# Patient Record
Sex: FEMALE | Race: WHITE | NOT HISPANIC OR LATINO
[De-identification: names, ages, dates, MRNs, and addresses within clinical notes are randomized per-mention and may not be internally consistent; named-entity substitution may affect disease eponyms.]

---

## 2017-02-10 ENCOUNTER — RESULT REVIEW (OUTPATIENT)
Age: 67
End: 2017-02-10

## 2017-02-15 ENCOUNTER — APPOINTMENT (OUTPATIENT)
Dept: MAMMOGRAPHY | Facility: IMAGING CENTER | Age: 67
End: 2017-02-15

## 2017-02-15 ENCOUNTER — APPOINTMENT (OUTPATIENT)
Dept: ULTRASOUND IMAGING | Facility: IMAGING CENTER | Age: 67
End: 2017-02-15

## 2017-02-16 ENCOUNTER — OUTPATIENT (OUTPATIENT)
Dept: OUTPATIENT SERVICES | Facility: HOSPITAL | Age: 67
LOS: 1 days | End: 2017-02-16
Payer: MEDICARE

## 2017-02-16 ENCOUNTER — APPOINTMENT (OUTPATIENT)
Dept: MRI IMAGING | Facility: IMAGING CENTER | Age: 67
End: 2017-02-16

## 2017-02-16 DIAGNOSIS — Z98.89 OTHER SPECIFIED POSTPROCEDURAL STATES: Chronic | ICD-10-CM

## 2017-02-16 DIAGNOSIS — Z00.8 ENCOUNTER FOR OTHER GENERAL EXAMINATION: ICD-10-CM

## 2017-02-16 PROCEDURE — C8908: CPT

## 2017-02-16 PROCEDURE — C8937: CPT

## 2017-02-16 PROCEDURE — A9585: CPT

## 2017-02-16 PROCEDURE — 82565 ASSAY OF CREATININE: CPT

## 2017-02-22 ENCOUNTER — OUTPATIENT (OUTPATIENT)
Dept: OUTPATIENT SERVICES | Facility: HOSPITAL | Age: 67
LOS: 1 days | Discharge: HOME | End: 2017-02-22

## 2017-02-22 DIAGNOSIS — Z98.89 OTHER SPECIFIED POSTPROCEDURAL STATES: Chronic | ICD-10-CM

## 2017-02-22 DIAGNOSIS — D05.10 INTRADUCTAL CARCINOMA IN SITU OF UNSPECIFIED BREAST: ICD-10-CM

## 2017-03-09 ENCOUNTER — APPOINTMENT (OUTPATIENT)
Dept: HEMATOLOGY ONCOLOGY | Facility: CLINIC | Age: 67
End: 2017-03-09

## 2017-03-31 ENCOUNTER — OUTPATIENT (OUTPATIENT)
Dept: OUTPATIENT SERVICES | Facility: HOSPITAL | Age: 67
LOS: 1 days | Discharge: HOME | End: 2017-03-31

## 2017-03-31 DIAGNOSIS — Z98.89 OTHER SPECIFIED POSTPROCEDURAL STATES: Chronic | ICD-10-CM

## 2017-03-31 DIAGNOSIS — D05.10 INTRADUCTAL CARCINOMA IN SITU OF UNSPECIFIED BREAST: ICD-10-CM

## 2017-04-02 ENCOUNTER — RESULT REVIEW (OUTPATIENT)
Age: 67
End: 2017-04-02

## 2017-04-03 ENCOUNTER — OTHER (OUTPATIENT)
Age: 67
End: 2017-04-03

## 2017-04-03 ENCOUNTER — APPOINTMENT (OUTPATIENT)
Dept: HEMATOLOGY ONCOLOGY | Facility: CLINIC | Age: 67
End: 2017-04-03

## 2017-04-03 VITALS
HEART RATE: 97 BPM | DIASTOLIC BLOOD PRESSURE: 73 MMHG | WEIGHT: 201.99 LBS | BODY MASS INDEX: 32.85 KG/M2 | OXYGEN SATURATION: 94 % | SYSTOLIC BLOOD PRESSURE: 109 MMHG | RESPIRATION RATE: 16 BRPM | TEMPERATURE: 98.2 F

## 2017-04-03 LAB
BASOPHILS # BLD AUTO: 0 K/UL — SIGNIFICANT CHANGE UP (ref 0–0.2)
BASOPHILS NFR BLD AUTO: 0.2 % — SIGNIFICANT CHANGE UP (ref 0–2)
EOSINOPHIL # BLD AUTO: 0.1 K/UL — SIGNIFICANT CHANGE UP (ref 0–0.5)
EOSINOPHIL NFR BLD AUTO: 0.5 % — SIGNIFICANT CHANGE UP (ref 0–6)
HCT VFR BLD CALC: 40.1 % — SIGNIFICANT CHANGE UP (ref 34.5–45)
HGB BLD-MCNC: 13.6 G/DL — SIGNIFICANT CHANGE UP (ref 11.5–15.5)
LYMPHOCYTES # BLD AUTO: 1.7 K/UL — SIGNIFICANT CHANGE UP (ref 1–3.3)
LYMPHOCYTES # BLD AUTO: 11.2 % — LOW (ref 13–44)
MCHC RBC-ENTMCNC: 29.4 PG — SIGNIFICANT CHANGE UP (ref 27–34)
MCHC RBC-ENTMCNC: 34.1 G/DL — SIGNIFICANT CHANGE UP (ref 32–36)
MCV RBC AUTO: 86.4 FL — SIGNIFICANT CHANGE UP (ref 80–100)
MONOCYTES # BLD AUTO: 0.6 K/UL — SIGNIFICANT CHANGE UP (ref 0–0.9)
MONOCYTES NFR BLD AUTO: 4.3 % — SIGNIFICANT CHANGE UP (ref 2–14)
NEUTROPHILS # BLD AUTO: 12.4 K/UL — HIGH (ref 1.8–7.4)
NEUTROPHILS NFR BLD AUTO: 83.9 % — HIGH (ref 43–77)
PLATELET # BLD AUTO: 197 K/UL — SIGNIFICANT CHANGE UP (ref 150–400)
RBC # BLD: 4.64 M/UL — SIGNIFICANT CHANGE UP (ref 3.8–5.2)
RBC # FLD: 11.9 % — SIGNIFICANT CHANGE UP (ref 10.3–14.5)
WBC # BLD: 14.8 K/UL — HIGH (ref 3.8–10.5)
WBC # FLD AUTO: 14.8 K/UL — HIGH (ref 3.8–10.5)

## 2017-04-17 ENCOUNTER — APPOINTMENT (OUTPATIENT)
Dept: ULTRASOUND IMAGING | Facility: HOSPITAL | Age: 67
End: 2017-04-17

## 2017-04-17 ENCOUNTER — APPOINTMENT (OUTPATIENT)
Dept: ULTRASOUND IMAGING | Facility: IMAGING CENTER | Age: 67
End: 2017-04-17

## 2017-06-02 ENCOUNTER — RESULT REVIEW (OUTPATIENT)
Age: 67
End: 2017-06-02

## 2017-06-02 LAB
ALBUMIN SERPL ELPH-MCNC: 3.9 G/DL
ALP BLD-CCNC: 119 U/L
ALT SERPL-CCNC: 29 U/L
ANION GAP SERPL CALC-SCNC: 15 MMOL/L
AST SERPL-CCNC: 24 U/L
BILIRUB SERPL-MCNC: 0.7 MG/DL
BUN SERPL-MCNC: 19 MG/DL
CALCIUM SERPL-MCNC: 9.5 MG/DL
CANCER AG15-3 SERPL-ACNC: 15.5 U/ML
CANCER AG27-29 SERPL-ACNC: 18.7 U/ML
CHLORIDE SERPL-SCNC: 101 MMOL/L
CO2 SERPL-SCNC: 21 MMOL/L
CREAT SERPL-MCNC: 0.85 MG/DL
GLUCOSE SERPL-MCNC: 106 MG/DL
LDH SERPL-CCNC: 225 U/L
POTASSIUM SERPL-SCNC: 4.5 MMOL/L
PROT SERPL-MCNC: 7 G/DL
SODIUM SERPL-SCNC: 137 MMOL/L

## 2017-07-07 ENCOUNTER — CHART COPY (OUTPATIENT)
Age: 67
End: 2017-07-07

## 2017-07-07 ENCOUNTER — APPOINTMENT (OUTPATIENT)
Dept: SURGERY | Facility: CLINIC | Age: 67
End: 2017-07-07

## 2017-07-07 VITALS
DIASTOLIC BLOOD PRESSURE: 88 MMHG | OXYGEN SATURATION: 97 % | HEART RATE: 78 BPM | RESPIRATION RATE: 16 BRPM | TEMPERATURE: 98.1 F | SYSTOLIC BLOOD PRESSURE: 154 MMHG

## 2017-07-07 DIAGNOSIS — Z82.49 FAMILY HISTORY OF ISCHEMIC HEART DISEASE AND OTHER DISEASES OF THE CIRCULATORY SYSTEM: ICD-10-CM

## 2017-07-07 RX ORDER — ANASTROZOLE TABLETS 1 MG/1
1 TABLET ORAL DAILY
Qty: 90 | Refills: 3 | Status: DISCONTINUED | COMMUNITY
Start: 2017-04-17 | End: 2017-07-07

## 2017-07-07 RX ORDER — CLONAZEPAM 1 MG/1
1 TABLET ORAL
Refills: 0 | Status: ACTIVE | COMMUNITY

## 2017-07-07 RX ORDER — AMLODIPINE BESYLATE 5 MG/1
TABLET ORAL
Refills: 0 | Status: ACTIVE | COMMUNITY

## 2017-07-07 RX ORDER — ZOLPIDEM TARTRATE 10 MG/1
10 TABLET, FILM COATED ORAL
Refills: 0 | Status: ACTIVE | COMMUNITY

## 2017-07-10 ENCOUNTER — OUTPATIENT (OUTPATIENT)
Dept: OUTPATIENT SERVICES | Facility: HOSPITAL | Age: 67
LOS: 1 days | Discharge: HOME | End: 2017-07-10

## 2017-07-10 ENCOUNTER — APPOINTMENT (OUTPATIENT)
Dept: HEMATOLOGY ONCOLOGY | Facility: CLINIC | Age: 67
End: 2017-07-10

## 2017-07-10 DIAGNOSIS — Z98.89 OTHER SPECIFIED POSTPROCEDURAL STATES: Chronic | ICD-10-CM

## 2017-07-10 DIAGNOSIS — D05.10 INTRADUCTAL CARCINOMA IN SITU OF UNSPECIFIED BREAST: ICD-10-CM

## 2017-07-11 ENCOUNTER — OTHER (OUTPATIENT)
Age: 67
End: 2017-07-11

## 2017-07-19 ENCOUNTER — RESULT REVIEW (OUTPATIENT)
Age: 67
End: 2017-07-19

## 2017-07-19 ENCOUNTER — APPOINTMENT (OUTPATIENT)
Dept: HEMATOLOGY ONCOLOGY | Facility: CLINIC | Age: 67
End: 2017-07-19

## 2017-07-19 VITALS
DIASTOLIC BLOOD PRESSURE: 79 MMHG | WEIGHT: 204.81 LBS | HEART RATE: 78 BPM | TEMPERATURE: 98 F | OXYGEN SATURATION: 97 % | SYSTOLIC BLOOD PRESSURE: 117 MMHG | RESPIRATION RATE: 16 BRPM | BODY MASS INDEX: 33.31 KG/M2

## 2017-07-19 LAB
BASOPHILS # BLD AUTO: 0 K/UL — SIGNIFICANT CHANGE UP (ref 0–0.2)
BASOPHILS NFR BLD AUTO: 0.1 % — SIGNIFICANT CHANGE UP (ref 0–2)
EOSINOPHIL # BLD AUTO: 0.1 K/UL — SIGNIFICANT CHANGE UP (ref 0–0.5)
EOSINOPHIL NFR BLD AUTO: 1.1 % — SIGNIFICANT CHANGE UP (ref 0–6)
HCT VFR BLD CALC: 43.5 % — SIGNIFICANT CHANGE UP (ref 34.5–45)
HGB BLD-MCNC: 15 G/DL — SIGNIFICANT CHANGE UP (ref 11.5–15.5)
LYMPHOCYTES # BLD AUTO: 2.4 K/UL — SIGNIFICANT CHANGE UP (ref 1–3.3)
LYMPHOCYTES # BLD AUTO: 29.8 % — SIGNIFICANT CHANGE UP (ref 13–44)
MCHC RBC-ENTMCNC: 29.1 PG — SIGNIFICANT CHANGE UP (ref 27–34)
MCHC RBC-ENTMCNC: 34.4 G/DL — SIGNIFICANT CHANGE UP (ref 32–36)
MCV RBC AUTO: 84.5 FL — SIGNIFICANT CHANGE UP (ref 80–100)
MONOCYTES # BLD AUTO: 0.6 K/UL — SIGNIFICANT CHANGE UP (ref 0–0.9)
MONOCYTES NFR BLD AUTO: 7.2 % — SIGNIFICANT CHANGE UP (ref 2–14)
NEUTROPHILS # BLD AUTO: 4.9 K/UL — SIGNIFICANT CHANGE UP (ref 1.8–7.4)
NEUTROPHILS NFR BLD AUTO: 61.8 % — SIGNIFICANT CHANGE UP (ref 43–77)
PLATELET # BLD AUTO: 218 K/UL — SIGNIFICANT CHANGE UP (ref 150–400)
RBC # BLD: 5.14 M/UL — SIGNIFICANT CHANGE UP (ref 3.8–5.2)
RBC # FLD: 13.9 % — SIGNIFICANT CHANGE UP (ref 10.3–14.5)
WBC # BLD: 8 K/UL — SIGNIFICANT CHANGE UP (ref 3.8–10.5)
WBC # FLD AUTO: 8 K/UL — SIGNIFICANT CHANGE UP (ref 3.8–10.5)

## 2017-07-24 ENCOUNTER — RESULT REVIEW (OUTPATIENT)
Age: 67
End: 2017-07-24

## 2017-07-24 ENCOUNTER — APPOINTMENT (OUTPATIENT)
Dept: HEMATOLOGY ONCOLOGY | Facility: CLINIC | Age: 67
End: 2017-07-24

## 2017-07-24 LAB
BASOPHILS # BLD AUTO: 0 K/UL — SIGNIFICANT CHANGE UP (ref 0–0.2)
BASOPHILS NFR BLD AUTO: 0.4 % — SIGNIFICANT CHANGE UP (ref 0–2)
EOSINOPHIL # BLD AUTO: 0.1 K/UL — SIGNIFICANT CHANGE UP (ref 0–0.5)
EOSINOPHIL NFR BLD AUTO: 1.2 % — SIGNIFICANT CHANGE UP (ref 0–6)
HCT VFR BLD CALC: 42.2 % — SIGNIFICANT CHANGE UP (ref 34.5–45)
HGB BLD-MCNC: 14.6 G/DL — SIGNIFICANT CHANGE UP (ref 11.5–15.5)
LYMPHOCYTES # BLD AUTO: 2.1 K/UL — SIGNIFICANT CHANGE UP (ref 1–3.3)
LYMPHOCYTES # BLD AUTO: 34.9 % — SIGNIFICANT CHANGE UP (ref 13–44)
MCHC RBC-ENTMCNC: 29.5 PG — SIGNIFICANT CHANGE UP (ref 27–34)
MCHC RBC-ENTMCNC: 34.7 G/DL — SIGNIFICANT CHANGE UP (ref 32–36)
MCV RBC AUTO: 85.1 FL — SIGNIFICANT CHANGE UP (ref 80–100)
MONOCYTES # BLD AUTO: 0.4 K/UL — SIGNIFICANT CHANGE UP (ref 0–0.9)
MONOCYTES NFR BLD AUTO: 6.8 % — SIGNIFICANT CHANGE UP (ref 2–14)
NEUTROPHILS # BLD AUTO: 3.5 K/UL — SIGNIFICANT CHANGE UP (ref 1.8–7.4)
NEUTROPHILS NFR BLD AUTO: 56.7 % — SIGNIFICANT CHANGE UP (ref 43–77)
PLATELET # BLD AUTO: 210 K/UL — SIGNIFICANT CHANGE UP (ref 150–400)
RBC # BLD: 4.95 M/UL — SIGNIFICANT CHANGE UP (ref 3.8–5.2)
RBC # FLD: 13.9 % — SIGNIFICANT CHANGE UP (ref 10.3–14.5)
WBC # BLD: 6.1 K/UL — SIGNIFICANT CHANGE UP (ref 3.8–10.5)
WBC # FLD AUTO: 6.1 K/UL — SIGNIFICANT CHANGE UP (ref 3.8–10.5)

## 2017-07-25 ENCOUNTER — RESULT REVIEW (OUTPATIENT)
Age: 67
End: 2017-07-25

## 2017-07-25 LAB
ALBUMIN SERPL ELPH-MCNC: 4.4 G/DL
ALP BLD-CCNC: 172 U/L
ALT SERPL-CCNC: 19 U/L
ANION GAP SERPL CALC-SCNC: 18 MMOL/L
AST SERPL-CCNC: 20 U/L
BILIRUB SERPL-MCNC: 0.3 MG/DL
BUN SERPL-MCNC: 14 MG/DL
CALCIUM SERPL-MCNC: 10 MG/DL
CANCER AG15-3 SERPL-ACNC: 16.7 U/ML
CANCER AG27-29 SERPL-ACNC: 16.8 U/ML
CHLORIDE SERPL-SCNC: 104 MMOL/L
CO2 SERPL-SCNC: 21 MMOL/L
CREAT SERPL-MCNC: 0.83 MG/DL
GLUCOSE SERPL-MCNC: 118 MG/DL
LDH SERPL-CCNC: 176 U/L
POTASSIUM SERPL-SCNC: 4.6 MMOL/L
PROT SERPL-MCNC: 7.3 G/DL
SODIUM SERPL-SCNC: 143 MMOL/L

## 2017-07-26 ENCOUNTER — APPOINTMENT (OUTPATIENT)
Dept: SURGERY | Facility: HOSPITAL | Age: 67
End: 2017-07-26

## 2017-07-28 LAB
ALP BLD-CCNC: 168 U/L
ALP BONE CFR SERPL: 61 %
ALP INTEST CFR SERPL: 0 %
ALP LIVER CFR SERPL: 39 %
ALP MACRO CFR SERPL: 0 %
ALP PLAC CFR SERPL: 0 %

## 2017-08-01 ENCOUNTER — FORM ENCOUNTER (OUTPATIENT)
Age: 67
End: 2017-08-01

## 2017-08-02 ENCOUNTER — APPOINTMENT (OUTPATIENT)
Dept: NUCLEAR MEDICINE | Facility: IMAGING CENTER | Age: 67
End: 2017-08-02
Payer: MEDICARE

## 2017-08-02 ENCOUNTER — OUTPATIENT (OUTPATIENT)
Dept: OUTPATIENT SERVICES | Facility: HOSPITAL | Age: 67
LOS: 1 days | End: 2017-08-02
Payer: MEDICARE

## 2017-08-02 ENCOUNTER — APPOINTMENT (OUTPATIENT)
Dept: MRI IMAGING | Facility: IMAGING CENTER | Age: 67
End: 2017-08-02
Payer: MEDICARE

## 2017-08-02 DIAGNOSIS — Z85.3 PERSONAL HISTORY OF MALIGNANT NEOPLASM OF BREAST: ICD-10-CM

## 2017-08-02 DIAGNOSIS — Z98.89 OTHER SPECIFIED POSTPROCEDURAL STATES: Chronic | ICD-10-CM

## 2017-08-02 PROCEDURE — 78816 PET IMAGE W/CT FULL BODY: CPT | Mod: 26,PS

## 2017-08-02 PROCEDURE — A9552: CPT

## 2017-08-02 PROCEDURE — 78816 PET IMAGE W/CT FULL BODY: CPT

## 2017-08-03 ENCOUNTER — APPOINTMENT (OUTPATIENT)
Dept: ULTRASOUND IMAGING | Facility: IMAGING CENTER | Age: 67
End: 2017-08-03
Payer: MEDICARE

## 2017-08-03 ENCOUNTER — APPOINTMENT (OUTPATIENT)
Dept: MAMMOGRAPHY | Facility: IMAGING CENTER | Age: 67
End: 2017-08-03
Payer: MEDICARE

## 2017-08-03 ENCOUNTER — RESULT REVIEW (OUTPATIENT)
Age: 67
End: 2017-08-03

## 2017-08-03 ENCOUNTER — OUTPATIENT (OUTPATIENT)
Dept: OUTPATIENT SERVICES | Facility: HOSPITAL | Age: 67
LOS: 1 days | End: 2017-08-03
Payer: MEDICARE

## 2017-08-03 DIAGNOSIS — Z00.8 ENCOUNTER FOR OTHER GENERAL EXAMINATION: ICD-10-CM

## 2017-08-03 DIAGNOSIS — Z98.89 OTHER SPECIFIED POSTPROCEDURAL STATES: Chronic | ICD-10-CM

## 2017-08-03 LAB
ALBUMIN SERPL ELPH-MCNC: 4.2 G/DL
ALP BLD-CCNC: 168 U/L
ALT SERPL-CCNC: 20 U/L
ANION GAP SERPL CALC-SCNC: 16 MMOL/L
AST SERPL-CCNC: 16 U/L
BILIRUB SERPL-MCNC: 0.3 MG/DL
BUN SERPL-MCNC: 21 MG/DL
CALCIUM SERPL-MCNC: 10.4 MG/DL
CANCER AG15-3 SERPL-ACNC: 18.3 U/ML
CANCER AG27-29 SERPL-ACNC: 20.5 U/ML
CHLORIDE SERPL-SCNC: 104 MMOL/L
CO2 SERPL-SCNC: 21 MMOL/L
CREAT SERPL-MCNC: 0.94 MG/DL
GLUCOSE SERPL-MCNC: 107 MG/DL
LDH SERPL-CCNC: 164 U/L
POTASSIUM SERPL-SCNC: 4.4 MMOL/L
PROT SERPL-MCNC: 7.3 G/DL
SODIUM SERPL-SCNC: 141 MMOL/L

## 2017-08-03 PROCEDURE — G0204: CPT | Mod: 26

## 2017-08-03 PROCEDURE — G0279: CPT | Mod: 26

## 2017-08-03 PROCEDURE — 76641 ULTRASOUND BREAST COMPLETE: CPT | Mod: 26,50

## 2017-08-03 PROCEDURE — 77066 DX MAMMO INCL CAD BI: CPT

## 2017-08-03 PROCEDURE — 76641 ULTRASOUND BREAST COMPLETE: CPT

## 2017-08-03 PROCEDURE — G0279: CPT

## 2017-08-11 ENCOUNTER — APPOINTMENT (OUTPATIENT)
Dept: SURGERY | Facility: HOSPITAL | Age: 67
End: 2017-08-11

## 2017-10-12 ENCOUNTER — OUTPATIENT (OUTPATIENT)
Dept: OUTPATIENT SERVICES | Facility: HOSPITAL | Age: 67
LOS: 1 days | Discharge: HOME | End: 2017-10-12

## 2017-10-12 DIAGNOSIS — D05.10 INTRADUCTAL CARCINOMA IN SITU OF UNSPECIFIED BREAST: ICD-10-CM

## 2017-10-12 DIAGNOSIS — Z98.89 OTHER SPECIFIED POSTPROCEDURAL STATES: Chronic | ICD-10-CM

## 2017-10-17 ENCOUNTER — APPOINTMENT (OUTPATIENT)
Dept: HEMATOLOGY ONCOLOGY | Facility: CLINIC | Age: 67
End: 2017-10-17
Payer: MEDICARE

## 2017-11-07 ENCOUNTER — APPOINTMENT (OUTPATIENT)
Dept: HEMATOLOGY ONCOLOGY | Facility: CLINIC | Age: 67
End: 2017-11-07
Payer: MEDICARE

## 2017-11-07 ENCOUNTER — RESULT REVIEW (OUTPATIENT)
Age: 67
End: 2017-11-07

## 2017-11-07 VITALS
WEIGHT: 210.54 LBS | SYSTOLIC BLOOD PRESSURE: 127 MMHG | RESPIRATION RATE: 16 BRPM | HEART RATE: 78 BPM | BODY MASS INDEX: 35.51 KG/M2 | OXYGEN SATURATION: 99 % | HEIGHT: 64.69 IN | TEMPERATURE: 97.4 F | DIASTOLIC BLOOD PRESSURE: 83 MMHG

## 2017-11-07 LAB
BASOPHILS # BLD AUTO: 0.1 K/UL — SIGNIFICANT CHANGE UP (ref 0–0.2)
BASOPHILS NFR BLD AUTO: 0.8 % — SIGNIFICANT CHANGE UP (ref 0–2)
EOSINOPHIL # BLD AUTO: 0.1 K/UL — SIGNIFICANT CHANGE UP (ref 0–0.5)
EOSINOPHIL NFR BLD AUTO: 1.2 % — SIGNIFICANT CHANGE UP (ref 0–6)
HCT VFR BLD CALC: 45.7 % — HIGH (ref 34.5–45)
HGB BLD-MCNC: 15.7 G/DL — HIGH (ref 11.5–15.5)
LYMPHOCYTES # BLD AUTO: 2.6 K/UL — SIGNIFICANT CHANGE UP (ref 1–3.3)
LYMPHOCYTES # BLD AUTO: 34.8 % — SIGNIFICANT CHANGE UP (ref 13–44)
MCHC RBC-ENTMCNC: 29.2 PG — SIGNIFICANT CHANGE UP (ref 27–34)
MCHC RBC-ENTMCNC: 34.4 G/DL — SIGNIFICANT CHANGE UP (ref 32–36)
MCV RBC AUTO: 85 FL — SIGNIFICANT CHANGE UP (ref 80–100)
MONOCYTES # BLD AUTO: 0.5 K/UL — SIGNIFICANT CHANGE UP (ref 0–0.9)
MONOCYTES NFR BLD AUTO: 6.3 % — SIGNIFICANT CHANGE UP (ref 2–14)
NEUTROPHILS # BLD AUTO: 4.2 K/UL — SIGNIFICANT CHANGE UP (ref 1.8–7.4)
NEUTROPHILS NFR BLD AUTO: 56.9 % — SIGNIFICANT CHANGE UP (ref 43–77)
PLATELET # BLD AUTO: 232 K/UL — SIGNIFICANT CHANGE UP (ref 150–400)
RBC # BLD: 5.38 M/UL — HIGH (ref 3.8–5.2)
RBC # FLD: 12.2 % — SIGNIFICANT CHANGE UP (ref 10.3–14.5)
WBC # BLD: 7.4 K/UL — SIGNIFICANT CHANGE UP (ref 3.8–10.5)
WBC # FLD AUTO: 7.4 K/UL — SIGNIFICANT CHANGE UP (ref 3.8–10.5)

## 2017-11-07 PROCEDURE — 99214 OFFICE O/P EST MOD 30 MIN: CPT

## 2017-11-08 LAB
ALBUMIN SERPL ELPH-MCNC: 4.1 G/DL
ALP BLD-CCNC: 157 U/L
ALT SERPL-CCNC: 20 U/L
ANION GAP SERPL CALC-SCNC: 13 MMOL/L
AST SERPL-CCNC: 22 U/L
BILIRUB SERPL-MCNC: 0.3 MG/DL
BUN SERPL-MCNC: 16 MG/DL
CALCIUM SERPL-MCNC: 9.8 MG/DL
CANCER AG15-3 SERPL-ACNC: 18.7 U/ML
CANCER AG27-29 SERPL-ACNC: 20 U/ML
CHLORIDE SERPL-SCNC: 101 MMOL/L
CO2 SERPL-SCNC: 24 MMOL/L
CREAT SERPL-MCNC: 0.74 MG/DL
GLUCOSE SERPL-MCNC: 114 MG/DL
LDH SERPL-CCNC: 203 U/L
POTASSIUM SERPL-SCNC: 4.4 MMOL/L
PROT SERPL-MCNC: 7.6 G/DL
SODIUM SERPL-SCNC: 138 MMOL/L

## 2017-11-13 ENCOUNTER — OUTPATIENT (OUTPATIENT)
Dept: OUTPATIENT SERVICES | Facility: HOSPITAL | Age: 67
LOS: 1 days | Discharge: HOME | End: 2017-11-13

## 2017-11-13 ENCOUNTER — APPOINTMENT (OUTPATIENT)
Dept: SURGERY | Facility: CLINIC | Age: 67
End: 2017-11-13
Payer: MEDICARE

## 2017-11-13 ENCOUNTER — RESULT REVIEW (OUTPATIENT)
Age: 67
End: 2017-11-13

## 2017-11-13 ENCOUNTER — APPOINTMENT (OUTPATIENT)
Dept: HEMATOLOGY ONCOLOGY | Facility: CLINIC | Age: 67
End: 2017-11-13

## 2017-11-13 DIAGNOSIS — R74.8 ABNORMAL LEVELS OF OTHER SERUM ENZYMES: ICD-10-CM

## 2017-11-13 DIAGNOSIS — Z98.89 OTHER SPECIFIED POSTPROCEDURAL STATES: Chronic | ICD-10-CM

## 2017-11-13 DIAGNOSIS — D05.10 INTRADUCTAL CARCINOMA IN SITU OF UNSPECIFIED BREAST: ICD-10-CM

## 2017-11-13 LAB
BASOPHILS # BLD AUTO: 0.1 K/UL — SIGNIFICANT CHANGE UP (ref 0–0.2)
BASOPHILS NFR BLD AUTO: 1.2 % — SIGNIFICANT CHANGE UP (ref 0–2)
EOSINOPHIL # BLD AUTO: 0.1 K/UL — SIGNIFICANT CHANGE UP (ref 0–0.5)
EOSINOPHIL NFR BLD AUTO: 0.9 % — SIGNIFICANT CHANGE UP (ref 0–6)
HCT VFR BLD CALC: 47.4 % — HIGH (ref 34.5–45)
HGB BLD-MCNC: 16.5 G/DL — HIGH (ref 11.5–15.5)
LYMPHOCYTES # BLD AUTO: 2.5 K/UL — SIGNIFICANT CHANGE UP (ref 1–3.3)
LYMPHOCYTES # BLD AUTO: 26.6 % — SIGNIFICANT CHANGE UP (ref 13–44)
MCHC RBC-ENTMCNC: 29.7 PG — SIGNIFICANT CHANGE UP (ref 27–34)
MCHC RBC-ENTMCNC: 34.8 G/DL — SIGNIFICANT CHANGE UP (ref 32–36)
MCV RBC AUTO: 85.5 FL — SIGNIFICANT CHANGE UP (ref 80–100)
MONOCYTES # BLD AUTO: 0.6 K/UL — SIGNIFICANT CHANGE UP (ref 0–0.9)
MONOCYTES NFR BLD AUTO: 6.8 % — SIGNIFICANT CHANGE UP (ref 2–14)
NEUTROPHILS # BLD AUTO: 6 K/UL — SIGNIFICANT CHANGE UP (ref 1.8–7.4)
NEUTROPHILS NFR BLD AUTO: 64.5 % — SIGNIFICANT CHANGE UP (ref 43–77)
PLATELET # BLD AUTO: 230 K/UL — SIGNIFICANT CHANGE UP (ref 150–400)
RBC # BLD: 5.54 M/UL — HIGH (ref 3.8–5.2)
RBC # FLD: 12.3 % — SIGNIFICANT CHANGE UP (ref 10.3–14.5)
WBC # BLD: 9.3 K/UL — SIGNIFICANT CHANGE UP (ref 3.8–10.5)
WBC # FLD AUTO: 9.3 K/UL — SIGNIFICANT CHANGE UP (ref 3.8–10.5)

## 2017-11-13 PROCEDURE — 99213K: CUSTOM

## 2017-11-16 LAB
ALP BLD-CCNC: 178 U/L
ALP BONE CFR SERPL: 57 %
ALP INTEST CFR SERPL: 0 %
ALP LIVER CFR SERPL: 43 %
ALP MACRO CFR SERPL: 0 %
ALP PLAC CFR SERPL: 0 %

## 2018-01-02 ENCOUNTER — EMERGENCY (EMERGENCY)
Facility: HOSPITAL | Age: 68
LOS: 1 days | Discharge: HOME | End: 2018-01-02
Attending: EMERGENCY MEDICINE | Admitting: EMERGENCY MEDICINE
Payer: MEDICARE

## 2018-01-02 VITALS
SYSTOLIC BLOOD PRESSURE: 133 MMHG | RESPIRATION RATE: 20 BRPM | OXYGEN SATURATION: 99 % | HEART RATE: 98 BPM | DIASTOLIC BLOOD PRESSURE: 87 MMHG | TEMPERATURE: 99 F

## 2018-01-02 DIAGNOSIS — Z98.89 OTHER SPECIFIED POSTPROCEDURAL STATES: Chronic | ICD-10-CM

## 2018-01-02 LAB
ALBUMIN SERPL ELPH-MCNC: 3.7 G/DL — SIGNIFICANT CHANGE UP (ref 3.3–5)
ALBUMIN SERPL ELPH-MCNC: 4.2 G/DL — SIGNIFICANT CHANGE UP (ref 3.3–5)
ALP SERPL-CCNC: 113 U/L — SIGNIFICANT CHANGE UP (ref 40–120)
ALP SERPL-CCNC: 122 U/L — HIGH (ref 40–120)
ALT FLD-CCNC: 44 U/L RC — SIGNIFICANT CHANGE UP (ref 10–45)
ALT FLD-CCNC: 55 U/L RC — HIGH (ref 10–45)
ANION GAP SERPL CALC-SCNC: 11 MMOL/L — SIGNIFICANT CHANGE UP (ref 5–17)
ANION GAP SERPL CALC-SCNC: 11 MMOL/L — SIGNIFICANT CHANGE UP (ref 5–17)
APPEARANCE UR: ABNORMAL
AST SERPL-CCNC: 29 U/L — SIGNIFICANT CHANGE UP (ref 10–40)
AST SERPL-CCNC: 70 U/L — HIGH (ref 10–40)
BASOPHILS # BLD AUTO: 0.1 K/UL — SIGNIFICANT CHANGE UP (ref 0–0.2)
BASOPHILS NFR BLD AUTO: 1 % — SIGNIFICANT CHANGE UP (ref 0–2)
BILIRUB SERPL-MCNC: 0.4 MG/DL — SIGNIFICANT CHANGE UP (ref 0.2–1.2)
BILIRUB SERPL-MCNC: 0.5 MG/DL — SIGNIFICANT CHANGE UP (ref 0.2–1.2)
BILIRUB UR-MCNC: NEGATIVE — SIGNIFICANT CHANGE UP
BUN SERPL-MCNC: 17 MG/DL — SIGNIFICANT CHANGE UP (ref 7–23)
BUN SERPL-MCNC: 18 MG/DL — SIGNIFICANT CHANGE UP (ref 7–23)
CALCIUM SERPL-MCNC: 10 MG/DL — SIGNIFICANT CHANGE UP (ref 8.4–10.5)
CALCIUM SERPL-MCNC: 9.4 MG/DL — SIGNIFICANT CHANGE UP (ref 8.4–10.5)
CHLORIDE SERPL-SCNC: 104 MMOL/L — SIGNIFICANT CHANGE UP (ref 96–108)
CHLORIDE SERPL-SCNC: 106 MMOL/L — SIGNIFICANT CHANGE UP (ref 96–108)
CO2 SERPL-SCNC: 24 MMOL/L — SIGNIFICANT CHANGE UP (ref 22–31)
CO2 SERPL-SCNC: 25 MMOL/L — SIGNIFICANT CHANGE UP (ref 22–31)
COLOR SPEC: YELLOW — SIGNIFICANT CHANGE UP
COMMENT - URINE: SIGNIFICANT CHANGE UP
CREAT SERPL-MCNC: 0.81 MG/DL — SIGNIFICANT CHANGE UP (ref 0.5–1.3)
CREAT SERPL-MCNC: 0.88 MG/DL — SIGNIFICANT CHANGE UP (ref 0.5–1.3)
DIFF PNL FLD: ABNORMAL
EOSINOPHIL # BLD AUTO: 0.1 K/UL — SIGNIFICANT CHANGE UP (ref 0–0.5)
EOSINOPHIL NFR BLD AUTO: 1.4 % — SIGNIFICANT CHANGE UP (ref 0–6)
EPI CELLS # UR: SIGNIFICANT CHANGE UP /HPF
GLUCOSE SERPL-MCNC: 100 MG/DL — HIGH (ref 70–99)
GLUCOSE SERPL-MCNC: 118 MG/DL — HIGH (ref 70–99)
GLUCOSE UR QL: NEGATIVE — SIGNIFICANT CHANGE UP
HCT VFR BLD CALC: 44.3 % — SIGNIFICANT CHANGE UP (ref 34.5–45)
HGB BLD-MCNC: 16.1 G/DL — HIGH (ref 11.5–15.5)
KETONES UR-MCNC: NEGATIVE — SIGNIFICANT CHANGE UP
LEUKOCYTE ESTERASE UR-ACNC: ABNORMAL
LIDOCAIN IGE QN: 63 U/L — HIGH (ref 7–60)
LYMPHOCYTES # BLD AUTO: 4.1 K/UL — HIGH (ref 1–3.3)
LYMPHOCYTES # BLD AUTO: 43.6 % — SIGNIFICANT CHANGE UP (ref 13–44)
MCHC RBC-ENTMCNC: 32 PG — SIGNIFICANT CHANGE UP (ref 27–34)
MCHC RBC-ENTMCNC: 36.3 GM/DL — HIGH (ref 32–36)
MCV RBC AUTO: 88 FL — SIGNIFICANT CHANGE UP (ref 80–100)
MONOCYTES # BLD AUTO: 0.6 K/UL — SIGNIFICANT CHANGE UP (ref 0–0.9)
MONOCYTES NFR BLD AUTO: 6.7 % — SIGNIFICANT CHANGE UP (ref 2–14)
NEUTROPHILS # BLD AUTO: 4.5 K/UL — SIGNIFICANT CHANGE UP (ref 1.8–7.4)
NEUTROPHILS NFR BLD AUTO: 47.3 % — SIGNIFICANT CHANGE UP (ref 43–77)
NITRITE UR-MCNC: NEGATIVE — SIGNIFICANT CHANGE UP
PH UR: 5.5 — SIGNIFICANT CHANGE UP (ref 5–8)
PLATELET # BLD AUTO: 261 K/UL — SIGNIFICANT CHANGE UP (ref 150–400)
POTASSIUM SERPL-MCNC: 3.7 MMOL/L — SIGNIFICANT CHANGE UP (ref 3.5–5.3)
POTASSIUM SERPL-MCNC: 6.8 MMOL/L — CRITICAL HIGH (ref 3.5–5.3)
POTASSIUM SERPL-SCNC: 3.7 MMOL/L — SIGNIFICANT CHANGE UP (ref 3.5–5.3)
POTASSIUM SERPL-SCNC: 6.8 MMOL/L — CRITICAL HIGH (ref 3.5–5.3)
PROT SERPL-MCNC: 6.3 G/DL — SIGNIFICANT CHANGE UP (ref 6–8.3)
PROT SERPL-MCNC: 8.2 G/DL — SIGNIFICANT CHANGE UP (ref 6–8.3)
PROT UR-MCNC: 30 MG/DL
RBC # BLD: 5.03 M/UL — SIGNIFICANT CHANGE UP (ref 3.8–5.2)
RBC # FLD: 12.3 % — SIGNIFICANT CHANGE UP (ref 10.3–14.5)
RBC CASTS # UR COMP ASSIST: ABNORMAL /HPF (ref 0–2)
SODIUM SERPL-SCNC: 139 MMOL/L — SIGNIFICANT CHANGE UP (ref 135–145)
SODIUM SERPL-SCNC: 142 MMOL/L — SIGNIFICANT CHANGE UP (ref 135–145)
SP GR SPEC: 1.02 — SIGNIFICANT CHANGE UP (ref 1.01–1.02)
UROBILINOGEN FLD QL: NEGATIVE — SIGNIFICANT CHANGE UP
WBC # BLD: 9.4 K/UL — SIGNIFICANT CHANGE UP (ref 3.8–10.5)
WBC # FLD AUTO: 9.4 K/UL — SIGNIFICANT CHANGE UP (ref 3.8–10.5)
WBC UR QL: >50 /HPF (ref 0–5)

## 2018-01-02 PROCEDURE — 93010 ELECTROCARDIOGRAM REPORT: CPT

## 2018-01-02 PROCEDURE — 99284 EMERGENCY DEPT VISIT MOD MDM: CPT | Mod: 25,GC

## 2018-01-02 RX ORDER — SODIUM CHLORIDE 9 MG/ML
1000 INJECTION INTRAMUSCULAR; INTRAVENOUS; SUBCUTANEOUS ONCE
Qty: 0 | Refills: 0 | Status: COMPLETED | OUTPATIENT
Start: 2018-01-02 | End: 2018-01-02

## 2018-01-02 RX ORDER — ONDANSETRON 8 MG/1
4 TABLET, FILM COATED ORAL ONCE
Qty: 0 | Refills: 0 | Status: COMPLETED | OUTPATIENT
Start: 2018-01-02 | End: 2018-01-02

## 2018-01-02 RX ORDER — CEFTRIAXONE 500 MG/1
1 INJECTION, POWDER, FOR SOLUTION INTRAMUSCULAR; INTRAVENOUS ONCE
Qty: 0 | Refills: 0 | Status: COMPLETED | OUTPATIENT
Start: 2018-01-02 | End: 2018-01-02

## 2018-01-02 RX ORDER — MORPHINE SULFATE 50 MG/1
4 CAPSULE, EXTENDED RELEASE ORAL ONCE
Qty: 0 | Refills: 0 | Status: DISCONTINUED | OUTPATIENT
Start: 2018-01-02 | End: 2018-01-02

## 2018-01-02 RX ADMIN — MORPHINE SULFATE 4 MILLIGRAM(S): 50 CAPSULE, EXTENDED RELEASE ORAL at 23:41

## 2018-01-02 RX ADMIN — SODIUM CHLORIDE 3000 MILLILITER(S): 9 INJECTION INTRAMUSCULAR; INTRAVENOUS; SUBCUTANEOUS at 21:53

## 2018-01-02 RX ADMIN — MORPHINE SULFATE 4 MILLIGRAM(S): 50 CAPSULE, EXTENDED RELEASE ORAL at 21:53

## 2018-01-02 RX ADMIN — CEFTRIAXONE 100 GRAM(S): 500 INJECTION, POWDER, FOR SOLUTION INTRAMUSCULAR; INTRAVENOUS at 23:41

## 2018-01-02 RX ADMIN — ONDANSETRON 4 MILLIGRAM(S): 8 TABLET, FILM COATED ORAL at 21:54

## 2018-01-02 NOTE — ED ADULT NURSE NOTE - PSH
History of lumpectomy  left 2002  & right 2008 both time treated with chemotherapy & radiation therapy  S/P D&C (status post dilation and curettage)

## 2018-01-02 NOTE — ED PROVIDER NOTE - OBJECTIVE STATEMENT
67 year old F w/ hx of breast CA s/p lumpectomy, HTN, anxiety, insomnia, depression, hypothyroidism, bipolar disorder presenting w/ 4 day history of intermittent R sided abdominal pain. Patient describes vague R sided pain, RLQ> RUQ and involving R flank. No relationship to food. No fever, chills, nausea, vomiting. Patient visited [facility] for similar symptoms in April, CT scan and US showed gallstones without cholecystitis, pain resolved with morphine. No diarrhea, constipation. No urinary symptoms, cough, shortness of breath.

## 2018-01-02 NOTE — ED PROVIDER NOTE - ATTENDING CONTRIBUTION TO CARE
Dr. Walls : I have personally seen and examined this patient at the bedside. I have fully participated in the care of this patient. I have reviewed all pertinent clinical information, including history, physical exam, plan and the Resident's note and agree except as noted.     68yo F w/ hx of breast CA s/p lumpectomy, HTN, anxiety, insomnia, depression, hypothyroidism, bipolar disorder presenting intermittent ruq and rlq pain. notes that feels similar to her "gallbladder attack - time when she had gallstone pain". pain not worse after food. notes that pain is getting more constant today.  Denies f/c/n/v/cp/sob/palpitations/cough//d/c/dysuria/hematuria. no sick contacts/recent travel.    PE:  head; atraumatic normocephalic  eyes: perrla  Heart: rrr s1s2  lungs: ctab  abd: soft, rlq ttp; mild epigastric discomfort; neg murphys, nd + bs no rebound/guarding no cva ttp  le: no swelling no calf ttp  back: no midline cervical/thoracic/lumbar ttp    -->rlq ttp will fu ct abd to eval for appy labs analgesia; ruq bedside sono; ekg--reassess

## 2018-01-02 NOTE — ED ADULT NURSE NOTE - OBJECTIVE STATEMENT
67 y,o female w. PMH of breast CA s/p lumpectomy, HTN, anxiety, insomnia, depression, hypothyroidism, bipolar disorder presenting w/ 4 day history of intermittent R sided abdominal pain. Pt denies any CP, SOB fevers, chills, nausea, vomiting. Patient visited [facility] for similar symptoms in April, CT scan and US showed gallstones. No acute distress noted at this time, safety and comfort maintained. No acute distress noted at this time.

## 2018-01-02 NOTE — ED PROVIDER NOTE - PROGRESS NOTE DETAILS
Dr. Chavez: Pt signed out to me pending CTAP for abd pain. CT neg. UA c/w infection. Plan to DC with rx for cefpodoxime per sign out.

## 2018-01-02 NOTE — ED ADULT NURSE NOTE - PMH
Abnormal finding  breast  Acid reflux    Affective Bipolar Disorder    Benzodiazepine Dependence    Carcinoma in situ  breast,bilateal lumpectomies s/p chemo  Depression    Hypertension    Hypothyroidism    Insomnia

## 2018-01-03 VITALS
HEART RATE: 80 BPM | OXYGEN SATURATION: 99 % | TEMPERATURE: 98 F | RESPIRATION RATE: 19 BRPM | SYSTOLIC BLOOD PRESSURE: 125 MMHG | DIASTOLIC BLOOD PRESSURE: 78 MMHG

## 2018-01-03 PROCEDURE — 85027 COMPLETE CBC AUTOMATED: CPT

## 2018-01-03 PROCEDURE — 96375 TX/PRO/DX INJ NEW DRUG ADDON: CPT

## 2018-01-03 PROCEDURE — 83690 ASSAY OF LIPASE: CPT

## 2018-01-03 PROCEDURE — 93005 ELECTROCARDIOGRAM TRACING: CPT

## 2018-01-03 PROCEDURE — 76705 ECHO EXAM OF ABDOMEN: CPT | Mod: 26,RT

## 2018-01-03 PROCEDURE — 80053 COMPREHEN METABOLIC PANEL: CPT

## 2018-01-03 PROCEDURE — 74177 CT ABD & PELVIS W/CONTRAST: CPT

## 2018-01-03 PROCEDURE — 87186 SC STD MICRODIL/AGAR DIL: CPT

## 2018-01-03 PROCEDURE — 76705 ECHO EXAM OF ABDOMEN: CPT

## 2018-01-03 PROCEDURE — 74177 CT ABD & PELVIS W/CONTRAST: CPT | Mod: 26

## 2018-01-03 PROCEDURE — 81001 URINALYSIS AUTO W/SCOPE: CPT

## 2018-01-03 PROCEDURE — 87086 URINE CULTURE/COLONY COUNT: CPT

## 2018-01-03 PROCEDURE — 96374 THER/PROPH/DIAG INJ IV PUSH: CPT | Mod: XU

## 2018-01-03 PROCEDURE — 99284 EMERGENCY DEPT VISIT MOD MDM: CPT | Mod: 25

## 2018-01-03 RX ORDER — CEFPODOXIME PROXETIL 100 MG
1 TABLET ORAL
Qty: 14 | Refills: 0 | OUTPATIENT
Start: 2018-01-03 | End: 2018-01-09

## 2018-02-12 ENCOUNTER — OUTPATIENT (OUTPATIENT)
Dept: OUTPATIENT SERVICES | Facility: HOSPITAL | Age: 68
LOS: 1 days | Discharge: HOME | End: 2018-02-12

## 2018-02-12 ENCOUNTER — LABORATORY RESULT (OUTPATIENT)
Age: 68
End: 2018-02-12

## 2018-02-12 ENCOUNTER — APPOINTMENT (OUTPATIENT)
Dept: HEMATOLOGY ONCOLOGY | Facility: CLINIC | Age: 68
End: 2018-02-12

## 2018-02-12 ENCOUNTER — RESULT REVIEW (OUTPATIENT)
Age: 68
End: 2018-02-12

## 2018-02-12 DIAGNOSIS — Z98.89 OTHER SPECIFIED POSTPROCEDURAL STATES: Chronic | ICD-10-CM

## 2018-02-12 DIAGNOSIS — D05.10 INTRADUCTAL CARCINOMA IN SITU OF UNSPECIFIED BREAST: ICD-10-CM

## 2018-02-12 LAB
BASOPHILS # BLD AUTO: 0.1 K/UL — SIGNIFICANT CHANGE UP (ref 0–0.2)
BASOPHILS NFR BLD AUTO: 0.9 % — SIGNIFICANT CHANGE UP (ref 0–2)
EOSINOPHIL # BLD AUTO: 0.1 K/UL — SIGNIFICANT CHANGE UP (ref 0–0.5)
EOSINOPHIL NFR BLD AUTO: 1.2 % — SIGNIFICANT CHANGE UP (ref 0–6)
HCT VFR BLD CALC: 43.5 % — SIGNIFICANT CHANGE UP (ref 34.5–45)
HGB BLD-MCNC: 15.4 G/DL — SIGNIFICANT CHANGE UP (ref 11.5–15.5)
LYMPHOCYTES # BLD AUTO: 2.7 K/UL — SIGNIFICANT CHANGE UP (ref 1–3.3)
LYMPHOCYTES # BLD AUTO: 35.5 % — SIGNIFICANT CHANGE UP (ref 13–44)
MCHC RBC-ENTMCNC: 29.7 PG — SIGNIFICANT CHANGE UP (ref 27–34)
MCHC RBC-ENTMCNC: 35.4 G/DL — SIGNIFICANT CHANGE UP (ref 32–36)
MCV RBC AUTO: 84 FL — SIGNIFICANT CHANGE UP (ref 80–100)
MONOCYTES # BLD AUTO: 0.6 K/UL — SIGNIFICANT CHANGE UP (ref 0–0.9)
MONOCYTES NFR BLD AUTO: 7.4 % — SIGNIFICANT CHANGE UP (ref 2–14)
NEUTROPHILS # BLD AUTO: 4.2 K/UL — SIGNIFICANT CHANGE UP (ref 1.8–7.4)
NEUTROPHILS NFR BLD AUTO: 54.9 % — SIGNIFICANT CHANGE UP (ref 43–77)
PLATELET # BLD AUTO: 185 K/UL — SIGNIFICANT CHANGE UP (ref 150–400)
RBC # BLD: 5.18 M/UL — SIGNIFICANT CHANGE UP (ref 3.8–5.2)
RBC # FLD: 12.1 % — SIGNIFICANT CHANGE UP (ref 10.3–14.5)
WBC # BLD: 7.6 K/UL — SIGNIFICANT CHANGE UP (ref 3.8–10.5)
WBC # FLD AUTO: 7.6 K/UL — SIGNIFICANT CHANGE UP (ref 3.8–10.5)

## 2018-03-01 ENCOUNTER — APPOINTMENT (OUTPATIENT)
Dept: HEMATOLOGY ONCOLOGY | Facility: CLINIC | Age: 68
End: 2018-03-01

## 2018-03-09 ENCOUNTER — APPOINTMENT (OUTPATIENT)
Dept: MRI IMAGING | Facility: IMAGING CENTER | Age: 68
End: 2018-03-09
Payer: MEDICARE

## 2018-03-09 ENCOUNTER — OUTPATIENT (OUTPATIENT)
Dept: OUTPATIENT SERVICES | Facility: HOSPITAL | Age: 68
LOS: 1 days | End: 2018-03-09
Payer: MEDICARE

## 2018-03-09 DIAGNOSIS — Z98.89 OTHER SPECIFIED POSTPROCEDURAL STATES: Chronic | ICD-10-CM

## 2018-03-09 DIAGNOSIS — Z00.8 ENCOUNTER FOR OTHER GENERAL EXAMINATION: ICD-10-CM

## 2018-03-09 PROCEDURE — 0159T: CPT | Mod: 26

## 2018-03-09 PROCEDURE — 77059 MRI BREAST BILATERAL: CPT | Mod: 26

## 2018-03-09 PROCEDURE — C8908: CPT

## 2018-03-09 PROCEDURE — A9585: CPT

## 2018-03-09 PROCEDURE — C8937: CPT

## 2018-03-20 ENCOUNTER — OUTPATIENT (OUTPATIENT)
Dept: OUTPATIENT SERVICES | Facility: HOSPITAL | Age: 68
LOS: 1 days | Discharge: HOME | End: 2018-03-20

## 2018-03-20 DIAGNOSIS — D05.10 INTRADUCTAL CARCINOMA IN SITU OF UNSPECIFIED BREAST: ICD-10-CM

## 2018-03-20 DIAGNOSIS — Z98.89 OTHER SPECIFIED POSTPROCEDURAL STATES: Chronic | ICD-10-CM

## 2018-03-22 ENCOUNTER — APPOINTMENT (OUTPATIENT)
Dept: HEMATOLOGY ONCOLOGY | Facility: CLINIC | Age: 68
End: 2018-03-22
Payer: MEDICARE

## 2018-03-22 VITALS
TEMPERATURE: 97.9 F | OXYGEN SATURATION: 95 % | WEIGHT: 201.99 LBS | HEART RATE: 99 BPM | BODY MASS INDEX: 33.94 KG/M2 | SYSTOLIC BLOOD PRESSURE: 107 MMHG | RESPIRATION RATE: 16 BRPM | DIASTOLIC BLOOD PRESSURE: 76 MMHG

## 2018-03-22 PROCEDURE — 99215 OFFICE O/P EST HI 40 MIN: CPT

## 2018-06-21 ENCOUNTER — OUTPATIENT (OUTPATIENT)
Dept: OUTPATIENT SERVICES | Facility: HOSPITAL | Age: 68
LOS: 1 days | Discharge: HOME | End: 2018-06-21

## 2018-06-21 DIAGNOSIS — Z98.89 OTHER SPECIFIED POSTPROCEDURAL STATES: Chronic | ICD-10-CM

## 2018-06-21 DIAGNOSIS — D05.10 INTRADUCTAL CARCINOMA IN SITU OF UNSPECIFIED BREAST: ICD-10-CM

## 2018-06-25 ENCOUNTER — APPOINTMENT (OUTPATIENT)
Dept: HEMATOLOGY ONCOLOGY | Facility: CLINIC | Age: 68
End: 2018-06-25

## 2018-07-11 ENCOUNTER — RESULT REVIEW (OUTPATIENT)
Age: 68
End: 2018-07-11

## 2018-07-11 ENCOUNTER — APPOINTMENT (OUTPATIENT)
Dept: HEMATOLOGY ONCOLOGY | Facility: CLINIC | Age: 68
End: 2018-07-11
Payer: MEDICARE

## 2018-07-11 VITALS
DIASTOLIC BLOOD PRESSURE: 73 MMHG | OXYGEN SATURATION: 95 % | WEIGHT: 200 LBS | RESPIRATION RATE: 16 BRPM | HEART RATE: 73 BPM | BODY MASS INDEX: 33.6 KG/M2 | TEMPERATURE: 97.9 F | SYSTOLIC BLOOD PRESSURE: 105 MMHG

## 2018-07-11 LAB
BASOPHILS # BLD AUTO: 0.1 K/UL — SIGNIFICANT CHANGE UP (ref 0–0.2)
BASOPHILS NFR BLD AUTO: 1.1 % — SIGNIFICANT CHANGE UP (ref 0–2)
EOSINOPHIL # BLD AUTO: 0.1 K/UL — SIGNIFICANT CHANGE UP (ref 0–0.5)
EOSINOPHIL NFR BLD AUTO: 1.4 % — SIGNIFICANT CHANGE UP (ref 0–6)
HCT VFR BLD CALC: 45 % — SIGNIFICANT CHANGE UP (ref 34.5–45)
HGB BLD-MCNC: 15.3 G/DL — SIGNIFICANT CHANGE UP (ref 11.5–15.5)
LYMPHOCYTES # BLD AUTO: 2.7 K/UL — SIGNIFICANT CHANGE UP (ref 1–3.3)
LYMPHOCYTES # BLD AUTO: 39.2 % — SIGNIFICANT CHANGE UP (ref 13–44)
MCHC RBC-ENTMCNC: 28.8 PG — SIGNIFICANT CHANGE UP (ref 27–34)
MCHC RBC-ENTMCNC: 34 G/DL — SIGNIFICANT CHANGE UP (ref 32–36)
MCV RBC AUTO: 84.8 FL — SIGNIFICANT CHANGE UP (ref 80–100)
MONOCYTES # BLD AUTO: 0.5 K/UL — SIGNIFICANT CHANGE UP (ref 0–0.9)
MONOCYTES NFR BLD AUTO: 8.1 % — SIGNIFICANT CHANGE UP (ref 2–14)
NEUTROPHILS # BLD AUTO: 3.4 K/UL — SIGNIFICANT CHANGE UP (ref 1.8–7.4)
NEUTROPHILS NFR BLD AUTO: 50.2 % — SIGNIFICANT CHANGE UP (ref 43–77)
PLATELET # BLD AUTO: 217 K/UL — SIGNIFICANT CHANGE UP (ref 150–400)
RBC # BLD: 5.31 M/UL — HIGH (ref 3.8–5.2)
RBC # FLD: 12.6 % — SIGNIFICANT CHANGE UP (ref 10.3–14.5)
WBC # BLD: 6.8 K/UL — SIGNIFICANT CHANGE UP (ref 3.8–10.5)
WBC # FLD AUTO: 6.8 K/UL — SIGNIFICANT CHANGE UP (ref 3.8–10.5)

## 2018-07-11 PROCEDURE — 99214 OFFICE O/P EST MOD 30 MIN: CPT

## 2018-07-16 LAB
ALBUMIN SERPL ELPH-MCNC: 4.2 G/DL
ALP BLD-CCNC: 168 U/L
ALT SERPL-CCNC: 19 U/L
ANION GAP SERPL CALC-SCNC: 15 MMOL/L
AST SERPL-CCNC: 16 U/L
BILIRUB SERPL-MCNC: 0.3 MG/DL
BUN SERPL-MCNC: 12 MG/DL
CALCIUM SERPL-MCNC: 10 MG/DL
CANCER AG15-3 SERPL-ACNC: 17 U/ML
CHLORIDE SERPL-SCNC: 102 MMOL/L
CO2 SERPL-SCNC: 23 MMOL/L
CREAT SERPL-MCNC: 0.76 MG/DL
GLUCOSE SERPL-MCNC: 104 MG/DL
LDH SERPL-CCNC: 172 U/L
POTASSIUM SERPL-SCNC: 4.3 MMOL/L
PROT SERPL-MCNC: 7.1 G/DL
SODIUM SERPL-SCNC: 140 MMOL/L

## 2018-08-15 ENCOUNTER — OUTPATIENT (OUTPATIENT)
Dept: OUTPATIENT SERVICES | Facility: HOSPITAL | Age: 68
LOS: 1 days | End: 2018-08-15
Payer: MEDICARE

## 2018-08-15 ENCOUNTER — APPOINTMENT (OUTPATIENT)
Dept: MAMMOGRAPHY | Facility: IMAGING CENTER | Age: 68
End: 2018-08-15
Payer: MEDICARE

## 2018-08-15 ENCOUNTER — APPOINTMENT (OUTPATIENT)
Dept: ULTRASOUND IMAGING | Facility: IMAGING CENTER | Age: 68
End: 2018-08-15
Payer: MEDICARE

## 2018-08-15 DIAGNOSIS — Z98.89 OTHER SPECIFIED POSTPROCEDURAL STATES: Chronic | ICD-10-CM

## 2018-08-15 DIAGNOSIS — Z00.00 ENCOUNTER FOR GENERAL ADULT MEDICAL EXAMINATION WITHOUT ABNORMAL FINDINGS: ICD-10-CM

## 2018-08-15 PROCEDURE — 77066 DX MAMMO INCL CAD BI: CPT | Mod: 26

## 2018-08-15 PROCEDURE — G0279: CPT | Mod: 26

## 2018-08-15 PROCEDURE — G0279: CPT

## 2018-08-15 PROCEDURE — 77066 DX MAMMO INCL CAD BI: CPT

## 2018-08-23 ENCOUNTER — APPOINTMENT (OUTPATIENT)
Dept: ULTRASOUND IMAGING | Facility: IMAGING CENTER | Age: 68
End: 2018-08-23
Payer: MEDICARE

## 2018-08-23 ENCOUNTER — APPOINTMENT (OUTPATIENT)
Dept: MAMMOGRAPHY | Facility: IMAGING CENTER | Age: 68
End: 2018-08-23
Payer: MEDICARE

## 2018-08-23 ENCOUNTER — OUTPATIENT (OUTPATIENT)
Dept: OUTPATIENT SERVICES | Facility: HOSPITAL | Age: 68
LOS: 1 days | End: 2018-08-23
Payer: MEDICARE

## 2018-08-23 DIAGNOSIS — Z98.89 OTHER SPECIFIED POSTPROCEDURAL STATES: Chronic | ICD-10-CM

## 2018-08-23 DIAGNOSIS — Z00.8 ENCOUNTER FOR OTHER GENERAL EXAMINATION: ICD-10-CM

## 2018-08-23 PROCEDURE — 77065 DX MAMMO INCL CAD UNI: CPT

## 2018-08-23 PROCEDURE — G0279: CPT | Mod: 26

## 2018-08-23 PROCEDURE — 77065 DX MAMMO INCL CAD UNI: CPT | Mod: 26,RT

## 2018-08-23 PROCEDURE — 76641 ULTRASOUND BREAST COMPLETE: CPT

## 2018-08-23 PROCEDURE — 76641 ULTRASOUND BREAST COMPLETE: CPT | Mod: 26,50

## 2018-08-23 PROCEDURE — G0279: CPT

## 2018-10-31 ENCOUNTER — OUTPATIENT (OUTPATIENT)
Dept: OUTPATIENT SERVICES | Facility: HOSPITAL | Age: 68
LOS: 1 days | Discharge: HOME | End: 2018-10-31

## 2018-10-31 DIAGNOSIS — Z98.89 OTHER SPECIFIED POSTPROCEDURAL STATES: Chronic | ICD-10-CM

## 2018-10-31 DIAGNOSIS — D05.10 INTRADUCTAL CARCINOMA IN SITU OF UNSPECIFIED BREAST: ICD-10-CM

## 2018-11-19 ENCOUNTER — EMERGENCY (EMERGENCY)
Facility: HOSPITAL | Age: 68
LOS: 1 days | Discharge: HOME | End: 2018-11-19
Attending: EMERGENCY MEDICINE | Admitting: EMERGENCY MEDICINE
Payer: MEDICARE

## 2018-11-19 VITALS
OXYGEN SATURATION: 100 % | DIASTOLIC BLOOD PRESSURE: 94 MMHG | SYSTOLIC BLOOD PRESSURE: 127 MMHG | HEART RATE: 91 BPM | RESPIRATION RATE: 15 BRPM

## 2018-11-19 VITALS
OXYGEN SATURATION: 100 % | DIASTOLIC BLOOD PRESSURE: 111 MMHG | RESPIRATION RATE: 16 BRPM | SYSTOLIC BLOOD PRESSURE: 142 MMHG | HEART RATE: 103 BPM | TEMPERATURE: 99 F

## 2018-11-19 DIAGNOSIS — Z98.89 OTHER SPECIFIED POSTPROCEDURAL STATES: Chronic | ICD-10-CM

## 2018-11-19 DIAGNOSIS — F43.21 ADJUSTMENT DISORDER WITH DEPRESSED MOOD: ICD-10-CM

## 2018-11-19 DIAGNOSIS — F13.10 SEDATIVE, HYPNOTIC OR ANXIOLYTIC ABUSE, UNCOMPLICATED: ICD-10-CM

## 2018-11-19 LAB
ALBUMIN SERPL ELPH-MCNC: 4.1 G/DL — SIGNIFICANT CHANGE UP (ref 3.3–5)
ALBUMIN SERPL ELPH-MCNC: 4.1 G/DL — SIGNIFICANT CHANGE UP (ref 3.3–5)
ALP SERPL-CCNC: 161 U/L — HIGH (ref 40–120)
ALP SERPL-CCNC: 163 U/L — HIGH (ref 40–120)
ALT FLD-CCNC: 27 U/L — SIGNIFICANT CHANGE UP (ref 4–33)
ALT FLD-CCNC: 28 U/L — SIGNIFICANT CHANGE UP (ref 4–33)
APAP SERPL-MCNC: < 15 UG/ML — LOW (ref 15–25)
AST SERPL-CCNC: 28 U/L — SIGNIFICANT CHANGE UP (ref 4–32)
AST SERPL-CCNC: 39 U/L — HIGH (ref 4–32)
BASE EXCESS BLDV CALC-SCNC: 3.3 MMOL/L — SIGNIFICANT CHANGE UP
BASOPHILS # BLD AUTO: 0.04 K/UL — SIGNIFICANT CHANGE UP (ref 0–0.2)
BASOPHILS NFR BLD AUTO: 0.6 % — SIGNIFICANT CHANGE UP (ref 0–2)
BILIRUB SERPL-MCNC: 0.4 MG/DL — SIGNIFICANT CHANGE UP (ref 0.2–1.2)
BILIRUB SERPL-MCNC: 0.4 MG/DL — SIGNIFICANT CHANGE UP (ref 0.2–1.2)
BLOOD GAS VENOUS - CREATININE: 0.67 MG/DL — SIGNIFICANT CHANGE UP (ref 0.5–1.3)
BUN SERPL-MCNC: 15 MG/DL — SIGNIFICANT CHANGE UP (ref 7–23)
BUN SERPL-MCNC: 15 MG/DL — SIGNIFICANT CHANGE UP (ref 7–23)
CALCIUM SERPL-MCNC: 10.4 MG/DL — SIGNIFICANT CHANGE UP (ref 8.4–10.5)
CALCIUM SERPL-MCNC: 10.5 MG/DL — SIGNIFICANT CHANGE UP (ref 8.4–10.5)
CHLORIDE BLDV-SCNC: 108 MMOL/L — SIGNIFICANT CHANGE UP (ref 96–108)
CHLORIDE SERPL-SCNC: 103 MMOL/L — SIGNIFICANT CHANGE UP (ref 98–107)
CHLORIDE SERPL-SCNC: 103 MMOL/L — SIGNIFICANT CHANGE UP (ref 98–107)
CO2 SERPL-SCNC: 24 MMOL/L — SIGNIFICANT CHANGE UP (ref 22–31)
CO2 SERPL-SCNC: 24 MMOL/L — SIGNIFICANT CHANGE UP (ref 22–31)
CREAT SERPL-MCNC: 0.72 MG/DL — SIGNIFICANT CHANGE UP (ref 0.5–1.3)
CREAT SERPL-MCNC: 0.73 MG/DL — SIGNIFICANT CHANGE UP (ref 0.5–1.3)
EOSINOPHIL # BLD AUTO: 0.09 K/UL — SIGNIFICANT CHANGE UP (ref 0–0.5)
EOSINOPHIL NFR BLD AUTO: 1.3 % — SIGNIFICANT CHANGE UP (ref 0–6)
ETHANOL BLD-MCNC: < 10 MG/DL — SIGNIFICANT CHANGE UP
GAS PNL BLDV: 136 MMOL/L — SIGNIFICANT CHANGE UP (ref 136–146)
GLUCOSE BLDV-MCNC: 98 — SIGNIFICANT CHANGE UP (ref 70–99)
GLUCOSE SERPL-MCNC: 103 MG/DL — HIGH (ref 70–99)
GLUCOSE SERPL-MCNC: 103 MG/DL — HIGH (ref 70–99)
HCO3 BLDV-SCNC: 25 MMOL/L — SIGNIFICANT CHANGE UP (ref 20–27)
HCT VFR BLD CALC: 45.5 % — HIGH (ref 34.5–45)
HCT VFR BLDV CALC: 48 % — HIGH (ref 34.5–45)
HGB BLD-MCNC: 15.2 G/DL — SIGNIFICANT CHANGE UP (ref 11.5–15.5)
HGB BLDV-MCNC: 15.7 G/DL — HIGH (ref 11.5–15.5)
IMM GRANULOCYTES # BLD AUTO: 0.02 # — SIGNIFICANT CHANGE UP
IMM GRANULOCYTES NFR BLD AUTO: 0.3 % — SIGNIFICANT CHANGE UP (ref 0–1.5)
LACTATE BLDV-MCNC: 2 MMOL/L — SIGNIFICANT CHANGE UP (ref 0.5–2)
LYMPHOCYTES # BLD AUTO: 3.13 K/UL — SIGNIFICANT CHANGE UP (ref 1–3.3)
LYMPHOCYTES # BLD AUTO: 43.7 % — SIGNIFICANT CHANGE UP (ref 13–44)
MCHC RBC-ENTMCNC: 28.9 PG — SIGNIFICANT CHANGE UP (ref 27–34)
MCHC RBC-ENTMCNC: 33.4 % — SIGNIFICANT CHANGE UP (ref 32–36)
MCV RBC AUTO: 86.5 FL — SIGNIFICANT CHANGE UP (ref 80–100)
MONOCYTES # BLD AUTO: 0.5 K/UL — SIGNIFICANT CHANGE UP (ref 0–0.9)
MONOCYTES NFR BLD AUTO: 7 % — SIGNIFICANT CHANGE UP (ref 2–14)
NEUTROPHILS # BLD AUTO: 3.39 K/UL — SIGNIFICANT CHANGE UP (ref 1.8–7.4)
NEUTROPHILS NFR BLD AUTO: 47.1 % — SIGNIFICANT CHANGE UP (ref 43–77)
NRBC # FLD: 0 — SIGNIFICANT CHANGE UP
PCO2 BLDV: 48 MMHG — SIGNIFICANT CHANGE UP (ref 41–51)
PH BLDV: 7.39 PH — SIGNIFICANT CHANGE UP (ref 7.32–7.43)
PLATELET # BLD AUTO: 242 K/UL — SIGNIFICANT CHANGE UP (ref 150–400)
PMV BLD: 10 FL — SIGNIFICANT CHANGE UP (ref 7–13)
PO2 BLDV: 28 MMHG — LOW (ref 35–40)
POTASSIUM BLDV-SCNC: 4.9 MMOL/L — HIGH (ref 3.4–4.5)
POTASSIUM SERPL-MCNC: 3.8 MMOL/L — SIGNIFICANT CHANGE UP (ref 3.5–5.3)
POTASSIUM SERPL-MCNC: 5 MMOL/L — SIGNIFICANT CHANGE UP (ref 3.5–5.3)
POTASSIUM SERPL-SCNC: 3.8 MMOL/L — SIGNIFICANT CHANGE UP (ref 3.5–5.3)
POTASSIUM SERPL-SCNC: 5 MMOL/L — SIGNIFICANT CHANGE UP (ref 3.5–5.3)
PROT SERPL-MCNC: 7.3 G/DL — SIGNIFICANT CHANGE UP (ref 6–8.3)
PROT SERPL-MCNC: 7.7 G/DL — SIGNIFICANT CHANGE UP (ref 6–8.3)
RBC # BLD: 5.26 M/UL — HIGH (ref 3.8–5.2)
RBC # FLD: 13.5 % — SIGNIFICANT CHANGE UP (ref 10.3–14.5)
SALICYLATES SERPL-MCNC: < 5 MG/DL — LOW (ref 15–30)
SAO2 % BLDV: 45.9 % — LOW (ref 60–85)
SODIUM SERPL-SCNC: 139 MMOL/L — SIGNIFICANT CHANGE UP (ref 135–145)
SODIUM SERPL-SCNC: 141 MMOL/L — SIGNIFICANT CHANGE UP (ref 135–145)
TSH SERPL-MCNC: 1.4 UIU/ML — SIGNIFICANT CHANGE UP (ref 0.27–4.2)
WBC # BLD: 7.17 K/UL — SIGNIFICANT CHANGE UP (ref 3.8–10.5)
WBC # FLD AUTO: 7.17 K/UL — SIGNIFICANT CHANGE UP (ref 3.8–10.5)

## 2018-11-19 PROCEDURE — 99284 EMERGENCY DEPT VISIT MOD MDM: CPT

## 2018-11-19 PROCEDURE — 90792 PSYCH DIAG EVAL W/MED SRVCS: CPT | Mod: GC

## 2018-11-19 NOTE — ED ADULT NURSE NOTE - NSIMPLEMENTINTERV_GEN_ALL_ED
Implemented All Fall Risk Interventions:  [place] to call system. Call bell, personal items and telephone within reach. Instruct patient to call for assistance. Room bathroom lighting operational. Non-slip footwear when patient is off stretcher. Physically safe environment: no spills, clutter or unnecessary equipment. Stretcher in lowest position, wheels locked, appropriate side rails in place. Provide visual cue, wrist band, yellow gown, etc. Monitor gait and stability. Monitor for mental status changes and reorient to person, place, and time. Review medications for side effects contributing to fall risk. Reinforce activity limits and safety measures with patient and family.

## 2018-11-19 NOTE — ED PROVIDER NOTE - NEUROLOGICAL, MLM
Alert and oriented, no focal deficits, no motor or sensory deficits. No clonus, rigidity or hyperreflexia.

## 2018-11-19 NOTE — ED BEHAVIORAL HEALTH ASSESSMENT NOTE - SAFETY PLAN DETAILS
Pt and denys's son informed if they feel patient is danger to self or other or if there is worsening of sxymptoms to call 911 or return to the ED. Pt and patient's son informed if they feel patient is danger to self or other or if there is worsening of symptoms to call 911 or return to the ED.

## 2018-11-19 NOTE — ED BEHAVIORAL HEALTH ASSESSMENT NOTE - DETAILS
1 suicide attempt attempt in past via OD on 70+ ambien per records neglect as a child back pain patient's son 1 suicide attempt in past via OD on 70+ ambien per records

## 2018-11-19 NOTE — ED BEHAVIORAL HEALTH ASSESSMENT NOTE - CURRENT MEDICATION
Effexor 300mg po daily, ?Klonopin 1mg po QID, Valium 10mg po BID, Ambien 10mg, (unable to confirm BDZ on ISTOP under numerous variations of name, though patient states she takes these amounts)

## 2018-11-19 NOTE — ED ADULT NURSE NOTE - OBJECTIVE STATEMENT
break coverage- pt with PMH of depression brought in by son for taking "6 ambien 12 valium in the past 24"- pt denies that it was an SI attempt, she states that "this does is normal for me"- pt currently awake, a/ox3, vitally stable in NAD- pt denies any SI/HI, VH/AH, ETOH abuse- IV 22g placed to right wrist, family at bedside, as well as staff for 1:1- MD at bedside, awaiting further orders from MD, will continue to monitor, pt safety maintained.

## 2018-11-19 NOTE — ED BEHAVIORAL HEALTH ASSESSMENT NOTE - REFERRAL / APPOINTMENT DETAILS
Pt will follow up with outpatient psychiatrist and therapist. Pt was provided with information for [facility] and was encouraged to follow up for rehab. Pt was encouraged to go to inpatient rehab/detox, but she refused at this time.

## 2018-11-19 NOTE — ED BEHAVIORAL HEALTH ASSESSMENT NOTE - SUICIDE PROTECTIVE FACTORS
Future oriented/Identifies reasons for living/Responsibility to family and others/Supportive social network or family/Fear of death or dying due to pain/suffering

## 2018-11-19 NOTE — ED ADULT NURSE REASSESSMENT NOTE - NS ED NURSE REASSESS COMMENT FT1
Report received from abner Aguayo. Pt resting with PCA at bedside within arms reach. Pt being evaluated by psych. Pt appears in NAD at this time

## 2018-11-19 NOTE — ED PROVIDER NOTE - FAMILY HISTORY
Mother  Still living? No  Family history of Alzheimer's disease, Age at diagnosis: Age Unknown     Father  Still living? No  Family history of heart failure, Age at diagnosis: Age Unknown

## 2018-11-19 NOTE — ED PROVIDER NOTE - OBJECTIVE STATEMENT
68F w/PMH depression (on venlafaxine ?100mg), hypothyroid (on levothyroxine 12.5mcg), GERD (on protonix 40mg), "pinched disc", and insomnia/benzodiazepine dependence (rx-ed 10mg zolpidem, 1mg clonazepam, 10mg diazepam) p/w ingestion 3-5 tabs zolpidem, clonazepam, and diazepam every 3-4h between 9AM and 2PM in order to "end it all", because she wanted her son to "take her seriously" and b/c she had pain in her back. She states she has been taking these medications like this "for years". She denied other ingestion or overdose of her other medications. Denies drug/ETOH use. Asymptomatic (no lethargy, seizures, cp/sob, slurred speech). Denies prior withdrawal symptoms or attempt at cessation with rehab. Psychiatrist Dr. Alcazar prescribes these medications. 68F w/PMH depression (on venlafaxine ?100mg), hypothyroid (on levothyroxine 12.5mcg), GERD (on protonix 40mg), "pinched disc", and insomnia/benzodiazepine dependence (rx-ed 10mg zolpidem, 1mg clonazepam, 10mg diazepam) p/w ingestion 3-5 tabs zolpidem, clonazepam, and diazepam every 3-4h between 9AM and 2PM in order to "end it all", because she wanted her son to "take her seriously" and b/c she had pain in her back. She states she has been taking these medications like this "for years". She denied other ingestion or overdose of her other medications. Denies drug/ETOH use. Asymptomatic (no lethargy, seizures, cp/sob, slurred speech). Denies prior withdrawal symptoms or attempt at cessation with rehab. Psychiatrist Dr. Alcazar prescribes these medications.  Attending - Agree with above.  I evaluated patient myself.  67 y/o F with son at bedside.  Noted extensive PMH including Bipolar disorder/MDD.  Reports has zolpidem, diazepam, and clonzepam prescribed to her by Psych Dr. Alcazar in [place].  Usually takes multiple doses of each of these throughout the day.  Today took multiple tabs from 9am-2pm.  Unclear if intended to harm herself.  patient currently denies SI.  Reports feeling well.  No CP/SOB.  No abd pain.  No nausea/vomiting.

## 2018-11-19 NOTE — ED BEHAVIORAL HEALTH ASSESSMENT NOTE - DESCRIPTION (FIRST USE, LAST USE, QUANTITY, FREQUENCY, DURATION)
past history of alcohol abuse, denies current use Daily BDZ and ambien abuse for over 20 years. No record on ISTOP under numerous different names, however pt reports she takes at least 10mg of Valium BID, Klonopin 1mg QID, and Ambien 10mg once or twice at night.

## 2018-11-19 NOTE — ED PROVIDER NOTE - NSFOLLOWUPINSTRUCTIONS_ED_ALL_ED_FT
Your diagnosis: substance dependence    Discharge instructions:    1. Please follow-up with the [facility] ([phone]) for outpatient substance abuse treatment.    2. Take any prescribed medications as instructed:    3. Others:    4. Be sure to return to the ED if you develop new or worsening symptoms. Specific signs and symptoms to be vigilant of: thoughts of hurting or killing yourself, hallucinations.

## 2018-11-19 NOTE — ED BEHAVIORAL HEALTH ASSESSMENT NOTE - CASE SUMMARY
68F, domiciled in [place], retired ,  (though physically  from  for 20 years), past psychiatric history of benzo/alcohol use disorder, denies complicated withdrawal/rehab, chronic depression, at least 10 past psychiatric hospitalizations (most recently 5-6 years ago), 1 history of suicide attempt via OD on ambien about 15 years ago, current daily klonopin, valium, ambien abuse, denies current alcohol use, PMHx hypothyroid (on levothyroxine 12.5mcg), GERD (on protonix 40mg), "pinched disc", brought in by son after patient called son today and said she wanted to "end it all" because of her back pain. On interview patient is in good behavioral control. She reports chronic depressed mood, chronic history of suicidal threats and gestures, and chronic BDZ/ambien abuse. Patient has no evidence of acute depression/psychosis/christofer. She has poor insight into substance abuse. Patient denies S/H/I/I/P. Pt would benefit from substance abuse treatment for BDZ abuse, however she refuses at this time. Patient also does not want voluntary psychiatric admission. She does not meet criteria for involuntary psychiatric admission at this time as she is not at acute elevated risk of harm to self or others, and her current symptoms are chronic and represent patient's baseline level of functioning for the past 20 years (per patient's son).

## 2018-11-19 NOTE — ED BEHAVIORAL HEALTH ASSESSMENT NOTE - SUMMARY
68F, domiciled in [place], retired ,  (though physically  from  for 20 years), past psychiatric history of benzo/alcohol use disorder, denies complicated withdrawal/rehab, chronic depression, at least 10 past psychiatric hospitalizations (most recently 5-6 years ago), 1 history of suicide attempt via OD on ambien about 15 years ago, current daily klonopin, valium, ambien abuse, denies current alcohol use, PMHx hypothyroid (on levothyroxine 12.5mcg), GERD (on protonix 40mg), "pinched disc", brought in by son after patient called son today and said she wanted to "end it all" because of her back pain. On interview patient is in good behavioral control. She reports chronic depressed mood, atient has chronic history of suicidal threats, with chronic depression, and chronic BDZ/ambien abuse. Patient has poor insight into substance abuse. 68F, domiciled in [place], retired ,  (though physically  from  for 20 years), past psychiatric history of benzo/alcohol use disorder, denies complicated withdrawal/rehab, chronic depression, at least 10 past psychiatric hospitalizations (most recently 5-6 years ago), 1 history of suicide attempt via OD on ambien about 15 years ago, current daily klonopin, valium, ambien abuse, denies current alcohol use, PMHx hypothyroid (on levothyroxine 12.5mcg), GERD (on protonix 40mg), "pinched disc", brought in by son after patient called son today and said she wanted to "end it all" because of her back pain. On interview patient is in good behavioral control. She reports chronic depressed mood, chronic history of suicidal threats and gestures, and chronic BDZ/ambien abuse. Patient has no evidence of acute depression/psychosis/christofer. She has poor insight into substance abuse. Patient denies S/H/I/I/P. Pt would benefit from substance abuse treatment for BDZ abuse, however she refuses at this time. Patient also does not want voluntary psychiatric admission. She does not meet criteria for involuntary psychiatric admission at this time as she is not at acute elevated risk of harm to self or others, and her current symptoms are chronic and represent patient's baseline level of functioning for the past 20 years (per patient's son). Given that patient is refusing substance detox/treatment and psych admission, she will be discharged home and will f/u with her psychiatrist. She was encouraged to attend outpatient rehab program and provided with information. Pt is agreeable to discharge plan and contracted for safety.

## 2018-11-19 NOTE — ED ADULT TRIAGE NOTE - CHIEF COMPLAINT QUOTE
Pt w/ hx of depression admits to SI "from time to time" x 40 yrs and takes pills to relieve her suffereing but states "not to kill myself."  denies HI, hallucinations.    Per son Anirudh Sal  Pt took more of her px meds than usual.    Pt reports she took 6 ambien 12 valium in the past 24 hours but states "this is very regular for me."  Pt is aaox4 and denies any physical complaints. FIT MD called.  Per Dr. Luis Pt should go to [facility] and be cleared medically with p[sych consult.

## 2018-11-19 NOTE — ED PROVIDER NOTE - MEDICAL DECISION MAKING DETAILS
Crystal: 68F w/PMH depression, benzodiazepine dependence, hypothryoid, GERD, prior breast CA who p/w staggered ingestion of clonazepam, diazepam and zolpidem in possible suicide attempt. Time to peak of clonazepam is longest at 1-4h so will observe until then but no e/o sedative hypnotic toxicity or withdrawal. No other toxidrome. Will assess labs, EKG, and consult psych for SI. Crystal: 68F w/PMH depression, benzodiazepine dependence, hypothyroid, GERD, prior breast CA who p/w staggered ingestion of clonazepam, diazepam and zolpidem in possible suicide attempt. Time to peak of clonazepam is longest at 1-4h so will observe until then but no e/o sedative hypnotic toxicity or withdrawal. No other toxidrome. Will assess labs, EKG, and consult psych for SI.

## 2018-11-19 NOTE — ED PROVIDER NOTE - PROGRESS NOTE DETAILS
Crystal: Spoke to  who will assess the patient. Crystal: Patient is cleared from a toxicologic perspective. tong: spoke with psychiatry, recommending outpatient follow up at [facility]

## 2018-11-19 NOTE — ED BEHAVIORAL HEALTH ASSESSMENT NOTE - HPI (INCLUDE ILLNESS QUALITY, SEVERITY, DURATION, TIMING, CONTEXT, MODIFYING FACTORS, ASSOCIATED SIGNS AND SYMPTOMS)
68F, domiciled in [place], retired ,  (though physically  from  for 20 years), past psychiatric history of benzo/alcohol use disorder, denies complicated withdrawal/rehab, chronic depression, at least 10 past psychiatric hospitalizations (most recently 5-6 years ago), 1 history of suicide attempt via OD on ambien about 15 years ago, current daily klonopin, valium, ambien abuse, denies current alcohol use, PMHx hypothyroid (on levothyroxine 12.5mcg), GERD (on protonix 40mg), "pinched disc", brought in by son after patient called son today and said she wanted to "end it all" because of her back pain.     Per ED provider note: "patient ingested 3-5 tabs zolpidem, clonazepam, and diazepam every 3-4h between 9AM and 2PM in order to "end it all", because she wanted her son to "take her seriously" and b/c she had pain in her back. She states she has been taking these medications like this "for years". She denied other ingestion or overdose of her other medications. Denies drug/ETOH use, though per records has a history of alcohol abuse.  Denies prior withdrawal symptoms or attempt at cessation with rehab."    On interview the patient is calm and in good control. She is A+O x 4, able to name president and past president, able to name pen and tip of pen. She reports chronic depression and suicidal ideation, for the past 20-30 years. She states she is in monthly psychiatric treatment with psychiatrist Dr. Alcazar in [place] and also every other week therapy. She states nothing has ever helped for depression, she has been on antipsychotics, antidepressants, and had ECT in the past. In the past 2 weeks her mood has worsened because she turned 68. She denies anhedonia, is able to enjoy music, and went to a party this weekend which she enjoyed. Denies weight loss/weight gain. Denies guilt. The patient reports poor sleep (for the past 20 years), which sometimes is relieved by ambien, valium, and klonopin. Patient is unable to state how much BDZ and ambien she uses a day, but states she may have been taking extra medications in the past 2 weeks. She denies that she has been suicidal, and denies S/H/I/I/P currently, and deneis suicidal intent associated with BDZ/ambien abuse.  She states that she takes Valium up to 10mg three times a day, Ambien 10mg once or twice at night, and Klonopin 1mg QID. She states these medications are prescribed by her psychiatrist Dr. Alcazar, though there is no record of this on ISTOP. Pt denies other substance use, though has abused alcohol in the past per records. She denies symptoms of christofer. Denies auditory/visual hallucinations, denies paranoia, no evidence of psychosis/christofer on interview. No access to weapons.     Patient's son provided collateral in the hospital. He states that his mother has chronic abuse of ambien, valium, klonopin and in the past other BDZ and alcohol. He reports that this has been going on for 20-30 years and she has never received rehab/detox for substance abuse (though she has been inpatient at [facility] and has been tapered off BDZ). He believes that his mother gets these medications by doctor shopping, buying off the street, and using fake names. He corroborates that patient has history of depression, but says no treatment has ever worked. The patient has a history of leaving provocative messages stating that she is suicidal, and then will disconnect the phone. He states that he called her today, and stated that she was going to end it all because of her back pain. The patient's son does not think that the patient is suicidal, he feels that he called her for attention, so he would go to her house and keep her company. He is concerned about her substance abuse and would like the patient to go to rehab and detox. However, he does not think his mother is suicidal.     Pt has poor insight into substance abuse. Writer offered for patient to go to inpatient rehab and detox, but patient refused, stating that she has numerous doctors appointments coming up for her health that she cannot miss. She said she will reconsider going in December. She also was offered information for outpatient substance abuse treatment, a nd encouraged to call for an appointment. Pt does not want voluntary psychiatric admission and does not want to stay in the hospital for detox. Pt does not meet criteria for involuntary inpatient psychiatric admission. Psychoeducation provided regarding the dangers of BDZ/ambien abuse, dangers of BDZ/ambien withdrawal, and dangers of mixing these medications with other substances. 68F, domiciled in [place], retired ,  (though physically  from  for 20 years), past psychiatric history of benzo/alcohol use disorder, denies complicated withdrawal/rehab, chronic depression, at least 10 past psychiatric hospitalizations (most recently 5-6 years ago), 1 history of suicide attempt via OD on ambien in 2004, current daily klonopin, valium, ambien abuse, denies current alcohol use, PMHx hypothyroid, GERD, "pinched disc", s/p breast cancer, brought in by son after patient called son today and said she wanted to "end it all" because of her back pain.     Per ED provider note: "patient ingested 3-5 tabs zolpidem, clonazepam, and diazepam every 3-4h between 9AM and 2PM in order to "end it all", because she wanted her son to "take her seriously" and b/c she had pain in her back. She states she has been taking these medications like this "for years". She denied other ingestion or overdose of her other medications. Denies drug/ETOH use, though per records has a history of alcohol abuse and BDZ abuse.  Denies prior withdrawal symptoms or attempt at cessation with rehab."    On interview the patient is calm and in good control. She is A+O x 4. She reports chronic depression and suicidal ideation, for the past 20-30 years. She states she is in monthly psychiatric treatment with psychiatrist Dr. Alcazar in [place] and also therapy every other week therapy. She states nothing has ever helped for depression, she has been on antipsychotics, antidepressants, and had ECT in the past. In the past 2 weeks her mood has worsened because she turned 68 on 11/11/18. She denies anhedonia, is able to enjoy music, and went to a party this weekend which she enjoyed. Denies weight loss/weight gain. Denies guilt. The patient reports poor sleep (for the past 20 years), which sometimes is relieved by ambien, valium, and klonopin. Patient is unable to state how much BDZ and ambien she uses a day, but states she may have been taking extra medications in the past 2 weeks. She denies that is suicidal, and denies current S/H/I/I/P and denies suicidal intent associated with BDZ/ambien abuse/overuse today/past few weeks.  She states that she takes Valium up to 10mg three times a day, Ambien 10mg once or twice at night, and Klonopin 1mg QID. She states these medications are prescribed by her psychiatrist Dr. Alcazar, though there is no record of this on ISTOP and son states that patient doctor shops and uses different names to obtain prescriptions. Pt denies other substance use, though has abused alcohol in the past per records. She denies symptoms of christofer. Denies auditory/visual hallucinations, denies paranoia, no evidence of psychosis/christofer on interview. No access to weapons.     Patient's son Anirudh provided collateral in the hospital. He states that his mother has chronic abuse of ambien, valium, klonopin and in the past other BDZ and alcohol. He reports that this has been going on for 20-30 years and she has never received rehab/detox for substance abuse (though she has been inpatient at [facility] and has been tapered off BDZ). He believes that his mother gets these medications by doctor shopping, buying off the street, and using fake names. He corroborates that patient has history of depression, but says no treatment has ever worked. The patient has a history of leaving provocative messages stating that she is suicidal, and then will disconnect the phone. He states that he called her today, and stated that she was going to end it all because of her back pain. The patient's son does not think that the patient is suicidal, he feels that she called him for attention, so he would go to her house. He is concerned about her substance abuse and would like the patient to go to rehab and detox for BDZ abuse, however he realizes that the patient cannot be involuntarily sent to substance abuse treatment.     Pt has poor insight into substance abuse. Writer offered for patient to go to inpatient rehab and detox, but patient refused, stating that she has numerous doctor appointments coming up for her health that she cannot miss. She said she will reconsider going in December. She also was offered information for outpatient substance abuse treatment, and encouraged to call for an appointment. Pt does not want voluntary psychiatric admission and does not want to stay in the hospital for detox. Pt does not meet criteria for involuntary inpatient psychiatric admission. Psychoeducation provided regarding the dangers of BDZ/ambien abuse, dangers of BDZ/ambien withdrawal, and dangers of mixing these medications with other substances.

## 2018-11-19 NOTE — ED ADULT NURSE REASSESSMENT NOTE - NS ED NURSE REASSESS COMMENT FT1
Pt appears in NAD, and denies SI/HI/AV/HV at this time. Pt states she does have history of chronis SI, however denies it at this time.

## 2018-11-27 ENCOUNTER — APPOINTMENT (OUTPATIENT)
Dept: HEMATOLOGY ONCOLOGY | Facility: CLINIC | Age: 68
End: 2018-11-27
Payer: MEDICARE

## 2018-11-27 ENCOUNTER — RESULT REVIEW (OUTPATIENT)
Age: 68
End: 2018-11-27

## 2018-11-27 VITALS
TEMPERATURE: 98.2 F | WEIGHT: 195.11 LBS | HEART RATE: 96 BPM | BODY MASS INDEX: 32.78 KG/M2 | RESPIRATION RATE: 16 BRPM | OXYGEN SATURATION: 99 % | SYSTOLIC BLOOD PRESSURE: 120 MMHG | DIASTOLIC BLOOD PRESSURE: 70 MMHG

## 2018-11-27 PROCEDURE — 99214 OFFICE O/P EST MOD 30 MIN: CPT

## 2018-11-27 NOTE — REASON FOR VISIT
[Follow-Up Visit] : a follow-up [FreeTextEntry2] : History of multiple breast events most recently DCIS 2015  on Arimidex

## 2018-11-27 NOTE — RESULTS/DATA
[FreeTextEntry1] : CBC reveals a white count of 6.9, hemoglobin 14.4 and platelet count 245,000\par Chemistry panel reveals an elevated alkaline phosphatase at 156 which is stable since July 2017

## 2018-11-27 NOTE — HISTORY OF PRESENT ILLNESS
[de-identified] : Doing well\par No new side effects or ongoing side effects of her Arimidex\par Doing breast imaging with Dr Barakat\par Continues with treatment refractory depression\par \par She is going for GB surgery

## 2018-11-27 NOTE — ASSESSMENT
[FreeTextEntry1] : History of multiple breast events most recently DCIS 2015  on Arimidex\par Plan: Continue Arimidex\par \par RTO 6 months\par

## 2018-11-28 ENCOUNTER — APPOINTMENT (OUTPATIENT)
Dept: SURGERY | Facility: CLINIC | Age: 68
End: 2018-11-28
Payer: MEDICARE

## 2018-11-28 LAB
ALBUMIN SERPL ELPH-MCNC: 3.9 G/DL
ALP BLD-CCNC: 156 U/L
ALT SERPL-CCNC: 19 U/L
ANION GAP SERPL CALC-SCNC: 12 MMOL/L
AST SERPL-CCNC: 17 U/L
BILIRUB SERPL-MCNC: 0.4 MG/DL
BUN SERPL-MCNC: 15 MG/DL
CALCIUM SERPL-MCNC: 9.6 MG/DL
CANCER AG27-29 SERPL-ACNC: 19.5 U/ML
CHLORIDE SERPL-SCNC: 105 MMOL/L
CO2 SERPL-SCNC: 22 MMOL/L
CREAT SERPL-MCNC: 0.65 MG/DL
GLUCOSE SERPL-MCNC: 120 MG/DL
LDH SERPL-CCNC: 175 U/L
POTASSIUM SERPL-SCNC: 4.1 MMOL/L
PROT SERPL-MCNC: 7.1 G/DL
SODIUM SERPL-SCNC: 139 MMOL/L

## 2018-11-28 PROCEDURE — 99213K: CUSTOM

## 2019-03-07 ENCOUNTER — OUTPATIENT (OUTPATIENT)
Dept: OUTPATIENT SERVICES | Facility: HOSPITAL | Age: 69
LOS: 1 days | Discharge: HOME | End: 2019-03-07

## 2019-03-07 DIAGNOSIS — Z98.89 OTHER SPECIFIED POSTPROCEDURAL STATES: Chronic | ICD-10-CM

## 2019-03-07 DIAGNOSIS — D05.10 INTRADUCTAL CARCINOMA IN SITU OF UNSPECIFIED BREAST: ICD-10-CM

## 2019-03-12 ENCOUNTER — OTHER (OUTPATIENT)
Age: 69
End: 2019-03-12

## 2019-03-19 ENCOUNTER — APPOINTMENT (OUTPATIENT)
Dept: SURGERY | Facility: CLINIC | Age: 69
End: 2019-03-19
Payer: MEDICARE

## 2019-03-19 VITALS
BODY MASS INDEX: 31.34 KG/M2 | OXYGEN SATURATION: 96 % | HEIGHT: 66 IN | DIASTOLIC BLOOD PRESSURE: 91 MMHG | RESPIRATION RATE: 16 BRPM | TEMPERATURE: 97.4 F | WEIGHT: 195 LBS | HEART RATE: 100 BPM | SYSTOLIC BLOOD PRESSURE: 139 MMHG

## 2019-03-19 DIAGNOSIS — Z79.890 HORMONE REPLACEMENT THERAPY: ICD-10-CM

## 2019-03-19 DIAGNOSIS — M25.551 PAIN IN RIGHT HIP: ICD-10-CM

## 2019-03-19 DIAGNOSIS — K21.9 GASTRO-ESOPHAGEAL REFLUX DISEASE W/OUT ESOPHAGITIS: ICD-10-CM

## 2019-03-19 DIAGNOSIS — Z92.3 PERSONAL HISTORY OF IRRADIATION: ICD-10-CM

## 2019-03-19 DIAGNOSIS — Z85.3 PERSONAL HISTORY OF MALIGNANT NEOPLASM OF BREAST: ICD-10-CM

## 2019-03-19 PROCEDURE — 99214 OFFICE O/P EST MOD 30 MIN: CPT

## 2019-03-19 RX ORDER — CHROMIUM 200 MCG
TABLET ORAL
Refills: 0 | Status: ACTIVE | COMMUNITY

## 2019-03-19 RX ORDER — LINACLOTIDE 72 UG/1
CAPSULE, GELATIN COATED ORAL
Refills: 0 | Status: DISCONTINUED | COMMUNITY
End: 2019-03-19

## 2019-03-19 RX ORDER — ANASTROZOLE TABLETS 1 MG/1
1 TABLET ORAL DAILY
Qty: 90 | Refills: 3 | Status: DISCONTINUED | COMMUNITY
Start: 2017-12-20 | End: 2019-03-19

## 2019-03-19 RX ORDER — PSYLLIUM SEED (WITH DEXTROSE)
POWDER (GRAM) ORAL
Refills: 0 | Status: DISCONTINUED | COMMUNITY
End: 2019-03-19

## 2019-03-19 NOTE — HISTORY OF PRESENT ILLNESS
[de-identified] : The patient presents for evaluation of her cholelithiasis. I saw her in 2016 with complaints of upper abdominal pain. Ultrasound and CT demonstrated cholelithiasis and a laparoscopic cholecystectomy was recommended. The patient did not follow up for surgery and presents today with similar complaints- right-sided abdominal discomfort and intermittent nausea. Her imaging was reviewed. Both CT and ultrasound demonstrated cholelithiasis with no evidence of biliary ductal dilatation. Recent transaminases and bilirubin are normal. Her alkaline phosphatase is mildly elevated. She denies jaundice fever dark urine. She has never had abdominal surgery.

## 2019-03-19 NOTE — PHYSICAL EXAM
[Normal Breath Sounds] : Normal breath sounds [Normal Rate and Rhythm] : normal rate and rhythm [Abdominal Masses] : No abdominal masses [No HSM] : no hepatosplenomegaly [Abdomen Tenderness] : ~T ~M No abdominal tenderness [No Rash or Lesion] : No rash or lesion [Alert] : alert [de-identified] : nad [Calm] : calm [de-identified] : nl

## 2019-03-19 NOTE — CONSULT LETTER
[Dear  ___] : Dear  [unfilled], [Consult Letter:] : I had the pleasure of evaluating your patient, [unfilled]. [Consult Closing:] : Thank you very much for allowing me to participate in the care of this patient.  If you have any questions, please do not hesitate to contact me. [Please see my note below.] : Please see my note below. [FreeTextEntry3] : Chong Santos M.D., F.A.C.S, F.A.S.C.R.S [Sincerely,] : Sincerely, [DrLolly  ___] : Dr. FARRIS

## 2019-03-19 NOTE — ASSESSMENT
[FreeTextEntry1] : In summary the patient has signs and symptoms consistent with symptomatic cholelithiasis. I again recommended she undergo a laparoscopic cholecystectomy. I explained the risks benefits and alternatives including the risks of bleeding infection the possible need for an open procedure and the rare instance of bile duct injury.

## 2019-04-01 ENCOUNTER — RESULT REVIEW (OUTPATIENT)
Age: 69
End: 2019-04-01

## 2019-04-01 ENCOUNTER — APPOINTMENT (OUTPATIENT)
Dept: HEMATOLOGY ONCOLOGY | Facility: CLINIC | Age: 69
End: 2019-04-01
Payer: MEDICARE

## 2019-04-01 VITALS
SYSTOLIC BLOOD PRESSURE: 126 MMHG | TEMPERATURE: 97.3 F | OXYGEN SATURATION: 97 % | DIASTOLIC BLOOD PRESSURE: 86 MMHG | RESPIRATION RATE: 16 BRPM | BODY MASS INDEX: 32.02 KG/M2 | WEIGHT: 198.42 LBS | HEART RATE: 88 BPM

## 2019-04-01 DIAGNOSIS — D05.10 INTRADUCTAL CARCINOMA IN SITU OF UNSPECIFIED BREAST: ICD-10-CM

## 2019-04-01 DIAGNOSIS — F32.9 MAJOR DEPRESSIVE DISORDER, SINGLE EPISODE, UNSPECIFIED: ICD-10-CM

## 2019-04-01 LAB
BASOPHILS # BLD AUTO: 0.1 K/UL — SIGNIFICANT CHANGE UP (ref 0–0.2)
BASOPHILS NFR BLD AUTO: 1 % — SIGNIFICANT CHANGE UP (ref 0–2)
EOSINOPHIL # BLD AUTO: 0.1 K/UL — SIGNIFICANT CHANGE UP (ref 0–0.5)
EOSINOPHIL NFR BLD AUTO: 1.2 % — SIGNIFICANT CHANGE UP (ref 0–6)
HCT VFR BLD CALC: 43.9 % — SIGNIFICANT CHANGE UP (ref 34.5–45)
HGB BLD-MCNC: 15.6 G/DL — HIGH (ref 11.5–15.5)
LYMPHOCYTES # BLD AUTO: 2.6 K/UL — SIGNIFICANT CHANGE UP (ref 1–3.3)
LYMPHOCYTES # BLD AUTO: 30.9 % — SIGNIFICANT CHANGE UP (ref 13–44)
MCHC RBC-ENTMCNC: 30 PG — SIGNIFICANT CHANGE UP (ref 27–34)
MCHC RBC-ENTMCNC: 35.5 G/DL — SIGNIFICANT CHANGE UP (ref 32–36)
MCV RBC AUTO: 84.7 FL — SIGNIFICANT CHANGE UP (ref 80–100)
MONOCYTES # BLD AUTO: 0.4 K/UL — SIGNIFICANT CHANGE UP (ref 0–0.9)
MONOCYTES NFR BLD AUTO: 5.1 % — SIGNIFICANT CHANGE UP (ref 2–14)
NEUTROPHILS # BLD AUTO: 5.2 K/UL — SIGNIFICANT CHANGE UP (ref 1.8–7.4)
NEUTROPHILS NFR BLD AUTO: 61.7 % — SIGNIFICANT CHANGE UP (ref 43–77)
PLATELET # BLD AUTO: 255 K/UL — SIGNIFICANT CHANGE UP (ref 150–400)
RBC # BLD: 5.19 M/UL — SIGNIFICANT CHANGE UP (ref 3.8–5.2)
RBC # FLD: 11.9 % — SIGNIFICANT CHANGE UP (ref 10.3–14.5)
WBC # BLD: 8.5 K/UL — SIGNIFICANT CHANGE UP (ref 3.8–10.5)
WBC # FLD AUTO: 8.5 K/UL — SIGNIFICANT CHANGE UP (ref 3.8–10.5)

## 2019-04-01 PROCEDURE — 99214 OFFICE O/P EST MOD 30 MIN: CPT

## 2019-04-01 NOTE — HISTORY OF PRESENT ILLNESS
[de-identified] : Intractable depression continues\par Has had thoughts of suicide with pills, but states she is afraid of death and has no definite plan\par has GB surgery\par No new side effects or ongoing side effects of her Arimidex\par \par \par

## 2019-04-01 NOTE — REASON FOR VISIT
[Follow-Up Visit] : a follow-up [FreeTextEntry2] : History of multiple breast events most recently DCIS 2015  on Arimidex, BRCA negative

## 2019-04-01 NOTE — ASSESSMENT
[FreeTextEntry1] : No further breast events\par Continue Arimidex\par \par Severe depression-chronic\par Sees therapist and psychiatrist\par \par Has suicidal ideation but no active plan\par To follow up with her therapists

## 2019-04-02 LAB
ALBUMIN SERPL ELPH-MCNC: 4.4 G/DL
ALP BLD-CCNC: 157 U/L
ALT SERPL-CCNC: 22 U/L
ANION GAP SERPL CALC-SCNC: 15 MMOL/L
AST SERPL-CCNC: 24 U/L
BILIRUB SERPL-MCNC: 0.4 MG/DL
BUN SERPL-MCNC: 17 MG/DL
CALCIUM SERPL-MCNC: 10.5 MG/DL
CHLORIDE SERPL-SCNC: 105 MMOL/L
CO2 SERPL-SCNC: 23 MMOL/L
CREAT SERPL-MCNC: 0.88 MG/DL
GLUCOSE SERPL-MCNC: 111 MG/DL
LDH SERPL-CCNC: 168 U/L
POTASSIUM SERPL-SCNC: 4.8 MMOL/L
PROT SERPL-MCNC: 7.7 G/DL
SODIUM SERPL-SCNC: 143 MMOL/L

## 2019-04-03 ENCOUNTER — OUTPATIENT (OUTPATIENT)
Dept: OUTPATIENT SERVICES | Facility: HOSPITAL | Age: 69
LOS: 1 days | End: 2019-04-03
Payer: MEDICARE

## 2019-04-03 VITALS
TEMPERATURE: 98 F | HEART RATE: 90 BPM | SYSTOLIC BLOOD PRESSURE: 132 MMHG | DIASTOLIC BLOOD PRESSURE: 88 MMHG | HEIGHT: 65 IN | OXYGEN SATURATION: 98 % | RESPIRATION RATE: 17 BRPM | WEIGHT: 195.11 LBS

## 2019-04-03 DIAGNOSIS — K80.20 CALCULUS OF GALLBLADDER WITHOUT CHOLECYSTITIS WITHOUT OBSTRUCTION: ICD-10-CM

## 2019-04-03 DIAGNOSIS — Z98.890 OTHER SPECIFIED POSTPROCEDURAL STATES: Chronic | ICD-10-CM

## 2019-04-03 DIAGNOSIS — Z98.89 OTHER SPECIFIED POSTPROCEDURAL STATES: Chronic | ICD-10-CM

## 2019-04-03 DIAGNOSIS — Z01.818 ENCOUNTER FOR OTHER PREPROCEDURAL EXAMINATION: ICD-10-CM

## 2019-04-03 DIAGNOSIS — F31.9 BIPOLAR DISORDER, UNSPECIFIED: ICD-10-CM

## 2019-04-03 LAB — CANCER AG27-29 SERPL-ACNC: 22.6 U/ML

## 2019-04-03 PROCEDURE — 85027 COMPLETE CBC AUTOMATED: CPT

## 2019-04-03 PROCEDURE — G0463: CPT

## 2019-04-03 PROCEDURE — 80053 COMPREHEN METABOLIC PANEL: CPT

## 2019-04-03 RX ORDER — AMLODIPINE BESYLATE 2.5 MG/1
0 TABLET ORAL
Qty: 0 | Refills: 0 | COMMUNITY

## 2019-04-03 RX ORDER — CEFAZOLIN SODIUM 1 G
2000 VIAL (EA) INJECTION ONCE
Qty: 0 | Refills: 0 | Status: DISCONTINUED | OUTPATIENT
Start: 2019-04-11 | End: 2019-04-26

## 2019-04-03 RX ORDER — PANTOPRAZOLE SODIUM 20 MG/1
0 TABLET, DELAYED RELEASE ORAL
Qty: 0 | Refills: 0 | COMMUNITY

## 2019-04-03 NOTE — H&P PST ADULT - ANESTHESIA, PREVIOUS REACTION, PROFILE
none "feel like I am on fire. I scream" When I had colonoscopy medication given slowly without issue

## 2019-04-03 NOTE — H&P PST ADULT - NSICDXPROBLEM_GEN_ALL_CORE_FT
PROBLEM DIAGNOSES  Problem: Bipolar mood disorder  Assessment and Plan: on medication sees a psychiatrist and therapist     Problem: Calculus of gallbladder without cholecystitis without obstruction  Assessment and Plan: Laparoscopic cholecystectomy possible open

## 2019-04-03 NOTE — H&P PST ADULT - NSICDXPASTSURGICALHX_GEN_ALL_CORE_FT
PAST SURGICAL HISTORY:  History of lumpectomy left 2002  & right 2008  left 2013  treated with chemotherapy & radiation therapy    S/P D&C (status post dilation and curettage) PAST SURGICAL HISTORY:  History of lumpectomy left 2002  & right 2008  left 2013  treated with chemotherapy & radiation therapy    S/P colonoscopy 3 years ago negative    S/P D&C (status post dilation and curettage)     S/P endoscopy 3 years ago negative

## 2019-04-03 NOTE — H&P PST ADULT - NSICDXPASTMEDICALHX_GEN_ALL_CORE_FT
PAST MEDICAL HISTORY:  Acid reflux     Affective Bipolar Disorder     Benzodiazepine Dependence     Calculus of gallbladder without cholecystitis     Carcinoma in situ breast,bilateral lumpectomies s/p chemo  had treatment 2008 and 2015   current on Arimedex    Depression last admission 1- 2 year ago has been admitted several times   ECT last 20 years ago   currently on medication "does not really work well"    Hypertension     Hypothyroidism     Insomnia PAST MEDICAL HISTORY:  Acid reflux     Affective Bipolar Disorder     Benzodiazepine Dependence     Calculus of gallbladder without cholecystitis     Carcinoma in situ Breast Cancer  bilateral lumpectomies s/p chemo  had treatment 2008 and 2015   current on Arimidex    Chronic GERD     Depression last admission 1- 2 year ago has been admitted several times   ECT last 20 years ago   currently on medication "does not really work well"    Hypertension     Hypothyroidism     Insomnia PAST MEDICAL HISTORY:  Acid reflux     Affective Bipolar Disorder     Benzodiazepine Dependence     Calculus of gallbladder without cholecystitis     Carcinoma in situ Breast Cancer  bilateral lumpectomies s/p chemo  had treatment 2008 and 2015   current on Arimidex    Chronic GERD     Depression last admission 1- 2 year ago has been admitted several times   ECT last 20 years ago   currently on medication "antidepresants do not really work well"    Hypertension     Hypothyroidism     Insomnia PAST MEDICAL HISTORY:  Acid reflux     Affective Bipolar Disorder     Benzodiazepine Dependence     Calculus of gallbladder without cholecystitis     Carcinoma in situ Breast Cancer  bilateral lumpectomies s/p chemo  had treatment 2008 and 2015   current on Arimidex    Chronic GERD     Depression last admission 1- 2 year ago has been admitted several times   ECT last 20 years ago   currently on medication "antidepressants do not really work well"    Hypertension     Hypothyroidism     Insomnia

## 2019-04-03 NOTE — H&P PST ADULT - HISTORY OF PRESENT ILLNESS
68 year old female with gall bladder attack 7/18 , an d 2 weeks ago work up have many stones need surgery. 68 year old female with gall bladder attack 7/18   and 2 weeks ago work up have many stones need surgery.

## 2019-04-04 ENCOUNTER — RESULT REVIEW (OUTPATIENT)
Age: 69
End: 2019-04-04

## 2019-04-10 ENCOUNTER — TRANSCRIPTION ENCOUNTER (OUTPATIENT)
Age: 69
End: 2019-04-10

## 2019-04-11 ENCOUNTER — OUTPATIENT (OUTPATIENT)
Dept: OUTPATIENT SERVICES | Facility: HOSPITAL | Age: 69
LOS: 1 days | End: 2019-04-11
Payer: MEDICARE

## 2019-04-11 ENCOUNTER — APPOINTMENT (OUTPATIENT)
Dept: SURGERY | Facility: HOSPITAL | Age: 69
End: 2019-04-11
Payer: MEDICARE

## 2019-04-11 VITALS
DIASTOLIC BLOOD PRESSURE: 79 MMHG | SYSTOLIC BLOOD PRESSURE: 133 MMHG | RESPIRATION RATE: 18 BRPM | HEART RATE: 77 BPM | TEMPERATURE: 98 F | OXYGEN SATURATION: 97 %

## 2019-04-11 VITALS
DIASTOLIC BLOOD PRESSURE: 88 MMHG | OXYGEN SATURATION: 97 % | RESPIRATION RATE: 15 BRPM | HEART RATE: 88 BPM | SYSTOLIC BLOOD PRESSURE: 136 MMHG

## 2019-04-11 DIAGNOSIS — Z98.890 OTHER SPECIFIED POSTPROCEDURAL STATES: Chronic | ICD-10-CM

## 2019-04-11 DIAGNOSIS — Z98.89 OTHER SPECIFIED POSTPROCEDURAL STATES: Chronic | ICD-10-CM

## 2019-04-11 DIAGNOSIS — K80.20 CALCULUS OF GALLBLADDER WITHOUT CHOLECYSTITIS WITHOUT OBSTRUCTION: ICD-10-CM

## 2019-04-11 PROCEDURE — 47562 LAPAROSCOPIC CHOLECYSTECTOMY: CPT

## 2019-04-11 RX ORDER — BENZOCAINE AND MENTHOL 5; 1 G/100ML; G/100ML
1 LIQUID ORAL EVERY 4 HOURS
Qty: 0 | Refills: 0 | Status: DISCONTINUED | OUTPATIENT
Start: 2019-04-11 | End: 2019-04-26

## 2019-04-11 RX ORDER — SODIUM CHLORIDE 9 MG/ML
1000 INJECTION, SOLUTION INTRAVENOUS
Qty: 0 | Refills: 0 | Status: DISCONTINUED | OUTPATIENT
Start: 2019-04-11 | End: 2019-04-11

## 2019-04-11 RX ORDER — LIDOCAINE HCL 20 MG/ML
0.2 VIAL (ML) INJECTION ONCE
Qty: 0 | Refills: 0 | Status: DISCONTINUED | OUTPATIENT
Start: 2019-04-11 | End: 2019-04-11

## 2019-04-11 RX ORDER — HYDROMORPHONE HYDROCHLORIDE 2 MG/ML
0.5 INJECTION INTRAMUSCULAR; INTRAVENOUS; SUBCUTANEOUS
Qty: 0 | Refills: 0 | Status: DISCONTINUED | OUTPATIENT
Start: 2019-04-11 | End: 2019-04-11

## 2019-04-11 RX ORDER — LEVOTHYROXINE SODIUM 125 MCG
0 TABLET ORAL
Qty: 0 | Refills: 0 | COMMUNITY

## 2019-04-11 RX ORDER — SODIUM CHLORIDE 9 MG/ML
3 INJECTION INTRAMUSCULAR; INTRAVENOUS; SUBCUTANEOUS EVERY 8 HOURS
Qty: 0 | Refills: 0 | Status: DISCONTINUED | OUTPATIENT
Start: 2019-04-11 | End: 2019-04-11

## 2019-04-11 RX ORDER — RABEPRAZOLE 20 MG/1
0 TABLET, DELAYED RELEASE ORAL
Qty: 0 | Refills: 0 | COMMUNITY

## 2019-04-11 RX ORDER — ONDANSETRON 8 MG/1
4 TABLET, FILM COATED ORAL ONCE
Qty: 0 | Refills: 0 | Status: COMPLETED | OUTPATIENT
Start: 2019-04-11 | End: 2019-04-11

## 2019-04-11 RX ORDER — ONDANSETRON 8 MG/1
4 TABLET, FILM COATED ORAL ONCE
Qty: 0 | Refills: 0 | Status: DISCONTINUED | OUTPATIENT
Start: 2019-04-11 | End: 2019-04-11

## 2019-04-11 RX ORDER — SODIUM CHLORIDE 9 MG/ML
1000 INJECTION, SOLUTION INTRAVENOUS
Qty: 0 | Refills: 0 | Status: DISCONTINUED | OUTPATIENT
Start: 2019-04-11 | End: 2019-04-26

## 2019-04-11 RX ADMIN — SODIUM CHLORIDE 30 MILLILITER(S): 9 INJECTION, SOLUTION INTRAVENOUS at 16:57

## 2019-04-11 RX ADMIN — HYDROMORPHONE HYDROCHLORIDE 0.5 MILLIGRAM(S): 2 INJECTION INTRAMUSCULAR; INTRAVENOUS; SUBCUTANEOUS at 17:00

## 2019-04-11 RX ADMIN — ONDANSETRON 4 MILLIGRAM(S): 8 TABLET, FILM COATED ORAL at 16:42

## 2019-04-11 RX ADMIN — BENZOCAINE AND MENTHOL 1 LOZENGE: 5; 1 LIQUID ORAL at 18:30

## 2019-04-11 RX ADMIN — HYDROMORPHONE HYDROCHLORIDE 0.5 MILLIGRAM(S): 2 INJECTION INTRAMUSCULAR; INTRAVENOUS; SUBCUTANEOUS at 16:45

## 2019-04-11 RX ADMIN — HYDROMORPHONE HYDROCHLORIDE 0.5 MILLIGRAM(S): 2 INJECTION INTRAMUSCULAR; INTRAVENOUS; SUBCUTANEOUS at 18:00

## 2019-04-11 RX ADMIN — HYDROMORPHONE HYDROCHLORIDE 0.5 MILLIGRAM(S): 2 INJECTION INTRAMUSCULAR; INTRAVENOUS; SUBCUTANEOUS at 17:20

## 2019-04-11 NOTE — ASU DISCHARGE PLAN (ADULT/PEDIATRIC) - NURSING INSTRUCTIONS
If at any time there are any signs of infection (increased swelling, redness, drainage from the incisions, warmth, fever, chills), severe pain unrelieved by prescribed medications or if you have any questions or concerns, contact your Doctor.

## 2019-04-11 NOTE — ASU DISCHARGE PLAN (ADULT/PEDIATRIC) - CARE PROVIDER_API CALL
Chong Santos)  ColonRectal Surgery; Surgery  [address], [address]  [place] [address]  Phone: [phone]  Fax: [phone]  Follow Up Time:

## 2019-04-11 NOTE — ASU DISCHARGE PLAN (ADULT/PEDIATRIC) - CALL YOUR DOCTOR IF YOU HAVE ANY OF THE FOLLOWING:
Swelling that gets worse/Pain not relieved by Medications/Inability to tolerate liquids or foods/Nausea and vomiting that does not stop/Unable to urinate/Fever greater than (need to indicate Fahrenheit or Celsius)/Wound/Surgical Site with redness, or foul smelling discharge or pus/Bleeding that does not stop

## 2019-04-12 ENCOUNTER — RESULT REVIEW (OUTPATIENT)
Age: 69
End: 2019-04-12

## 2019-04-12 PROCEDURE — 88304 TISSUE EXAM BY PATHOLOGIST: CPT

## 2019-04-12 PROCEDURE — 47562 LAPAROSCOPIC CHOLECYSTECTOMY: CPT

## 2019-04-12 PROCEDURE — 88304 TISSUE EXAM BY PATHOLOGIST: CPT | Mod: 26

## 2019-04-13 ENCOUNTER — EMERGENCY (EMERGENCY)
Facility: HOSPITAL | Age: 69
LOS: 1 days | Discharge: HOME | End: 2019-04-13
Attending: EMERGENCY MEDICINE
Payer: MEDICARE

## 2019-04-13 VITALS
SYSTOLIC BLOOD PRESSURE: 152 MMHG | TEMPERATURE: 98 F | OXYGEN SATURATION: 93 % | HEART RATE: 86 BPM | DIASTOLIC BLOOD PRESSURE: 89 MMHG | RESPIRATION RATE: 18 BRPM

## 2019-04-13 VITALS
HEART RATE: 74 BPM | TEMPERATURE: 98 F | OXYGEN SATURATION: 94 % | HEIGHT: 66 IN | SYSTOLIC BLOOD PRESSURE: 139 MMHG | RESPIRATION RATE: 20 BRPM | DIASTOLIC BLOOD PRESSURE: 82 MMHG | WEIGHT: 195.11 LBS

## 2019-04-13 DIAGNOSIS — Z98.89 OTHER SPECIFIED POSTPROCEDURAL STATES: Chronic | ICD-10-CM

## 2019-04-13 DIAGNOSIS — Z98.890 OTHER SPECIFIED POSTPROCEDURAL STATES: Chronic | ICD-10-CM

## 2019-04-13 PROBLEM — K21.9 GASTRO-ESOPHAGEAL REFLUX DISEASE WITHOUT ESOPHAGITIS: Chronic | Status: ACTIVE | Noted: 2019-04-03

## 2019-04-13 PROBLEM — K80.20 CALCULUS OF GALLBLADDER WITHOUT CHOLECYSTITIS WITHOUT OBSTRUCTION: Chronic | Status: ACTIVE | Noted: 2019-04-03

## 2019-04-13 LAB
ALBUMIN SERPL ELPH-MCNC: 3.7 G/DL — SIGNIFICANT CHANGE UP (ref 3.3–5)
ALP SERPL-CCNC: 141 U/L — HIGH (ref 40–120)
ALT FLD-CCNC: 44 U/L — SIGNIFICANT CHANGE UP (ref 10–45)
ANION GAP SERPL CALC-SCNC: 13 MMOL/L — SIGNIFICANT CHANGE UP (ref 5–17)
APTT BLD: 27.1 SEC — LOW (ref 27.5–36.3)
AST SERPL-CCNC: 45 U/L — HIGH (ref 10–40)
BASE EXCESS BLDV CALC-SCNC: 3.2 MMOL/L — HIGH (ref -2–2)
BASOPHILS # BLD AUTO: 0 K/UL — SIGNIFICANT CHANGE UP (ref 0–0.2)
BASOPHILS NFR BLD AUTO: 0.3 % — SIGNIFICANT CHANGE UP (ref 0–2)
BILIRUB SERPL-MCNC: 0.5 MG/DL — SIGNIFICANT CHANGE UP (ref 0.2–1.2)
BUN SERPL-MCNC: 14 MG/DL — SIGNIFICANT CHANGE UP (ref 7–23)
CA-I SERPL-SCNC: 1.22 MMOL/L — SIGNIFICANT CHANGE UP (ref 1.12–1.3)
CALCIUM SERPL-MCNC: 9.6 MG/DL — SIGNIFICANT CHANGE UP (ref 8.4–10.5)
CHLORIDE BLDV-SCNC: 110 MMOL/L — HIGH (ref 96–108)
CHLORIDE SERPL-SCNC: 104 MMOL/L — SIGNIFICANT CHANGE UP (ref 96–108)
CO2 BLDV-SCNC: 30 MMOL/L — SIGNIFICANT CHANGE UP (ref 22–30)
CO2 SERPL-SCNC: 23 MMOL/L — SIGNIFICANT CHANGE UP (ref 22–31)
CREAT SERPL-MCNC: 0.54 MG/DL — SIGNIFICANT CHANGE UP (ref 0.5–1.3)
EOSINOPHIL # BLD AUTO: 0.2 K/UL — SIGNIFICANT CHANGE UP (ref 0–0.5)
EOSINOPHIL NFR BLD AUTO: 2.5 % — SIGNIFICANT CHANGE UP (ref 0–6)
GAS PNL BLDV: 136 MMOL/L — SIGNIFICANT CHANGE UP (ref 136–145)
GAS PNL BLDV: SIGNIFICANT CHANGE UP
GAS PNL BLDV: SIGNIFICANT CHANGE UP
GLUCOSE BLDV-MCNC: 97 MG/DL — SIGNIFICANT CHANGE UP (ref 70–99)
GLUCOSE SERPL-MCNC: 96 MG/DL — SIGNIFICANT CHANGE UP (ref 70–99)
HCO3 BLDV-SCNC: 28 MMOL/L — SIGNIFICANT CHANGE UP (ref 21–29)
HCT VFR BLD CALC: 42.6 % — SIGNIFICANT CHANGE UP (ref 34.5–45)
HCT VFR BLDA CALC: 45 % — SIGNIFICANT CHANGE UP (ref 39–50)
HGB BLD CALC-MCNC: 14.5 G/DL — SIGNIFICANT CHANGE UP (ref 11.5–15.5)
HGB BLD-MCNC: 14.5 G/DL — SIGNIFICANT CHANGE UP (ref 11.5–15.5)
INR BLD: 1.03 RATIO — SIGNIFICANT CHANGE UP (ref 0.88–1.16)
LACTATE BLDV-MCNC: 1.6 MMOL/L — SIGNIFICANT CHANGE UP (ref 0.7–2)
LIDOCAIN IGE QN: 37 U/L — SIGNIFICANT CHANGE UP (ref 7–60)
LYMPHOCYTES # BLD AUTO: 1.8 K/UL — SIGNIFICANT CHANGE UP (ref 1–3.3)
LYMPHOCYTES # BLD AUTO: 21.4 % — SIGNIFICANT CHANGE UP (ref 13–44)
MCHC RBC-ENTMCNC: 30.1 PG — SIGNIFICANT CHANGE UP (ref 27–34)
MCHC RBC-ENTMCNC: 34.1 GM/DL — SIGNIFICANT CHANGE UP (ref 32–36)
MCV RBC AUTO: 88.1 FL — SIGNIFICANT CHANGE UP (ref 80–100)
MONOCYTES # BLD AUTO: 0.6 K/UL — SIGNIFICANT CHANGE UP (ref 0–0.9)
MONOCYTES NFR BLD AUTO: 7.1 % — SIGNIFICANT CHANGE UP (ref 2–14)
NEUTROPHILS # BLD AUTO: 5.9 K/UL — SIGNIFICANT CHANGE UP (ref 1.8–7.4)
NEUTROPHILS NFR BLD AUTO: 68.7 % — SIGNIFICANT CHANGE UP (ref 43–77)
PCO2 BLDV: 46 MMHG — SIGNIFICANT CHANGE UP (ref 35–50)
PH BLDV: 7.4 — SIGNIFICANT CHANGE UP (ref 7.35–7.45)
PLATELET # BLD AUTO: 201 K/UL — SIGNIFICANT CHANGE UP (ref 150–400)
PO2 BLDV: 42 MMHG — SIGNIFICANT CHANGE UP (ref 25–45)
POTASSIUM BLDV-SCNC: 3.6 MMOL/L — SIGNIFICANT CHANGE UP (ref 3.5–5.3)
POTASSIUM SERPL-MCNC: 4 MMOL/L — SIGNIFICANT CHANGE UP (ref 3.5–5.3)
POTASSIUM SERPL-SCNC: 4 MMOL/L — SIGNIFICANT CHANGE UP (ref 3.5–5.3)
PROT SERPL-MCNC: 7.1 G/DL — SIGNIFICANT CHANGE UP (ref 6–8.3)
PROTHROM AB SERPL-ACNC: 11.7 SEC — SIGNIFICANT CHANGE UP (ref 10–12.9)
RBC # BLD: 4.84 M/UL — SIGNIFICANT CHANGE UP (ref 3.8–5.2)
RBC # FLD: 12.6 % — SIGNIFICANT CHANGE UP (ref 10.3–14.5)
SAO2 % BLDV: 75 % — SIGNIFICANT CHANGE UP (ref 67–88)
SODIUM SERPL-SCNC: 140 MMOL/L — SIGNIFICANT CHANGE UP (ref 135–145)
WBC # BLD: 8.6 K/UL — SIGNIFICANT CHANGE UP (ref 3.8–10.5)
WBC # FLD AUTO: 8.6 K/UL — SIGNIFICANT CHANGE UP (ref 3.8–10.5)

## 2019-04-13 PROCEDURE — 96374 THER/PROPH/DIAG INJ IV PUSH: CPT | Mod: XU

## 2019-04-13 PROCEDURE — 74177 CT ABD & PELVIS W/CONTRAST: CPT | Mod: 26

## 2019-04-13 PROCEDURE — 71045 X-RAY EXAM CHEST 1 VIEW: CPT

## 2019-04-13 PROCEDURE — 85610 PROTHROMBIN TIME: CPT

## 2019-04-13 PROCEDURE — 85730 THROMBOPLASTIN TIME PARTIAL: CPT

## 2019-04-13 PROCEDURE — 93005 ELECTROCARDIOGRAM TRACING: CPT

## 2019-04-13 PROCEDURE — 93010 ELECTROCARDIOGRAM REPORT: CPT

## 2019-04-13 PROCEDURE — 82330 ASSAY OF CALCIUM: CPT

## 2019-04-13 PROCEDURE — 84132 ASSAY OF SERUM POTASSIUM: CPT

## 2019-04-13 PROCEDURE — 71045 X-RAY EXAM CHEST 1 VIEW: CPT | Mod: 26

## 2019-04-13 PROCEDURE — 99284 EMERGENCY DEPT VISIT MOD MDM: CPT | Mod: 25

## 2019-04-13 PROCEDURE — 83690 ASSAY OF LIPASE: CPT

## 2019-04-13 PROCEDURE — 96361 HYDRATE IV INFUSION ADD-ON: CPT

## 2019-04-13 PROCEDURE — 96376 TX/PRO/DX INJ SAME DRUG ADON: CPT

## 2019-04-13 PROCEDURE — 82435 ASSAY OF BLOOD CHLORIDE: CPT

## 2019-04-13 PROCEDURE — 84295 ASSAY OF SERUM SODIUM: CPT

## 2019-04-13 PROCEDURE — 83605 ASSAY OF LACTIC ACID: CPT

## 2019-04-13 PROCEDURE — 96375 TX/PRO/DX INJ NEW DRUG ADDON: CPT

## 2019-04-13 PROCEDURE — 80053 COMPREHEN METABOLIC PANEL: CPT

## 2019-04-13 PROCEDURE — 85014 HEMATOCRIT: CPT

## 2019-04-13 PROCEDURE — 74177 CT ABD & PELVIS W/CONTRAST: CPT

## 2019-04-13 PROCEDURE — 85027 COMPLETE CBC AUTOMATED: CPT

## 2019-04-13 PROCEDURE — 82803 BLOOD GASES ANY COMBINATION: CPT

## 2019-04-13 PROCEDURE — 82947 ASSAY GLUCOSE BLOOD QUANT: CPT

## 2019-04-13 RX ORDER — MORPHINE SULFATE 50 MG/1
2 CAPSULE, EXTENDED RELEASE ORAL ONCE
Qty: 0 | Refills: 0 | Status: DISCONTINUED | OUTPATIENT
Start: 2019-04-13 | End: 2019-04-13

## 2019-04-13 RX ORDER — MORPHINE SULFATE 50 MG/1
4 CAPSULE, EXTENDED RELEASE ORAL ONCE
Qty: 0 | Refills: 0 | Status: DISCONTINUED | OUTPATIENT
Start: 2019-04-13 | End: 2019-04-13

## 2019-04-13 RX ORDER — ONDANSETRON 8 MG/1
4 TABLET, FILM COATED ORAL ONCE
Qty: 0 | Refills: 0 | Status: COMPLETED | OUTPATIENT
Start: 2019-04-13 | End: 2019-04-13

## 2019-04-13 RX ORDER — SODIUM CHLORIDE 9 MG/ML
1000 INJECTION INTRAMUSCULAR; INTRAVENOUS; SUBCUTANEOUS ONCE
Qty: 0 | Refills: 0 | Status: COMPLETED | OUTPATIENT
Start: 2019-04-13 | End: 2019-04-13

## 2019-04-13 RX ADMIN — MORPHINE SULFATE 4 MILLIGRAM(S): 50 CAPSULE, EXTENDED RELEASE ORAL at 11:51

## 2019-04-13 RX ADMIN — MORPHINE SULFATE 4 MILLIGRAM(S): 50 CAPSULE, EXTENDED RELEASE ORAL at 12:30

## 2019-04-13 RX ADMIN — MORPHINE SULFATE 2 MILLIGRAM(S): 50 CAPSULE, EXTENDED RELEASE ORAL at 15:05

## 2019-04-13 RX ADMIN — SODIUM CHLORIDE 2000 MILLILITER(S): 9 INJECTION INTRAMUSCULAR; INTRAVENOUS; SUBCUTANEOUS at 11:51

## 2019-04-13 RX ADMIN — ONDANSETRON 4 MILLIGRAM(S): 8 TABLET, FILM COATED ORAL at 11:51

## 2019-04-13 RX ADMIN — SODIUM CHLORIDE 1000 MILLILITER(S): 9 INJECTION INTRAMUSCULAR; INTRAVENOUS; SUBCUTANEOUS at 13:00

## 2019-04-13 NOTE — ED PROVIDER NOTE - OBJECTIVE STATEMENT
Patient POD #2 from [facility] by Dr Santos presenting reporting diffuse body pains.  Reporting pain primarily in throat and abdomen.  Associated nausea but no vomiting.  Taking oxycodone at home (1 pill left) with some relief.  Pain is diffusely throughout abdomen.  No noted fevers or chills.

## 2019-04-13 NOTE — ED ADULT NURSE NOTE - ED STAT RN HANDOFF DETAILS
hand off given to oncoming GEOVANNY Newman. Awaiting CT results. Abd pain almost resolved. Still c/o neck and throat pain, less than earlier. Tolerated oral contrast well for CT. Voided small amount in bathroom earlier with assistance. Pt was unable to collect sample properly. voided in toilet. Dr Galindo aware

## 2019-04-13 NOTE — ED PROVIDER NOTE - GASTROINTESTINAL, MLM
Abdomen soft, non-tender, no guarding, normal appearing lab surgical ports Abdomen soft, non-tender, no guarding, normal appearing lap surgical ports

## 2019-04-13 NOTE — ED PROVIDER NOTE - CARE PROVIDER_API CALL
Chong Santos)  ColonRectal Surgery; Surgery  [address], [address]  [place] [address]  Phone: [phone]  Fax: [phone]  Follow Up Time:

## 2019-04-13 NOTE — ED ADULT NURSE NOTE - OBJECTIVE STATEMENT
1055 68 yr old Wf brought to ER by family for further eval and tx of severe abd pain, poor appetite, nec pain and bilat shoulder pain . s/pLaparoscopic cholecystectomy 2 days ago at ambulatory surgery. A&Ox4. States unable to walk. difficulty moving around on stretcher. Steristrips intact at abd. Denies fever or chills. color pink. skin W&D. lungs clear 1055 68 yr old Wf brought to ER by family for further eval and tx of severe abd pain, poor appetite, neck and throat pain and bilat shoulder pain . s/p Laparoscopic cholecystectomy 2 days ago at ambulatory surgery. A&Ox4. States unable to walk. difficulty moving around on stretcher. Steristrips intact at abd. Denies fever or chills. color pink. skin W&D. lungs clear

## 2019-04-13 NOTE — ED PROVIDER NOTE - ATTENDING CONTRIBUTION TO CARE
Patient seen and evaluated with resident/NP/PA, however HPI, ROS, PE and MDM as documented authored by myself unless otherwise noted- Ousmane Galindo MD

## 2019-04-13 NOTE — ED ADULT TRIAGE NOTE - CHIEF COMPLAINT QUOTE
"I had my gallbladder removed thursday, now I have excruciating pain to stomach"  also c/o chest pain

## 2019-04-13 NOTE — ED PROVIDER NOTE - CLINICAL SUMMARY MEDICAL DECISION MAKING FREE TEXT BOX
Patient presenting with post operative throat and abdominal pains.  No airway compromise, suspect throat pain related to recent intubation for procedure, abdominal exam reassuring but will obtain labs and CT to r/o bile leak or complication of recent surgery and discuss with colorectal surgery.

## 2019-04-13 NOTE — ED PROVIDER NOTE - PROGRESS NOTE DETAILS
Patient seen and evaluated by surgery who cleared for d/c. States patient should not be given additional rx for oxy and should follow up with Dr. Santos outpatient. Upon reassessment patient is much improved. she endorses mild throat and abdominal pain but feels comfortable with level of pain to go home. Denies chest pain, sob, n/v at this time. Discussed that sx does not want pt to have additional oxy and pt endorses understanding   Nu Bradley PA-C

## 2019-04-13 NOTE — CONSULT NOTE ADULT - ASSESSMENT
ASSESSMENT:  68F presents POD 2 s/p lap luís complaining of abdominal pain and general discomfort. CT abdomen/pelvis does not demonstrate any acute concerns, expected post operative findings including small volume pneumoperitoneum.     - Discussed with patient that she should stagger over the counter pain control (tylenol, NSAID) every 3 hours alternating so she has more constant pain medication  - Patient does not require any further oxycodone at this time  - Please have patient contact Dr. Santos's office at [phone] to schedule her post operative follow up appointment     Discussed with surgery fellow Dr. Guevara on behalf of Dr. Danielle Galo PGY-2  Surgery Pager [phone]

## 2019-04-13 NOTE — ED ADULT NURSE NOTE - NSIMPLEMENTINTERV_GEN_ALL_ED
Implemented All Fall with Harm Risk Interventions:  [place] to call system. Call bell, personal items and telephone within reach. Instruct patient to call for assistance. Room bathroom lighting operational. Non-slip footwear when patient is off stretcher. Physically safe environment: no spills, clutter or unnecessary equipment. Stretcher in lowest position, wheels locked, appropriate side rails in place. Provide visual cue, wrist band, yellow gown, etc. Monitor gait and stability. Monitor for mental status changes and reorient to person, place, and time. Review medications for side effects contributing to fall risk. Reinforce activity limits and safety measures with patient and family. Provide visual clues: red socks.

## 2019-04-13 NOTE — ED PROVIDER NOTE - CARE PLAN
Principal Discharge DX:	Abdominal pain  Assessment and plan of treatment:	1. Follow up with your primary care doctor in the next 1-2 days  2. You should make an appointment to see Dr. Santos early next week for evaluation   3. Continue all at home medications. And use incentive spirometer as instructed   4. For pain take Tylenol 1g every 6 hours alternated with Motrin 600mg every 6 hours  5. Return to ED for change of symptoms including increased pain, nausea, vomiting, fevers, shortness of breath, weakness and any other symptoms of concern

## 2019-04-13 NOTE — ED PROVIDER NOTE - NSFOLLOWUPINSTRUCTIONS_ED_ALL_ED_FT
1. Follow up with your primary care doctor in the next 1-2 days  2. You should make an appointment to see Dr. Santos early next week for evaluation   3. Continue all at home medications. And use incentive spirometer as instructed   4. For pain take Tylenol 1g every 6 hours alternated with Motrin 600mg every 6 hours  5. Return to ED for change of symptoms including increased pain, nausea, vomiting, fevers, shortness of breath, weakness and any other symptoms of concern

## 2019-04-13 NOTE — ED PROVIDER NOTE - PMH
Acid reflux    Affective Bipolar Disorder    Benzodiazepine Dependence    Calculus of gallbladder without cholecystitis    Carcinoma in situ  Breast Cancer  bilateral lumpectomies s/p chemo  had treatment 2008 and 2015   current on Arimidex  Chronic GERD    Depression  last admission 1- 2 year ago has been admitted several times   ECT last 20 years ago   currently on medication "antidepressants do not really work well"  Hypertension    Hypothyroidism    Insomnia

## 2019-04-13 NOTE — CONSULT NOTE ADULT - SUBJECTIVE AND OBJECTIVE BOX
Surgery Consultation Note  =====================================================  HPI:  68F presents POD 2 s/p lap luís complaining of abdominal pain and general discomfort. Patient had a laparoscopic cholecystectomy with Dr. Santos on 4/11 which she tolerated well, discharged home. Patient states that since her procedure, she has been having persistent abdominal and throat pain, unrelenting and not responsive to oral pain medication. She says that the pain is "worse than natural childbirth" or her two prior surgeries. She also complains of chest pain, back pain, shoulder pain, and throat pain that have worsened since her procedure. She is able to talk, ambulate, and is tolerating a diet. She also expresses concern about restarting her home medications, taking off the bandaids on her incisions overlying the steri strips, and passing gas. She denies any recent fevers, chills, nausea, vomiting, diarrhea, constipation.     In ED, labs notable for leukocytosis to 8.6 with elevated alk phos 141 (downtrending from post op). CT abdomen/pelvis does not demonstrate any acute concerns, expected post operative findings including small volume pneumoperitoneum.     Allergies: adhesives (Rash)    PAST MEDICAL & SURGICAL HISTORY:  Chronic GERD  Calculus of gallbladder without cholecystitis  Insomnia  Depression: last admission 1- 2 year ago has been admitted several times   ECT last 20 years ago   currently on medication &quot;antidepressants do not really work well&quot;  Hypothyroidism  Acid reflux  Carcinoma in situ: Breast Cancer  bilateral lumpectomies s/p chemo  had treatment 2008 and 2015   current on Arimidex  Hypertension  Affective Bipolar Disorder  Benzodiazepine Dependence  S/P endoscopy: 3 years ago negative  S/P colonoscopy: 3 years ago negative  S/P D&C (status post dilation and curettage)  History of lumpectomy: left 2002  &amp; right 2008  left 2013  treated with chemotherapy &amp; radiation therapy    FAMILY HISTORY:  Family history of heart failure (Father)  Family history of Alzheimer's disease (Mother)    ADVANCE DIRECTIVES: Presumed Full Code     REVIEW OF SYSTEMS:   General: Non-Contributory  Skin/Breast: Non-Contributory  Ophthalmologic: Non-Contributory  ENMT: Non-Contributory  Respiratory and Thorax: Non-Contributory  Cardiovascular: Non-Contributory  Gastrointestinal: Non-Contributory  Genitourinary: Non-Contributory  Musculoskeletal: Non-Contributory  Neurological: Non-Contributory  Psychiatric: Non-Contributory  Hematology/Lymphatics: Non-Contributory  Endocrine: Non-Contributory  Allergic/Immunologic: Non-Contributory    HOME MEDICATIONS:   Home Medications:  Ambien: 1 milligram(s) orally once a day (at bedtime) (11 Apr 2019 13:57)  Arimidex 1 mg oral tablet: 1 tab(s) orally once a day (11 Apr 2019 13:57)  clonazePAM 1 mg oral tablet: 1 tab(s) orally 4 times a day (11 Apr 2019 13:57)  diphenhydrAMINE 25 mg/5 mL oral liquid: 25 milligram(s) orally once a day (at bedtime)  takes as ZZZ Quil (11 Apr 2019 13:57)  Levoxyl 112 mcg (0.112 mg) oral tablet: 1 tab(s) orally once a day (11 Apr 2019 13:57)  Norvasc 5 mg oral tablet: 1 tab(s) orally once a day (11 Apr 2019 13:57)  Protonix 40 mg oral delayed release tablet: 1 tab(s) orally once a day (11 Apr 2019 13:57)    CURRENT MEDICATIONS:   --------------------------------------------------------------------------------------  Neurologic Medications    Respiratory Medications    Cardiovascular Medications    Gastrointestinal Medications    Genitourinary Medications    Hematologic/Oncologic Medications    Antimicrobial/Immunologic Medications    Endocrine/Metabolic Medications    Topical/Other Medications    --------------------------------------------------------------------------------------  VITAL SIGNS, INS/OUTS (last 24 hours):  --------------------------------------------------------------------------------------  Vital Signs Last 24 Hrs  T(C): 36.6 (13 Apr 2019 14:50), Max: 36.8 (13 Apr 2019 11:00)  T(F): 97.8 (13 Apr 2019 14:50), Max: 98.3 (13 Apr 2019 11:00)  HR: 86 (13 Apr 2019 14:50) (70 - 86)  BP: 152/89 (13 Apr 2019 14:50) (139/82 - 161/95)  BP(mean): --  RR: 18 (13 Apr 2019 14:50) (18 - 20)  SpO2: 93% (13 Apr 2019 14:50) (93% - 94%)  --------------------------------------------------------------------------------------    EXAM  General: NAD, resting comfortably  Resp: unlabored breathing on RA  CV: HDS, rrr  Abd: obese, soft, appropriate tenderness, laparoscopic incisions intact w/o erythema, steri strips in place, nondistended  Extr: wwp     LABS  --------------------------------------------------------------------------------------  CBC (04-13 @ 12:03)                          14.5                     8.6     )--------------(  201        68.7  % Neuts, 21.4  % Lymphs, ANC: 5.9                             42.6      BMP (04-13 @ 12:03)       140     |  104     |  14    			Ca++ --      Ca 9.6          ---------------------------------( 96    		Mg --           4.0     |  23      |  0.54  			Ph --        LFTs (04-13 @ 12:03)      TPro 7.1 / Alb 3.7 / TBili 0.5 / DBili -- / AST 45<H> / ALT 44 / AlkPhos 141<H>    Coags (04-13 @ 12:03)  aPTT 27.1<L> / INR 1.03 / PT 11.7    VBG (04-13 @ 12:03)     7.40 / 46 / 42 / 28 / 3.2<H> / 75%      Lactate: 1.6  --------------------------------------------------------------------------------------    OTHER LABS    IMAGING RESULTS  < from: CT Abdomen and Pelvis w/ Oral Cont and w/ IV Cont (04.13.19 @ 14:02) >  LOWER CHEST: Linear subsegmental atelectasis in the right lower lobe.  Atherosclerotic calcifications of the coronary arteries.  Postsurgical changes of the bilateral breasts from prior bilateral   lumpectomy for breast cancer. Compared to recent mammogram from   8/23/2018, there is redemonstration of slight nodularity within the   partially visualized bilateral breast, recommended continuing   surveillance mammography/breast ultrasound as clinically warranted.    LIVER: Within normal limits.  BILE DUCTS: Normal caliber. No evidence of choledocholithiasis.  GALLBLADDER: Status post cholecystectomy with postoperative changes   including small amount of pneumoperitoneum with air in the gallbladder   fossa as well. No discrete collection within the gallbladder fossa.  SPLEEN: Within normal limits.  PANCREAS: Within normal limits.  ADRENALS: Within normal limits.  KIDNEYS/URETERS: Left renal cysts. Symmetric renal enhancement. No   hydronephrosis.    BLADDER: Within normal limits.  REPRODUCTIVE ORGANS: Uterus and adnexa are within normal limits.    BOWEL: No bowel obstruction. Appendix is within normal limits.  PERITONEUM: Postoperative changes including small volume   pneumoperitoneum. Very trace free fluid within the posterior pelvis.  VESSELS:  Atherosclerotic calcifications.  RETROPERITONEUM: No lymphadenopathy.    ABDOMINAL WALL: Postsurgical changes along the ventral-periumbilical   abdominal wall including subcutaneous gas and stranding secondary to   recent laparoscopic cholecystectomy. Small fat-containing umbilical   hernia.    BONES: Mild degenerative changes.    IMPRESSION:   Expected postsurgical changes from recent laparoscopic cholecystectomy   including small pneumoperitoneum.  No discrete collection or intra-abdominal abnormality.    KADEN EPPS M.D., RADIOLOGY RESIDENT  This document has been electronically signed.  CHRIS DELUNA M.D., ATTENDING RADIOLOGIST  This document has been electronically signed. Apr 13 2019  4:01PM    < end of copied text >

## 2019-04-13 NOTE — ED PROVIDER NOTE - PSH
History of lumpectomy  left 2002  & right 2008  left 2013  treated with chemotherapy & radiation therapy  S/P colonoscopy  3 years ago negative  S/P D&C (status post dilation and curettage)    S/P endoscopy  3 years ago negative

## 2019-04-16 ENCOUNTER — APPOINTMENT (OUTPATIENT)
Dept: SURGERY | Facility: CLINIC | Age: 69
End: 2019-04-16
Payer: MEDICARE

## 2019-04-16 VITALS
SYSTOLIC BLOOD PRESSURE: 148 MMHG | HEART RATE: 93 BPM | RESPIRATION RATE: 16 BRPM | DIASTOLIC BLOOD PRESSURE: 90 MMHG | OXYGEN SATURATION: 96 % | TEMPERATURE: 98.5 F

## 2019-04-16 DIAGNOSIS — Z09 ENCOUNTER FOR FOLLOW-UP EXAMINATION AFTER COMPLETED TREATMENT FOR CONDITIONS OTHER THAN MALIGNANT NEOPLASM: ICD-10-CM

## 2019-04-16 DIAGNOSIS — Z87.19 PERSONAL HISTORY OF OTHER DISEASES OF THE DIGESTIVE SYSTEM: ICD-10-CM

## 2019-04-16 PROCEDURE — 99024 POSTOP FOLLOW-UP VISIT: CPT

## 2019-04-16 NOTE — HISTORY OF PRESENT ILLNESS
[de-identified] : The patient is postoperative day #5 status post laparoscopic cholecystectomy. She complains of abdominal discomfort and states that she has not had a bowel movement since surgery. She denies nausea vomiting fevers or jaundice. She went to the emergency room at [facility] on postoperative day #2. There was normal and a CT of the abdomen and pelvis demonstrated postsurgical changes with no acute intra-abdominal pathology. She was discharged home. Her main complaint in the office today it is sore throat

## 2019-04-16 NOTE — PHYSICAL EXAM
[Normal Breath Sounds] : Normal breath sounds [Abdominal Masses] : No abdominal masses [de-identified] : nad [de-identified] : Incisions appropriately tender without evidence of infection . No palpable masses hernias. No peritoneal signs.

## 2019-04-16 NOTE — ASSESSMENT
[FreeTextEntry1] : In summary the patient is having incisional discomfort status post laparoscopic cholecystectomy. I reassured her that this will likely improve over the next week. I recommended fiber supplementation stool softeners and laxatives for her constipation. Because of her recent diagnosis of suicidal ideation I will not be prescribing additional narcotics. I recommended Tylenol and nonsteroidal anti-inflammatories for her incisional discomfort. Finally I reassured her that her sore throat is not uncommon following general anesthesia and should subside over the next week or 2.

## 2019-04-18 LAB — SURGICAL PATHOLOGY STUDY: SIGNIFICANT CHANGE UP

## 2019-05-06 ENCOUNTER — APPOINTMENT (OUTPATIENT)
Dept: MRI IMAGING | Facility: IMAGING CENTER | Age: 69
End: 2019-05-06
Payer: MEDICARE

## 2019-05-06 ENCOUNTER — OUTPATIENT (OUTPATIENT)
Dept: OUTPATIENT SERVICES | Facility: HOSPITAL | Age: 69
LOS: 1 days | End: 2019-05-06
Payer: MEDICARE

## 2019-05-06 DIAGNOSIS — Z98.89 OTHER SPECIFIED POSTPROCEDURAL STATES: Chronic | ICD-10-CM

## 2019-05-06 DIAGNOSIS — Z98.890 OTHER SPECIFIED POSTPROCEDURAL STATES: Chronic | ICD-10-CM

## 2019-05-06 DIAGNOSIS — Z00.8 ENCOUNTER FOR OTHER GENERAL EXAMINATION: ICD-10-CM

## 2019-05-06 PROCEDURE — 77049 MRI BREAST C-+ W/CAD BI: CPT | Mod: 26

## 2019-05-06 PROCEDURE — C8937: CPT

## 2019-05-06 PROCEDURE — A9585: CPT

## 2019-05-06 PROCEDURE — C8908: CPT

## 2019-05-08 ENCOUNTER — APPOINTMENT (OUTPATIENT)
Dept: SURGERY | Facility: CLINIC | Age: 69
End: 2019-05-08
Payer: MEDICARE

## 2019-05-08 PROCEDURE — 99213K: CUSTOM

## 2019-06-10 ENCOUNTER — APPOINTMENT (OUTPATIENT)
Dept: SURGERY | Facility: CLINIC | Age: 69
End: 2019-06-10

## 2019-06-10 NOTE — REASON FOR VISIT
[Post Op: _________] : a [unfilled] post op visit [FreeTextEntry1] : Laparoscopic cholecystectomy. \par Laparoscopic cholecystectomy. \par Laparoscopic cholecystectomy. Last seen on 4/16/19.

## 2019-07-01 ENCOUNTER — APPOINTMENT (OUTPATIENT)
Dept: HEMATOLOGY ONCOLOGY | Facility: CLINIC | Age: 69
End: 2019-07-01

## 2019-07-18 ENCOUNTER — OUTPATIENT (OUTPATIENT)
Dept: OUTPATIENT SERVICES | Facility: HOSPITAL | Age: 69
LOS: 1 days | Discharge: HOME | End: 2019-07-18

## 2019-07-18 DIAGNOSIS — Z98.89 OTHER SPECIFIED POSTPROCEDURAL STATES: Chronic | ICD-10-CM

## 2019-07-18 DIAGNOSIS — D05.10 INTRADUCTAL CARCINOMA IN SITU OF UNSPECIFIED BREAST: ICD-10-CM

## 2019-07-18 DIAGNOSIS — Z98.890 OTHER SPECIFIED POSTPROCEDURAL STATES: Chronic | ICD-10-CM

## 2019-07-23 ENCOUNTER — APPOINTMENT (OUTPATIENT)
Dept: HEMATOLOGY ONCOLOGY | Facility: CLINIC | Age: 69
End: 2019-07-23
Payer: MEDICARE

## 2019-07-23 ENCOUNTER — RESULT REVIEW (OUTPATIENT)
Age: 69
End: 2019-07-23

## 2019-07-23 VITALS
SYSTOLIC BLOOD PRESSURE: 110 MMHG | HEART RATE: 87 BPM | OXYGEN SATURATION: 96 % | DIASTOLIC BLOOD PRESSURE: 76 MMHG | WEIGHT: 186.29 LBS | BODY MASS INDEX: 30.07 KG/M2 | TEMPERATURE: 97.5 F | RESPIRATION RATE: 16 BRPM

## 2019-07-23 LAB
BASOPHILS # BLD AUTO: 0 K/UL — SIGNIFICANT CHANGE UP (ref 0–0.2)
BASOPHILS NFR BLD AUTO: 0.2 % — SIGNIFICANT CHANGE UP (ref 0–2)
EOSINOPHIL # BLD AUTO: 0.1 K/UL — SIGNIFICANT CHANGE UP (ref 0–0.5)
EOSINOPHIL NFR BLD AUTO: 0.9 % — SIGNIFICANT CHANGE UP (ref 0–6)
HCT VFR BLD CALC: 44.1 % — SIGNIFICANT CHANGE UP (ref 34.5–45)
HGB BLD-MCNC: 15.1 G/DL — SIGNIFICANT CHANGE UP (ref 11.5–15.5)
LYMPHOCYTES # BLD AUTO: 2.8 K/UL — SIGNIFICANT CHANGE UP (ref 1–3.3)
LYMPHOCYTES # BLD AUTO: 34.4 % — SIGNIFICANT CHANGE UP (ref 13–44)
MCHC RBC-ENTMCNC: 29.8 PG — SIGNIFICANT CHANGE UP (ref 27–34)
MCHC RBC-ENTMCNC: 34.2 G/DL — SIGNIFICANT CHANGE UP (ref 32–36)
MCV RBC AUTO: 87.3 FL — SIGNIFICANT CHANGE UP (ref 80–100)
MONOCYTES # BLD AUTO: 0.6 K/UL — SIGNIFICANT CHANGE UP (ref 0–0.9)
MONOCYTES NFR BLD AUTO: 7.6 % — SIGNIFICANT CHANGE UP (ref 2–14)
NEUTROPHILS # BLD AUTO: 4.6 K/UL — SIGNIFICANT CHANGE UP (ref 1.8–7.4)
NEUTROPHILS NFR BLD AUTO: 56.9 % — SIGNIFICANT CHANGE UP (ref 43–77)
PLATELET # BLD AUTO: 241 K/UL — SIGNIFICANT CHANGE UP (ref 150–400)
RBC # BLD: 5.05 M/UL — SIGNIFICANT CHANGE UP (ref 3.8–5.2)
RBC # FLD: 13.5 % — SIGNIFICANT CHANGE UP (ref 10.3–14.5)
WBC # BLD: 8.1 K/UL — SIGNIFICANT CHANGE UP (ref 3.8–10.5)
WBC # FLD AUTO: 8.1 K/UL — SIGNIFICANT CHANGE UP (ref 3.8–10.5)

## 2019-07-23 PROCEDURE — 99215 OFFICE O/P EST HI 40 MIN: CPT

## 2019-07-23 RX ORDER — ANASTROZOLE TABLETS 1 MG/1
1 TABLET ORAL DAILY
Qty: 90 | Refills: 3 | Status: ACTIVE | COMMUNITY

## 2019-07-25 ENCOUNTER — EMERGENCY (EMERGENCY)
Facility: HOSPITAL | Age: 69
LOS: 1 days | Discharge: HOME | End: 2019-07-25
Attending: EMERGENCY MEDICINE
Payer: MEDICARE

## 2019-07-25 DIAGNOSIS — Z98.89 OTHER SPECIFIED POSTPROCEDURAL STATES: Chronic | ICD-10-CM

## 2019-07-25 DIAGNOSIS — Z98.890 OTHER SPECIFIED POSTPROCEDURAL STATES: Chronic | ICD-10-CM

## 2019-07-25 LAB
ALBUMIN SERPL ELPH-MCNC: 4.4 G/DL
ALP BLD-CCNC: 161 U/L
ALT SERPL-CCNC: 14 U/L
ANION GAP SERPL CALC-SCNC: 13 MMOL/L
AST SERPL-CCNC: 17 U/L
BILIRUB SERPL-MCNC: 0.6 MG/DL
BUN SERPL-MCNC: 26 MG/DL
CALCIUM SERPL-MCNC: 10 MG/DL
CHLORIDE SERPL-SCNC: 104 MMOL/L
CO2 SERPL-SCNC: 23 MMOL/L
CREAT SERPL-MCNC: 0.74 MG/DL
EPO SERPL-MCNC: 14.6 MIU/ML
FERRITIN SERPL-MCNC: 39 NG/ML
GLUCOSE SERPL-MCNC: 112 MG/DL
IRON SATN MFR SERPL: 32 %
IRON SERPL-MCNC: 103 UG/DL
POTASSIUM SERPL-SCNC: 4.3 MMOL/L
PROT SERPL-MCNC: 7.6 G/DL
SODIUM SERPL-SCNC: 140 MMOL/L
TIBC SERPL-MCNC: 317 UG/DL
UIBC SERPL-MCNC: 214 UG/DL

## 2019-07-25 PROCEDURE — 99282 EMERGENCY DEPT VISIT SF MDM: CPT

## 2019-07-26 VITALS
OXYGEN SATURATION: 96 % | HEIGHT: 66 IN | RESPIRATION RATE: 18 BRPM | HEART RATE: 96 BPM | DIASTOLIC BLOOD PRESSURE: 90 MMHG | WEIGHT: 199.96 LBS | TEMPERATURE: 98 F | SYSTOLIC BLOOD PRESSURE: 140 MMHG

## 2019-07-26 NOTE — ED PROVIDER NOTE - MUSCULOSKELETAL, MLM
Spine appears normal, range of motion is not limited, no muscle or joint tenderness, BLE equal in size and NV intact.

## 2019-07-26 NOTE — ED PROVIDER NOTE - OBJECTIVE STATEMENT
67 y/o female with PMHx of bipolar disorder, HTN, breast ca, hypothyroid presents to the ED c/o anxiety x today. Pt notes BLE swelling, feeling like they have become more swollen over the past 3-4 days, improves with elevation but pt states she is unsure why they are swelling up. Pt notes stressful life events contributing to her anxiety:  is currently in the hospital in [place] and her son currently lives in [place]; pt is living alone right now and it makes her feel anxious. Pt reports she is resistant to anxiety meds and states she does not want anxiety meds. No Hx cardiac/renal/liver issues. No Hx DVT/PE. No recent surgeries, travel or long immobilizations. Pt denies chest pain, shortness of breath, fever, abd pain, SI, HI, hallucinations, or any other complaints. NKDA.

## 2019-07-26 NOTE — ED PROVIDER NOTE - PRINCIPAL DIAGNOSIS
Message   tsh at goal, continue synthroid at current dose , send it to mail order for 90 days      Verified Results  (1) TSH 28Jul2016 01:34PM Susanna Factor Order Number: [id number]     Test Name Result Flag Reference   TSH 1 890 uIU/mL  0 358-3 740   Patients undergoing fluorescein dye angiography may retain small amounts of fluorescein in the body for 48-72 hours post procedure  Samples containing fluorescein can produce falsely depressed TSH values  If the patient had this procedure,a specimen should be resubmitted post fluorescein clearance            The recommended reference ranges for TSH during pregnancy are as follows:  First trimester 0 1 to 2 5 uIU/mL  Second trimester  0 2 to 3 0 uIU/mL  Third trimester 0 3 to 3 0 uIU/m     (1) T4, FREE 28Jul2016 01:34PM Susanna Factor Order Number: [id number]     Test Name Result Flag Reference   T4,FREE 0 92 ng/dL  0 76-1 46
Anxiety

## 2019-07-26 NOTE — ED PROVIDER NOTE - CHPI ED SYMPTOMS NEG
no chest pain, no shortness of breath, no fever, no abd pain/no hallucinations/no suicidal/no homicidal

## 2019-07-29 ENCOUNTER — MESSAGE (OUTPATIENT)
Age: 69
End: 2019-07-29

## 2019-07-30 RX ORDER — PAROXETINE HYDROCHLORIDE 40 MG/1
TABLET, FILM COATED ORAL
Refills: 0 | Status: ACTIVE | COMMUNITY

## 2019-07-30 NOTE — REASON FOR VISIT
[Follow-Up Visit] : a follow-up [FreeTextEntry2] : History of multiple breast events most recently DCIS 2015

## 2019-07-30 NOTE — ASSESSMENT
[FreeTextEntry1] : 67yo woman with history of breast cancer in the distant past b/l with ACT, TC chemo, radiation. More recently 2015 lumpectomy for DCIS, atypia, tolerating AI since then.\par \par Breast cancer:\par -continue AI\par -check DEXA\par -check labs today - noted Alk phos mild elevation for years, PET/CT in setting of this 2017 OK, noted gall bladder issues and surgery, follow up\par -follow up 3-6 months, sooner if issues\par -follow up Dr. Barakat as recommended, breast imaging ordered by her\par -she is UTD on RHM, reviewed as above\par \par Severe depression-chronic, doing better\par -continues to see therapist and psychiatrist\par \par Elevated Hgb: transient mildly elevated, normocytic, likely normal, asymptomatic\par -check CBC, iron studies, EPO level, JAK2

## 2019-07-30 NOTE — HISTORY OF PRESENT ILLNESS
[de-identified] : T2 N1A ER positive HER-2/jona left breast cancer in 2002 she received AC x4 followed by Taxotere x4 followed by radiation and then Aromasin.\par She was separately diagnosed with a T1CN0 ER positive breast cancer in the right breast 2008 and received TC x4 followed by radiation and then tamoxifen for 5 years. \par \par 2015 abnormal mammogram of both breasts core biopsy of the left breast containing calcification - ductal carcinoma in situ cribriform pattern with intermediate nuclear grade, ER positive IL negative. She also had addition of an area of calcification in the right breast revealing benign breast tissue with fibrosis calcifications foci of LCIS and stromal fibrosis. Excision of the areas in both breasts left breast contained an area of focal atypical ductal hyperplasia, no residual DCIS, right breast contained rare foci of LCIS.\par \par She started Arimidex in 2015.\par Of note she is BRCA negative. [de-identified] : Patient presents for follow up and to establish medical oncology care with me. She has been on Arimidex for ~3 years and is tolerating it well without AEs or issues. She had her gall bladder removed in April. She established care with a new psychiatrist and she is very pleased with him - her depression/anxiety medications were changed and she meets with him regularly. She feels much better and hopeful, denies SI. \par \par PMD Dr. Rachel - sees regularly\par She is due to see Dr. Barakat again in 11/2019, mammogram/sonogram in September, ordered by her\par Gyn Dr. De Leon annually\par Ophtho Dr. Rodrigez - she will see soon\par Follows with dermatology annually\par C-scope 2 years ago Dr. Mak\par \par

## 2019-07-30 NOTE — PHYSICAL EXAM
[Restricted in physically strenuous activity but ambulatory and able to carry out work of a light or sedentary nature] : Status 1- Restricted in physically strenuous activity but ambulatory and able to carry out work of a light or sedentary nature, e.g., light house work, office work [Normal] : affect appropriate [de-identified] : No masses or adenopathy palpable

## 2019-08-01 LAB
JAK2 P.V617F BLD/T QL: NEGATIVE
JAK2RLX: NEGATIVE

## 2019-08-06 ENCOUNTER — APPOINTMENT (OUTPATIENT)
Dept: SURGERY | Facility: CLINIC | Age: 69
End: 2019-08-06

## 2019-09-11 ENCOUNTER — APPOINTMENT (OUTPATIENT)
Dept: ULTRASOUND IMAGING | Facility: IMAGING CENTER | Age: 69
End: 2019-09-11

## 2019-09-11 ENCOUNTER — APPOINTMENT (OUTPATIENT)
Dept: MAMMOGRAPHY | Facility: IMAGING CENTER | Age: 69
End: 2019-09-11

## 2019-10-04 ENCOUNTER — APPOINTMENT (OUTPATIENT)
Dept: MAMMOGRAPHY | Facility: IMAGING CENTER | Age: 69
End: 2019-10-04
Payer: MEDICARE

## 2019-10-04 ENCOUNTER — OUTPATIENT (OUTPATIENT)
Dept: OUTPATIENT SERVICES | Facility: HOSPITAL | Age: 69
LOS: 1 days | End: 2019-10-04
Payer: MEDICARE

## 2019-10-04 ENCOUNTER — APPOINTMENT (OUTPATIENT)
Dept: ULTRASOUND IMAGING | Facility: IMAGING CENTER | Age: 69
End: 2019-10-04

## 2019-10-04 DIAGNOSIS — Z98.890 OTHER SPECIFIED POSTPROCEDURAL STATES: Chronic | ICD-10-CM

## 2019-10-04 DIAGNOSIS — Z00.00 ENCOUNTER FOR GENERAL ADULT MEDICAL EXAMINATION WITHOUT ABNORMAL FINDINGS: ICD-10-CM

## 2019-10-04 DIAGNOSIS — Z98.89 OTHER SPECIFIED POSTPROCEDURAL STATES: Chronic | ICD-10-CM

## 2019-10-04 PROCEDURE — G0279: CPT

## 2019-10-04 PROCEDURE — 76641 ULTRASOUND BREAST COMPLETE: CPT

## 2019-10-04 PROCEDURE — 77066 DX MAMMO INCL CAD BI: CPT

## 2019-10-04 PROCEDURE — G0279: CPT | Mod: 26

## 2019-10-04 PROCEDURE — 77066 DX MAMMO INCL CAD BI: CPT | Mod: 26

## 2019-10-04 PROCEDURE — 76641 ULTRASOUND BREAST COMPLETE: CPT | Mod: 26,50

## 2019-10-16 RX ORDER — ANASTROZOLE TABLETS 1 MG/1
1 TABLET ORAL
Qty: 1 | Refills: 3 | Status: ACTIVE | COMMUNITY
Start: 2019-10-16 | End: 1900-01-01

## 2019-10-21 ENCOUNTER — RESULT REVIEW (OUTPATIENT)
Age: 69
End: 2019-10-21

## 2019-10-21 ENCOUNTER — APPOINTMENT (OUTPATIENT)
Dept: ULTRASOUND IMAGING | Facility: IMAGING CENTER | Age: 69
End: 2019-10-21
Payer: MEDICARE

## 2019-10-21 ENCOUNTER — OUTPATIENT (OUTPATIENT)
Dept: OUTPATIENT SERVICES | Facility: HOSPITAL | Age: 69
LOS: 1 days | End: 2019-10-21
Payer: MEDICARE

## 2019-10-21 DIAGNOSIS — Z98.890 OTHER SPECIFIED POSTPROCEDURAL STATES: Chronic | ICD-10-CM

## 2019-10-21 DIAGNOSIS — Z00.8 ENCOUNTER FOR OTHER GENERAL EXAMINATION: ICD-10-CM

## 2019-10-21 DIAGNOSIS — Z98.89 OTHER SPECIFIED POSTPROCEDURAL STATES: Chronic | ICD-10-CM

## 2019-10-21 PROCEDURE — 77065 DX MAMMO INCL CAD UNI: CPT

## 2019-10-21 PROCEDURE — A4648: CPT

## 2019-10-21 PROCEDURE — 88305 TISSUE EXAM BY PATHOLOGIST: CPT

## 2019-10-21 PROCEDURE — 88305 TISSUE EXAM BY PATHOLOGIST: CPT | Mod: 26

## 2019-10-21 PROCEDURE — 77065 DX MAMMO INCL CAD UNI: CPT | Mod: 26,RT

## 2019-10-21 PROCEDURE — 19083 BX BREAST 1ST LESION US IMAG: CPT

## 2019-10-21 PROCEDURE — 19083 BX BREAST 1ST LESION US IMAG: CPT | Mod: RT

## 2019-11-04 ENCOUNTER — APPOINTMENT (OUTPATIENT)
Dept: MAMMOGRAPHY | Facility: IMAGING CENTER | Age: 69
End: 2019-11-04
Payer: MEDICARE

## 2019-11-04 ENCOUNTER — RESULT REVIEW (OUTPATIENT)
Age: 69
End: 2019-11-04

## 2019-11-04 ENCOUNTER — OUTPATIENT (OUTPATIENT)
Dept: OUTPATIENT SERVICES | Facility: HOSPITAL | Age: 69
LOS: 1 days | End: 2019-11-04
Payer: MEDICARE

## 2019-11-04 DIAGNOSIS — Z00.8 ENCOUNTER FOR OTHER GENERAL EXAMINATION: ICD-10-CM

## 2019-11-04 DIAGNOSIS — Z98.890 OTHER SPECIFIED POSTPROCEDURAL STATES: Chronic | ICD-10-CM

## 2019-11-04 DIAGNOSIS — Z98.89 OTHER SPECIFIED POSTPROCEDURAL STATES: Chronic | ICD-10-CM

## 2019-11-04 PROCEDURE — 19081 BX BREAST 1ST LESION STRTCTC: CPT | Mod: LT

## 2019-11-04 PROCEDURE — 88305 TISSUE EXAM BY PATHOLOGIST: CPT | Mod: 26

## 2019-11-04 PROCEDURE — 77065 DX MAMMO INCL CAD UNI: CPT

## 2019-11-04 PROCEDURE — 88305 TISSUE EXAM BY PATHOLOGIST: CPT

## 2019-11-04 PROCEDURE — 77065 DX MAMMO INCL CAD UNI: CPT | Mod: 26,LT

## 2019-11-04 PROCEDURE — 19081 BX BREAST 1ST LESION STRTCTC: CPT

## 2019-11-05 LAB — SURGICAL PATHOLOGY STUDY: SIGNIFICANT CHANGE UP

## 2020-01-22 ENCOUNTER — OUTPATIENT (OUTPATIENT)
Dept: OUTPATIENT SERVICES | Facility: HOSPITAL | Age: 70
LOS: 1 days | Discharge: HOME | End: 2020-01-22

## 2020-01-22 DIAGNOSIS — Z98.890 OTHER SPECIFIED POSTPROCEDURAL STATES: Chronic | ICD-10-CM

## 2020-01-22 DIAGNOSIS — D05.10 INTRADUCTAL CARCINOMA IN SITU OF UNSPECIFIED BREAST: ICD-10-CM

## 2020-01-22 DIAGNOSIS — Z98.89 OTHER SPECIFIED POSTPROCEDURAL STATES: Chronic | ICD-10-CM

## 2020-01-23 RX ORDER — ANASTROZOLE TABLETS 1 MG/1
1 TABLET ORAL
Qty: 1 | Refills: 3 | Status: ACTIVE | COMMUNITY
Start: 2020-01-23 | End: 1900-01-01

## 2020-01-28 ENCOUNTER — APPOINTMENT (OUTPATIENT)
Dept: HEMATOLOGY ONCOLOGY | Facility: CLINIC | Age: 70
End: 2020-01-28
Payer: MEDICARE

## 2020-01-28 VITALS
DIASTOLIC BLOOD PRESSURE: 82 MMHG | HEART RATE: 92 BPM | RESPIRATION RATE: 18 BRPM | BODY MASS INDEX: 30.42 KG/M2 | TEMPERATURE: 98.1 F | WEIGHT: 188.47 LBS | OXYGEN SATURATION: 95 % | SYSTOLIC BLOOD PRESSURE: 124 MMHG

## 2020-01-28 PROCEDURE — 99213 OFFICE O/P EST LOW 20 MIN: CPT

## 2020-01-31 NOTE — HISTORY OF PRESENT ILLNESS
[de-identified] : T2 N1A ER positive HER-2/jona left breast cancer in 2002 she received AC x4 followed by Taxotere x4 followed by radiation and then Aromasin.\par She was separately diagnosed with a T1CN0 ER positive breast cancer in the right breast 2008 and received TC x4 followed by radiation and then tamoxifen for 5 years. \par \par 2015 abnormal mammogram of both breasts core biopsy of the left breast containing calcification - ductal carcinoma in situ cribriform pattern with intermediate nuclear grade, ER positive UT negative. She also had addition of an area of calcification in the right breast revealing benign breast tissue with fibrosis calcifications foci of LCIS and stromal fibrosis. Excision of the areas in both breasts left breast contained an area of focal atypical ductal hyperplasia, no residual DCIS, right breast contained rare foci of LCIS.\par \par She started Arimidex in 2015.\par Of note she is BRCA negative. [de-identified] : Patient presents for follow up and to establish medical oncology care with me. She has been on Arimidex for ~4 years and is tolerating it well without AEs or issues. She had her gall bladder removed in April. She established care with a new psychiatrist and she is very pleased with him - her depression/anxiety medications were changed and she meets with him regularly - changed again recently. She feels much better and hopeful, denies SI. Dr. Ryan Sparrow new psychiatrist. \par \par PMD Dr. Rachel - sees regularly\par She is due to see Dr. Barakat again in 2/2020, recent MRI with 2 biopsies, benign\par Gyn Dr. De Leon annually\par Ophtho Dr. Rodrigez - she will see soon\par Follows with dermatology annually\par C-scope 2 years ago Dr. Mak\par \par

## 2020-01-31 NOTE — ASSESSMENT
[FreeTextEntry1] : 69yo woman with history of breast cancer in the distant past b/l with ACT, TC chemo, radiation. More recently 2015 lumpectomy for DCIS, atypia, tolerating AI since then.\par \par Breast cancer:\par -continue AI daily for now\par -check DEXA, readdressed\par -noted Alk phos mild elevation for years, PET/CT in setting of this 2017 OK, noted gall bladder issues and surgery, follow up - she defers labs today, monitor with PMD (she notes he follows it as well)\par -follow up 3-6 months, sooner if issues\par -follow up Dr. Barakat as recommended, breast imaging ordered by her\par -she is UTD on RHM, reviewed as above\par \par Severe depression-chronic, doing better, no SI\par -continues to see therapist and psychiatrist

## 2020-01-31 NOTE — PHYSICAL EXAM
[Restricted in physically strenuous activity but ambulatory and able to carry out work of a light or sedentary nature] : Status 1- Restricted in physically strenuous activity but ambulatory and able to carry out work of a light or sedentary nature, e.g., light house work, office work [Normal] : affect appropriate [de-identified] : wearing wig and sunglasses [de-identified] : No masses or adenopathy palpable

## 2020-02-12 ENCOUNTER — APPOINTMENT (OUTPATIENT)
Dept: SURGERY | Facility: CLINIC | Age: 70
End: 2020-02-12
Payer: MEDICARE

## 2020-02-12 PROCEDURE — 99213K: CUSTOM

## 2020-05-26 ENCOUNTER — APPOINTMENT (OUTPATIENT)
Dept: MRI IMAGING | Facility: IMAGING CENTER | Age: 70
End: 2020-05-26
Payer: MEDICARE

## 2020-05-26 ENCOUNTER — RESULT REVIEW (OUTPATIENT)
Age: 70
End: 2020-05-26

## 2020-05-26 ENCOUNTER — OUTPATIENT (OUTPATIENT)
Dept: OUTPATIENT SERVICES | Facility: HOSPITAL | Age: 70
LOS: 1 days | End: 2020-05-26
Payer: MEDICARE

## 2020-05-26 DIAGNOSIS — Z98.890 OTHER SPECIFIED POSTPROCEDURAL STATES: Chronic | ICD-10-CM

## 2020-05-26 DIAGNOSIS — Z98.89 OTHER SPECIFIED POSTPROCEDURAL STATES: Chronic | ICD-10-CM

## 2020-05-26 DIAGNOSIS — Z00.8 ENCOUNTER FOR OTHER GENERAL EXAMINATION: ICD-10-CM

## 2020-05-26 PROCEDURE — C8937: CPT

## 2020-05-26 PROCEDURE — A9585: CPT

## 2020-05-26 PROCEDURE — 77049 MRI BREAST C-+ W/CAD BI: CPT | Mod: 26

## 2020-05-26 PROCEDURE — C8908: CPT

## 2020-07-31 ENCOUNTER — OUTPATIENT (OUTPATIENT)
Dept: OUTPATIENT SERVICES | Facility: HOSPITAL | Age: 70
LOS: 1 days | Discharge: HOME | End: 2020-07-31

## 2020-07-31 DIAGNOSIS — Z98.89 OTHER SPECIFIED POSTPROCEDURAL STATES: Chronic | ICD-10-CM

## 2020-07-31 DIAGNOSIS — Z98.890 OTHER SPECIFIED POSTPROCEDURAL STATES: Chronic | ICD-10-CM

## 2020-07-31 DIAGNOSIS — D05.10 INTRADUCTAL CARCINOMA IN SITU OF UNSPECIFIED BREAST: ICD-10-CM

## 2020-08-06 ENCOUNTER — APPOINTMENT (OUTPATIENT)
Dept: HEMATOLOGY ONCOLOGY | Facility: CLINIC | Age: 70
End: 2020-08-06
Payer: MEDICARE

## 2020-08-06 VITALS
RESPIRATION RATE: 18 BRPM | WEIGHT: 200.62 LBS | OXYGEN SATURATION: 96 % | TEMPERATURE: 98.1 F | BODY MASS INDEX: 32.38 KG/M2 | HEART RATE: 95 BPM | DIASTOLIC BLOOD PRESSURE: 72 MMHG | SYSTOLIC BLOOD PRESSURE: 124 MMHG

## 2020-08-06 PROCEDURE — 99213 OFFICE O/P EST LOW 20 MIN: CPT

## 2020-10-21 ENCOUNTER — APPOINTMENT (OUTPATIENT)
Dept: SURGERY | Facility: CLINIC | Age: 70
End: 2020-10-21

## 2020-10-28 ENCOUNTER — APPOINTMENT (OUTPATIENT)
Dept: SURGERY | Facility: CLINIC | Age: 70
End: 2020-10-28

## 2021-01-28 ENCOUNTER — APPOINTMENT (OUTPATIENT)
Dept: MAMMOGRAPHY | Facility: IMAGING CENTER | Age: 71
End: 2021-01-28

## 2021-01-28 ENCOUNTER — APPOINTMENT (OUTPATIENT)
Dept: ULTRASOUND IMAGING | Facility: IMAGING CENTER | Age: 71
End: 2021-01-28

## 2021-02-03 ENCOUNTER — APPOINTMENT (OUTPATIENT)
Dept: MAMMOGRAPHY | Facility: IMAGING CENTER | Age: 71
End: 2021-02-03
Payer: MEDICARE

## 2021-02-03 ENCOUNTER — RESULT REVIEW (OUTPATIENT)
Age: 71
End: 2021-02-03

## 2021-02-03 ENCOUNTER — APPOINTMENT (OUTPATIENT)
Dept: ULTRASOUND IMAGING | Facility: IMAGING CENTER | Age: 71
End: 2021-02-03
Payer: MEDICARE

## 2021-02-03 ENCOUNTER — OUTPATIENT (OUTPATIENT)
Dept: OUTPATIENT SERVICES | Facility: HOSPITAL | Age: 71
LOS: 1 days | End: 2021-02-03
Payer: MEDICARE

## 2021-02-03 DIAGNOSIS — Z98.89 OTHER SPECIFIED POSTPROCEDURAL STATES: Chronic | ICD-10-CM

## 2021-02-03 DIAGNOSIS — Z98.890 OTHER SPECIFIED POSTPROCEDURAL STATES: Chronic | ICD-10-CM

## 2021-02-03 DIAGNOSIS — Z00.8 ENCOUNTER FOR OTHER GENERAL EXAMINATION: ICD-10-CM

## 2021-02-03 PROCEDURE — 76641 ULTRASOUND BREAST COMPLETE: CPT | Mod: 26,50

## 2021-02-03 PROCEDURE — 77067 SCR MAMMO BI INCL CAD: CPT | Mod: 26

## 2021-02-03 PROCEDURE — 77063 BREAST TOMOSYNTHESIS BI: CPT | Mod: 26

## 2021-02-03 PROCEDURE — 77067 SCR MAMMO BI INCL CAD: CPT

## 2021-02-03 PROCEDURE — 77063 BREAST TOMOSYNTHESIS BI: CPT

## 2021-02-03 PROCEDURE — 76641 ULTRASOUND BREAST COMPLETE: CPT

## 2021-02-09 ENCOUNTER — OUTPATIENT (OUTPATIENT)
Dept: OUTPATIENT SERVICES | Facility: HOSPITAL | Age: 71
LOS: 1 days | Discharge: HOME | End: 2021-02-09

## 2021-02-09 DIAGNOSIS — Z98.890 OTHER SPECIFIED POSTPROCEDURAL STATES: Chronic | ICD-10-CM

## 2021-02-09 DIAGNOSIS — Z98.89 OTHER SPECIFIED POSTPROCEDURAL STATES: Chronic | ICD-10-CM

## 2021-02-09 DIAGNOSIS — D05.10 INTRADUCTAL CARCINOMA IN SITU OF UNSPECIFIED BREAST: ICD-10-CM

## 2021-03-14 ENCOUNTER — OUTPATIENT (OUTPATIENT)
Dept: OUTPATIENT SERVICES | Facility: HOSPITAL | Age: 71
LOS: 1 days | Discharge: HOME | End: 2021-03-14

## 2021-03-14 DIAGNOSIS — D05.10 INTRADUCTAL CARCINOMA IN SITU OF UNSPECIFIED BREAST: ICD-10-CM

## 2021-03-14 DIAGNOSIS — Z98.890 OTHER SPECIFIED POSTPROCEDURAL STATES: Chronic | ICD-10-CM

## 2021-03-14 DIAGNOSIS — Z98.89 OTHER SPECIFIED POSTPROCEDURAL STATES: Chronic | ICD-10-CM

## 2021-03-15 ENCOUNTER — APPOINTMENT (OUTPATIENT)
Dept: SURGERY | Facility: CLINIC | Age: 71
End: 2021-03-15
Payer: MEDICARE

## 2021-03-15 PROCEDURE — 99213K: CUSTOM

## 2021-04-08 ENCOUNTER — APPOINTMENT (OUTPATIENT)
Dept: HEMATOLOGY ONCOLOGY | Facility: CLINIC | Age: 71
End: 2021-04-08

## 2021-04-12 ENCOUNTER — OUTPATIENT (OUTPATIENT)
Dept: OUTPATIENT SERVICES | Facility: HOSPITAL | Age: 71
LOS: 1 days | Discharge: HOME | End: 2021-04-12

## 2021-04-12 DIAGNOSIS — D05.10 INTRADUCTAL CARCINOMA IN SITU OF UNSPECIFIED BREAST: ICD-10-CM

## 2021-04-12 DIAGNOSIS — Z98.890 OTHER SPECIFIED POSTPROCEDURAL STATES: Chronic | ICD-10-CM

## 2021-04-12 DIAGNOSIS — Z98.89 OTHER SPECIFIED POSTPROCEDURAL STATES: Chronic | ICD-10-CM

## 2021-04-15 ENCOUNTER — APPOINTMENT (OUTPATIENT)
Dept: HEMATOLOGY ONCOLOGY | Facility: CLINIC | Age: 71
End: 2021-04-15

## 2021-04-21 RX ORDER — ANASTROZOLE TABLETS 1 MG/1
1 TABLET ORAL
Qty: 1 | Refills: 3 | Status: ACTIVE | COMMUNITY
Start: 2021-04-21 | End: 1900-01-01

## 2021-06-09 ENCOUNTER — OUTPATIENT (OUTPATIENT)
Dept: OUTPATIENT SERVICES | Facility: HOSPITAL | Age: 71
LOS: 1 days | Discharge: HOME | End: 2021-06-09

## 2021-06-09 DIAGNOSIS — Z98.89 OTHER SPECIFIED POSTPROCEDURAL STATES: Chronic | ICD-10-CM

## 2021-06-09 DIAGNOSIS — Z98.890 OTHER SPECIFIED POSTPROCEDURAL STATES: Chronic | ICD-10-CM

## 2021-06-09 DIAGNOSIS — D05.10 INTRADUCTAL CARCINOMA IN SITU OF UNSPECIFIED BREAST: ICD-10-CM

## 2021-07-08 ENCOUNTER — APPOINTMENT (OUTPATIENT)
Dept: HEMATOLOGY ONCOLOGY | Facility: CLINIC | Age: 71
End: 2021-07-08
Payer: MEDICARE

## 2021-07-08 VITALS
TEMPERATURE: 98 F | RESPIRATION RATE: 16 BRPM | DIASTOLIC BLOOD PRESSURE: 80 MMHG | WEIGHT: 211.64 LBS | SYSTOLIC BLOOD PRESSURE: 140 MMHG | HEART RATE: 88 BPM | BODY MASS INDEX: 34.16 KG/M2 | OXYGEN SATURATION: 98 %

## 2021-07-08 PROCEDURE — 99213 OFFICE O/P EST LOW 20 MIN: CPT

## 2021-07-08 RX ORDER — BUPROPION HYDROCHLORIDE 300 MG/1
300 TABLET, EXTENDED RELEASE ORAL
Refills: 0 | Status: ACTIVE | COMMUNITY

## 2021-07-08 RX ORDER — DEXTROAMPHETAMINE SACCHARATE, AMPHETAMINE ASPARTATE, DEXTROAMPHETAMINE SULFATE, AND AMPHETAMINE SULFATE 3.75; 3.75; 3.75; 3.75 MG/1; MG/1; MG/1; MG/1
TABLET ORAL
Refills: 0 | Status: ACTIVE | COMMUNITY

## 2021-07-09 NOTE — PHYSICAL EXAM
[Restricted in physically strenuous activity but ambulatory and able to carry out work of a light or sedentary nature] : Status 1- Restricted in physically strenuous activity but ambulatory and able to carry out work of a light or sedentary nature, e.g., light house work, office work [Normal] : grossly intact [de-identified] : anicteric [de-identified] : No LE edema, no calf tenderness. [de-identified] : Bilateral changes from surgery with no discrete palpable masses, no palpable axillary nodes.  [de-identified] :   Noted lateral deformity of toes on b/l feet

## 2021-07-09 NOTE — HISTORY OF PRESENT ILLNESS
[de-identified] : 71 y/o female who presents for breast medical oncology follow up.  \par \par The patient has history of T2 N1A ER positive HER-2/jona left breast cancer in 2002 she received AC x4 followed by Taxotere x4 followed by radiation and then Aromasin.\par She was separately diagnosed with a T1CN0 ER positive breast cancer in the right breast 2008 and received TC x4 followed by radiation and then tamoxifen for 5 years. \par \par In 2015 she had an abnormal mammogram of both breasts.  Core biopsy of the left breast containing calcification - ductal carcinoma in situ cribriform pattern with intermediate nuclear grade, ER positive ND negative. She also had addition of an area of calcification in the right breast revealing benign breast tissue with fibrosis calcifications foci of LCIS and stromal fibrosis. Excision of the areas in both breasts by Dr. Barakat,  left breast contained an area of focal atypical ductal hyperplasia, no residual DCIS, right breast contained rare foci of LCIS.\par \par She started Arimidex in 2015.\par \par PET/CT, 8/2/2017- 1. Probable postsurgical changes in bilateral breasts, as seen on breast MRI dated 2/16/2017. Please follow-up with bilateral diagnostic mammography and breast ultrasound, as recommended in report of breast MRI. 2. Remainder of study is unremarkable, demonstrating no evidence of recurrent or metastatic disease.\par \par MRI Breast, 5/26/20- Stable postlumpectomy changes in both breasts.  No MRI evidence of malignancy.\par \par Pertinent History:\par pmhx: GERD, cilnical depression\par FH: Mother with uterine cancer.  \par BRCA negative.\par DEXA, 3/11/20- Osteoporosis \par The patient is  with one child\par Colonoscopy, 2017-Dr. Mak\par PMD Dr. Rachel - sees regularly\par  Gyn Dr. De Leon annually\par Ophtho Dr. Rodrigez \par  Dr. Ryan Sparrow- psychiatrist. \par  [de-identified] : Patient presents for follow up.  She has been on Arimidex for ~5 years and is tolerating it well without AEs or issues.  She notes mood has been better lately, trying to watch her weight. No other issues or concerns.\par \par  \par \par

## 2021-07-09 NOTE — ASSESSMENT
[FreeTextEntry1] : 69 y/o woman with history of breast cancer in the distant past b/l with ACT, TC chemo, radiation. More recently 2015 lumpectomy for DCIS, atypia, tolerating AI since then.\par \par Breast cancer:\par -continue AI daily for now, tolerating well\par -follow up 3-6 months, sooner if issues\par -follow up Dr. Barakat as recommended, breast imaging ordered by her, follow up advised\par -she is UTD on RHM, reviewed as above\par \par Severe depression-chronic, states she is doing better, no SI\par -continues to see therapist and psychiatrist\par \par OA: noted nodules and deformity c/f RA\par -she did not f/u with rheumatology evaluation as recommended last visit.  She is amendable at this time and [facility] was contacted to assist patient again with apt.\par

## 2021-07-28 ENCOUNTER — APPOINTMENT (OUTPATIENT)
Dept: RHEUMATOLOGY | Facility: CLINIC | Age: 71
End: 2021-07-28

## 2021-10-05 ENCOUNTER — APPOINTMENT (OUTPATIENT)
Dept: MRI IMAGING | Facility: IMAGING CENTER | Age: 71
End: 2021-10-05

## 2021-11-19 ENCOUNTER — APPOINTMENT (OUTPATIENT)
Dept: MRI IMAGING | Facility: IMAGING CENTER | Age: 71
End: 2021-11-19

## 2021-11-19 ENCOUNTER — INPATIENT (INPATIENT)
Facility: HOSPITAL | Age: 71
LOS: 1 days | Discharge: HOME | End: 2021-11-21
Attending: INTERNAL MEDICINE | Admitting: INTERNAL MEDICINE
Payer: MEDICARE

## 2021-11-19 VITALS
SYSTOLIC BLOOD PRESSURE: 152 MMHG | DIASTOLIC BLOOD PRESSURE: 100 MMHG | HEIGHT: 66 IN | TEMPERATURE: 97 F | WEIGHT: 206.79 LBS | HEART RATE: 99 BPM | RESPIRATION RATE: 17 BRPM | OXYGEN SATURATION: 99 %

## 2021-11-19 DIAGNOSIS — R07.9 CHEST PAIN, UNSPECIFIED: ICD-10-CM

## 2021-11-19 DIAGNOSIS — F31.9 BIPOLAR DISORDER, UNSPECIFIED: ICD-10-CM

## 2021-11-19 DIAGNOSIS — Z98.89 OTHER SPECIFIED POSTPROCEDURAL STATES: Chronic | ICD-10-CM

## 2021-11-19 DIAGNOSIS — I10 ESSENTIAL (PRIMARY) HYPERTENSION: ICD-10-CM

## 2021-11-19 DIAGNOSIS — E03.9 HYPOTHYROIDISM, UNSPECIFIED: ICD-10-CM

## 2021-11-19 DIAGNOSIS — Z98.890 OTHER SPECIFIED POSTPROCEDURAL STATES: Chronic | ICD-10-CM

## 2021-11-19 DIAGNOSIS — I21.4 NON-ST ELEVATION (NSTEMI) MYOCARDIAL INFARCTION: ICD-10-CM

## 2021-11-19 DIAGNOSIS — G47.00 INSOMNIA, UNSPECIFIED: ICD-10-CM

## 2021-11-19 LAB
ALBUMIN SERPL ELPH-MCNC: 4.4 G/DL — SIGNIFICANT CHANGE UP (ref 3.3–5)
ALP SERPL-CCNC: 155 U/L — HIGH (ref 40–120)
ALT FLD-CCNC: 13 U/L — SIGNIFICANT CHANGE UP (ref 4–33)
ANION GAP SERPL CALC-SCNC: 15 MMOL/L — HIGH (ref 7–14)
ANION GAP SERPL CALC-SCNC: 15 MMOL/L — HIGH (ref 7–14)
APTT BLD: 30.9 SEC — SIGNIFICANT CHANGE UP (ref 27–36.3)
AST SERPL-CCNC: 19 U/L — SIGNIFICANT CHANGE UP (ref 4–32)
BASOPHILS # BLD AUTO: 0.07 K/UL — SIGNIFICANT CHANGE UP (ref 0–0.2)
BASOPHILS NFR BLD AUTO: 0.7 % — SIGNIFICANT CHANGE UP (ref 0–2)
BILIRUB SERPL-MCNC: 0.3 MG/DL — SIGNIFICANT CHANGE UP (ref 0.2–1.2)
BUN SERPL-MCNC: 16 MG/DL — SIGNIFICANT CHANGE UP (ref 7–23)
BUN SERPL-MCNC: 16 MG/DL — SIGNIFICANT CHANGE UP (ref 7–23)
CALCIUM SERPL-MCNC: 10 MG/DL — SIGNIFICANT CHANGE UP (ref 8.4–10.5)
CALCIUM SERPL-MCNC: 10.3 MG/DL — SIGNIFICANT CHANGE UP (ref 8.4–10.5)
CHLORIDE SERPL-SCNC: 100 MMOL/L — SIGNIFICANT CHANGE UP (ref 98–107)
CHLORIDE SERPL-SCNC: 101 MMOL/L — SIGNIFICANT CHANGE UP (ref 98–107)
CO2 SERPL-SCNC: 20 MMOL/L — LOW (ref 22–31)
CO2 SERPL-SCNC: 21 MMOL/L — LOW (ref 22–31)
CREAT SERPL-MCNC: 0.68 MG/DL — SIGNIFICANT CHANGE UP (ref 0.5–1.3)
CREAT SERPL-MCNC: 0.7 MG/DL — SIGNIFICANT CHANGE UP (ref 0.5–1.3)
EOSINOPHIL # BLD AUTO: 0.03 K/UL — SIGNIFICANT CHANGE UP (ref 0–0.5)
EOSINOPHIL NFR BLD AUTO: 0.3 % — SIGNIFICANT CHANGE UP (ref 0–6)
GLUCOSE SERPL-MCNC: 114 MG/DL — HIGH (ref 70–99)
GLUCOSE SERPL-MCNC: 152 MG/DL — HIGH (ref 70–99)
HCT VFR BLD CALC: 45.1 % — HIGH (ref 34.5–45)
HGB BLD-MCNC: 15.2 G/DL — SIGNIFICANT CHANGE UP (ref 11.5–15.5)
IANC: 8.06 K/UL — SIGNIFICANT CHANGE UP (ref 1.5–8.5)
IMM GRANULOCYTES NFR BLD AUTO: 0.5 % — SIGNIFICANT CHANGE UP (ref 0–1.5)
INR BLD: 1.03 RATIO — SIGNIFICANT CHANGE UP (ref 0.88–1.16)
LYMPHOCYTES # BLD AUTO: 1.6 K/UL — SIGNIFICANT CHANGE UP (ref 1–3.3)
LYMPHOCYTES # BLD AUTO: 15.7 % — SIGNIFICANT CHANGE UP (ref 13–44)
MAGNESIUM SERPL-MCNC: 1.9 MG/DL — SIGNIFICANT CHANGE UP (ref 1.6–2.6)
MCHC RBC-ENTMCNC: 27.7 PG — SIGNIFICANT CHANGE UP (ref 27–34)
MCHC RBC-ENTMCNC: 33.7 GM/DL — SIGNIFICANT CHANGE UP (ref 32–36)
MCV RBC AUTO: 82.1 FL — SIGNIFICANT CHANGE UP (ref 80–100)
MONOCYTES # BLD AUTO: 0.4 K/UL — SIGNIFICANT CHANGE UP (ref 0–0.9)
MONOCYTES NFR BLD AUTO: 3.9 % — SIGNIFICANT CHANGE UP (ref 2–14)
NEUTROPHILS # BLD AUTO: 8.06 K/UL — HIGH (ref 1.8–7.4)
NEUTROPHILS NFR BLD AUTO: 78.9 % — HIGH (ref 43–77)
NRBC # BLD: 0 /100 WBCS — SIGNIFICANT CHANGE UP
NRBC # FLD: 0 K/UL — SIGNIFICANT CHANGE UP
PLATELET # BLD AUTO: 326 K/UL — SIGNIFICANT CHANGE UP (ref 150–400)
POTASSIUM SERPL-MCNC: 3.9 MMOL/L — SIGNIFICANT CHANGE UP (ref 3.5–5.3)
POTASSIUM SERPL-MCNC: 4.3 MMOL/L — SIGNIFICANT CHANGE UP (ref 3.5–5.3)
POTASSIUM SERPL-SCNC: 3.9 MMOL/L — SIGNIFICANT CHANGE UP (ref 3.5–5.3)
POTASSIUM SERPL-SCNC: 4.3 MMOL/L — SIGNIFICANT CHANGE UP (ref 3.5–5.3)
PROT SERPL-MCNC: 8.4 G/DL — HIGH (ref 6–8.3)
PROTHROM AB SERPL-ACNC: 11.8 SEC — SIGNIFICANT CHANGE UP (ref 10.6–13.6)
RBC # BLD: 5.49 M/UL — HIGH (ref 3.8–5.2)
RBC # FLD: 14.1 % — SIGNIFICANT CHANGE UP (ref 10.3–14.5)
SARS-COV-2 RNA SPEC QL NAA+PROBE: SIGNIFICANT CHANGE UP
SODIUM SERPL-SCNC: 135 MMOL/L — SIGNIFICANT CHANGE UP (ref 135–145)
SODIUM SERPL-SCNC: 137 MMOL/L — SIGNIFICANT CHANGE UP (ref 135–145)
TROPONIN T, HIGH SENSITIVITY RESULT: 109 NG/L — CRITICAL HIGH
TROPONIN T, HIGH SENSITIVITY RESULT: 348 NG/L — CRITICAL HIGH
TROPONIN T, HIGH SENSITIVITY RESULT: 78 NG/L — CRITICAL HIGH
WBC # BLD: 10.21 K/UL — SIGNIFICANT CHANGE UP (ref 3.8–10.5)
WBC # FLD AUTO: 10.21 K/UL — SIGNIFICANT CHANGE UP (ref 3.8–10.5)

## 2021-11-19 PROCEDURE — 99223 1ST HOSP IP/OBS HIGH 75: CPT

## 2021-11-19 PROCEDURE — 99285 EMERGENCY DEPT VISIT HI MDM: CPT | Mod: CS,25,GC

## 2021-11-19 PROCEDURE — 93458 L HRT ARTERY/VENTRICLE ANGIO: CPT | Mod: 26,59

## 2021-11-19 PROCEDURE — 93010 ELECTROCARDIOGRAM REPORT: CPT

## 2021-11-19 PROCEDURE — 99152 MOD SED SAME PHYS/QHP 5/>YRS: CPT

## 2021-11-19 PROCEDURE — 92941 PRQ TRLML REVSC TOT OCCL AMI: CPT | Mod: LC

## 2021-11-19 PROCEDURE — 71046 X-RAY EXAM CHEST 2 VIEWS: CPT | Mod: 26

## 2021-11-19 RX ORDER — POTASSIUM CHLORIDE 20 MEQ
40 PACKET (EA) ORAL ONCE
Refills: 0 | Status: COMPLETED | OUTPATIENT
Start: 2021-11-19 | End: 2021-11-19

## 2021-11-19 RX ORDER — TICAGRELOR 90 MG/1
180 TABLET ORAL ONCE
Refills: 0 | Status: COMPLETED | OUTPATIENT
Start: 2021-11-19 | End: 2021-11-19

## 2021-11-19 RX ORDER — ASPIRIN/CALCIUM CARB/MAGNESIUM 324 MG
81 TABLET ORAL DAILY
Refills: 0 | Status: DISCONTINUED | OUTPATIENT
Start: 2021-11-20 | End: 2021-11-21

## 2021-11-19 RX ORDER — HEPARIN SODIUM 5000 [USP'U]/ML
5000 INJECTION INTRAVENOUS; SUBCUTANEOUS ONCE
Refills: 0 | Status: COMPLETED | OUTPATIENT
Start: 2021-11-19 | End: 2021-11-19

## 2021-11-19 RX ORDER — MORPHINE SULFATE 50 MG/1
2 CAPSULE, EXTENDED RELEASE ORAL ONCE
Refills: 0 | Status: DISCONTINUED | OUTPATIENT
Start: 2021-11-19 | End: 2021-11-19

## 2021-11-19 RX ORDER — TICAGRELOR 90 MG/1
1 TABLET ORAL
Qty: 60 | Refills: 0
Start: 2021-11-19 | End: 2021-12-18

## 2021-11-19 RX ORDER — CLONAZEPAM 1 MG
1 TABLET ORAL
Qty: 0 | Refills: 0 | DISCHARGE

## 2021-11-19 RX ORDER — CLONAZEPAM 1 MG
2 TABLET ORAL AT BEDTIME
Refills: 0 | Status: DISCONTINUED | OUTPATIENT
Start: 2021-11-19 | End: 2021-11-21

## 2021-11-19 RX ORDER — METOPROLOL TARTRATE 50 MG
12.5 TABLET ORAL
Refills: 0 | Status: DISCONTINUED | OUTPATIENT
Start: 2021-11-19 | End: 2021-11-20

## 2021-11-19 RX ORDER — HEPARIN SODIUM 5000 [USP'U]/ML
5000 INJECTION INTRAVENOUS; SUBCUTANEOUS EVERY 12 HOURS
Refills: 0 | Status: DISCONTINUED | OUTPATIENT
Start: 2021-11-20 | End: 2021-11-21

## 2021-11-19 RX ORDER — KETOROLAC TROMETHAMINE 30 MG/ML
15 SYRINGE (ML) INJECTION ONCE
Refills: 0 | Status: DISCONTINUED | OUTPATIENT
Start: 2021-11-19 | End: 2021-11-19

## 2021-11-19 RX ORDER — ASPIRIN/CALCIUM CARB/MAGNESIUM 324 MG
162 TABLET ORAL ONCE
Refills: 0 | Status: COMPLETED | OUTPATIENT
Start: 2021-11-19 | End: 2021-11-19

## 2021-11-19 RX ORDER — MORPHINE SULFATE 50 MG/1
4 CAPSULE, EXTENDED RELEASE ORAL ONCE
Refills: 0 | Status: DISCONTINUED | OUTPATIENT
Start: 2021-11-19 | End: 2021-11-19

## 2021-11-19 RX ORDER — ZOLPIDEM TARTRATE 10 MG/1
1 TABLET ORAL
Qty: 0 | Refills: 0 | DISCHARGE

## 2021-11-19 RX ORDER — TICAGRELOR 90 MG/1
90 TABLET ORAL EVERY 12 HOURS
Refills: 0 | Status: DISCONTINUED | OUTPATIENT
Start: 2021-11-20 | End: 2021-11-21

## 2021-11-19 RX ORDER — BUPROPION HYDROCHLORIDE 150 MG/1
150 TABLET, EXTENDED RELEASE ORAL DAILY
Refills: 0 | Status: DISCONTINUED | OUTPATIENT
Start: 2021-11-20 | End: 2021-11-21

## 2021-11-19 RX ORDER — DIPHENHYDRAMINE HCL 50 MG
25 CAPSULE ORAL
Qty: 0 | Refills: 0 | DISCHARGE

## 2021-11-19 RX ORDER — LEVOTHYROXINE SODIUM 125 MCG
112 TABLET ORAL DAILY
Refills: 0 | Status: DISCONTINUED | OUTPATIENT
Start: 2021-11-20 | End: 2021-11-21

## 2021-11-19 RX ORDER — ANASTROZOLE 1 MG/1
1 TABLET ORAL
Qty: 0 | Refills: 0 | DISCHARGE

## 2021-11-19 RX ORDER — ATORVASTATIN CALCIUM 80 MG/1
80 TABLET, FILM COATED ORAL AT BEDTIME
Refills: 0 | Status: DISCONTINUED | OUTPATIENT
Start: 2021-11-19 | End: 2021-11-21

## 2021-11-19 RX ORDER — ACETAMINOPHEN 500 MG
650 TABLET ORAL EVERY 6 HOURS
Refills: 0 | Status: DISCONTINUED | OUTPATIENT
Start: 2021-11-19 | End: 2021-11-21

## 2021-11-19 RX ORDER — HEPARIN SODIUM 5000 [USP'U]/ML
5600 INJECTION INTRAVENOUS; SUBCUTANEOUS EVERY 6 HOURS
Refills: 0 | Status: DISCONTINUED | OUTPATIENT
Start: 2021-11-19 | End: 2021-11-19

## 2021-11-19 RX ORDER — NITROGLYCERIN 6.5 MG
0.4 CAPSULE, EXTENDED RELEASE ORAL
Refills: 0 | Status: DISCONTINUED | OUTPATIENT
Start: 2021-11-19 | End: 2021-11-21

## 2021-11-19 RX ORDER — CLONAZEPAM 1 MG
2 TABLET ORAL AT BEDTIME
Refills: 0 | Status: DISCONTINUED | OUTPATIENT
Start: 2021-11-19 | End: 2021-11-19

## 2021-11-19 RX ORDER — MAGNESIUM SULFATE 500 MG/ML
1 VIAL (ML) INJECTION ONCE
Refills: 0 | Status: COMPLETED | OUTPATIENT
Start: 2021-11-19 | End: 2021-11-19

## 2021-11-19 RX ORDER — PANTOPRAZOLE SODIUM 20 MG/1
40 TABLET, DELAYED RELEASE ORAL
Refills: 0 | Status: DISCONTINUED | OUTPATIENT
Start: 2021-11-19 | End: 2021-11-21

## 2021-11-19 RX ORDER — HEPARIN SODIUM 5000 [USP'U]/ML
INJECTION INTRAVENOUS; SUBCUTANEOUS
Qty: 25000 | Refills: 0 | Status: DISCONTINUED | OUTPATIENT
Start: 2021-11-19 | End: 2021-11-19

## 2021-11-19 RX ADMIN — Medication 2 MILLIGRAM(S): at 23:20

## 2021-11-19 RX ADMIN — Medication 162 MILLIGRAM(S): at 05:16

## 2021-11-19 RX ADMIN — Medication 15 MILLIGRAM(S): at 06:48

## 2021-11-19 RX ADMIN — Medication 40 MILLIEQUIVALENT(S): at 22:55

## 2021-11-19 RX ADMIN — HEPARIN SODIUM 5000 UNIT(S): 5000 INJECTION INTRAVENOUS; SUBCUTANEOUS at 09:09

## 2021-11-19 RX ADMIN — TICAGRELOR 180 MILLIGRAM(S): 90 TABLET ORAL at 12:56

## 2021-11-19 RX ADMIN — MORPHINE SULFATE 2 MILLIGRAM(S): 50 CAPSULE, EXTENDED RELEASE ORAL at 06:23

## 2021-11-19 RX ADMIN — Medication 15 MILLIGRAM(S): at 05:16

## 2021-11-19 RX ADMIN — Medication 162 MILLIGRAM(S): at 06:24

## 2021-11-19 RX ADMIN — MORPHINE SULFATE 2 MILLIGRAM(S): 50 CAPSULE, EXTENDED RELEASE ORAL at 06:48

## 2021-11-19 RX ADMIN — Medication 40 MILLIGRAM(S): at 22:55

## 2021-11-19 RX ADMIN — Medication 100 GRAM(S): at 22:56

## 2021-11-19 RX ADMIN — ATORVASTATIN CALCIUM 80 MILLIGRAM(S): 80 TABLET, FILM COATED ORAL at 22:54

## 2021-11-19 RX ADMIN — HEPARIN SODIUM 1000 UNIT(S)/HR: 5000 INJECTION INTRAVENOUS; SUBCUTANEOUS at 09:07

## 2021-11-19 RX ADMIN — Medication 12.5 MILLIGRAM(S): at 18:52

## 2021-11-19 RX ADMIN — Medication 0.4 MILLIGRAM(S): at 12:04

## 2021-11-19 NOTE — ED ADULT NURSE NOTE - NSICDXPASTSURGICALHX_GEN_ALL_CORE_FT
Lab called with a Glucose of 536  
Ok. She is off her meds. Thanks.   
PAST SURGICAL HISTORY:  History of lumpectomy left 2002  & right 2008  left 2013  treated with chemotherapy & radiation therapy    S/P colonoscopy 3 years ago negative    S/P D&C (status post dilation and curettage)     S/P endoscopy 3 years ago negative

## 2021-11-19 NOTE — ED PROVIDER NOTE - NSICDXFAMILYHX_GEN_ALL_CORE_FT
FAMILY HISTORY:  Father  Still living? No  Family history of heart failure, Age at diagnosis: Age Unknown    Mother  Still living? No  Family history of Alzheimer's disease, Age at diagnosis: Age Unknown

## 2021-11-19 NOTE — H&P ADULT - PROBLEM SELECTOR PLAN 5
Holding home amlodipine as suspect will likely benefit from beta blocker. F/u cath and cardiology recs.

## 2021-11-19 NOTE — ED PROVIDER NOTE - ATTENDING CONTRIBUTION TO CARE
71F with hx of Bipolar Dx, HTN, Breast CA and Hypothyroidism p/w body aches and chest pain since yesterday. CPis left sided and radiates to her b/l upper ext. no cough, sob, palpitations.    ***GEN - NAD; well appearing; A+O x3 ***HEAD - NC/AT ***EYES/NOSE - PERRL, EOMI, mucous membranes moist, no discharge ***THROAT: Oral cavity and pharynx normal. No inflammation, swelling, exudate, or lesions.  ***NECK: Neck supple, non-tender without lymphadenopathy, no masses, no thyromegaly.   ***PULMONARY - CTA b/l, symmetric breath sounds. ***CARDIAC -s1s2, RRR, no M,G,R  ***ABDOMEN - +BS, ND, NT, soft, no guarding, no rebound, no masses   ***BACK - no CVA tenderness, Normal  spine ***EXTREMITIES - symmetric pulses, 2+ dp, capillary refill < 2 seconds, no clubbing, no cyanosis, no edema ***SKIN - no rash or bruising   ***NEUROLOGIC - alert, CN 2-12 intact    MDM: 71F with left sided CP. acs w/u.

## 2021-11-19 NOTE — H&P ADULT - NSHPPHYSICALEXAM_GEN_ALL_CORE
PHYSICAL EXAM:  Vital Signs Last 24 Hrs  T(C): 36.1 (19 Nov 2021 12:05), Max: 36.8 (19 Nov 2021 05:21)  T(F): 96.9 (19 Nov 2021 12:05), Max: 98.3 (19 Nov 2021 05:21)  HR: 80 (19 Nov 2021 12:05) (80 - 99)  BP: 140/99 (19 Nov 2021 12:05) (135/95 - 153/101)  BP(mean): --  RR: 20 (19 Nov 2021 12:05) (17 - 20)  SpO2: 96% (19 Nov 2021 12:05) (96% - 99%)    CONSTITUTIONAL: NAD, well-developed, well-groomed  EYES: PERRLA; conjunctiva and sclera clear  ENMT: Moist oral mucosa, no pharyngeal injection or exudates; normal dentition  NECK: Supple, no palpable masses; no thyromegaly  RESPIRATORY: Normal respiratory effort; lungs are clear to auscultation bilaterally  CARDIOVASCULAR: Regular rate and rhythm, normal S1 and S2, no murmur/rub/gallop; No lower extremity edema; Peripheral pulses are 2+ bilaterally  ABDOMEN: Nontender to palpation, normoactive bowel sounds, no rebound/guarding; No hepatosplenomegaly  MUSCULOSKELETAL:  Normal gait; no clubbing or cyanosis of digits; no joint swelling or tenderness to palpation  PSYCH: A+O to person, place, and time; affect appropriate  NEUROLOGY: CN 2-12 are intact and symmetric; no gross sensory deficits   SKIN: No rashes; no palpable lesions PHYSICAL EXAM:  Vital Signs Last 24 Hrs  T(C): 36.1 (19 Nov 2021 12:05), Max: 36.8 (19 Nov 2021 05:21)  T(F): 96.9 (19 Nov 2021 12:05), Max: 98.3 (19 Nov 2021 05:21)  HR: 80 (19 Nov 2021 12:05) (80 - 99)  BP: 140/99 (19 Nov 2021 12:05) (135/95 - 153/101)  BP(mean): --  RR: 20 (19 Nov 2021 12:05) (17 - 20)  SpO2: 96% (19 Nov 2021 12:05) (96% - 99%)    CONSTITUTIONAL: NAD, well-developed, well-groomed  EYES: EOMI, conjunctiva and sclera clear  ENMT: Moist oral mucosa  NECK: Supple, no palpable masses  RESPIRATORY: Normal respiratory effort; lungs are clear to auscultation bilaterally  CARDIOVASCULAR: Regular rate and rhythm, normal S1 and S2, No lower extremity edema; Peripheral pulses are 2+ bilaterally  ABDOMEN: Nontender to palpation, normoactive bowel sounds, no rebound/guarding  PSYCH: AOx3  NEUROLOGY: nonfocal, moving all extremities  SKIN: No rashes; no palpable lesions

## 2021-11-19 NOTE — H&P ADULT - NSHPSOCIALHISTORY_GEN_ALL_CORE
( lives in separate apartment)  Ambulates without assistance but has been offered ambulatory assistance by PCP previously but patient declined as reports unsteady gait

## 2021-11-19 NOTE — ED PROVIDER NOTE - CLINICAL SUMMARY MEDICAL DECISION MAKING FREE TEXT BOX
72 y/o female with pmhx of bipolar disorder, HTN, breast cancer (in remission), and hypothyroidism presenting with diffuse body pain/chest pain x 2 days, no FND, hypertensive, comfortable appearing, EKG with no acute changes. Low suspicion of ACS; EKG/Trop/CXR, pain control, and reassess. PE and dissection considered; no leg swelling, hemoptysis, OCP use, malignancy in remission, back pain and equal b/l radial pulses

## 2021-11-19 NOTE — ED PROVIDER NOTE - NS ED ROS FT
Gen: Denies fever, weight loss  CV: Denies palpitations  Skin: Denies rash, erythema, color changes  Resp: Denies SOB, cough  Endo: Denies sensitivity to heat, cold, increased urination  GI: Denies diarrhea, constipation, nausea, vomiting  Msk: Denies back pain, LE swelling, extremity pain  : Denies dysuria, increased frequency  Neuro: Denies LOC, weakness, numbness/tingling

## 2021-11-19 NOTE — H&P ADULT - NSHPLABSRESULTS_GEN_ALL_CORE
LABS:                        15.2   10.21 )-----------( 326      ( 19 Nov 2021 05:15 )             45.1     11-19    137  |  101  |  16  ----------------------------<  152<H>  4.3   |  21<L>  |  0.68    Ca    10.3      19 Nov 2021 05:15    TPro  8.4<H>  /  Alb  4.4  /  TBili  0.3  /  DBili  x   /  AST  19  /  ALT  13  /  AlkPhos  155<H>  11-19    PT/INR - ( 19 Nov 2021 07:18 )   PT: 11.8 sec;   INR: 1.03 ratio      PTT - ( 19 Nov 2021 07:18 )  PTT:30.9 sec  CARDIAC MARKERS ( 19 Nov 2021 07:33 )  x     / x     / 202 U/L / x     / 29.4 ng/mL    RADIOLOGY & ADDITIONAL TESTS:  Results Reviewed:   Imaging Personally Reviewed:  Electrocardiogram Personally Reviewed:    COORDINATION OF CARE:  Care Discussed with Consultants/Other Providers [Yes - Cardiology] LABS:                        15.2   10.21 )-----------( 326      ( 19 Nov 2021 05:15 )             45.1     11-19    137  |  101  |  16  ----------------------------<  152<H>  4.3   |  21<L>  |  0.68    Ca    10.3      19 Nov 2021 05:15    TPro  8.4<H>  /  Alb  4.4  /  TBili  0.3  /  DBili  x   /  AST  19  /  ALT  13  /  AlkPhos  155<H>  11-19    PT/INR - ( 19 Nov 2021 07:18 )   PT: 11.8 sec;   INR: 1.03 ratio      PTT - ( 19 Nov 2021 07:18 )  PTT:30.9 sec  CARDIAC MARKERS ( 19 Nov 2021 07:33 )  x     / x     / 202 U/L / x     / 29.4 ng/mL    RADIOLOGY & ADDITIONAL TESTS:  Results Reviewed: CXR 11/19 No acute pulmonary disease.    COORDINATION OF CARE:  Care Discussed with Consultants/Other Providers [Yes - Cardiology]

## 2021-11-19 NOTE — ED PROVIDER NOTE - NSICDXPASTSURGICALHX_GEN_ALL_CORE_FT
PAST SURGICAL HISTORY:  History of lumpectomy left 2002  & right 2008  left 2013  treated with chemotherapy & radiation therapy    S/P colonoscopy 3 years ago negative    S/P D&C (status post dilation and curettage)     S/P endoscopy 3 years ago negative

## 2021-11-19 NOTE — CONSULT NOTE ADULT - SUBJECTIVE AND OBJECTIVE BOX
HPI:  71 year old female with HTN, breast cancer, hypothyroidism, bipolar disorder, prior hysterectomy, cholecystectomy, D&C, presented with chest pain beginning yesterday. Chest pain began without significant exertion, substernal, pressure like with radiation to bilateral arms and neck/mouth/gums. Pain self resolved after about 1 hour. Pain recurred again today and continues to persist. Chest pain is currently substernal without significant radiation. Pain is currently about 8/10 which is similar to the pain intensity she felt yesterday. Earlier today after receiving morphine in the ED, patient reports some relief in the chest pain (improvement down to 6/10). No shortness of breath. Intermittent nausea, no vomiting. No headache. No fever or chills. No abdominal pain.     ED course: Cardiology consulted. Patient started on heparin drip and received aspirin load.   Ongoing chest discomfort, Cardiology updated and recommended Brilinta load as well with plan for catheterization.      (19 Nov 2021 12:27)    Interval History:     MEDICATIONS  (STANDING):  heparin  Infusion.  Unit(s)/Hr (10 mL/Hr) IV Continuous <Continuous>    MEDICATIONS  (PRN):  acetaminophen     Tablet .. 650 milliGRAM(s) Oral every 6 hours PRN Temp greater or equal to 38C (100.4F), Mild Pain (1 - 3)  heparin   Injectable 5600 Unit(s) IV Push every 6 hours PRN For aPTT less than 40  nitroglycerin     SubLingual 0.4 milliGRAM(s) SubLingual every 5 minutes PRN Chest Pain    PAST MEDICAL & SURGICAL HISTORY:  Benzodiazepine Dependence    Affective Bipolar Disorder    Hypertension    Carcinoma in situ  Breast Cancer  bilateral lumpectomies s/p chemo  had treatment 2008 and 2015   current on Arimidex    Acid reflux    Hypothyroidism    Depression  last admission 1- 2 year ago has been admitted several times   ECT last 20 years ago   currently on medication &quot;antidepressants do not really work well&quot;    Insomnia    Calculus of gallbladder without cholecystitis    Chronic GERD    History of lumpectomy  left 2002  &amp; right 2008  left 2013  treated with chemotherapy &amp; radiation therapy    S/P D&amp;C (status post dilation and curettage)    S/P colonoscopy  3 years ago negative    S/P endoscopy  3 years ago negative      SOCIAL HISTORY:  Smoker:  YES / NO        PACK YEARS:                         WHEN QUIT?  ETOH use:  YES / NO               FREQUENCY / QUANTITY:  Ilicit Drug use:  YES / NO      REVIEW OF SYSTEMS:    CONSTITUTIONAL: No weakness, fevers or chills  RESPIRATORY: No cough, wheezing, hemoptysis; No shortness of breath  CARDIOVASCULAR: No chest pain or palpitations  GASTROINTESTINAL: No abdominal or epigastric pain. No nausea, vomiting, or hematemesis; No diarrhea or constipation. No melena or hematochezia.  NEUROLOGICAL: No numbness or weakness  All other review of systems is negative unless indicated above.    Vital Signs Last 24 Hrs  T(C): 36.1 (19 Nov 2021 12:05), Max: 36.8 (19 Nov 2021 05:21)  T(F): 96.9 (19 Nov 2021 12:05), Max: 98.3 (19 Nov 2021 05:21)  HR: 80 (19 Nov 2021 12:05) (80 - 99)  BP: 140/99 (19 Nov 2021 12:05) (135/95 - 153/101)  BP(mean): --  RR: 20 (19 Nov 2021 12:05) (17 - 20)  SpO2: 96% (19 Nov 2021 12:05) (96% - 99%)  General: A/ox 3, No acute Distress  Neck: Supple, NO JVD  Cardiac: S1 S2, No M/R/G  Pulmonary: CTAB, Breathing unlabored, No Rhonchi/Rales/Wheezing  Abdomen: Soft, Non -tender, +BS x 4 quads  Extremities: No Rashes, No edema  Neuro: A/o x 3, No focal deficits        Telemetry:  EKG:  CHADSVASC:                          15.2   10.21 )-----------( 326      ( 19 Nov 2021 05:15 )             45.1     11-19    137  |  101  |  16  ----------------------------<  152<H>  4.3   |  21<L>  |  0.68    Ca    10.3      19 Nov 2021 05:15    TPro  8.4<H>  /  Alb  4.4  /  TBili  0.3  /  DBili  x   /  AST  19  /  ALT  13  /  AlkPhos  155<H>  11-19      Assessment/Plan    1)

## 2021-11-19 NOTE — H&P ADULT - PROBLEM SELECTOR PLAN 1
Presented with chest pain beginning yesterday, radiating to b/l UE, neck, gums. Improved after ~1 hr. Today ongoing substernal nonradiating chest discomfort. EKG with HEIDI. Troponins rising 78->109->348.   - S/p aspirin load and started on heparin gtt in the ED per Cardiology recs  - I rediscussed with Cardiology, recommending adding brilinta load of 180mg at this time given ongoing chest pain and rising troponins. Cardiology planning to catheterize patient today. Follow up additional recommendations.  - Continue telemetry monitoring, serial EKGs/troponins

## 2021-11-19 NOTE — ED PROVIDER NOTE - NSICDXPASTMEDICALHX_GEN_ALL_CORE_FT
PAST MEDICAL HISTORY:  Acid reflux     Affective Bipolar Disorder     Benzodiazepine Dependence     Calculus of gallbladder without cholecystitis     Carcinoma in situ Breast Cancer  bilateral lumpectomies s/p chemo  had treatment 2008 and 2015   current on Arimidex    Chronic GERD     Depression last admission 1- 2 year ago has been admitted several times   ECT last 20 years ago   currently on medication "antidepressants do not really work well"    Hypertension     Hypothyroidism     Insomnia

## 2021-11-19 NOTE — ED PROVIDER NOTE - DATE/TIME 2
19-Nov-2021 07:00 Patient is a good candidate to attempt outpatient management. Supportive care and home care discussed in detail.  Strict return precautions discussed.  Will follow up with pmd and ortho as discussed.

## 2021-11-19 NOTE — ED ADULT NURSE REASSESSMENT NOTE - NS ED NURSE REASSESS COMMENT FT1
Pt placed on Zoll, Pt off floor to cardiac cath lab with EDT, Keri RN, and cardiology.
critical lab - troponin 76. MD made aware Dr. Bagley
11:51- Cardiology at bedside. Pt c/o chest pain unchanged from arrival. Repeat EKG performed. Pending pharmacy verification of nitro. call bell within reach. Will continue to monitor. GEOVANNY Randolph
11/19/21 0917- Pt received from outgoing RN in stretcher in no apparent distress. denies discomfort. Respirations even, non-labored. Skin warm, dry, color appropriate. Meds verified by pharmacy from 0700 and administered as ordered. POC explained and pt education provided on heparin drip. call bell within reach. Will continue to monitor. GEOVANNY Randolph

## 2021-11-19 NOTE — H&P ADULT - NSHPREVIEWOFSYSTEMS_GEN_ALL_CORE
Constitutional: no recent weight changes, fevers, night sweats  Dermatologic: no lesions or rashes  HEENT: denies visual changes, hearing changes, rhinitis, odynophagia, or dysphagia  Cardiovascular: +chest pain, denies palpitations, denies edema  Respiratory: denies SOB, wheezing  Gastrointestinal: +nausea, denies V/D, abdominal pain, hematochezia, melena  : denies dysuria, hematuria  MSK: denies weakness, joint pain  Endocrine: no cold or heat intolerance or excessive thirst or urination  Hematologic: no excessive bleeding or clotting  Neurologic: no headaches, vision changes, dizziness, fainting

## 2021-11-19 NOTE — ED PROVIDER NOTE - OBJECTIVE STATEMENT
72 y/o female with pmhx of bipolar disorder, HTN, breast cancer (in remission), and hypothyroidism presenting with diffuse body pain/chest pain x 2 days. No recent falls/trauma. Chest pain left sided with radiation to b/l UE (believes pain starts in hands and travels to chest), non-exertional, with associated gum/teeth pain and no associated LOC, palpitations, SOB, n/v, weakness, numbness/tingling. Also denies any hx of blood clots, leg swelling, hemoptysis, or recent bed bound natures. Denies fevers/chills, n/v/d, cough, rashes, or changes in urination. Took ibuprofen tonight with minimal relief. Took BP meds today

## 2021-11-19 NOTE — ED ADULT TRIAGE NOTE - CHIEF COMPLAINT QUOTE
Pt arrives to ED via EMS reporting general body pain starting 4 hours ago but reports pain to left side of chest starting while at rest.  Pt took Ibuprofen at 01:00 with no improvement.

## 2021-11-19 NOTE — ED PROVIDER NOTE - PHYSICAL EXAMINATION
Gen: Obese, NAD, comfortable appearing, hypertensive   HEENT: PERRLA, EOMI, no nasal discharge, mucous membranes moist, no oropharyngeal edema/erythema/exudates   CV: RRR, +S1/S2, no M/R/G, equal b/l radial pulses 2+  Resp: CTAB, no W/R/R, no increased WOB, SPO2 100% on RA   GI: Abdomen soft non-distended, NTTP, no masses/organomegaly  MSK/Skin: No CVA tenderness, no open wounds, no bruising, b/l LE non-pitting edema (patient describes as baseline)   Neuro: CN2-12 grossly intact, A&Ox4, MS +5/5 in UE and LE BL, finger to nose smooth and rapid, gross sensation intact in UE and LE BL,  Psych: appropriate mood

## 2021-11-19 NOTE — ED ADULT NURSE NOTE - OBJECTIVE STATEMENT
pt received to rm 15  , a&ox 4, ambulatory , pmh of bipolar disorder, breast CA, and hypothyroidism, p/w  body pain and chest pain x2Days.  pain starting from her hands radiating to the left side of her chest. pt states she has gum and teeth.  Pt breathing even and unlabored on room air. pt states she took ibuprofen with minimal relief. pt states she get "dizzy sometimes" when she walks. NSR on cardiac monitor. Denies fever, chills, cough, SOB, chest pain, palpitations, dizziness, N/V/D, constipation, numbness, tingling, and LOC. IV placed R #20G . Labs collected and sent. EKG in chart.  pt educated on fall precautions and confirms understanding via teach back method. Stretcher locked in lowest position with siderails up x2. Call bell and personal items within reach.

## 2021-11-19 NOTE — ED PROVIDER NOTE - PROGRESS NOTE DETAILS
Kevyn, PGY-2  First troponin at 78. Repeat EKG performed with no acute changes. Cardiology consulted and rpt troponin sent Kevyn, PGY-2  Cardiology requesting to start heparin. Will see patient and follow up whether to load with brillinta vs. plavix

## 2021-11-19 NOTE — H&P ADULT - HISTORY OF PRESENT ILLNESS
71 year old female with HTN, breast cancer, hypothyroidism, bipolar disorder, prior hysterectomy, cholecystectomy, D&C, presented with chest pain beginning yesterday. Chest pain began without significant exertion, substernal, pressure like with radiation to bilateral arms and neck/mouth/gums. Pain self resolved after about 1 hour. Pain recurred again today and continues to persist. Chest pain is currently substernal without significant radiation. Pain is currently about 8/10 which is similar to the pain intensity she felt yesterday. Earlier today after receiving morphine in the ED, patient reports some relief in the chest pain (improvement down to 6/10). No shortness of breath. Intermittent nausea, no vomiting. No headache. No fever or chills. No abdominal pain.     ED course: Cardiology consulted. Patient started on heparin drip and received aspirin load.   Ongoing chest discomfort, Cardiology updated and recommended Brilinta load as well with plan for catheterization.

## 2021-11-19 NOTE — H&P ADULT - ASSESSMENT
71 year old female with HTN, breast cancer, hypothyroidism, bipolar disorder presented with chest pain found to have rising troponins, admitted for NSTEMI with plan for catheterization per Cardiology.

## 2021-11-19 NOTE — CONSULT NOTE ADULT - ASSESSMENT
71 year old female with HTN, breast cancer, hypothyroidism, bipolar disorder, prior hysterectomy, cholecystectomy, D&C, presented with chest pain beginning yesterday. EKG showed ST depressions. Troponin elevated x3.     Case was reviewed with the team   Patient started on heparin drip, Brilinta and Nitrostat  Given all her findings, Patient will need coronary angiography   will be transferred for Cath

## 2021-11-20 ENCOUNTER — TRANSCRIPTION ENCOUNTER (OUTPATIENT)
Age: 71
End: 2021-11-20

## 2021-11-20 LAB
A1C WITH ESTIMATED AVERAGE GLUCOSE RESULT: 6.5 % — HIGH (ref 4–5.6)
ANION GAP SERPL CALC-SCNC: 15 MMOL/L — HIGH (ref 7–14)
BUN SERPL-MCNC: 15 MG/DL — SIGNIFICANT CHANGE UP (ref 7–23)
CALCIUM SERPL-MCNC: 10.2 MG/DL — SIGNIFICANT CHANGE UP (ref 8.4–10.5)
CHLORIDE SERPL-SCNC: 100 MMOL/L — SIGNIFICANT CHANGE UP (ref 98–107)
CHOLEST SERPL-MCNC: 149 MG/DL — SIGNIFICANT CHANGE UP
CO2 SERPL-SCNC: 20 MMOL/L — LOW (ref 22–31)
CREAT SERPL-MCNC: 0.74 MG/DL — SIGNIFICANT CHANGE UP (ref 0.5–1.3)
ESTIMATED AVERAGE GLUCOSE: 140 — SIGNIFICANT CHANGE UP
GLUCOSE SERPL-MCNC: 109 MG/DL — HIGH (ref 70–99)
HCT VFR BLD CALC: 48.8 % — HIGH (ref 34.5–45)
HDLC SERPL-MCNC: 39 MG/DL — LOW
HGB BLD-MCNC: 15.4 G/DL — SIGNIFICANT CHANGE UP (ref 11.5–15.5)
LIPID PNL WITH DIRECT LDL SERPL: 62 MG/DL — SIGNIFICANT CHANGE UP
MAGNESIUM SERPL-MCNC: 2.3 MG/DL — SIGNIFICANT CHANGE UP (ref 1.6–2.6)
MCHC RBC-ENTMCNC: 27.4 PG — SIGNIFICANT CHANGE UP (ref 27–34)
MCHC RBC-ENTMCNC: 31.6 GM/DL — LOW (ref 32–36)
MCV RBC AUTO: 86.7 FL — SIGNIFICANT CHANGE UP (ref 80–100)
NON HDL CHOLESTEROL: 110 MG/DL — SIGNIFICANT CHANGE UP
NRBC # BLD: 0 /100 WBCS — SIGNIFICANT CHANGE UP
NRBC # FLD: 0 K/UL — SIGNIFICANT CHANGE UP
PLATELET # BLD AUTO: 313 K/UL — SIGNIFICANT CHANGE UP (ref 150–400)
POTASSIUM SERPL-MCNC: 4.8 MMOL/L — SIGNIFICANT CHANGE UP (ref 3.5–5.3)
POTASSIUM SERPL-SCNC: 4.8 MMOL/L — SIGNIFICANT CHANGE UP (ref 3.5–5.3)
RBC # BLD: 5.63 M/UL — HIGH (ref 3.8–5.2)
RBC # FLD: 14.5 % — SIGNIFICANT CHANGE UP (ref 10.3–14.5)
SODIUM SERPL-SCNC: 135 MMOL/L — SIGNIFICANT CHANGE UP (ref 135–145)
TRIGL SERPL-MCNC: 238 MG/DL — HIGH
TSH SERPL-MCNC: 2.68 UIU/ML — SIGNIFICANT CHANGE UP (ref 0.27–4.2)
WBC # BLD: 10.7 K/UL — HIGH (ref 3.8–10.5)
WBC # FLD AUTO: 10.7 K/UL — HIGH (ref 3.8–10.5)

## 2021-11-20 PROCEDURE — 99232 SBSQ HOSP IP/OBS MODERATE 35: CPT

## 2021-11-20 PROCEDURE — 93306 TTE W/DOPPLER COMPLETE: CPT | Mod: 26

## 2021-11-20 RX ORDER — METOPROLOL TARTRATE 50 MG
25 TABLET ORAL
Refills: 0 | Status: DISCONTINUED | OUTPATIENT
Start: 2021-11-20 | End: 2021-11-21

## 2021-11-20 RX ADMIN — Medication 12.5 MILLIGRAM(S): at 06:31

## 2021-11-20 RX ADMIN — Medication 25 MILLIGRAM(S): at 18:32

## 2021-11-20 RX ADMIN — PANTOPRAZOLE SODIUM 40 MILLIGRAM(S): 20 TABLET, DELAYED RELEASE ORAL at 06:31

## 2021-11-20 RX ADMIN — ATORVASTATIN CALCIUM 80 MILLIGRAM(S): 80 TABLET, FILM COATED ORAL at 23:22

## 2021-11-20 RX ADMIN — Medication 40 MILLIGRAM(S): at 23:22

## 2021-11-20 RX ADMIN — Medication 2 MILLIGRAM(S): at 23:26

## 2021-11-20 RX ADMIN — HEPARIN SODIUM 5000 UNIT(S): 5000 INJECTION INTRAVENOUS; SUBCUTANEOUS at 06:31

## 2021-11-20 RX ADMIN — BUPROPION HYDROCHLORIDE 150 MILLIGRAM(S): 150 TABLET, EXTENDED RELEASE ORAL at 12:08

## 2021-11-20 RX ADMIN — TICAGRELOR 90 MILLIGRAM(S): 90 TABLET ORAL at 12:07

## 2021-11-20 RX ADMIN — Medication 112 MICROGRAM(S): at 06:31

## 2021-11-20 RX ADMIN — Medication 81 MILLIGRAM(S): at 12:06

## 2021-11-20 RX ADMIN — TICAGRELOR 90 MILLIGRAM(S): 90 TABLET ORAL at 01:30

## 2021-11-20 RX ADMIN — HEPARIN SODIUM 5000 UNIT(S): 5000 INJECTION INTRAVENOUS; SUBCUTANEOUS at 18:31

## 2021-11-20 NOTE — DISCHARGE NOTE PROVIDER - PROVIDER TOKENS
FREE:[LAST:[FOLLOW UP WITH YOUR PRIMARY CARE DOCTOR AND CARDIOLOGIST],PHONE:[(   )    -],FAX:[(   )    -]] FREE:[LAST:[FOLLOW UP WITH YOUR PRIMARY CARE DOCTOR AND CARDIOLOGIST],PHONE:[(   )    -],FAX:[(   )    -]],PROVIDER:[TOKEN:[[id number]],FOLLOWUP:[1 week]]

## 2021-11-20 NOTE — DISCHARGE NOTE PROVIDER - NSDCCPCAREPLAN_GEN_ALL_CORE_FT
PRINCIPAL DISCHARGE DIAGNOSIS  Diagnosis: Chest pain  Assessment and Plan of Treatment: You presented with chest pain. You underwent a left heart catherization and received one stent to your left circumflex artery. We started you on aspirin, Brilinta and Atorvastatin. You must take these medications daily. Continue the rest of your home medications.

## 2021-11-20 NOTE — DISCHARGE NOTE PROVIDER - CARE PROVIDER_API CALL
FOLLOW UP WITH YOUR PRIMARY CARE DOCTOR AND CARDIOLOGIST,   Phone: (   )    -  Fax: (   )    -  Follow Up Time:    FOLLOW UP WITH YOUR PRIMARY CARE DOCTOR AND CARDIOLOGIST,   Phone: (   )    -  Fax: (   )    -  Follow Up Time:     Carson Mcclelland (MD)  Cardiology; Internal Medicine  [address] [place] [address]  Phone: [phone]  Fax: [phone]  Follow Up Time: 1 week

## 2021-11-20 NOTE — DISCHARGE NOTE PROVIDER - NSDCMRMEDTOKEN_GEN_ALL_CORE_FT
Adderall XR 20 mg oral capsule, extended release: 1 cap(s) orally once a day (in the morning)  Ambien 10 mg oral tablet: 1 tab(s) orally once a day (at bedtime)  clonazePAM 2 mg oral tablet: 1 tab(s) orally once a day (at bedtime)  doxepin 50 mg oral capsule: 1 tab(s) orally once a day (at bedtime)  Levoxyl 112 mcg (0.112 mg) oral tablet: 1 tab(s) orally once a day  Norvasc 5 mg oral tablet: 1 tab(s) orally once a day  Paxil 40 mg oral tablet: 1 tab(s) orally once a day (at bedtime)  Protonix 40 mg oral delayed release tablet: 1 tab(s) orally once a day  ticagrelor 90 mg oral tablet: 1 tab(s) orally 2 times a day   Wellbutrin  mg/24 hours oral tablet, extended release: 1 tab(s) orally every 24 hours   Adderall XR 20 mg oral capsule, extended release: 1 cap(s) orally once a day (in the morning)  aspirin 81 mg oral delayed release tablet: 1 tab(s) orally once a day  atorvastatin 80 mg oral tablet: 1 tab(s) orally once a day (at bedtime)  clonazePAM 2 mg oral tablet: 1 tab(s) orally once a day (at bedtime)  Levoxyl 112 mcg (0.112 mg) oral tablet: 1 tab(s) orally once a day  metoprolol tartrate 25 mg oral tablet: 1 tab(s) orally 2 times a day  Paxil 40 mg oral tablet: 1 tab(s) orally once a day (at bedtime)  Protonix 40 mg oral delayed release tablet: 1 tab(s) orally once a day  ticagrelor 90 mg oral tablet: 1 tab(s) orally 2 times a day   Wellbutrin  mg/24 hours oral tablet, extended release: 1 tab(s) orally every 24 hours   Adderall XR 20 mg oral capsule, extended release: 1 cap(s) orally once a day (in the morning)  aspirin 81 mg oral delayed release tablet: 1 tab(s) orally once a day  atorvastatin 80 mg oral tablet: 1 tab(s) orally once a day (at bedtime)  clonazePAM 2 mg oral tablet: 1 tab(s) orally once a day (at bedtime)  Levoxyl 112 mcg (0.112 mg) oral tablet: 1 tab(s) orally once a day  metoprolol tartrate 25 mg oral tablet: 1 tab(s) orally 2 times a day  Paxil 40 mg oral tablet: 1 tab(s) orally once a day (at bedtime)  Physical Therapy : 3 times a week   Protonix 40 mg oral delayed release tablet: 1 tab(s) orally once a day  ticagrelor 90 mg oral tablet: 1 tab(s) orally 2 times a day   Wellbutrin  mg/24 hours oral tablet, extended release: 1 tab(s) orally every 24 hours

## 2021-11-20 NOTE — DISCHARGE NOTE PROVIDER - HOSPITAL COURSE
71 year old female with HTN, breast cancer, hypothyroidism, bipolar disorder presented with chest pain found to have elevated troponins, admitted for NSTEMI. Underwent LHC - ALO to Lcx. Started on aspirin, Brilinta and statin therapy. Radial site stable after procedure. Patient expressed concern in affording Brilinta, so discussed with cardiology who recommended ----.   Underwent echo - EF 41%, normal mitral valve, moderate LV global systolic dysfunction, normal pulm pressures. Metoprolol increased. To continue rest of her home medications including Synthroid and bipolar/insomnia medications.     Physical therapy --     Cleared for discharge on 11/20 as per  --   Prescriptions sent to pharmacy of choice.    71 year old female with HTN, breast cancer, hypothyroidism, bipolar disorder presented with chest pain found to have elevated troponins, admitted for NSTEMI. Underwent LHC - ALO to Lcx. Started on aspirin, Brilinta and statin therapy. Radial site stable after procedure. Cardiology recommended continuing brilinta, statin, ASA, and metoprolol. Underwent echo - EF 41%, normal mitral valve, moderate LV global systolic dysfunction, normal pulm pressures. Metoprolol increased. To continue rest of her home medications including Synthroid and bipolar/insomnia medications. Physical therapy recommended home with outpatient physical therapy.     On 11/21/2021 this case was reviewed with Dr. Hercules, the patient is medically stable and optimized for discharge. All medications were reviewed and prescriptions were sent to mutually agreed upon pharmacy.

## 2021-11-20 NOTE — DISCHARGE NOTE PROVIDER - NSDCFUADDAPPT_GEN_ALL_CORE_FT
Follow up with cardiology within 1-2 weeks of discharge. If you are in need of a general cardiologist after discharge, you may contact the [facility] for an appointment at [phone].

## 2021-11-20 NOTE — PROGRESS NOTE ADULT - ATTENDING COMMENTS
71 year old woman sp NSTEMI and PCI to LCX. Doing well  Check TTE  Continue DAPT and Statin  Increase Metoprolol as above

## 2021-11-20 NOTE — CHART NOTE - NSCHARTNOTEFT_GEN_A_CORE
71y Female s/p cardiac cath for right radial site check. Pt without any complaints at this time.    Right radial site: Dressing in place c/d/i. No hematoma. No swelling/edema/discoloration/coldness of extremity. Pulses and sensation intact. Cap refill <3 secs.     Will cont to monitor.

## 2021-11-21 ENCOUNTER — TRANSCRIPTION ENCOUNTER (OUTPATIENT)
Age: 71
End: 2021-11-21

## 2021-11-21 VITALS
HEART RATE: 62 BPM | TEMPERATURE: 98 F | SYSTOLIC BLOOD PRESSURE: 109 MMHG | DIASTOLIC BLOOD PRESSURE: 60 MMHG | OXYGEN SATURATION: 97 % | RESPIRATION RATE: 18 BRPM

## 2021-11-21 LAB
HCV AB S/CO SERPL IA: 0.18 S/CO — SIGNIFICANT CHANGE UP (ref 0–0.99)
HCV AB SERPL-IMP: SIGNIFICANT CHANGE UP

## 2021-11-21 PROCEDURE — 99232 SBSQ HOSP IP/OBS MODERATE 35: CPT

## 2021-11-21 RX ORDER — DOXEPIN HCL 100 MG
1 CAPSULE ORAL
Qty: 0 | Refills: 0 | DISCHARGE

## 2021-11-21 RX ORDER — TICAGRELOR 90 MG/1
1 TABLET ORAL
Qty: 60 | Refills: 0
Start: 2021-11-21 | End: 2021-12-20

## 2021-11-21 RX ORDER — ZOLPIDEM TARTRATE 10 MG/1
1 TABLET ORAL
Qty: 0 | Refills: 0 | DISCHARGE

## 2021-11-21 RX ORDER — ATORVASTATIN CALCIUM 80 MG/1
1 TABLET, FILM COATED ORAL
Qty: 30 | Refills: 0
Start: 2021-11-21 | End: 2021-12-20

## 2021-11-21 RX ORDER — CLOPIDOGREL BISULFATE 75 MG/1
600 TABLET, FILM COATED ORAL ONCE
Refills: 0 | Status: DISCONTINUED | OUTPATIENT
Start: 2021-11-21 | End: 2021-11-21

## 2021-11-21 RX ORDER — AMLODIPINE BESYLATE 2.5 MG/1
1 TABLET ORAL
Qty: 0 | Refills: 0 | DISCHARGE

## 2021-11-21 RX ORDER — ASPIRIN/CALCIUM CARB/MAGNESIUM 324 MG
1 TABLET ORAL
Qty: 30 | Refills: 0
Start: 2021-11-21 | End: 2021-12-20

## 2021-11-21 RX ORDER — TICAGRELOR 90 MG/1
90 TABLET ORAL EVERY 12 HOURS
Refills: 0 | Status: DISCONTINUED | OUTPATIENT
Start: 2021-11-22 | End: 2021-11-21

## 2021-11-21 RX ORDER — METOPROLOL TARTRATE 50 MG
1 TABLET ORAL
Qty: 60 | Refills: 0
Start: 2021-11-21 | End: 2021-12-20

## 2021-11-21 RX ADMIN — TICAGRELOR 90 MILLIGRAM(S): 90 TABLET ORAL at 01:40

## 2021-11-21 RX ADMIN — TICAGRELOR 90 MILLIGRAM(S): 90 TABLET ORAL at 12:37

## 2021-11-21 RX ADMIN — HEPARIN SODIUM 5000 UNIT(S): 5000 INJECTION INTRAVENOUS; SUBCUTANEOUS at 05:38

## 2021-11-21 RX ADMIN — Medication 25 MILLIGRAM(S): at 05:38

## 2021-11-21 RX ADMIN — Medication 25 MILLIGRAM(S): at 16:30

## 2021-11-21 RX ADMIN — PANTOPRAZOLE SODIUM 40 MILLIGRAM(S): 20 TABLET, DELAYED RELEASE ORAL at 06:06

## 2021-11-21 RX ADMIN — BUPROPION HYDROCHLORIDE 150 MILLIGRAM(S): 150 TABLET, EXTENDED RELEASE ORAL at 12:36

## 2021-11-21 RX ADMIN — Medication 112 MICROGRAM(S): at 05:38

## 2021-11-21 RX ADMIN — Medication 81 MILLIGRAM(S): at 12:37

## 2021-11-21 NOTE — DISCHARGE NOTE NURSING/CASE MANAGEMENT/SOCIAL WORK - PATIENT PORTAL LINK FT
You can access the FollowMyHealth Patient Portal offered by [facility] by registering at the following website: [email or web address]. By joining [facility]’s FollowMyHealth portal, you will also be able to view your health information using other applications (apps) compatible with our system.

## 2021-11-21 NOTE — PROGRESS NOTE ADULT - ASSESSMENT
71F with HTN, breast cancer, hypothyroidism, bipolar disorder presented with NSTEMI s/p ALO to LCx. Currently chest pain free. Exam not suggestive of volume overload - no rales, or significant extremity edema. Complains of mild dyspnea, SPO2 98% on RA. No significant evidence of volume overload. No murmurs appreciated on exam.     Plan:     Continue on aspirin, Brilinta, statin   Increase metoprolol to 25 mg bid especially given frequent PVCs  Obtain Echocardiogram for LV function assessment and check if there any MR contributing her mild dyspnea  No ECG from this morning, please obtain 12-lead ECG 
71F with HTN, breast cancer, hypothyroidism, bipolar disorder presented with NSTEMI s/p ALO to LCx. Currently chest pain free. Exam not suggestive of volume overload - no rales, or significant extremity edema. Complains of mild dyspnea, SPO2 98% on RA. No significant evidence of volume overload. No murmurs appreciated on exam.       okay to discharge on aspirin, Brilinta, statin   metoprolol to 25 mg bid especially given frequent PVCs  Moderate global left ventricular systolic dysfunction  case discussed with Dr. Bray   follow-up with Dr. Carson Mcclelland in 1 week       
71 year old female with HTN, breast cancer, hypothyroidism, bipolar disorder presented with chest pain found to have rising troponins, admitted for NSTEMI s/p [facility] ALO to Lcx
71 year old female with HTN, breast cancer, hypothyroidism, bipolar disorder presented with chest pain found to have rising troponins, admitted for NSTEMI s/p [facility] ALO to Lcx

## 2021-11-21 NOTE — PHYSICAL THERAPY INITIAL EVALUATION ADULT - PERTINENT HX OF CURRENT PROBLEM, REHAB EVAL
71 year old female with HTN, breast cancer, hypothyroidism, bipolar disorder presented with chest pain found to have rising troponins, admitted for NSTEMI s/p Left heart catheterization Drug eluting stent to Left circumflex artery

## 2021-11-21 NOTE — DISCHARGE NOTE NURSING/CASE MANAGEMENT/SOCIAL WORK - NSDCFUADDAPPT_GEN_ALL_CORE_FT
0 Follow up with cardiology within 1-2 weeks of discharge. If you are in need of a general cardiologist after discharge, you may contact the [facility] for an appointment at [phone].

## 2021-11-21 NOTE — PROGRESS NOTE ADULT - PROBLEM SELECTOR PLAN 2
- C/w home buproprion, clonazepam, paroxetine  - doxepin not on hospital formulary
- C/w home buproprion, clonazepam, paroxetine  - doxepin not on hospital formulary

## 2021-11-21 NOTE — PROVIDER CONTACT NOTE (OTHER) - SITUATION
Patient complaining of increased shortness of breath w/o exertion. Pt. respiration rate of 20. o2 sat 98% on room air. Breath sounds diminished anterior/posteriorly
Arrived to 4N having frequent PVC's & 5 beats non-sustaining v tach on tele. Patient asymptomatic
New presence of ecchymosis in 3 different areas on patient's right arm and at R radial site s/p cath 11/19. Pt. denies pain.
Pt refusing to wear continuous pulse ox monitor. Pt keeps ripping it off her finger. PT oxygen saturation remains above 95%.

## 2021-11-21 NOTE — PROGRESS NOTE ADULT - PROBLEM SELECTOR PLAN 1
s/p LHC with ALO to Lcx  - R. radial a. site c/d/i  - c/w asa, ticagrelor, statin  - pending TTE   - increase metoprolol tartrate to 25 mg BID  - STAT EKG
s/p LHC with ALO to Lcx  - R. radial a. site c/d/i  - c/w asa and statin  - brillanta not covered by insurance, per cards ok to plavix load today and resume plavix 75 mg on discharge  - TTE showed EF 41% w/ mod global LVS dysfunction, nl RV size and function  - increase metoprolol tartrate to 25 mg BID  - pending cardiology clearance for discharge

## 2021-11-21 NOTE — PROVIDER CONTACT NOTE (OTHER) - RECOMMENDATIONS
Continue to monitor for signs/symptoms respiratory distress
As per ARI Walls, continuous pulse ox was discontinued and will be checked with other vital signs.
Continue to monitor for further ecchymosis/pain/hematoma at site
Continue to monitor. Evening metoprolol given as per eMAR

## 2021-11-21 NOTE — PHYSICAL THERAPY INITIAL EVALUATION ADULT - ADDITIONAL COMMENTS
Pt lives in apartment with elevator access. Pt was independent in functional activities prior to admission without use of assistive device.     Pt left in bed with head of bed elevated, lines intact, NAD. RN aware of session. +call bell. Pt lives in apartment with elevator access. Pt was independent in functional activities prior to admission without use of assistive device.     Pt left in bed with head of bed elevated, lines intact, NAD. RN aware of session. +call bell.  Of note, pt encouraged to follow up with podiatrist secondary to interest in orthotics. Pt states she has never seen podiatrist.

## 2021-11-21 NOTE — CHART NOTE - NSCHARTNOTEFT_GEN_A_CORE
Discussed w/ cardiology NP Partha regarding plavix loading, he recommended to cont ticagrelor for now until he discussed it with interventional cardiology regarding switching ticagrelor to plavix. Endorsed ticagrelor is not covered by her insurance.   Nevin Hercules MD

## 2021-11-21 NOTE — PROVIDER CONTACT NOTE (OTHER) - BACKGROUND
71 yr old female, admitted with chest pain, NSTEMI, cardiac cath with 1 stent placed. Hx GERD, depression, affective bipolar disorder
Chest pain/NSTEMI, [facility] on 11/19
Pt admitted for chest pain. PT s/p cath today.

## 2021-11-21 NOTE — PROGRESS NOTE ADULT - PROBLEM SELECTOR PLAN 4
On home zolpidem 10mg nightly. Holding currently, continue to reassess
On home zolpidem 10mg nightly. Holding currently, continue to reassess

## 2021-11-21 NOTE — PROVIDER CONTACT NOTE (OTHER) - ACTION/TREATMENT ORDERED:
As per ARI Walls, continuous pulse ox was discontinued and will be checked with other vital signs.
Continue to monitor on tele; night shift RN Whitney Henry made aware
ARI Patel examined patient at bedside   Continue to monitor for further ecchymosis/pain/hematoma at site
No further action ordered at this time; continue to monitor for signs/symptoms respiratory distress

## 2021-11-21 NOTE — PROVIDER CONTACT NOTE (OTHER) - REASON
Patient complaining of increased shortness of breath w/o exertion
Pt refusing continuous pulse ox
Frequent PVC's & 5 beats non-sustaining v tach on tele
Ecchymosis at cath site and upper arm

## 2021-11-21 NOTE — PHYSICAL THERAPY INITIAL EVALUATION ADULT - GENERAL OBSERVATIONS, REHAB EVAL
Pt received semi-hung in bed, +tele monitor, NAD. Pt agreeable to PT consultation. Cleared for PT as per GEOVANNY Mckenzie

## 2021-11-21 NOTE — PROGRESS NOTE ADULT - SUBJECTIVE AND OBJECTIVE BOX
Partha Queen NP  CCU Service  Cardiology   Spect: [phone] ([facility])     PATIENT: DAIJA ESTEVEZ, MRN: [id number]    CHIEF COMPLAINT: Patient is a 71y old  Female who presents with a chief complaint of Chest pain (21 Nov 2021 13:19)      INTERVAL HISTORY/OVERNIGHT EVENTS: No overnight events. Denies abdominal pain, chest pain or SOB, cough. Has been ambulating without assistance. Oriented to person, place, and time. Breathing comfortably on room air.    REVIEW OF SYSTEMS:    Constitutional:     [ ] negative [ ] fevers [ ] chills [ ] weight loss [ ] weight gain  HEENT:                  [ ] negative [ ] dry eyes [ ] eye irritation [ ] postnasal drip [ ] nasal congestion  CV:                         [ ] negative  [ ] chest pain [ ] orthopnea [ ] palpitations [ ] murmur  Resp:                     [ ] negative [ ] cough [ ] shortness of breath [ ] dyspnea [ ] wheezing [ ] sputum [ ] hemoptysis  GI:                          [ ] negative [ ] nausea [ ] vomiting [ ] diarrhea [ ] constipation [ ] abd pain [ ] dysphagia   :                        [ ] negative [ ] dysuria [ ] nocturia [ ] hematuria [ ] increased urinary frequency  Musculoskeletal: [ ] negative [ ] back pain [ ] myalgias [ ] arthralgias [ ] fracture  Skin:                       [ ] negative [ ] rash [ ] itch  Neurological:        [ ] negative [ ] headache [ ] dizziness [ ] syncope [ ] weakness [ ] numbness  Psychiatric:           [ ] negative [ ] anxiety [ ] depression  Endocrine:            [ ] negative [ ] diabetes [ ] thyroid problem  Heme/Lymph:      [ ] negative [ ] anemia [ ] bleeding problem  Allergic/Immune: [ ] negative [ ] itchy eyes [ ] nasal discharge [ ] hives [ ] angioedema    [x ] All other systems negative  [ ] Unable to assess ROS because ________.    MEDICATIONS:  MEDICATIONS  (STANDING):  aspirin enteric coated 81 milliGRAM(s) Oral daily  atorvastatin 80 milliGRAM(s) Oral at bedtime  buPROPion XL (24-Hour) . 150 milliGRAM(s) Oral daily  clonazePAM  Tablet 2 milliGRAM(s) Oral at bedtime  heparin   Injectable 5000 Unit(s) SubCutaneous every 12 hours  levothyroxine 112 MICROGram(s) Oral daily  metoprolol tartrate 25 milliGRAM(s) Oral two times a day  pantoprazole    Tablet 40 milliGRAM(s) Oral before breakfast  PARoxetine 40 milliGRAM(s) Oral at bedtime    MEDICATIONS  (PRN):  acetaminophen     Tablet .. 650 milliGRAM(s) Oral every 6 hours PRN Temp greater or equal to 38C (100.4F), Mild Pain (1 - 3)  nitroglycerin     SubLingual 0.4 milliGRAM(s) SubLingual every 5 minutes PRN Chest Pain      ALLERGIES: Allergies    adhesives (Rash)  Gadavist (Rash; Urticaria; Hives)    Intolerances        OBJECTIVE:  ICU Vital Signs Last 24 Hrs  T(C): 36.8 (21 Nov 2021 12:00), Max: 36.8 (21 Nov 2021 12:00)  T(F): 98.2 (21 Nov 2021 12:00), Max: 98.2 (21 Nov 2021 12:00)  HR: 62 (21 Nov 2021 12:00) (62 - 79)  BP: 109/60 (21 Nov 2021 12:00) (104/82 - 122/89)  BP(mean): 86 (20 Nov 2021 22:42) (86 - 86)  RR: 18 (21 Nov 2021 12:00) (18 - 18)  SpO2: 97% (21 Nov 2021 12:00) (96% - 97%)    CAPILLARY BLOOD GLUCOSE        I&O's Summary    Daily     Daily     PHYSICAL EXAMINATION:  General: Comfortable, no acute distress, cooperative with exam.  HEENT: Moist mucous membranes.  Respiratory: CTAB, normal respiratory effort, no coughing, wheezes, crackles, or rales.  CV: RRR, S1S2, no murmurs, rubs or gallops. No JVD. Distal pulses intact.  Abdominal: Soft, nontender, nondistended, no rebound or guarding, normal bowel sounds.  Neurology: AOx3, no focal neuro defects, OLEA x 4.  Extremities: No pitting edema, + Peripheral pulses.  Incisions:   Tubes:    LABS:                          15.4   10.70 )-----------( 313      ( 20 Nov 2021 06:25 )             48.8     11-20    135  |  100  |  15  ----------------------------<  109<H>  4.8   |  20<L>  |  0.74    Ca    10.2      20 Nov 2021 06:25  Mg     2.30     11-20    TELEMETRY: NSR     EKG:     IMAGING:   Transthoracic Echocardiogram (11.20.21 @ 10:52)   CONCLUSIONS:  1. Mitral annular calcification, otherwise normal mitral  valve.  2. Calcified trileaflet aortic valve with normal opening.  3. Moderate global left ventricular systolic dysfunction.  4. Normal right ventricular size and function.  5. Estimated pulmonary artery systolic pressure equals 31  mm Hg, assuming right atrial pressure equals 10  mm Hg,  consistent with normal pulmonary pressures.    
For all Cardiology service contact information, go to [email or web address] and use "[employer]" to login.    SUBJECTIVE:   No events overnight. Denies CP and body pain that she experienced prior to the hospitalization.   states has some dyspnea with moving around in bed, but she has some dyspnea at baseline and unclear if this is related.   Tele: NSR with frequent PVCs.     -------------------------------------------------------------------------------------------  ROS:  CV: chest pain (-), palpitation (-), orthopnea (-), PND (-), edema (-)  PULM: SOB (-), cough (-), wheezing (-), hemoptysis (-).   CONST: fever (-), chills (-) or fatigue (-)  GI: abdominal distension (-), abdominal pain (-) , nausea/vomiting (-), hematemesis, (-), melena (-), hematochezia (-)  : dysuria (-), frequency (-), hematuria (-).   NEURO: numbness (-), weakness (-), dizziness (-)  SKIN: itching (-), rash (-)  HEENT:  visual changes (-); vertigo or throat pain (-);  neck stiffness (-)     All other review of systems is negative unless indicated above.   -------------------------------------------------------------------------------------------  VS:  T(F): 98.1 (11-20), Max: 98.1 (11-19)  HR: 75 (11-20) (73 - 85)  BP: 115/87 (11-20) (115/87 - 141/94)  RR: 18 ()  SpO2: 98% ()  I&O's Summary    ------------------------------------------------------------------------------------------  PHYSICAL EXAM:  GENERAL: NAD  HEAD:  Atraumatic, Normocephalic.  EYES: EOMI, PERRLA, conjunctiva and sclera clear.  ENT: Moist mucous membranes.  NECK: Supple, difficult to assess JV due to body habitus.   CHEST/LUNG: Clear to auscultation bilaterally; No rales, rhonchi, wheezing, or rubs. Unlabored respirations.  HEART: Regular rate and rhythm; No murmurs, rubs, or gallops.  ABDOMEN: Bowel sounds present; Soft, Nontender, Nondistended.   EXTREMITIES:  2+ Peripheral Pulses, brisk capillary refill. No clubbing, cyanosis, or edema.  PSYCH: Normal affect.  SKIN: No rashes or lesions.  -------------------------------------------------------------------------------------------  LABS:                          15.4   10.70 )-----------( 313      ( 2021 06:25 )             48.8         135  |  100  |  15  ----------------------------<  109<H>  4.8   |  20<L>  |  0.74    Ca    10.2      2021 06:25  Mg     2.30         TPro  8.4<H>  /  Alb  4.4  /  TBili  0.3  /  DBili  x   /  AST  19  /  ALT  13  /  AlkPhos  155<H>  11-19    PT/INR - ( 2021 07:18 )   PT: 11.8 sec;   INR: 1.03 ratio         PTT - ( 2021 07:18 )  PTT:30.9 sec  CARDIAC MARKERS ( 2021 11:16 )  348 ng/L / x     / x     / x     / x     / x      CARDIAC MARKERS ( 2021 07:33 )  x     / x     / x     / 202 U/L / x     / 29.4 ng/mL  CARDIAC MARKERS ( 2021 07:18 )  109 ng/L / x     / x     / x     / x     / x      CARDIAC MARKERS ( 2021 05:15 )  78 ng/L / x     / x     / x     / x     / x          Total Cholesterol: 149  LDL: --  HDL: 39  T        -------------------------------------------------------------------------------------------  Meds:  acetaminophen     Tablet .. 650 milliGRAM(s) Oral every 6 hours PRN  aspirin enteric coated 81 milliGRAM(s) Oral daily  atorvastatin 80 milliGRAM(s) Oral at bedtime  buPROPion XL (24-Hour) . 150 milliGRAM(s) Oral daily  clonazePAM  Tablet 2 milliGRAM(s) Oral at bedtime  heparin   Injectable 5000 Unit(s) SubCutaneous every 12 hours  levothyroxine 112 MICROGram(s) Oral daily  metoprolol tartrate 12.5 milliGRAM(s) Oral two times a day  nitroglycerin     SubLingual 0.4 milliGRAM(s) SubLingual every 5 minutes PRN  pantoprazole    Tablet 40 milliGRAM(s) Oral before breakfast  PARoxetine 40 milliGRAM(s) Oral at bedtime  ticagrelor 90 milliGRAM(s) Oral every 12 hours    -------------------------------------------------------------------------------------------    
Patient is a 71y old  Female who presents with a chief complaint of Chest pain (20 Nov 2021 16:54)      SUBJECTIVE / OVERNIGHT EVENTS: No events overnight. Pt denies cp, sob, N/V/D. Reports ecchymosis on RUE, mildly painful, denies weakness or numbness.    MEDICATIONS  (STANDING):  aspirin enteric coated 81 milliGRAM(s) Oral daily  atorvastatin 80 milliGRAM(s) Oral at bedtime  buPROPion XL (24-Hour) . 150 milliGRAM(s) Oral daily  clonazePAM  Tablet 2 milliGRAM(s) Oral at bedtime  clopidogrel Tablet 600 milliGRAM(s) Oral once  heparin   Injectable 5000 Unit(s) SubCutaneous every 12 hours  levothyroxine 112 MICROGram(s) Oral daily  metoprolol tartrate 25 milliGRAM(s) Oral two times a day  pantoprazole    Tablet 40 milliGRAM(s) Oral before breakfast  PARoxetine 40 milliGRAM(s) Oral at bedtime    MEDICATIONS  (PRN):  acetaminophen     Tablet .. 650 milliGRAM(s) Oral every 6 hours PRN Temp greater or equal to 38C (100.4F), Mild Pain (1 - 3)  nitroglycerin     SubLingual 0.4 milliGRAM(s) SubLingual every 5 minutes PRN Chest Pain        CAPILLARY BLOOD GLUCOSE        I&O's Summary    Vital Signs Last 24 Hrs  T(C): 36.8 (21 Nov 2021 12:00), Max: 36.8 (21 Nov 2021 12:00)  T(F): 98.2 (21 Nov 2021 12:00), Max: 98.2 (21 Nov 2021 12:00)  HR: 62 (21 Nov 2021 12:00) (62 - 79)  BP: 109/60 (21 Nov 2021 12:00) (104/82 - 122/89)  BP(mean): 86 (20 Nov 2021 22:42) (86 - 86)  RR: 18 (21 Nov 2021 12:00) (18 - 18)  SpO2: 97% (21 Nov 2021 12:00) (96% - 97%)    PHYSICAL EXAM:  GENERAL: NAD, well-developed  CHEST/LUNG: Clear to auscultation bilaterally; No wheeze  HEART: Regular rate and rhythm; No murmurs, rubs, or gallops  ABDOMEN: Soft, Nontender, Nondistended; Bowel sounds present  EXTREMITIES:  2+ Peripheral Pulses, No clubbing, cyanosis, or edema  PSYCH: AAOx3  NEUROLOGY: non-focal  SKIN: + mildly tender scattered ecchymosis on RUE forearm  Incision: +R. radial artery site c/d/i  LABS:                        15.4   10.70 )-----------( 313      ( 20 Nov 2021 06:25 )             48.8     11-20    135  |  100  |  15  ----------------------------<  109<H>  4.8   |  20<L>  |  0.74    Ca    10.2      20 Nov 2021 06:25  Mg     2.30     11-20                RADIOLOGY & ADDITIONAL TESTS:    Imaging Personally Reviewed:    Consultant(s) Notes Reviewed:      Care Discussed with Consultants/Other Providers:  
Patient is a 71y old  Female who presents with a chief complaint of Chest pain (20 Nov 2021 09:58)      SUBJECTIVE / OVERNIGHT EVENTS: No events overnight. pt denies cp, diaphoresis, lightheadedness, sob.    MEDICATIONS  (STANDING):  aspirin enteric coated 81 milliGRAM(s) Oral daily  atorvastatin 80 milliGRAM(s) Oral at bedtime  buPROPion XL (24-Hour) . 150 milliGRAM(s) Oral daily  clonazePAM  Tablet 2 milliGRAM(s) Oral at bedtime  heparin   Injectable 5000 Unit(s) SubCutaneous every 12 hours  levothyroxine 112 MICROGram(s) Oral daily  metoprolol tartrate 25 milliGRAM(s) Oral two times a day  pantoprazole    Tablet 40 milliGRAM(s) Oral before breakfast  PARoxetine 40 milliGRAM(s) Oral at bedtime  ticagrelor 90 milliGRAM(s) Oral every 12 hours    MEDICATIONS  (PRN):  acetaminophen     Tablet .. 650 milliGRAM(s) Oral every 6 hours PRN Temp greater or equal to 38C (100.4F), Mild Pain (1 - 3)  nitroglycerin     SubLingual 0.4 milliGRAM(s) SubLingual every 5 minutes PRN Chest Pain        CAPILLARY BLOOD GLUCOSE        Vital Signs Last 24 Hrs  T(C): 36.8 (20 Nov 2021 12:00), Max: 36.8 (20 Nov 2021 10:00)  T(F): 98.2 (20 Nov 2021 12:00), Max: 98.2 (20 Nov 2021 10:00)  HR: 77 (20 Nov 2021 12:00) (63 - 85)  BP: 108/69 (20 Nov 2021 12:00) (108/69 - 141/94)  BP(mean): --  RR: 18 (20 Nov 2021 12:00) (18 - 20)  SpO2: 98% (20 Nov 2021 12:00) (97% - 98%)    PHYSICAL EXAM:  GENERAL: NAD, well-developed  CHEST/LUNG: Clear to auscultation bilaterally; No wheeze  HEART: Regular rate and rhythm; No murmurs, rubs, or gallops  ABDOMEN: Soft, Nontender, Nondistended; Bowel sounds present  EXTREMITIES:  2+ Peripheral Pulses, No clubbing, cyanosis, or edema  PSYCH: AAOx3  NEUROLOGY: non-focal  SKIN: No rashes or lesions  Incision: +R. radial artery site c/d/i    LABS:                        15.4   10.70 )-----------( 313      ( 20 Nov 2021 06:25 )             48.8     11-20    135  |  100  |  15  ----------------------------<  109<H>  4.8   |  20<L>  |  0.74    Ca    10.2      20 Nov 2021 06:25  Mg     2.30     11-20    TPro  8.4<H>  /  Alb  4.4  /  TBili  0.3  /  DBili  x   /  AST  19  /  ALT  13  /  AlkPhos  155<H>  11-19    PT/INR - ( 19 Nov 2021 07:18 )   PT: 11.8 sec;   INR: 1.03 ratio         PTT - ( 19 Nov 2021 07:18 )  PTT:30.9 sec  CARDIAC MARKERS ( 19 Nov 2021 07:33 )  x     / x     / 202 U/L / x     / 29.4 ng/mL          RADIOLOGY & ADDITIONAL TESTS:    Imaging Personally Reviewed:    Consultant(s) Notes Reviewed:      Care Discussed with Consultants/Other Providers:

## 2021-11-22 ENCOUNTER — OUTPATIENT (OUTPATIENT)
Dept: OUTPATIENT SERVICES | Facility: HOSPITAL | Age: 71
LOS: 1 days | Discharge: HOME | End: 2021-11-22

## 2021-11-22 DIAGNOSIS — D05.10 INTRADUCTAL CARCINOMA IN SITU OF UNSPECIFIED BREAST: ICD-10-CM

## 2021-11-22 DIAGNOSIS — Z98.890 OTHER SPECIFIED POSTPROCEDURAL STATES: Chronic | ICD-10-CM

## 2021-11-22 DIAGNOSIS — Z98.89 OTHER SPECIFIED POSTPROCEDURAL STATES: Chronic | ICD-10-CM

## 2021-11-22 PROBLEM — C50.911 BILATERAL BREAST CANCER: Status: ACTIVE | Noted: 2021-11-22

## 2021-11-22 PROBLEM — M81.8 OTHER OSTEOPOROSIS: Status: ACTIVE | Noted: 2021-11-22

## 2021-11-23 ENCOUNTER — APPOINTMENT (OUTPATIENT)
Age: 71
End: 2021-11-23

## 2021-11-23 DIAGNOSIS — M81.8 OTHER OSTEOPOROSIS W/OUT CURRENT PATHOLOGICAL FRACTURE: ICD-10-CM

## 2021-11-23 DIAGNOSIS — C50.912 MALIGNANT NEOPLASM OF UNSPECIFIED SITE OF RIGHT FEMALE BREAST: ICD-10-CM

## 2021-11-23 DIAGNOSIS — C50.911 MALIGNANT NEOPLASM OF UNSPECIFIED SITE OF RIGHT FEMALE BREAST: ICD-10-CM

## 2021-12-02 ENCOUNTER — APPOINTMENT (OUTPATIENT)
Dept: HEMATOLOGY ONCOLOGY | Facility: CLINIC | Age: 71
End: 2021-12-02

## 2021-12-28 ENCOUNTER — APPOINTMENT (OUTPATIENT)
Age: 71
End: 2021-12-28

## 2022-01-29 ENCOUNTER — OUTPATIENT (OUTPATIENT)
Dept: OUTPATIENT SERVICES | Facility: HOSPITAL | Age: 72
LOS: 1 days | Discharge: HOME | End: 2022-01-29

## 2022-01-29 DIAGNOSIS — Z98.890 OTHER SPECIFIED POSTPROCEDURAL STATES: Chronic | ICD-10-CM

## 2022-01-29 DIAGNOSIS — Z98.89 OTHER SPECIFIED POSTPROCEDURAL STATES: Chronic | ICD-10-CM

## 2022-01-29 DIAGNOSIS — D05.10 INTRADUCTAL CARCINOMA IN SITU OF UNSPECIFIED BREAST: ICD-10-CM

## 2022-01-31 ENCOUNTER — APPOINTMENT (OUTPATIENT)
Age: 72
End: 2022-01-31

## 2022-02-04 ENCOUNTER — APPOINTMENT (OUTPATIENT)
Dept: MAMMOGRAPHY | Facility: IMAGING CENTER | Age: 72
End: 2022-02-04

## 2022-02-04 ENCOUNTER — APPOINTMENT (OUTPATIENT)
Dept: ULTRASOUND IMAGING | Facility: IMAGING CENTER | Age: 72
End: 2022-02-04

## 2022-02-22 ENCOUNTER — APPOINTMENT (OUTPATIENT)
Dept: MRI IMAGING | Facility: IMAGING CENTER | Age: 72
End: 2022-02-22

## 2022-02-23 DIAGNOSIS — Z01.812 ENCOUNTER FOR PREPROCEDURAL LABORATORY EXAMINATION: ICD-10-CM

## 2022-03-08 ENCOUNTER — APPOINTMENT (OUTPATIENT)
Dept: MRI IMAGING | Facility: IMAGING CENTER | Age: 72
End: 2022-03-08

## 2022-03-09 ENCOUNTER — APPOINTMENT (OUTPATIENT)
Dept: PULMONOLOGY | Facility: CLINIC | Age: 72
End: 2022-03-09

## 2022-03-24 ENCOUNTER — OUTPATIENT (OUTPATIENT)
Dept: OUTPATIENT SERVICES | Facility: HOSPITAL | Age: 72
LOS: 1 days | End: 2022-03-24
Payer: MEDICARE

## 2022-03-24 ENCOUNTER — APPOINTMENT (OUTPATIENT)
Dept: ULTRASOUND IMAGING | Facility: IMAGING CENTER | Age: 72
End: 2022-03-24
Payer: MEDICARE

## 2022-03-24 ENCOUNTER — APPOINTMENT (OUTPATIENT)
Dept: MAMMOGRAPHY | Facility: IMAGING CENTER | Age: 72
End: 2022-03-24
Payer: MEDICARE

## 2022-03-24 DIAGNOSIS — Z98.89 OTHER SPECIFIED POSTPROCEDURAL STATES: Chronic | ICD-10-CM

## 2022-03-24 DIAGNOSIS — Z98.890 OTHER SPECIFIED POSTPROCEDURAL STATES: Chronic | ICD-10-CM

## 2022-03-24 DIAGNOSIS — Z00.8 ENCOUNTER FOR OTHER GENERAL EXAMINATION: ICD-10-CM

## 2022-03-24 PROCEDURE — 77067 SCR MAMMO BI INCL CAD: CPT | Mod: 26

## 2022-03-24 PROCEDURE — 76641 ULTRASOUND BREAST COMPLETE: CPT | Mod: 26,50

## 2022-03-24 PROCEDURE — 77063 BREAST TOMOSYNTHESIS BI: CPT | Mod: 26

## 2022-03-24 PROCEDURE — 77067 SCR MAMMO BI INCL CAD: CPT

## 2022-03-24 PROCEDURE — 77063 BREAST TOMOSYNTHESIS BI: CPT

## 2022-03-24 PROCEDURE — 76641 ULTRASOUND BREAST COMPLETE: CPT

## 2022-04-11 ENCOUNTER — TRANSCRIPTION ENCOUNTER (OUTPATIENT)
Age: 72
End: 2022-04-11

## 2022-04-21 ENCOUNTER — INPATIENT (INPATIENT)
Facility: HOSPITAL | Age: 72
LOS: 3 days | Discharge: HOME | End: 2022-04-25
Attending: INTERNAL MEDICINE | Admitting: INTERNAL MEDICINE
Payer: MEDICARE

## 2022-04-21 VITALS
DIASTOLIC BLOOD PRESSURE: 73 MMHG | SYSTOLIC BLOOD PRESSURE: 102 MMHG | TEMPERATURE: 97 F | OXYGEN SATURATION: 95 % | HEART RATE: 120 BPM | HEIGHT: 66 IN | RESPIRATION RATE: 18 BRPM

## 2022-04-21 DIAGNOSIS — Z98.890 OTHER SPECIFIED POSTPROCEDURAL STATES: Chronic | ICD-10-CM

## 2022-04-21 DIAGNOSIS — Z98.89 OTHER SPECIFIED POSTPROCEDURAL STATES: Chronic | ICD-10-CM

## 2022-04-21 DIAGNOSIS — R06.02 SHORTNESS OF BREATH: ICD-10-CM

## 2022-04-21 LAB
ALBUMIN SERPL ELPH-MCNC: 3.5 G/DL — SIGNIFICANT CHANGE UP (ref 3.3–5)
ALP SERPL-CCNC: 132 U/L — HIGH (ref 40–120)
ALT FLD-CCNC: 17 U/L — SIGNIFICANT CHANGE UP (ref 4–33)
ANION GAP SERPL CALC-SCNC: 13 MMOL/L — SIGNIFICANT CHANGE UP (ref 7–14)
APPEARANCE UR: CLEAR — SIGNIFICANT CHANGE UP
AST SERPL-CCNC: 18 U/L — SIGNIFICANT CHANGE UP (ref 4–32)
BASOPHILS # BLD AUTO: 0.04 K/UL — SIGNIFICANT CHANGE UP (ref 0–0.2)
BASOPHILS NFR BLD AUTO: 0.2 % — SIGNIFICANT CHANGE UP (ref 0–2)
BILIRUB SERPL-MCNC: 0.6 MG/DL — SIGNIFICANT CHANGE UP (ref 0.2–1.2)
BILIRUB UR-MCNC: NEGATIVE — SIGNIFICANT CHANGE UP
BUN SERPL-MCNC: 18 MG/DL — SIGNIFICANT CHANGE UP (ref 7–23)
CALCIUM SERPL-MCNC: 9.1 MG/DL — SIGNIFICANT CHANGE UP (ref 8.4–10.5)
CHLORIDE SERPL-SCNC: 106 MMOL/L — SIGNIFICANT CHANGE UP (ref 98–107)
CO2 SERPL-SCNC: 19 MMOL/L — LOW (ref 22–31)
COLOR SPEC: YELLOW — SIGNIFICANT CHANGE UP
CREAT SERPL-MCNC: 0.76 MG/DL — SIGNIFICANT CHANGE UP (ref 0.5–1.3)
DIFF PNL FLD: NEGATIVE — SIGNIFICANT CHANGE UP
EGFR: 83 ML/MIN/1.73M2 — SIGNIFICANT CHANGE UP
EOSINOPHIL # BLD AUTO: 0.01 K/UL — SIGNIFICANT CHANGE UP (ref 0–0.5)
EOSINOPHIL NFR BLD AUTO: 0.1 % — SIGNIFICANT CHANGE UP (ref 0–6)
GLUCOSE SERPL-MCNC: 113 MG/DL — HIGH (ref 70–99)
GLUCOSE UR QL: NEGATIVE — SIGNIFICANT CHANGE UP
HCT VFR BLD CALC: 40.5 % — SIGNIFICANT CHANGE UP (ref 34.5–45)
HGB BLD-MCNC: 13.3 G/DL — SIGNIFICANT CHANGE UP (ref 11.5–15.5)
IANC: 15.54 K/UL — HIGH (ref 1.8–7.4)
IMM GRANULOCYTES NFR BLD AUTO: 0.4 % — SIGNIFICANT CHANGE UP (ref 0–1.5)
KETONES UR-MCNC: NEGATIVE — SIGNIFICANT CHANGE UP
LEUKOCYTE ESTERASE UR-ACNC: NEGATIVE — SIGNIFICANT CHANGE UP
LIDOCAIN IGE QN: 16 U/L — SIGNIFICANT CHANGE UP (ref 7–60)
LYMPHOCYTES # BLD AUTO: 1.49 K/UL — SIGNIFICANT CHANGE UP (ref 1–3.3)
LYMPHOCYTES # BLD AUTO: 8.3 % — LOW (ref 13–44)
MCHC RBC-ENTMCNC: 27.7 PG — SIGNIFICANT CHANGE UP (ref 27–34)
MCHC RBC-ENTMCNC: 32.8 GM/DL — SIGNIFICANT CHANGE UP (ref 32–36)
MCV RBC AUTO: 84.2 FL — SIGNIFICANT CHANGE UP (ref 80–100)
MONOCYTES # BLD AUTO: 0.7 K/UL — SIGNIFICANT CHANGE UP (ref 0–0.9)
MONOCYTES NFR BLD AUTO: 3.9 % — SIGNIFICANT CHANGE UP (ref 2–14)
NEUTROPHILS # BLD AUTO: 15.54 K/UL — HIGH (ref 1.8–7.4)
NEUTROPHILS NFR BLD AUTO: 87.1 % — HIGH (ref 43–77)
NITRITE UR-MCNC: NEGATIVE — SIGNIFICANT CHANGE UP
NRBC # BLD: 0 /100 WBCS — SIGNIFICANT CHANGE UP
NRBC # FLD: 0 K/UL — SIGNIFICANT CHANGE UP
PH UR: 6 — SIGNIFICANT CHANGE UP (ref 5–8)
PLATELET # BLD AUTO: 252 K/UL — SIGNIFICANT CHANGE UP (ref 150–400)
POTASSIUM SERPL-MCNC: 4.8 MMOL/L — SIGNIFICANT CHANGE UP (ref 3.5–5.3)
POTASSIUM SERPL-SCNC: 4.8 MMOL/L — SIGNIFICANT CHANGE UP (ref 3.5–5.3)
PROT SERPL-MCNC: 6.9 G/DL — SIGNIFICANT CHANGE UP (ref 6–8.3)
PROT UR-MCNC: ABNORMAL
RBC # BLD: 4.81 M/UL — SIGNIFICANT CHANGE UP (ref 3.8–5.2)
RBC # FLD: 14.5 % — SIGNIFICANT CHANGE UP (ref 10.3–14.5)
SARS-COV-2 RNA SPEC QL NAA+PROBE: SIGNIFICANT CHANGE UP
SODIUM SERPL-SCNC: 138 MMOL/L — SIGNIFICANT CHANGE UP (ref 135–145)
SP GR SPEC: 1.03 — SIGNIFICANT CHANGE UP (ref 1–1.05)
TROPONIN T, HIGH SENSITIVITY RESULT: 11 NG/L — SIGNIFICANT CHANGE UP
TROPONIN T, HIGH SENSITIVITY RESULT: 15 NG/L — SIGNIFICANT CHANGE UP
UROBILINOGEN FLD QL: SIGNIFICANT CHANGE UP
WBC # BLD: 17.86 K/UL — HIGH (ref 3.8–10.5)
WBC # FLD AUTO: 17.86 K/UL — HIGH (ref 3.8–10.5)

## 2022-04-21 PROCEDURE — 71275 CT ANGIOGRAPHY CHEST: CPT | Mod: 26,MA

## 2022-04-21 PROCEDURE — 71045 X-RAY EXAM CHEST 1 VIEW: CPT | Mod: 26

## 2022-04-21 PROCEDURE — 99285 EMERGENCY DEPT VISIT HI MDM: CPT | Mod: CS,GC

## 2022-04-21 RX ORDER — CEFTRIAXONE 500 MG/1
1000 INJECTION, POWDER, FOR SOLUTION INTRAMUSCULAR; INTRAVENOUS ONCE
Refills: 0 | Status: COMPLETED | OUTPATIENT
Start: 2022-04-21 | End: 2022-04-21

## 2022-04-21 RX ORDER — AZITHROMYCIN 500 MG/1
500 TABLET, FILM COATED ORAL ONCE
Refills: 0 | Status: COMPLETED | OUTPATIENT
Start: 2022-04-21 | End: 2022-04-21

## 2022-04-21 RX ADMIN — CEFTRIAXONE 100 MILLIGRAM(S): 500 INJECTION, POWDER, FOR SOLUTION INTRAMUSCULAR; INTRAVENOUS at 21:48

## 2022-04-21 RX ADMIN — AZITHROMYCIN 255 MILLIGRAM(S): 500 TABLET, FILM COATED ORAL at 22:40

## 2022-04-21 NOTE — ED PROVIDER NOTE - OBJECTIVE STATEMENT
71F hx HTN HLD CAD s/p stent (unclear when, pt states "last month") on ASA/brilinta, bipolar disorder p/w tachycardia, hypoxia. Pt endorsing several days of persistent worsening cough w/ SOB, went to  who noted pt hypoxic to 91 and tachy to 120s, sent to ED. In ED pt denies any CP, denies new LE swelling, denies abd or flank pain.

## 2022-04-21 NOTE — ED PROVIDER NOTE - ATTENDING CONTRIBUTION TO CARE
I performed a history and physical exam of the patient and discussed their management with the resident and /or advanced care provider. I reviewed the resident and /or ACP's note and agree with the documented findings and plan of care. My medical decision making and observations are found above.  Lungs with scattered rhonchi. , abd soft

## 2022-04-21 NOTE — ED PROVIDER NOTE - CLINICAL SUMMARY MEDICAL DECISION MAKING FREE TEXT BOX
Demetria PGY3: 71F hx as above p/w hypoxia, tachycardia, persistent cough. Suspect likely PNA, will CXR, send labs, given recent  cardiac hx will screen for ACS, anticipate likely admission 2/2 hypoxia, will require monitoring. Demetria PGY3: 71F hx as above p/w hypoxia, tachycardia, persistent cough. Suspect likely PNA, will CXR, send labs, given recent  cardiac hx will screen for ACS, anticipate likely admission 2/2 hypoxia, will require monitoring.  Zora: high risk pt with increasing SOB. will eval for infection vs CHF or ACS equivalent. Would admit as hypoxia concerning.

## 2022-04-21 NOTE — ED ADULT NURSE NOTE - OBJECTIVE STATEMENT
Pt w/ hx of depression, bipolar affective disorder, hypothyroidism, c/o SOB Pt reports urgentcare visit today where found to be tachycardic and hypoxic and sent to ER Pt arrives with 22g iv access at left hand POA  Pt p/w nad breaths even unlabored skin warm dry intact Will endorse report to primary RN

## 2022-04-21 NOTE — ED PROVIDER NOTE - NS ED ROS FT
CONST: no fevers, no chills, no lightheadedness  HEENT: no vision change, no sore throat  CV: no chest pain, no palpitations  RESP: + cough, + shortness of breath  ABD: no abdominal pain, no nausea/vomiting, no diarrhea  : no dysuria, no hematuria  ENDO: no frequent urination, no unusual thirst  MSK: no musculoskeletal pain  NEURO: no headache, no focal weakness, no loss of sensation  SKIN:  no rash

## 2022-04-21 NOTE — ED ADULT NURSE REASSESSMENT NOTE - NS ED NURSE REASSESS COMMENT FT1
Pt awake, alert and oriented x 4 denies chest pain and SOB at rest.    c/o SOB with exertion.    Respirations even and unlabored.     Pt remains on continuous cardiac monitor with NSR noted and VSS.    IV and blood work complete, pt awaiting CT.   Awaiting urine sample.

## 2022-04-21 NOTE — ED PROVIDER NOTE - PHYSICAL EXAMINATION
Gen: WDWN, NAD  HEENT: EOMI, no nasal discharge, mucous membranes moist  CV: RRR, +S1/S2, no M/R/G  Resp: decreased BS b/l bases, comfortable, sats 94% RA   GI: Abdomen soft non-distended, NTTP, no masses  MSK: No open wounds, no bruising, no LE edema  Neuro: A&Ox4, following commands, moving all four extremities spontaneously

## 2022-04-21 NOTE — ED ADULT TRIAGE NOTE - CHIEF COMPLAINT QUOTE
Pt states that she has been having SOB for the past few days, worsens with exertion.  Pt shi to [facility] today and was found to be hypoxic, tachycardic with abnormal EKG.  was rapid Covid neg today.  PMH HTN, CAD

## 2022-04-22 DIAGNOSIS — J18.9 PNEUMONIA, UNSPECIFIED ORGANISM: ICD-10-CM

## 2022-04-22 DIAGNOSIS — Z29.9 ENCOUNTER FOR PROPHYLACTIC MEASURES, UNSPECIFIED: ICD-10-CM

## 2022-04-22 DIAGNOSIS — F32.89 OTHER SPECIFIED DEPRESSIVE EPISODES: ICD-10-CM

## 2022-04-22 DIAGNOSIS — A41.9 SEPSIS, UNSPECIFIED ORGANISM: ICD-10-CM

## 2022-04-22 DIAGNOSIS — G47.00 INSOMNIA, UNSPECIFIED: ICD-10-CM

## 2022-04-22 DIAGNOSIS — E78.5 HYPERLIPIDEMIA, UNSPECIFIED: ICD-10-CM

## 2022-04-22 DIAGNOSIS — I25.10 ATHEROSCLEROTIC HEART DISEASE OF NATIVE CORONARY ARTERY WITHOUT ANGINA PECTORIS: ICD-10-CM

## 2022-04-22 DIAGNOSIS — I50.9 HEART FAILURE, UNSPECIFIED: ICD-10-CM

## 2022-04-22 DIAGNOSIS — I10 ESSENTIAL (PRIMARY) HYPERTENSION: ICD-10-CM

## 2022-04-22 LAB
ALBUMIN SERPL ELPH-MCNC: 3.4 G/DL — SIGNIFICANT CHANGE UP (ref 3.3–5)
ALP SERPL-CCNC: 135 U/L — HIGH (ref 40–120)
ALT FLD-CCNC: 17 U/L — SIGNIFICANT CHANGE UP (ref 4–33)
ANION GAP SERPL CALC-SCNC: 12 MMOL/L — SIGNIFICANT CHANGE UP (ref 7–14)
AST SERPL-CCNC: 8 U/L — SIGNIFICANT CHANGE UP (ref 4–32)
B PERT DNA SPEC QL NAA+PROBE: SIGNIFICANT CHANGE UP
B PERT+PARAPERT DNA PNL SPEC NAA+PROBE: SIGNIFICANT CHANGE UP
BILIRUB SERPL-MCNC: 1 MG/DL — SIGNIFICANT CHANGE UP (ref 0.2–1.2)
BORDETELLA PARAPERTUSSIS (RAPRVP): SIGNIFICANT CHANGE UP
BUN SERPL-MCNC: 13 MG/DL — SIGNIFICANT CHANGE UP (ref 7–23)
C PNEUM DNA SPEC QL NAA+PROBE: SIGNIFICANT CHANGE UP
CALCIUM SERPL-MCNC: 9.1 MG/DL — SIGNIFICANT CHANGE UP (ref 8.4–10.5)
CHLORIDE SERPL-SCNC: 105 MMOL/L — SIGNIFICANT CHANGE UP (ref 98–107)
CO2 SERPL-SCNC: 22 MMOL/L — SIGNIFICANT CHANGE UP (ref 22–31)
CREAT SERPL-MCNC: 0.78 MG/DL — SIGNIFICANT CHANGE UP (ref 0.5–1.3)
EGFR: 80 ML/MIN/1.73M2 — SIGNIFICANT CHANGE UP
FLUAV SUBTYP SPEC NAA+PROBE: SIGNIFICANT CHANGE UP
FLUBV RNA SPEC QL NAA+PROBE: SIGNIFICANT CHANGE UP
GLUCOSE BLDC GLUCOMTR-MCNC: 89 MG/DL — SIGNIFICANT CHANGE UP (ref 70–99)
GLUCOSE SERPL-MCNC: 135 MG/DL — HIGH (ref 70–99)
HADV DNA SPEC QL NAA+PROBE: SIGNIFICANT CHANGE UP
HCOV 229E RNA SPEC QL NAA+PROBE: SIGNIFICANT CHANGE UP
HCOV HKU1 RNA SPEC QL NAA+PROBE: SIGNIFICANT CHANGE UP
HCOV NL63 RNA SPEC QL NAA+PROBE: SIGNIFICANT CHANGE UP
HCOV OC43 RNA SPEC QL NAA+PROBE: SIGNIFICANT CHANGE UP
HCT VFR BLD CALC: 39.1 % — SIGNIFICANT CHANGE UP (ref 34.5–45)
HGB BLD-MCNC: 12.7 G/DL — SIGNIFICANT CHANGE UP (ref 11.5–15.5)
HMPV RNA SPEC QL NAA+PROBE: SIGNIFICANT CHANGE UP
HPIV1 RNA SPEC QL NAA+PROBE: SIGNIFICANT CHANGE UP
HPIV2 RNA SPEC QL NAA+PROBE: SIGNIFICANT CHANGE UP
HPIV3 RNA SPEC QL NAA+PROBE: SIGNIFICANT CHANGE UP
HPIV4 RNA SPEC QL NAA+PROBE: SIGNIFICANT CHANGE UP
M PNEUMO DNA SPEC QL NAA+PROBE: SIGNIFICANT CHANGE UP
MAGNESIUM SERPL-MCNC: 1.8 MG/DL — SIGNIFICANT CHANGE UP (ref 1.6–2.6)
MCHC RBC-ENTMCNC: 28.1 PG — SIGNIFICANT CHANGE UP (ref 27–34)
MCHC RBC-ENTMCNC: 32.5 GM/DL — SIGNIFICANT CHANGE UP (ref 32–36)
MCV RBC AUTO: 86.5 FL — SIGNIFICANT CHANGE UP (ref 80–100)
NRBC # BLD: 0 /100 WBCS — SIGNIFICANT CHANGE UP
NRBC # FLD: 0 K/UL — SIGNIFICANT CHANGE UP
PHOSPHATE SERPL-MCNC: 2.7 MG/DL — SIGNIFICANT CHANGE UP (ref 2.5–4.5)
PLATELET # BLD AUTO: 235 K/UL — SIGNIFICANT CHANGE UP (ref 150–400)
POTASSIUM SERPL-MCNC: 3.9 MMOL/L — SIGNIFICANT CHANGE UP (ref 3.5–5.3)
POTASSIUM SERPL-SCNC: 3.9 MMOL/L — SIGNIFICANT CHANGE UP (ref 3.5–5.3)
PROCALCITONIN SERPL-MCNC: 1.57 NG/ML — HIGH (ref 0.02–0.1)
PROT SERPL-MCNC: 7.1 G/DL — SIGNIFICANT CHANGE UP (ref 6–8.3)
RAPID RVP RESULT: SIGNIFICANT CHANGE UP
RBC # BLD: 4.52 M/UL — SIGNIFICANT CHANGE UP (ref 3.8–5.2)
RBC # FLD: 14.5 % — SIGNIFICANT CHANGE UP (ref 10.3–14.5)
RSV RNA SPEC QL NAA+PROBE: SIGNIFICANT CHANGE UP
RV+EV RNA SPEC QL NAA+PROBE: SIGNIFICANT CHANGE UP
SARS-COV-2 RNA SPEC QL NAA+PROBE: SIGNIFICANT CHANGE UP
SODIUM SERPL-SCNC: 139 MMOL/L — SIGNIFICANT CHANGE UP (ref 135–145)
WBC # BLD: 15.7 K/UL — HIGH (ref 3.8–10.5)
WBC # FLD AUTO: 15.7 K/UL — HIGH (ref 3.8–10.5)

## 2022-04-22 PROCEDURE — 99223 1ST HOSP IP/OBS HIGH 75: CPT

## 2022-04-22 RX ORDER — CEFTRIAXONE 500 MG/1
1000 INJECTION, POWDER, FOR SOLUTION INTRAMUSCULAR; INTRAVENOUS EVERY 24 HOURS
Refills: 0 | Status: DISCONTINUED | OUTPATIENT
Start: 2022-04-22 | End: 2022-04-25

## 2022-04-22 RX ORDER — ZALEPLON 10 MG
5 CAPSULE ORAL ONCE
Refills: 0 | Status: DISCONTINUED | OUTPATIENT
Start: 2022-04-22 | End: 2022-04-23

## 2022-04-22 RX ORDER — BUPROPION HYDROCHLORIDE 150 MG/1
150 TABLET, EXTENDED RELEASE ORAL DAILY
Refills: 0 | Status: DISCONTINUED | OUTPATIENT
Start: 2022-04-22 | End: 2022-04-25

## 2022-04-22 RX ORDER — ASPIRIN/CALCIUM CARB/MAGNESIUM 324 MG
81 TABLET ORAL DAILY
Refills: 0 | Status: DISCONTINUED | OUTPATIENT
Start: 2022-04-22 | End: 2022-04-25

## 2022-04-22 RX ORDER — LEVOTHYROXINE SODIUM 125 MCG
112 TABLET ORAL DAILY
Refills: 0 | Status: DISCONTINUED | OUTPATIENT
Start: 2022-04-22 | End: 2022-04-25

## 2022-04-22 RX ORDER — TICAGRELOR 90 MG/1
90 TABLET ORAL
Refills: 0 | Status: DISCONTINUED | OUTPATIENT
Start: 2022-04-22 | End: 2022-04-25

## 2022-04-22 RX ORDER — ZOLPIDEM TARTRATE 10 MG/1
5 TABLET ORAL AT BEDTIME
Refills: 0 | Status: DISCONTINUED | OUTPATIENT
Start: 2022-04-22 | End: 2022-04-22

## 2022-04-22 RX ORDER — CHOLECALCIFEROL (VITAMIN D3) 125 MCG
1000 CAPSULE ORAL DAILY
Refills: 0 | Status: DISCONTINUED | OUTPATIENT
Start: 2022-04-22 | End: 2022-04-25

## 2022-04-22 RX ORDER — AZITHROMYCIN 500 MG/1
500 TABLET, FILM COATED ORAL EVERY 24 HOURS
Refills: 0 | Status: COMPLETED | OUTPATIENT
Start: 2022-04-22 | End: 2022-04-23

## 2022-04-22 RX ORDER — DEXTROAMPHETAMINE SACCHARATE, AMPHETAMINE ASPARTATE, DEXTROAMPHETAMINE SULFATE AND AMPHETAMINE SULFATE 1.875; 1.875; 1.875; 1.875 MG/1; MG/1; MG/1; MG/1
1 TABLET ORAL
Qty: 0 | Refills: 0 | DISCHARGE

## 2022-04-22 RX ORDER — ACETAMINOPHEN 500 MG
650 TABLET ORAL EVERY 6 HOURS
Refills: 0 | Status: DISCONTINUED | OUTPATIENT
Start: 2022-04-22 | End: 2022-04-25

## 2022-04-22 RX ORDER — METOPROLOL TARTRATE 50 MG
25 TABLET ORAL DAILY
Refills: 0 | Status: DISCONTINUED | OUTPATIENT
Start: 2022-04-22 | End: 2022-04-25

## 2022-04-22 RX ORDER — LEVOTHYROXINE SODIUM 125 MCG
1 TABLET ORAL
Qty: 0 | Refills: 0 | DISCHARGE

## 2022-04-22 RX ORDER — ENOXAPARIN SODIUM 100 MG/ML
40 INJECTION SUBCUTANEOUS EVERY 24 HOURS
Refills: 0 | Status: DISCONTINUED | OUTPATIENT
Start: 2022-04-22 | End: 2022-04-25

## 2022-04-22 RX ORDER — LANOLIN ALCOHOL/MO/W.PET/CERES
3 CREAM (GRAM) TOPICAL AT BEDTIME
Refills: 0 | Status: DISCONTINUED | OUTPATIENT
Start: 2022-04-22 | End: 2022-04-25

## 2022-04-22 RX ORDER — ATORVASTATIN CALCIUM 80 MG/1
80 TABLET, FILM COATED ORAL AT BEDTIME
Refills: 0 | Status: DISCONTINUED | OUTPATIENT
Start: 2022-04-22 | End: 2022-04-25

## 2022-04-22 RX ORDER — SODIUM CHLORIDE 9 MG/ML
500 INJECTION INTRAMUSCULAR; INTRAVENOUS; SUBCUTANEOUS ONCE
Refills: 0 | Status: COMPLETED | OUTPATIENT
Start: 2022-04-22 | End: 2022-04-22

## 2022-04-22 RX ORDER — PANTOPRAZOLE SODIUM 20 MG/1
40 TABLET, DELAYED RELEASE ORAL
Refills: 0 | Status: DISCONTINUED | OUTPATIENT
Start: 2022-04-22 | End: 2022-04-25

## 2022-04-22 RX ORDER — CLONAZEPAM 1 MG
2 TABLET ORAL AT BEDTIME
Refills: 0 | Status: DISCONTINUED | OUTPATIENT
Start: 2022-04-22 | End: 2022-04-25

## 2022-04-22 RX ADMIN — ENOXAPARIN SODIUM 40 MILLIGRAM(S): 100 INJECTION SUBCUTANEOUS at 05:40

## 2022-04-22 RX ADMIN — ATORVASTATIN CALCIUM 80 MILLIGRAM(S): 80 TABLET, FILM COATED ORAL at 22:51

## 2022-04-22 RX ADMIN — AZITHROMYCIN 500 MILLIGRAM(S): 500 TABLET, FILM COATED ORAL at 22:55

## 2022-04-22 RX ADMIN — CEFTRIAXONE 100 MILLIGRAM(S): 500 INJECTION, POWDER, FOR SOLUTION INTRAMUSCULAR; INTRAVENOUS at 22:52

## 2022-04-22 RX ADMIN — TICAGRELOR 90 MILLIGRAM(S): 90 TABLET ORAL at 05:34

## 2022-04-22 RX ADMIN — Medication 25 MILLIGRAM(S): at 05:34

## 2022-04-22 RX ADMIN — SODIUM CHLORIDE 125 MILLILITER(S): 9 INJECTION INTRAMUSCULAR; INTRAVENOUS; SUBCUTANEOUS at 05:33

## 2022-04-22 RX ADMIN — Medication 112 MICROGRAM(S): at 05:35

## 2022-04-22 RX ADMIN — Medication 2 MILLIGRAM(S): at 22:50

## 2022-04-22 RX ADMIN — Medication 30 MILLIGRAM(S): at 22:55

## 2022-04-22 RX ADMIN — Medication 30 MILLILITER(S): at 23:24

## 2022-04-22 RX ADMIN — PANTOPRAZOLE SODIUM 40 MILLIGRAM(S): 20 TABLET, DELAYED RELEASE ORAL at 06:52

## 2022-04-22 RX ADMIN — BUPROPION HYDROCHLORIDE 150 MILLIGRAM(S): 150 TABLET, EXTENDED RELEASE ORAL at 13:49

## 2022-04-22 RX ADMIN — TICAGRELOR 90 MILLIGRAM(S): 90 TABLET ORAL at 18:12

## 2022-04-22 RX ADMIN — Medication 1000 UNIT(S): at 13:49

## 2022-04-22 RX ADMIN — Medication 81 MILLIGRAM(S): at 13:49

## 2022-04-22 NOTE — PATIENT PROFILE ADULT - FALL HARM RISK - RISK INTERVENTIONS

## 2022-04-22 NOTE — H&P ADULT - PROBLEM SELECTOR PLAN 2
Leukocytosis to 17, tachy 120s initially with suspected source to be PNA. Previously also hypoxic but no longer  -CTX/azithromycin  -tylenol PRN  -500cc bolus

## 2022-04-22 NOTE — H&P ADULT - NSHPPHYSICALEXAM_GEN_ALL_CORE
PHYSICAL EXAM:  Vital Signs Last 24 Hrs  T(C): 36.3 (04-22-22 @ 01:50)  T(F): 97.3 (04-22-22 @ 01:50), Max: 97.9 (04-21-22 @ 21:06)  HR: 92 (04-22-22 @ 01:50) (88 - 120)  BP: 114/60 (04-22-22 @ 01:50)  BP(mean): --  RR: 17 (04-22-22 @ 01:50) (17 - 20)  SpO2: 100% (04-22-22 @ 01:50) (94% - 100%)  Wt(kg): --    Constitutional: NAD, awake and alert, well developed  EYES: EOMI, conjunctiva clear  ENT:  Normal Hearing, no tonsillar exudates   Neck: Soft and supple , no thyromegaly   Respiratory: RLL crackles, No wheezing or rhonchi, no tachypnea, no accessory muscle use  Cardiovascular: S1 and S2, regular rate and rhythm, no Murmurs, gallops or rubs, no JVD, no leg edema  Gastrointestinal: Bowel Sounds present, soft, nontender, nondistended, no guarding, no rebound  Extremities: No cyanosis or clubbing; warm to touch  Vascular: 2+ peripheral pulses lower ex  Neurological: No focal deficits, CN II-XII intact bilaterally, sensation to light touch intact in all extremities. Pupils are equally reactive to light and symmetrical in size.   Musculoskeletal: 5/5 strength b/l upper and lower extremities; no joint swelling.  Skin: No rashes, no ulcerations

## 2022-04-22 NOTE — H&P ADULT - NSHPLABSRESULTS_GEN_ALL_CORE
I have personally reviewed this patient's labs below:                        13.3   17.86 )-----------( 252      ( 21 Apr 2022 20:04 )             40.5     04-21-22 @ 20:04    138  |  106  |  18             --------------------------< 113<H>     4.8  |  19<L>  | 0.76    eGFR AA: --  eGFR N-AA: --    Calcium: 9.1  Phosphorus: --  Magnesium: --    AST: 18    ALT: 17  AlkPhos: 132<H>  Protein: 6.9  Albumin: 3.5  TBili: 0.6  D-Bili: --    Troponin 15--11  Probnp 472    I have personally reviewed this patient's EKG and my independent interpretation is sinus tachycardia @103bpm, left axis deviation, LVH, +PVC, no ST depressions or elevations      Imaging reviewed below:  CXR:  IMPRESSION:    New new peripheral right mid and lower lung opacities, of indeterminate   nature. Question due to overlying soft tissues versus pulmonary or   pleural pathology. A PA and lateral chest may be of benefit for further   evaluation.    CTA chest:  IMPRESSION:  No main, right main, left main, lobar or segmental pulmonary embolism.    Patchy airspace opacities with more consolidative opacity peripherally in   the right upper lobe. Limited evaluation secondary to patient motion.   Findings are suspicious for pneumonia. Correlate clinically and follow to   resolution.        Review and summation of old records:  TTE 11/20/2021:  CONCLUSIONS:  1. Mitral annular calcification, otherwise normal mitral  valve.  2. Calcified trileaflet aortic valve with normal opening.  3. Moderate global left ventricular systolic dysfunction.  4. Normal right ventricular size and function.  5. Estimated pulmonary artery systolic pressure equals 31  mm Hg, assuming right atrial pressure equals 10  mm Hg,  consistent with normal pulmonary pressures.    [facility] 11/19/2021:  CORONARY VESSELS: The coronary circulation is right dominant.  LM:   --  LM: Angiography showed mild atherosclerosis with no flow limiting  lesions.  LAD:   --  LAD: Angiography showed mild atherosclerosis with no flow  limiting lesions.  CX:   --  OM1: There was a tubular 100 % stenosis at a site with no prior  intervention. The lesion was associated with a large filling defect  consistent with thrombus. There was JOSE FRANCISCO grade 0 flow through the vessel  (no flow) and a moderate-sized vascular territory distal to the lesion.  This lesion is a likely culprit for the patient's recent myocardial  infarction. An intervention was performed.  RCA:   --  Mid RCA: There was a 40 % stenosis. There was JOSE FRANCISCO grade 3 flow  through the vessel (brisk flow).  --  RPDA: Angiography showed mild atherosclerosis with no flow limiting  lesions. There was a discrete 50 % stenosis at a site with no prior  intervention. There was JOSE FRANCISCO grade 3 flow through the vessel (brisk flow).  --  RPLS: Angiography showed mild atherosclerosis with no flow limiting  lesions.  COMPLICATIONS: There were no complications.  DIAGNOSTIC IMPRESSIONS: Successful PCI to 100% occlusion of OM1 using Johnathon  ALO

## 2022-04-22 NOTE — H&P ADULT - NSHPREVIEWOFSYSTEMS_GEN_ALL_CORE
ROS:    Constitutional: [x ] fevers [x ] chills  HEENT:  [ ] postnasal drip [ x] sore throat  CV: [ ] chest pain [ ] orthopnea [ ] palpitations  Resp: [x] cough [ x] shortness of breath [x ] dyspnea [ ] wheezing   GI: [ ] nausea [ ] vomiting [ ] diarrhea [ ] constipation [ ] abd pain [  : [ ] dysuria  [ ] increased urinary frequency  Musculoskeletal: [ ] back pain [ ] myalgias [ ] arthralgias  Skin: [ ] rash [ ] itch  Neurological: [ ] headache [ ] dizziness [ ] syncope   Endocrine: [ ] diabetes [ ] thyroid problem  Hematologic/Lymphatic: [ ] anemia [ ] bleeding problem  [x ] All other systems negative

## 2022-04-22 NOTE — CONSULT NOTE ADULT - SUBJECTIVE AND OBJECTIVE BOX
CHIEF COMPLAINT: Cough SOB x 3-4 days    HPI:  71F with PMHx HTN, HLD, CAD s/p stent (11/2021 ALO to LCx), depression, h/o BRCA  s/p lumpectomy presenting with cough and SOB for 3-4 days. Pt notes progressively worsening PURI, better with rest limiting her to less than two blocks. She also has a dry cough, body aches, sore throat, fever and chills for the last few days. [place] [facility] [place] to ER out of concern for hypoxia to 91% and tachycardia to 120s. Pt denies any chest pain and has been compliant with her cardiac meds. Also denies LOC, abdominal pain, vomiting, diarrhea, hematochezia, melena. She denies sick contacts and is covid vaccinated x2 but is unsure of when the last dose was.    In ED CTA neg for PE but with patchy airspace opacity in RUL. Given CTX and azithromycin (22 Apr 2022 01:49)    This morning she feels a little better. Still has nagging cough. No chest pain. Overall comfortable    PAST MEDICAL & SURGICAL HISTORY:  Benzodiazepine Dependence    Affective Bipolar Disorder    Hypertension    Carcinoma in situ  Breast Cancer  bilateral lumpectomies s/p chemo  had treatment 2008 and 2015   current on Arimidex    Acid reflux    Hypothyroidism    Depression  last admission 1- 2 year ago has been admitted several times   ECT last 20 years ago   currently on medication &quot;antidepressants do not really work well&quot;    Insomnia    Calculus of gallbladder without cholecystitis    Chronic GERD    History of lumpectomy  left 2002  &amp; right 2008  left 2013  treated with chemotherapy &amp; radiation therapy    S/P D&amp;C (status post dilation and curettage)    S/P colonoscopy  3 years ago negative    S/P endoscopy  3 years ago negative        Allergies    adhesives (Rash)  Gadavist (Rash; Urticaria; Hives)    Intolerances        SOCIAL HISTORY    Smoking Hx:  ETOH Hx:  Marital Status:  Occupational Hx:    FAMILY HISTORY:  Family history of Alzheimer&#x27;s disease (Mother)    Family history of heart failure (Father)        MEDICATIONS:  acetaminophen     Tablet .. 650 milliGRAM(s) Oral every 6 hours PRN  aspirin enteric coated 81 milliGRAM(s) Oral daily  atorvastatin 80 milliGRAM(s) Oral at bedtime  azithromycin   Tablet 500 milliGRAM(s) Oral every 24 hours  benzonatate 100 milliGRAM(s) Oral every 8 hours PRN  buPROPion XL (24-Hour) . 150 milliGRAM(s) Oral daily  cefTRIAXone   IVPB 1000 milliGRAM(s) IV Intermittent every 24 hours  cholecalciferol 1000 Unit(s) Oral daily  clonazePAM  Tablet 2 milliGRAM(s) Oral at bedtime PRN  enoxaparin Injectable 40 milliGRAM(s) SubCutaneous every 24 hours  guaiFENesin Oral Liquid (Sugar-Free) 200 milliGRAM(s) Oral every 6 hours PRN  levothyroxine 112 MICROGram(s) Oral daily  melatonin 3 milliGRAM(s) Oral at bedtime PRN  metoprolol tartrate 25 milliGRAM(s) Oral daily  pantoprazole    Tablet 40 milliGRAM(s) Oral before breakfast  PARoxetine 30 milliGRAM(s) Oral at bedtime  ticagrelor 90 milliGRAM(s) Oral two times a day  zolpidem 5 milliGRAM(s) Oral at bedtime PRN  zolpidem 5 milliGRAM(s) Oral at bedtime PRN      REVIEW OF SYSTEMS:    CONSTITUTIONAL: No weakness, fevers or chills  EYES/ENT: No visual changes;  No vertigo or throat pain   NECK: No pain or stiffness  RESPIRATORY: No cough, wheezing, hemoptysis; No shortness of breath  CARDIOVASCULAR: No chest pain or palpitations  GASTROINTESTINAL: No abdominal or epigastric pain. No nausea, vomiting, or hematemesis; No diarrhea or constipation. No melena or hematochezia.  GENITOURINARY: No dysuria, frequency or hematuria  NEUROLOGICAL: No numbness or weakness  SKIN: No itching, burning, rashes, or lesions   All other review of systems is negative unless indicated above    Vital Signs Last 24 Hrs  T(C): 36.7 (22 Apr 2022 05:30), Max: 36.7 (22 Apr 2022 02:03)  T(F): 98 (22 Apr 2022 05:30), Max: 98 (22 Apr 2022 02:03)  HR: 80 (22 Apr 2022 05:30) (80 - 120)  BP: 106/58 (22 Apr 2022 05:30) (102/69 - 127/56)  BP(mean): --  RR: 19 (22 Apr 2022 05:30) (17 - 20)  SpO2: 100% (22 Apr 2022 05:30) (94% - 100%)    I&O's Summary      PHYSICAL EXAM:    Constitutional: NAD, awake and alert, well-developed  HEENT: PERR, EOMI  Neck: soft and supple, No LAD, No JVD  Respiratory: Breath sounds are clear bilaterally, No wheezing, rales or rhonchi  Cardiovascular: Regular rate and rhythm, normal S1 and S2,  no murmurs, gallops or rubs  Gastrointestinal: Bowel Sounds present, soft, nontender.   Extremities: No peripheral edema. No clubbing or cyanosis.  Vascular: 2+ peripheral pulses  Neurological: A/O x 3, no focal deficits  Musculoskeletal: no calf tenderness.  Skin: No rashes.      LABS: All Labs Reviewed:                        12.7   15.70 )-----------( 235      ( 22 Apr 2022 10:32 )             39.1                         13.3   17.86 )-----------( 252      ( 21 Apr 2022 20:04 )             40.5     22 Apr 2022 10:32    139    |  105    |  13     ----------------------------<  135    3.9     |  22     |  0.78   21 Apr 2022 20:04    138    |  106    |  18     ----------------------------<  113    4.8     |  19     |  0.76     Ca    9.1        22 Apr 2022 10:32  Ca    9.1        21 Apr 2022 20:04  Phos  2.7       22 Apr 2022 10:32  Mg     1.80      22 Apr 2022 10:32    TPro  7.1    /  Alb  3.4    /  TBili  1.0    /  DBili  x      /  AST  8      /  ALT  17     /  AlkPhos  135    22 Apr 2022 10:32  TPro  6.9    /  Alb  3.5    /  TBili  0.6    /  DBili  x      /  AST  18     /  ALT  17     /  AlkPhos  132    21 Apr 2022 20:04      Blood Culture:     CARDIAC MARKERS:            proBNP: Serum Pro-Brain Natriuretic Peptide: 472 pg/mL (04-21 @ 20:04)    Lipid Profile:   HgA1c:   TSH:           RADIOLOGY/EKG:  LVH RBBB LAE Q V1

## 2022-04-22 NOTE — CONSULT NOTE ADULT - SUBJECTIVE AND OBJECTIVE BOX
22 @ 14:15    Patient is a 71y old  Female who presents with a chief complaint of SOB, cough x3-4 days (2022 11:32)      HPI:  71F with PMHx HTN, HLD, CAD s/p stent (2021 ALO to LCx), depression, hx breast cancer s/p lumpectomy presenting with cough and SOB for 3-4 days. Pt notes progressively worsening PURI, better with rest limiting her to less than two blocks. She also has a dry cough, body aches, sore throat, fever and chills for the last few days. [place] [facility] [place] and was sent to ER out of concern for hypoxia to 91% and tachycardia to 120s. Pt denies any chest pain and has been compliant with her cardiac meds. Also denies LOC, abdominal pain, vomiting, diarrhea, hematochezia, melena. She denies sick contacts and is covid vaccinated x2 but is unsure of when the last dose was.    In ED CTA neg for PE but with patchy airspace opacity in RUL. Given CTX and azithromycin (2022 01:49)    she says she has no underlying lung disease:  never had covid:  never had pe or dvt :  and deneis any kind iof wheezing:  she is on room air at this time      ?FOLLOWING PRESENT  [ x] Hx of PE/DVT, [ x] Hx COPD, [ x] Hx of Asthma, [ y] Hx of Hospitalization, x[ ]  Hx of BiPAP/CPAP use, [ x] Hx of TOYIN    Allergies    adhesives (Rash)  Gadavist (Rash; Urticaria; Hives)    Intolerances        PAST MEDICAL & SURGICAL HISTORY:  Benzodiazepine Dependence    Affective Bipolar Disorder    Hypertension    Carcinoma in situ  Breast Cancer  bilateral lumpectomies s/p chemo  had treatment  and    current on Arimidex    Acid reflux    Hypothyroidism    Depression  last admission 1- 2 year ago has been admitted several times   ECT last 20 years ago   currently on medication &quot;antidepressants do not really work well&quot;    Insomnia    Calculus of gallbladder without cholecystitis    Chronic GERD    History of lumpectomy  left   &amp; right   left   treated with chemotherapy &amp; radiation therapy    S/P D&amp;C (status post dilation and curettage)    S/P colonoscopy  3 years ago negative    S/P endoscopy  3 years ago negative        FAMILY HISTORY:  Family history of Alzheimer&#x27;s disease (Mother)    Family history of heart failure (Father)        Social History: [  x] TOBACCO                  [ x ] ETOH                                 [x  ] IVDA/DRUGS    REVIEW OF SYSTEMS      General:x	    Skin/Breast:x  	  Ophthalmologic:x  	  ENMT:	x    Respiratory and Thorax: fever, cough, sob   	  Cardiovascular:	x    Gastrointestinal:	  x  Genitourinary:	x  x  Musculoskeletal:	    Neurological:	x    Psychiatric:	x    Hematology/Lymphatics:	x    Endocrine:	x    Allergic/Immunologic:	x    MEDICATIONS  (STANDING):  aspirin enteric coated 81 milliGRAM(s) Oral daily  atorvastatin 80 milliGRAM(s) Oral at bedtime  azithromycin   Tablet 500 milliGRAM(s) Oral every 24 hours  buPROPion XL (24-Hour) . 150 milliGRAM(s) Oral daily  cefTRIAXone   IVPB 1000 milliGRAM(s) IV Intermittent every 24 hours  cholecalciferol 1000 Unit(s) Oral daily  enoxaparin Injectable 40 milliGRAM(s) SubCutaneous every 24 hours  levothyroxine 112 MICROGram(s) Oral daily  metoprolol tartrate 25 milliGRAM(s) Oral daily  pantoprazole    Tablet 40 milliGRAM(s) Oral before breakfast  PARoxetine 30 milliGRAM(s) Oral at bedtime  ticagrelor 90 milliGRAM(s) Oral two times a day    MEDICATIONS  (PRN):  acetaminophen     Tablet .. 650 milliGRAM(s) Oral every 6 hours PRN Temp greater or equal to 38C (100.4F), Mild Pain (1 - 3)  benzonatate 100 milliGRAM(s) Oral every 8 hours PRN Cough  clonazePAM  Tablet 2 milliGRAM(s) Oral at bedtime PRN insomnia  guaiFENesin Oral Liquid (Sugar-Free) 200 milliGRAM(s) Oral every 6 hours PRN Cough  melatonin 3 milliGRAM(s) Oral at bedtime PRN Insomnia  zolpidem 5 milliGRAM(s) Oral at bedtime PRN Insomnia  zolpidem 5 milliGRAM(s) Oral at bedtime PRN Insomnia       Vital Signs Last 24 Hrs  T(C): 36.7 (2022 05:30), Max: 36.7 (2022 02:03)  T(F): 98 (2022 05:30), Max: 98 (2022 02:03)  HR: 80 (2022 05:30) (80 - 92)  BP: 106/58 (2022 05:30) (102/69 - 127/56)  BP(mean): --  RR: 19 (2022 05:30) (17 - 20)  SpO2: 100% (2022 05:30) (94% - 100%)Orthostatic VS          I&O's Summary      Physical Exam:   GENERAL: NAD, well-groomed, well-developed  HEENT: MYRON/   Atraumatic, Normocephalic  ENMT: No tonsillar erythema, exudates, or enlargement; Moist mucous membranes, Good dentition, No lesions  NECK: Supple, No JVD, Normal thyroid  CHEST/LUNG: no wheezing, no crackles   CVS: Regular rate and rhythm; No murmurs, rubs, or gallops  GI: : Soft, Nontender, Nondistended; Bowel sounds present  NERVOUS SYSTEM:  Alert & Oriented X3  EXTREMITIES:  - edema  LYMPH: No lymphadenopathy noted  SKIN: No rashes or lesions  ENDOCRINOLOGY: No Thyromegaly  PSYCH: Appropriate    Labs:  COVID-19 PCR: NotDetec (2022 16:58)  COVID-19 PCR: NotDetec (2021 06:49)                              12.7   15.70 )-----------( 235      ( 2022 10:32 )             39.1                         13.3   17.86 )-----------( 252      ( 2022 20:04 )             40.5     04    139  |  105  |  13  ----------------------------<  135<H>  3.9   |  22  |  0.78      138  |  106  |  18  ----------------------------<  113<H>  4.8   |  19<L>  |  0.76    Ca    9.1      2022 10:32  Ca    9.1      2022 20:04  Phos  2.7       Mg     1.80         TPro  7.1  /  Alb  3.4  /  TBili  1.0  /  DBili  x   /  AST  8   /  ALT  17  /  AlkPhos  135<H>    TPro  6.9  /  Alb  3.5  /  TBili  0.6  /  DBili  x   /  AST  18  /  ALT  17  /  AlkPhos  132<H>      CAPILLARY BLOOD GLUCOSE      POCT Blood Glucose.: 89 mg/dL (2022 01:41)  POCT Blood Glucose.: 114 mg/dL (2022 22:37)    LIVER FUNCTIONS - ( 2022 10:32 )  Alb: 3.4 g/dL / Pro: 7.1 g/dL / ALK PHOS: 135 U/L / ALT: 17 U/L / AST: 8 U/L / GGT: x             Urinalysis Basic - ( 2022 22:26 )    Color: Yellow / Appearance: Clear / S.033 / pH: x  Gluc: x / Ketone: Negative  / Bili: Negative / Urobili: <2 mg/dL   Blood: x / Protein: Trace / Nitrite: Negative   Leuk Esterase: Negative / RBC: 1 /HPF / WBC 2 /HPF   Sq Epi: x / Non Sq Epi: 3 /HPF / Bacteria: Negative      D DImer  Procalcitonin, Serum: 1.57 ng/mL ( @ 22:44)  Serum Pro-Brain Natriuretic Peptide: 472 pg/mL ( @ 20:04)      Studies  Chest X-RAY  CT SCAN Chest   CT Abdomen  Venous Dopplers: LE:   Others    rad< from: CT Angio Chest PE Protocol w/ IV Cont (22 @ 21:58) >    COMPARISON: None.    CONTRAST/COMPLICATIONS:  IV Contrast: Omnipaque 350  90 cc administered   10 cc discarded  Oral Contrast: NONE  Complications: None reported at time of study completion    PROCEDURE:  CT Angiogram of the chest was obtained with intravenous contrast. Three   dimensional maximum intensity projection (MIP) images were generated.    FINDINGS:    PULMONARY ANGIOGRAM: No main, right main, left main, lobar or segmental   pulmonary embolism. Limited evaluation of subsegmental pulmonary arteries   secondary to motion and mixing artifact.    LYMPH NODES:No axillary or mediastinal lymphadenopathy.    HEART/VASCULATURE: The heart is normal size. There are coronary artery   calcifications/stents. No pericardial effusion.    AIRWAYS/LUNGS/PLEURA: Patent central airways. Patchy airspace opacities   with more consolidative opacity peripherally in the right upper lobe.   Limited evaluation secondary to patient motion. Findings are suspicious   for pneumonia. Correlate clinically and follow to resolution. Right upper   lobe calcified granuloma.    UPPERABDOMEN: Small hiatal hernia. Partially visualized left renal cyst.    BONES/SOFT TISSUES: Multilevel degenerative changes of the spine.   Scoliosis.    IMPRESSION:  No main, right main, left main, lobar or segmental pulmonary embolism.    Patchy airspace opacities with more consolidative opacity peripherally in   the right upper lobe. Limited evaluation secondary to patient motion.   Findings are suspicious for pneumonia. Correlate clinically and follow to   resolution.    --- End of Report ---           LILIA ROONEY MD; Resident Interventional Radiology  This document has been electronically signed.  FLAKO LEROY MD; Attending Radiologist  This document has been electronically signed. 2022 10:47PM    < end of copied text >

## 2022-04-22 NOTE — PHARMACOTHERAPY INTERVENTION NOTE - COMMENTS
Medication list in OMR verified with outpatient pharmacy.  Of Note:  - Pharmacy filled Wellbutrin XL 300mg QD on 4/7/22  - Pharmacy filled Lopressor 25mg BID

## 2022-04-22 NOTE — H&P ADULT - ASSESSMENT
71F with PMHx HTN, HLD, CAD s/p stent (11/2021 ALO to LCx), depression, hx breast cancer s/p lumpectomy presenting with cough and SOB for 3-4 days. Admitted with sepsis 2/2 PNA

## 2022-04-22 NOTE — H&P ADULT - PROBLEM SELECTOR PLAN 1
Cough, SOB, fever, leukocytosis, CT with consolidations consistent with PNA  -CTX, azithromycin  -urine strep, legionella  -sputum culture if makes sputum  -500cc bolus NS over 4 hours  -cough suppressants, tylenol PRN Cough, SOB, fever, leukocytosis, CT with consolidations consistent with PNA  -CTX, azithromycin  -urine strep, legionella  -sputum culture if makes sputum  -500cc bolus NS over 4 hours  -cough suppressants, tylenol PRN  -add on procalcitonin  -COVID neg. Vaccinated x2 per patient

## 2022-04-22 NOTE — H&P ADULT - PROBLEM SELECTOR PLAN 4
EF 41% on echo 11/2021. Possibly ischemic given NSTEMI and occlusion to LCx  Euvolemic on exam. Without significant LE edema. Not on home diuretics  Can f/u with cardiology regarding repeat echo and GDMT. Likely would benefit from ACE-I/ARB

## 2022-04-22 NOTE — PROGRESS NOTE ADULT - SUBJECTIVE AND OBJECTIVE BOX
patient not known to me, assigned this AM to assume care  chart reviewed and events thus far noted  admitted overnight by faculty hospitalist    seen and examined  above noted and personally confirmed as accurate by me  will continue antibiotics  ID and pulmonary help requested

## 2022-04-22 NOTE — H&P ADULT - HISTORY OF PRESENT ILLNESS
71F with PMHx HTN, HLD, CAD s/p stent (11/2021 ALO to LCx), depression, hx breast cancer s/p lumpectomy presenting with cough and SOB for 3-4 days. Pt notes progressively worsening PURI, better with rest limiting her to less than two blocks. She also has a dry cough, body aches, sore throat, fever and chills for the last few days. [place] [facility] [place] to ER out of concern for hypoxia to 91% and tachycardia to 120s. Pt denies any chest pain and has been compliant with her cardiac meds. Also denies LOC, abdominal pain, vomiting, diarrhea, hematochezia, melena. She denies sick contacts and is covid vaccinated x2 but is unsure of when the last dose was.    In ED CTA neg for PE but with patchy airspace opacity in RUL. Given CTX and azithromycin

## 2022-04-23 DIAGNOSIS — J18.9 PNEUMONIA, UNSPECIFIED ORGANISM: ICD-10-CM

## 2022-04-23 DIAGNOSIS — Z22.322 CARRIER OR SUSPECTED CARRIER OF METHICILLIN RESISTANT STAPHYLOCOCCUS AUREUS: ICD-10-CM

## 2022-04-23 DIAGNOSIS — D72.829 ELEVATED WHITE BLOOD CELL COUNT, UNSPECIFIED: ICD-10-CM

## 2022-04-23 DIAGNOSIS — R06.02 SHORTNESS OF BREATH: ICD-10-CM

## 2022-04-23 LAB
ANION GAP SERPL CALC-SCNC: 15 MMOL/L — HIGH (ref 7–14)
BUN SERPL-MCNC: 12 MG/DL — SIGNIFICANT CHANGE UP (ref 7–23)
CALCIUM SERPL-MCNC: 9.3 MG/DL — SIGNIFICANT CHANGE UP (ref 8.4–10.5)
CHLORIDE SERPL-SCNC: 104 MMOL/L — SIGNIFICANT CHANGE UP (ref 98–107)
CO2 SERPL-SCNC: 19 MMOL/L — LOW (ref 22–31)
CREAT SERPL-MCNC: 0.8 MG/DL — SIGNIFICANT CHANGE UP (ref 0.5–1.3)
EGFR: 78 ML/MIN/1.73M2 — SIGNIFICANT CHANGE UP
GLUCOSE SERPL-MCNC: 108 MG/DL — HIGH (ref 70–99)
HCT VFR BLD CALC: 38.9 % — SIGNIFICANT CHANGE UP (ref 34.5–45)
HGB BLD-MCNC: 12.8 G/DL — SIGNIFICANT CHANGE UP (ref 11.5–15.5)
MAGNESIUM SERPL-MCNC: 2 MG/DL — SIGNIFICANT CHANGE UP (ref 1.6–2.6)
MCHC RBC-ENTMCNC: 27.8 PG — SIGNIFICANT CHANGE UP (ref 27–34)
MCHC RBC-ENTMCNC: 32.9 GM/DL — SIGNIFICANT CHANGE UP (ref 32–36)
MCV RBC AUTO: 84.6 FL — SIGNIFICANT CHANGE UP (ref 80–100)
MRSA PCR RESULT.: DETECTED
NRBC # BLD: 0 /100 WBCS — SIGNIFICANT CHANGE UP
NRBC # FLD: 0 K/UL — SIGNIFICANT CHANGE UP
PHOSPHATE SERPL-MCNC: 3.4 MG/DL — SIGNIFICANT CHANGE UP (ref 2.5–4.5)
PLATELET # BLD AUTO: 255 K/UL — SIGNIFICANT CHANGE UP (ref 150–400)
POTASSIUM SERPL-MCNC: 3.9 MMOL/L — SIGNIFICANT CHANGE UP (ref 3.5–5.3)
POTASSIUM SERPL-SCNC: 3.9 MMOL/L — SIGNIFICANT CHANGE UP (ref 3.5–5.3)
RBC # BLD: 4.6 M/UL — SIGNIFICANT CHANGE UP (ref 3.8–5.2)
RBC # FLD: 14.5 % — SIGNIFICANT CHANGE UP (ref 10.3–14.5)
S AUREUS DNA NOSE QL NAA+PROBE: DETECTED
S PNEUM AG UR QL: NEGATIVE — SIGNIFICANT CHANGE UP
SODIUM SERPL-SCNC: 138 MMOL/L — SIGNIFICANT CHANGE UP (ref 135–145)
WBC # BLD: 12.01 K/UL — HIGH (ref 3.8–10.5)
WBC # FLD AUTO: 12.01 K/UL — HIGH (ref 3.8–10.5)

## 2022-04-23 PROCEDURE — 99222 1ST HOSP IP/OBS MODERATE 55: CPT

## 2022-04-23 RX ORDER — MUPIROCIN 20 MG/G
1 OINTMENT TOPICAL
Refills: 0 | Status: DISCONTINUED | OUTPATIENT
Start: 2022-04-23 | End: 2022-04-25

## 2022-04-23 RX ORDER — MUPIROCIN 20 MG/G
1 OINTMENT TOPICAL
Refills: 0 | Status: DISCONTINUED | OUTPATIENT
Start: 2022-04-23 | End: 2022-04-23

## 2022-04-23 RX ADMIN — AZITHROMYCIN 500 MILLIGRAM(S): 500 TABLET, FILM COATED ORAL at 22:23

## 2022-04-23 RX ADMIN — Medication 5 MILLIGRAM(S): at 00:09

## 2022-04-23 RX ADMIN — CEFTRIAXONE 100 MILLIGRAM(S): 500 INJECTION, POWDER, FOR SOLUTION INTRAMUSCULAR; INTRAVENOUS at 22:23

## 2022-04-23 RX ADMIN — ENOXAPARIN SODIUM 40 MILLIGRAM(S): 100 INJECTION SUBCUTANEOUS at 06:33

## 2022-04-23 RX ADMIN — Medication 1000 UNIT(S): at 12:38

## 2022-04-23 RX ADMIN — Medication 30 MILLIGRAM(S): at 22:24

## 2022-04-23 RX ADMIN — ATORVASTATIN CALCIUM 80 MILLIGRAM(S): 80 TABLET, FILM COATED ORAL at 22:24

## 2022-04-23 RX ADMIN — Medication 1200 MILLIGRAM(S): at 18:45

## 2022-04-23 RX ADMIN — Medication 25 MILLIGRAM(S): at 06:33

## 2022-04-23 RX ADMIN — TICAGRELOR 90 MILLIGRAM(S): 90 TABLET ORAL at 06:34

## 2022-04-23 RX ADMIN — BUPROPION HYDROCHLORIDE 150 MILLIGRAM(S): 150 TABLET, EXTENDED RELEASE ORAL at 12:38

## 2022-04-23 RX ADMIN — Medication 81 MILLIGRAM(S): at 12:37

## 2022-04-23 RX ADMIN — Medication 112 MICROGRAM(S): at 06:34

## 2022-04-23 RX ADMIN — TICAGRELOR 90 MILLIGRAM(S): 90 TABLET ORAL at 18:45

## 2022-04-23 RX ADMIN — PANTOPRAZOLE SODIUM 40 MILLIGRAM(S): 20 TABLET, DELAYED RELEASE ORAL at 06:33

## 2022-04-23 NOTE — PROGRESS NOTE ADULT - PROBLEM SELECTOR PLAN 1
with sepsis on admission   now clinically improving   states 40% better but still coughing  no audible wheeze  pulmonary help appreciated  MRSA PCR positive   ID help requested   CT positive for pneumonia   hemodynamically stable

## 2022-04-23 NOTE — CONSULT NOTE ADULT - ASSESSMENT
71F with PMHx HTN, HLD, CAD s/p stent (11/22/2021 ALO to LCx), depression, h/o BRCA  s/p lumpectomy presenting with cough and SOB for 3-4 days. Dx'ed with PNA CTA (-) for PE. CXR with RML/RLL opacities, CTA --> RUL opacities  Last ECHO 12/11/21 - Normal LV and RV function. Mild LAE Minimally dilated aortic root MAC Trace MR/TR Mild diastolic dysfunction  Last Carotid 12/21 Normal     - Agree with admission  - Cont ASA/Ticagrelor for h/o PCI  - Cont max dose high potency statin (Atorva 80 mg QD)  - Cont Metoprolol  - ABX for CAP = Ceftriaxone and Azithromycin  - no evidence for ischemia or HF on exam  Trop T 15, 11 no delta. . No further inpatient cardiac work up indicated at present time. 
71F with PMHx HTN, HLD, CAD s/p stent (11/2021 ALO to LCx), depression, hx breast cancer s/p lumpectomy presenting with cough and SOB for 3-4 days. Admitted with sepsis 2/2 PNA
71F with PMHx HTN, HLD, CAD s/p stent (11/2021 ALO to LCx), depression, hx breast cancer s/p lumpectomy presenting with cough and SOB for 3-4 days with CAP and positive MRSA PCR, leukocytosis     Corona Correia  Attending Physician   Division of Infectious Disease  Office #[phone]  Available on Microsoft Teams also  After 5pm/weekend or no response, call #[phone]

## 2022-04-23 NOTE — PROGRESS NOTE ADULT - NS ATTEND AMEND GEN_ALL_CORE FT
she is doing better: on room air:  no sob:  has MRSa positive: but does not seem like mrsa pneumonia: would watch without  vanco for now:

## 2022-04-23 NOTE — CONSULT NOTE ADULT - PROBLEM SELECTOR RECOMMENDATION 3
seems to be compensated at this time: no active clinical features of elizabeth chf to me!
-better  -due to pna  -cont abx  -monitor cbc

## 2022-04-23 NOTE — CONSULT NOTE ADULT - SUBJECTIVE AND OBJECTIVE BOX
DAIJA ESTEVEZ 71y Female  MRN-[id number]    Patient is a 71y old  Female who presents with a chief complaint of SOB, cough x3-4 days (2022 13:14)      HPI:  71F with PMHx HTN, HLD, CAD s/p stent (2021 ALO to LCx), depression, hx breast cancer s/p lumpectomy presenting with cough and SOB for 3-4 days. Pt notes progressively worsening PURI, better with rest limiting her to less than two blocks. She also has a dry cough, body aches, sore throat, fever and chills for the last few days. She went to [facility] [place] today and was sent to ER out of concern for hypoxia to 91% and tachycardia to 120s. Pt denies any chest pain and has been compliant with her cardiac meds. Also denies LOC, abdominal pain, vomiting, diarrhea, hematochezia, melena. She denies sick contacts and is covid vaccinated x2 but is unsure of when the last dose was.    In ED CTA neg for PE but with patchy airspace opacity in RUL. Given CTX and azithromycin (2022 01:49)      PAST MEDICAL & SURGICAL HISTORY:  Benzodiazepine Dependence    Affective Bipolar Disorder    Hypertension    Carcinoma in situ  Breast Cancer  bilateral lumpectomies s/p chemo  had treatment  and    current on Arimidex    Acid reflux    Hypothyroidism    Depression  last admission 1- 2 year ago has been admitted several times   ECT last 20 years ago   currently on medication &quot;antidepressants do not really work well&quot;    Insomnia    Calculus of gallbladder without cholecystitis    Chronic GERD    History of lumpectomy  left   &amp; right   left   treated with chemotherapy &amp; radiation therapy    S/P D&amp;C (status post dilation and curettage)    S/P colonoscopy  3 years ago negative    S/P endoscopy  3 years ago negative        Allergies    adhesives (Rash)  Gadavist (Rash; Urticaria; Hives)    Intolerances        ANTIMICROBIALS:  azithromycin   Tablet 500 every 24 hours  cefTRIAXone   IVPB 1000 every 24 hours      MEDICATIONS  (STANDING):  aspirin enteric coated 81 milliGRAM(s) Oral daily  atorvastatin 80 milliGRAM(s) Oral at bedtime  azithromycin   Tablet 500 milliGRAM(s) Oral every 24 hours  buPROPion XL (24-Hour) . 150 milliGRAM(s) Oral daily  cefTRIAXone   IVPB 1000 milliGRAM(s) IV Intermittent every 24 hours  cholecalciferol 1000 Unit(s) Oral daily  enoxaparin Injectable 40 milliGRAM(s) SubCutaneous every 24 hours  guaiFENesin ER 1200 milliGRAM(s) Oral every 12 hours  levothyroxine 112 MICROGram(s) Oral daily  metoprolol tartrate 25 milliGRAM(s) Oral daily  pantoprazole    Tablet 40 milliGRAM(s) Oral before breakfast  PARoxetine 30 milliGRAM(s) Oral at bedtime  ticagrelor 90 milliGRAM(s) Oral two times a day      Social History  Smoking:  Etoh:  Drug use:      FAMILY HISTORY:  Family history of Alzheimer&#x27;s disease (Mother)    Family history of heart failure (Father)        Vital Signs Last 24 Hrs  T(C): 36.8 (2022 12:18), Max: 37.1 (2022 18:33)  T(F): 98.2 (2022 12:18), Max: 98.7 (2022 18:33)  HR: 67 (2022 12:18) (67 - 84)  BP: 112/68 (2022 12:18) (112/68 - 135/83)  BP(mean): --  RR: 18 (2022 12:18) (18 - 18)  SpO2: 95% (2022 12:18) (95% - 100%)    CBC Full  -  ( 2022 05:52 )  WBC Count : 12.01 K/uL  RBC Count : 4.60 M/uL  Hemoglobin : 12.8 g/dL  Hematocrit : 38.9 %  Platelet Count - Automated : 255 K/uL  Mean Cell Volume : 84.6 fL  Mean Cell Hemoglobin : 27.8 pg  Mean Cell Hemoglobin Concentration : 32.9 gm/dL  Auto Neutrophil # : x  Auto Lymphocyte # : x  Auto Monocyte # : x  Auto Eosinophil # : x  Auto Basophil # : x  Auto Neutrophil % : x  Auto Lymphocyte % : x  Auto Monocyte % : x  Auto Eosinophil % : x  Auto Basophil % : x    04-23    138  |  104  |  12  ----------------------------<  108<H>  3.9   |  19<L>  |  0.80    Ca    9.3      2022 05:52  Phos  3.4     04-23  Mg     2.00         TPro  7.1  /  Alb  3.4  /  TBili  1.0  /  DBili  x   /  AST  8   /  ALT  17  /  AlkPhos  135<H>      LIVER FUNCTIONS - ( 2022 10:32 )  Alb: 3.4 g/dL / Pro: 7.1 g/dL / ALK PHOS: 135 U/L / ALT: 17 U/L / AST: 8 U/L / GGT: x           Urinalysis Basic - ( 2022 22:26 )    Color: Yellow / Appearance: Clear / S.033 / pH: x  Gluc: x / Ketone: Negative  / Bili: Negative / Urobili: <2 mg/dL   Blood: x / Protein: Trace / Nitrite: Negative   Leuk Esterase: Negative / RBC: 1 /HPF / WBC 2 /HPF   Sq Epi: x / Non Sq Epi: 3 /HPF / Bacteria: Negative    Procalcitonin, Serum: 1.57 ()      MICROBIOLOGY:      Rapid RVP Result: NotDetec ( @ 13:10)      RADIOLOGY  < from: CT Angio Chest PE Protocol w/ IV Cont (22 @ 21:58) >  No main, right main, left main, lobar or segmental pulmonary embolism.    Patchy airspace opacities with more consolidative opacity peripherally in   the right upper lobe. Limited evaluation secondary to patient motion.   Findings are suspicious for pneumonia. Correlate clinically and follow to   resolution.    < end of copied text >  < from: Xray Chest 1 View- PORTABLE-Urgent (22 @ 15:32) >  New new peripheral right mid and lower lung opacities, of indeterminate   nature. Question due to overlying soft tissues versus pulmonary or   pleural pathology. A PA and lateral chest may be of benefit for further   evaluation.    < end of copied text >   DAIJA ESTEVEZ 71y Female  MRN-[id number]    Patient is a 71y old  Female who presents with a chief complaint of SOB, cough x3-4 days (2022 13:14)      HPI:  71F with PMHx HTN, HLD, CAD s/p stent (2021 ALO to LCx), depression, hx breast cancer s/p lumpectomy presenting with cough and SOB for 3-4 days. Pt notes progressively worsening PURI, better with rest limiting her to less than two blocks. She also has a dry cough, body aches, sore throat, fever and chills for the last few days. She went to [facility] [place] and was sent to ER out of concern for hypoxia to 91% and tachycardia to 120s. Pt denies any chest pain and has been compliant with her cardiac meds. Also denies LOC, abdominal pain, vomiting, diarrhea, hematochezia, melena. She denies sick contacts and is covid vaccinated x2 but is unsure of when the last dose was.    In ED CTA neg for PE but with patchy airspace opacity in RUL. Given CTX and azithromycin (2022 01:49)    No travel, sick contacts recent abx use. Doing better overall.     PAST MEDICAL & SURGICAL HISTORY:  Benzodiazepine Dependence    Affective Bipolar Disorder    Hypertension    Carcinoma in situ  Breast Cancer  bilateral lumpectomies s/p chemo  had treatment  and    current on Arimidex    Acid reflux    Hypothyroidism    Depression  last admission 1- 2 year ago has been admitted several times   ECT last 20 years ago   currently on medication &quot;antidepressants do not really work well&quot;    Insomnia    Calculus of gallbladder without cholecystitis    Chronic GERD    History of lumpectomy  left   &amp; right   left   treated with chemotherapy &amp; radiation therapy    S/P D&amp;C (status post dilation and curettage)    S/P colonoscopy  3 years ago negative    S/P endoscopy  3 years ago negative        Allergies    adhesives (Rash)  Gadavist (Rash; Urticaria; Hives)    Intolerances        ANTIMICROBIALS:  azithromycin   Tablet 500 every 24 hours  cefTRIAXone   IVPB 1000 every 24 hours      MEDICATIONS  (STANDING):  aspirin enteric coated 81 milliGRAM(s) Oral daily  atorvastatin 80 milliGRAM(s) Oral at bedtime  azithromycin   Tablet 500 milliGRAM(s) Oral every 24 hours  buPROPion XL (24-Hour) . 150 milliGRAM(s) Oral daily  cefTRIAXone   IVPB 1000 milliGRAM(s) IV Intermittent every 24 hours  cholecalciferol 1000 Unit(s) Oral daily  enoxaparin Injectable 40 milliGRAM(s) SubCutaneous every 24 hours  guaiFENesin ER 1200 milliGRAM(s) Oral every 12 hours  levothyroxine 112 MICROGram(s) Oral daily  metoprolol tartrate 25 milliGRAM(s) Oral daily  pantoprazole    Tablet 40 milliGRAM(s) Oral before breakfast  PARoxetine 30 milliGRAM(s) Oral at bedtime  ticagrelor 90 milliGRAM(s) Oral two times a day      Social History  Smoking: no  Etoh: no  Drug use: no      FAMILY HISTORY:  Family history of Alzheimer&#x27;s disease (Mother)    Family history of heart failure (Father)        Vital Signs Last 24 Hrs  T(C): 36.8 (2022 12:18), Max: 37.1 (2022 18:33)  T(F): 98.2 (2022 12:18), Max: 98.7 (2022 18:33)  HR: 67 (2022 12:18) (67 - 84)  BP: 112/68 (2022 12:18) (112/68 - 135/83)  BP(mean): --  RR: 18 (2022 12:18) (18 - 18)  SpO2: 95% (2022 12:18) (95% - 100%)    CBC Full  -  ( 2022 05:52 )  WBC Count : 12.01 K/uL  RBC Count : 4.60 M/uL  Hemoglobin : 12.8 g/dL  Hematocrit : 38.9 %  Platelet Count - Automated : 255 K/uL  Mean Cell Volume : 84.6 fL  Mean Cell Hemoglobin : 27.8 pg  Mean Cell Hemoglobin Concentration : 32.9 gm/dL  Auto Neutrophil # : x  Auto Lymphocyte # : x  Auto Monocyte # : x  Auto Eosinophil # : x  Auto Basophil # : x  Auto Neutrophil % : x  Auto Lymphocyte % : x  Auto Monocyte % : x  Auto Eosinophil % : x  Auto Basophil % : x        138  |  104  |  12  ----------------------------<  108<H>  3.9   |  19<L>  |  0.80    Ca    9.3      2022 05:52  Phos  3.4       Mg     2.00         TPro  7.1  /  Alb  3.4  /  TBili  1.0  /  DBili  x   /  AST  8   /  ALT  17  /  AlkPhos  135<H>      LIVER FUNCTIONS - ( 2022 10:32 )  Alb: 3.4 g/dL / Pro: 7.1 g/dL / ALK PHOS: 135 U/L / ALT: 17 U/L / AST: 8 U/L / GGT: x           Urinalysis Basic - ( 2022 22:26 )    Color: Yellow / Appearance: Clear / S.033 / pH: x  Gluc: x / Ketone: Negative  / Bili: Negative / Urobili: <2 mg/dL   Blood: x / Protein: Trace / Nitrite: Negative   Leuk Esterase: Negative / RBC: 1 /HPF / WBC 2 /HPF   Sq Epi: x / Non Sq Epi: 3 /HPF / Bacteria: Negative    Procalcitonin, Serum: 1.57 ()      MICROBIOLOGY:      Rapid RVP Result: NotDetec ( @ 13:10)      RADIOLOGY  < from: CT Angio Chest PE Protocol w/ IV Cont (22 @ 21:58) >  No main, right main, left main, lobar or segmental pulmonary embolism.    Patchy airspace opacities with more consolidative opacity peripherally in   the right upper lobe. Limited evaluation secondary to patient motion.   Findings are suspicious for pneumonia. Correlate clinically and follow to   resolution.    < end of copied text >  < from: Xray Chest 1 View- PORTABLE-Urgent (22 @ 15:32) >  New new peripheral right mid and lower lung opacities, of indeterminate   nature. Question due to overlying soft tissues versus pulmonary or   pleural pathology. A PA and lateral chest may be of benefit for further   evaluation.    < end of copied text >

## 2022-04-23 NOTE — CONSULT NOTE ADULT - NSCONSULTADDITIONALINFOA_GEN_ALL_CORE
Please call the ID service [phone] with questions or concerns over the weekend.    D/w Dr. Hercules
dw acp

## 2022-04-23 NOTE — CONSULT NOTE ADULT - PROBLEM SELECTOR RECOMMENDATION 9
ct chest showed; No main, right main, left main, lobar or segmental pulmonary embolism. Patchy airspace opacities with more consolidative opacity peripherally in  the right upper lobe. Limited evaluation secondary to patient motion.   Findings are suspicious for pneumonia. Correlate clinically and follow to  resolution. :  she is being treated for pneumonia rul: check urinary legionella: and procal is pretty high: to cont antibtiocs: she is satting at 100%:
-better  -f/u urine strep and legionella test  -f/u blood cx  -cont CTX 1 gm iv q24 + zithromax 500 mg iv q24  -monitor temps/pulse ox/WBC  -COVID negative

## 2022-04-23 NOTE — PROGRESS NOTE ADULT - SUBJECTIVE AND OBJECTIVE BOX
Patient is a 71y old  Female who presents with a chief complaint of SOB, cough x3-4 days (2022 10:17)      DATE OF SERVICE: 22 @ 13:14    SUBJECTIVE / OVERNIGHT EVENTS: overnight events noted  "I feel better"     ROS:  Resp: No cough no sputum production  CVS: No chest pain no palpitations no orthopnea  GI: no N/V/D  : no dysuria, no hematuria  Neuro: no weakness no paresthesias  Heme: No petechiae no easy bruising  Msk: No joint pain no swelling  Skin: No rash no itching        MEDICATIONS  (STANDING):  aspirin enteric coated 81 milliGRAM(s) Oral daily  atorvastatin 80 milliGRAM(s) Oral at bedtime  azithromycin   Tablet 500 milliGRAM(s) Oral every 24 hours  buPROPion XL (24-Hour) . 150 milliGRAM(s) Oral daily  cefTRIAXone   IVPB 1000 milliGRAM(s) IV Intermittent every 24 hours  cholecalciferol 1000 Unit(s) Oral daily  enoxaparin Injectable 40 milliGRAM(s) SubCutaneous every 24 hours  guaiFENesin ER 1200 milliGRAM(s) Oral every 12 hours  levothyroxine 112 MICROGram(s) Oral daily  metoprolol tartrate 25 milliGRAM(s) Oral daily  pantoprazole    Tablet 40 milliGRAM(s) Oral before breakfast  PARoxetine 30 milliGRAM(s) Oral at bedtime  ticagrelor 90 milliGRAM(s) Oral two times a day    MEDICATIONS  (PRN):  acetaminophen     Tablet .. 650 milliGRAM(s) Oral every 6 hours PRN Temp greater or equal to 38C (100.4F), Mild Pain (1 - 3)  benzonatate 100 milliGRAM(s) Oral every 8 hours PRN Cough  clonazePAM  Tablet 2 milliGRAM(s) Oral at bedtime PRN insomnia  melatonin 3 milliGRAM(s) Oral at bedtime PRN Insomnia        CAPILLARY BLOOD GLUCOSE        I&O's Summary      Vital Signs Last 24 Hrs  T(C): 36.8 (2022 12:18), Max: 37.1 (2022 18:33)  T(F): 98.2 (2022 12:18), Max: 98.7 (2022 18:33)  HR: 67 (2022 12:18) (67 - 84)  BP: 112/68 (2022 12:18) (112/68 - 135/83)  BP(mean): --  RR: 18 (2022 12:18) (18 - 18)  SpO2: 95% (2022 12:18) (95% - 100%)    PHYSICAL EXAM:  GENERAL: in no apparent distress  HEAD:  Atraumatic, Normocephalic  EYES: EOMI, PERRLA, sclera clear  NECK: Supple, No JVD  CHEST/LUNG: clear b/l, no wheeze  HEART: S1 S2; no murmurs appreciated  ABDOMEN: Soft, Nontender, Bowel sounds present  EXTREMITIES:  No clubbing or cyanosis,  no edema  NEUROLOGY: AO x 3 non-focal  SKIN: No rashes or lesions    LABS:                        12.8   12.01 )-----------( 255      ( 2022 05:52 )             38.9     04-    138  |  104  |  12  ----------------------------<  108<H>  3.9   |  19<L>  |  0.80    Ca    9.3      2022 05:52  Phos  3.4     04-  Mg     2.00     -    TPro  7.1  /  Alb  3.4  /  TBili  1.0  /  DBili  x   /  AST  8   /  ALT  17  /  AlkPhos  135<H>  04-22          Urinalysis Basic - ( 2022 22:26 )    Color: Yellow / Appearance: Clear / S.033 / pH: x  Gluc: x / Ketone: Negative  / Bili: Negative / Urobili: <2 mg/dL   Blood: x / Protein: Trace / Nitrite: Negative   Leuk Esterase: Negative / RBC: 1 /HPF / WBC 2 /HPF   Sq Epi: x / Non Sq Epi: 3 /HPF / Bacteria: Negative          All consultant(s) notes reviewed and care discussed with other providers        Contact Number, Dr Hercules [phone]

## 2022-04-23 NOTE — PROGRESS NOTE ADULT - SUBJECTIVE AND OBJECTIVE BOX
Date of Service: 22 @ 10:17    Patient is a 71y old  Female who presents with a chief complaint of SOB, cough x3-4 days (2022 14:15)      Any change in ROS:             MEDICATIONS  (STANDING):  aspirin enteric coated 81 milliGRAM(s) Oral daily  atorvastatin 80 milliGRAM(s) Oral at bedtime  azithromycin   Tablet 500 milliGRAM(s) Oral every 24 hours  buPROPion XL (24-Hour) . 150 milliGRAM(s) Oral daily  cefTRIAXone   IVPB 1000 milliGRAM(s) IV Intermittent every 24 hours  cholecalciferol 1000 Unit(s) Oral daily  enoxaparin Injectable 40 milliGRAM(s) SubCutaneous every 24 hours  levothyroxine 112 MICROGram(s) Oral daily  metoprolol tartrate 25 milliGRAM(s) Oral daily  pantoprazole    Tablet 40 milliGRAM(s) Oral before breakfast  PARoxetine 30 milliGRAM(s) Oral at bedtime  ticagrelor 90 milliGRAM(s) Oral two times a day    MEDICATIONS  (PRN):  acetaminophen     Tablet .. 650 milliGRAM(s) Oral every 6 hours PRN Temp greater or equal to 38C (100.4F), Mild Pain (1 - 3)  benzonatate 100 milliGRAM(s) Oral every 8 hours PRN Cough  clonazePAM  Tablet 2 milliGRAM(s) Oral at bedtime PRN insomnia  guaiFENesin Oral Liquid (Sugar-Free) 200 milliGRAM(s) Oral every 6 hours PRN Cough  melatonin 3 milliGRAM(s) Oral at bedtime PRN Insomnia    Vital Signs Last 24 Hrs  T(C): 36.7 (2022 06:00), Max: 37.1 (2022 18:33)  T(F): 98.1 (2022 06:00), Max: 98.7 (2022 18:33)  HR: 82 (2022 06:00) (74 - 84)  BP: 135/83 (2022 06:00) (121/87 - 135/83)  BP(mean): --  RR: 18 (2022 06:00) (18 - 18)  SpO2: 100% (2022 06:00) (100% - 100%)    Physical Exam:   GENERAL: NAD, well-groomed, well-developed  HEENT: MYRON/   Atraumatic, Normocephalic  ENMT: No tonsillar erythema, exudates, or enlargement; Moist mucous membranes, Good dentition, No lesions  NECK: Supple, No JVD, Normal thyroid  CHEST/LUNG: Clear to auscultaion, ; No rales, rhonchi, wheezing, or rubs  CVS: Regular rate and rhythm; No murmurs, rubs, or gallops  GI: : Soft, Nontender, Nondistended; Bowel sounds present  NERVOUS SYSTEM:  Alert & Oriented X3, Good concentration; Motor Strength 5/5 B/L upper and lower extremities; DTRs 2+ intact and symmetric  EXTREMITIES:  2+ Peripheral Pulses, No clubbing, cyanosis, or edema  LYMPH: No lymphadenopathy noted  SKIN: No rashes or lesions  ENDOCRINOLOGY: No Thyromegaly  PSYCH: Appropriate    Labs:             12.8   12.01 )-----------( 255      ( 2022 05:52 )             38.9                         12.7   15.70 )-----------( 235      ( 2022 10:32 )             39.1                         13.3   17.86 )-----------( 252      ( 2022 20:04 )             40.5     04-23    138  |  104  |  12  ----------------------------<  108<H>  3.9   |  19<L>  |  0.80  22    139  |  105  |  13  ----------------------------<  135<H>  3.9   |  22  |  0.78  04-21    138  |  106  |  18  ----------------------------<  113<H>  4.8   |  19<L>  |  0.76    Ca    9.3      2022 05:52  Ca    9.1      2022 10:32  Ca    9.1      2022 20:04  Phos  3.4     04-23  Phos  2.7     04-22  Mg     2.00     04-23  Mg     1.80     -22    TPro  7.1  /  Alb  3.4  /  TBili  1.0  /  DBili  x   /  AST  8   /  ALT  17  /  AlkPhos  135<H>    TPro  6.9  /  Alb  3.5  /  TBili  0.6  /  DBili  x   /  AST  18  /  ALT  17  /  AlkPhos  132<H>      LIVER FUNCTIONS - ( 2022 10:32 )  Alb: 3.4 g/dL / Pro: 7.1 g/dL / ALK PHOS: 135 U/L / ALT: 17 U/L / AST: 8 U/L / GGT: x           Urinalysis Basic - ( 2022 22:26 )    Color: Yellow / Appearance: Clear / S.033 / pH: x  Gluc: x / Ketone: Negative  / Bili: Negative / Urobili: <2 mg/dL   Blood: x / Protein: Trace / Nitrite: Negative   Leuk Esterase: Negative / RBC: 1 /HPF / WBC 2 /HPF   Sq Epi: x / Non Sq Epi: 3 /HPF / Bacteria: Negative    Procalcitonin, Serum: 1.57 ng/mL ( @ 22:44)  Serum Pro-Brain Natriuretic Peptide: 472 pg/mL ( @ 20:04)    Studies    CT SCAN Chest < from: CT Angio Chest PE Protocol w/ IV Cont (22 @ 21:58) >    FINDINGS:    PULMONARY ANGIOGRAM: No main, right main, left main, lobar or segmental   pulmonary embolism. Limited evaluation of subsegmental pulmonary arteries   secondary to motion and mixing artifact.    LYMPH NODES:No axillary or mediastinal lymphadenopathy.    HEART/VASCULATURE: The heart is normal size. There are coronary artery   calcifications/stents. No pericardial effusion.    AIRWAYS/LUNGS/PLEURA: Patent central airways. Patchy airspace opacities   with more consolidative opacity peripherally in the right upper lobe.   Limited evaluation secondary to patient motion. Findings are suspicious   for pneumonia. Correlate clinically and follow to resolution. Right upper   lobe calcified granuloma.    UPPERABDOMEN: Small hiatal hernia. Partially visualized left renal cyst.    BONES/SOFT TISSUES: Multilevel degenerative changes of the spine.   Scoliosis.    IMPRESSION:  No main, right main, left main, lobar or segmental pulmonary embolism.    Patchy airspace opacities with more consolidative opacity peripherally in   the right upper lobe. Limited evaluation secondary to patient motion.   Findings are suspicious for pneumonia. Correlate clinically and follow to   resolution.    --- End of Report ---      < end of copied text >      < from: Transthoracic Echocardiogram (21 @ 10:52) >  DIMENSIONS:  Dimensions:     Normal Values:  LA:     3.7 cm    2.0 - 4.0 cm  Ao:     3.1 cm    2.0 - 3.8 cm  SEPTUM: 0.6 cm    0.6 - 1.2 cm  PWT:    1.0 cm    0.6 - 1.1 cm  LVIDd:  4.5 cm    3.0 - 5.6 cm  LVIDs:    ---     1.8 - 4.0 cm  Derived Variables:  LVMI: 56 g/m2  RWT: 0.44  Ejection Fraction (Modified Carpio Rule): 41 %  ------------------------------------------------------------------------  OBSERVATIONS:  Mitral Valve: Mitral annular calcification, otherwise  normal mitral valve.  Aortic Root: Normal aortic root.  Aortic Valve: Calcified trileaflet aortic valve with normal  opening.  Left Atrium: Normal left atrium.  LA volume index = 17  cc/m2.  Left Ventricle: Moderate global left ventricular systolic  dysfunction. Normal left ventricular internal dimensions  and wall thicknesses. (DT:229 ms).  Right Heart: Normal right atrium. Normal right ventricular  size and function. Normal tricuspid valve. Mild-moderate  tricuspid regurgitation. Normal pulmonic valve.  Pericardium/PleuraNormal pericardium with no pericardial  effusion.  Hemodynamic: Estimated right ventricular systolic pressure  equals 31 mm Hg, assuming right atrial pressure equals 10  mm Hg, consistent with normal pulmonary pressures.  ------------------------------------------------------------------------  CONCLUSIONS:  1. Mitral annular calcification, otherwise normal mitral  valve.  2. Calcified trileaflet aortic valve with normal opening.  3. Moderate global left ventricular systolic dysfunction.  4. Normal right ventricular size and function.  5. Estimated pulmonary artery systolic pressure equals 31  mm Hg, assuming right atrial pressure equals 10  mm Hg,  consistent with normal pulmonary pressures.    < end of copied text >           Date of Service: 22 @ 10:17    Patient is a 71y old  Female who presents with a chief complaint of SOB, cough x3-4 days (2022 14:15)      Any change in ROS:     O2 sats 96% on RA  Denies CP, SOB  +cough with difficulty expectorating sputum    MEDICATIONS  (STANDING):  aspirin enteric coated 81 milliGRAM(s) Oral daily  atorvastatin 80 milliGRAM(s) Oral at bedtime  azithromycin   Tablet 500 milliGRAM(s) Oral every 24 hours  buPROPion XL (24-Hour) . 150 milliGRAM(s) Oral daily  cefTRIAXone   IVPB 1000 milliGRAM(s) IV Intermittent every 24 hours  cholecalciferol 1000 Unit(s) Oral daily  enoxaparin Injectable 40 milliGRAM(s) SubCutaneous every 24 hours  levothyroxine 112 MICROGram(s) Oral daily  metoprolol tartrate 25 milliGRAM(s) Oral daily  pantoprazole    Tablet 40 milliGRAM(s) Oral before breakfast  PARoxetine 30 milliGRAM(s) Oral at bedtime  ticagrelor 90 milliGRAM(s) Oral two times a day    MEDICATIONS  (PRN):  acetaminophen     Tablet .. 650 milliGRAM(s) Oral every 6 hours PRN Temp greater or equal to 38C (100.4F), Mild Pain (1 - 3)  benzonatate 100 milliGRAM(s) Oral every 8 hours PRN Cough  clonazePAM  Tablet 2 milliGRAM(s) Oral at bedtime PRN insomnia  guaiFENesin Oral Liquid (Sugar-Free) 200 milliGRAM(s) Oral every 6 hours PRN Cough  melatonin 3 milliGRAM(s) Oral at bedtime PRN Insomnia    Vital Signs Last 24 Hrs  T(C): 36.7 (2022 06:00), Max: 37.1 (2022 18:33)  T(F): 98.1 (2022 06:00), Max: 98.7 (2022 18:33)  HR: 82 (2022 06:00) (74 - 84)  BP: 135/83 (2022 06:00) (121/87 - 135/83)  BP(mean): --  RR: 18 (2022 06:00) (18 - 18)  SpO2: 100% (2022 06:00) (100% - 100%)    Physical Exam:   GENERAL: NAD, well-groomed, well-developed  HEENT: MYRON/   Atraumatic, Normocephalic  ENMT: No tonsillar erythema, exudates, or enlargement; Moist mucous membranes, Good dentition, No lesions  NECK: Supple, No JVD, Normal thyroid  CHEST/LUNG: Clear to auscultaion, ; No rales, rhonchi, wheezing, or rubs  CVS: Regular rate and rhythm; No murmurs, rubs, or gallops  GI: : Soft, Nontender, Nondistended; Bowel sounds present  NERVOUS SYSTEM:  Alert & Oriented X3, Good concentration; Motor Strength 5/5 B/L upper and lower extremities; DTRs 2+ intact and symmetric  EXTREMITIES:  2+ Peripheral Pulses, No clubbing, cyanosis, or edema  LYMPH: No lymphadenopathy noted  SKIN: No rashes or lesions  ENDOCRINOLOGY: No Thyromegaly  PSYCH: Appropriate    Labs:             12.8   12.01 )-----------( 255      ( 2022 05:52 )             38.9                         12.7   15.70 )-----------( 235      ( 2022 10:32 )             39.1                         13.3   17.86 )-----------( 252      ( 2022 20:04 )             40.5     04-23    138  |  104  |  12  ----------------------------<  108<H>  3.9   |  19<L>  |  0.80      139  |  105  |  13  ----------------------------<  135<H>  3.9   |  22  |  0.78      138  |  106  |  18  ----------------------------<  113<H>  4.8   |  19<L>  |  0.76    Ca    9.3      2022 05:52  Ca    9.1      2022 10:32  Ca    9.1      2022 20:04  Phos  3.4     23  Phos  2.7       Mg     2.00     -  Mg     1.80     -    TPro  7.1  /  Alb  3.4  /  TBili  1.0  /  DBili  x   /  AST  8   /  ALT  17  /  AlkPhos  135<H>    TPro  6.9  /  Alb  3.5  /  TBili  0.6  /  DBili  x   /  AST  18  /  ALT  17  /  AlkPhos  132<H>      LIVER FUNCTIONS - ( 2022 10:32 )  Alb: 3.4 g/dL / Pro: 7.1 g/dL / ALK PHOS: 135 U/L / ALT: 17 U/L / AST: 8 U/L / GGT: x           Urinalysis Basic - ( 2022 22:26 )    Color: Yellow / Appearance: Clear / S.033 / pH: x  Gluc: x / Ketone: Negative  / Bili: Negative / Urobili: <2 mg/dL   Blood: x / Protein: Trace / Nitrite: Negative   Leuk Esterase: Negative / RBC: 1 /HPF / WBC 2 /HPF   Sq Epi: x / Non Sq Epi: 3 /HPF / Bacteria: Negative    Procalcitonin, Serum: 1.57 ng/mL ( @ 22:44)  Serum Pro-Brain Natriuretic Peptide: 472 pg/mL ( @ 20:04)    Studies    CT SCAN Chest < from: CT Angio Chest PE Protocol w/ IV Cont (22 @ 21:58) >    FINDINGS:    PULMONARY ANGIOGRAM: No main, right main, left main, lobar or segmental   pulmonary embolism. Limited evaluation of subsegmental pulmonary arteries   secondary to motion and mixing artifact.    LYMPH NODES:No axillary or mediastinal lymphadenopathy.    HEART/VASCULATURE: The heart is normal size. There are coronary artery   calcifications/stents. No pericardial effusion.    AIRWAYS/LUNGS/PLEURA: Patent central airways. Patchy airspace opacities   with more consolidative opacity peripherally in the right upper lobe.   Limited evaluation secondary to patient motion. Findings are suspicious   for pneumonia. Correlate clinically and follow to resolution. Right upper   lobe calcified granuloma.    UPPERABDOMEN: Small hiatal hernia. Partially visualized left renal cyst.    BONES/SOFT TISSUES: Multilevel degenerative changes of the spine.   Scoliosis.    IMPRESSION:  No main, right main, left main, lobar or segmental pulmonary embolism.    Patchy airspace opacities with more consolidative opacity peripherally in   the right upper lobe. Limited evaluation secondary to patient motion.   Findings are suspicious for pneumonia. Correlate clinically and follow to   resolution.    --- End of Report ---      < end of copied text >      < from: Transthoracic Echocardiogram (21 @ 10:52) >  DIMENSIONS:  Dimensions:     Normal Values:  LA:     3.7 cm    2.0 - 4.0 cm  Ao:     3.1 cm    2.0 - 3.8 cm  SEPTUM: 0.6 cm    0.6 - 1.2 cm  PWT:    1.0 cm    0.6 - 1.1 cm  LVIDd:  4.5 cm    3.0 - 5.6 cm  LVIDs:    ---     1.8 - 4.0 cm  Derived Variables:  LVMI: 56 g/m2  RWT: 0.44  Ejection Fraction (Modified Carpio Rule): 41 %  ------------------------------------------------------------------------  OBSERVATIONS:  Mitral Valve: Mitral annular calcification, otherwise  normal mitral valve.  Aortic Root: Normal aortic root.  Aortic Valve: Calcified trileaflet aortic valve with normal  opening.  Left Atrium: Normal left atrium.  LA volume index = 17  cc/m2.  Left Ventricle: Moderate global left ventricular systolic  dysfunction. Normal left ventricular internal dimensions  and wall thicknesses. (DT:229 ms).  Right Heart: Normal right atrium. Normal right ventricular  size and function. Normal tricuspid valve. Mild-moderate  tricuspid regurgitation. Normal pulmonic valve.  Pericardium/PleuraNormal pericardium with no pericardial  effusion.  Hemodynamic: Estimated right ventricular systolic pressure  equals 31 mm Hg, assuming right atrial pressure equals 10  mm Hg, consistent with normal pulmonary pressures.  ------------------------------------------------------------------------  CONCLUSIONS:  1. Mitral annular calcification, otherwise normal mitral  valve.  2. Calcified trileaflet aortic valve with normal opening.  3. Moderate global left ventricular systolic dysfunction.  4. Normal right ventricular size and function.  5. Estimated pulmonary artery systolic pressure equals 31  mm Hg, assuming right atrial pressure equals 10  mm Hg,  consistent with normal pulmonary pressures.    < end of copied text >

## 2022-04-23 NOTE — CONSULT NOTE ADULT - PROBLEM SELECTOR RECOMMENDATION 4
Controlled
-MRSA on PCR noted  -pt clinically better  -low concern for MRSA pna   -no need to add vanco given above

## 2022-04-24 LAB
A1C WITH ESTIMATED AVERAGE GLUCOSE RESULT: 6.3 % — HIGH (ref 4–5.6)
ESTIMATED AVERAGE GLUCOSE: 134 — SIGNIFICANT CHANGE UP
HCT VFR BLD CALC: 38.8 % — SIGNIFICANT CHANGE UP (ref 34.5–45)
HGB BLD-MCNC: 12.8 G/DL — SIGNIFICANT CHANGE UP (ref 11.5–15.5)
MCHC RBC-ENTMCNC: 27.8 PG — SIGNIFICANT CHANGE UP (ref 27–34)
MCHC RBC-ENTMCNC: 33 GM/DL — SIGNIFICANT CHANGE UP (ref 32–36)
MCV RBC AUTO: 84.2 FL — SIGNIFICANT CHANGE UP (ref 80–100)
NRBC # BLD: 0 /100 WBCS — SIGNIFICANT CHANGE UP
NRBC # FLD: 0 K/UL — SIGNIFICANT CHANGE UP
PLATELET # BLD AUTO: 239 K/UL — SIGNIFICANT CHANGE UP (ref 150–400)
RBC # BLD: 4.61 M/UL — SIGNIFICANT CHANGE UP (ref 3.8–5.2)
RBC # FLD: 14.3 % — SIGNIFICANT CHANGE UP (ref 10.3–14.5)
TSH SERPL-MCNC: 0.5 UIU/ML — SIGNIFICANT CHANGE UP (ref 0.27–4.2)
WBC # BLD: 7.96 K/UL — SIGNIFICANT CHANGE UP (ref 3.8–10.5)
WBC # FLD AUTO: 7.96 K/UL — SIGNIFICANT CHANGE UP (ref 3.8–10.5)

## 2022-04-24 PROCEDURE — 99232 SBSQ HOSP IP/OBS MODERATE 35: CPT

## 2022-04-24 RX ORDER — ZALEPLON 10 MG
5 CAPSULE ORAL ONCE
Refills: 0 | Status: DISCONTINUED | OUTPATIENT
Start: 2022-04-24 | End: 2022-04-24

## 2022-04-24 RX ADMIN — CEFTRIAXONE 100 MILLIGRAM(S): 500 INJECTION, POWDER, FOR SOLUTION INTRAMUSCULAR; INTRAVENOUS at 21:51

## 2022-04-24 RX ADMIN — MUPIROCIN 1 APPLICATION(S): 20 OINTMENT TOPICAL at 18:37

## 2022-04-24 RX ADMIN — Medication 5 MILLIGRAM(S): at 00:32

## 2022-04-24 RX ADMIN — Medication 25 MILLIGRAM(S): at 06:45

## 2022-04-24 RX ADMIN — TICAGRELOR 90 MILLIGRAM(S): 90 TABLET ORAL at 06:46

## 2022-04-24 RX ADMIN — Medication 81 MILLIGRAM(S): at 18:34

## 2022-04-24 RX ADMIN — TICAGRELOR 90 MILLIGRAM(S): 90 TABLET ORAL at 18:34

## 2022-04-24 RX ADMIN — MUPIROCIN 1 APPLICATION(S): 20 OINTMENT TOPICAL at 06:46

## 2022-04-24 RX ADMIN — Medication 3 MILLIGRAM(S): at 00:33

## 2022-04-24 RX ADMIN — ATORVASTATIN CALCIUM 80 MILLIGRAM(S): 80 TABLET, FILM COATED ORAL at 21:52

## 2022-04-24 RX ADMIN — ENOXAPARIN SODIUM 40 MILLIGRAM(S): 100 INJECTION SUBCUTANEOUS at 06:44

## 2022-04-24 RX ADMIN — Medication 1200 MILLIGRAM(S): at 18:34

## 2022-04-24 RX ADMIN — PANTOPRAZOLE SODIUM 40 MILLIGRAM(S): 20 TABLET, DELAYED RELEASE ORAL at 06:45

## 2022-04-24 RX ADMIN — Medication 112 MICROGRAM(S): at 06:45

## 2022-04-24 RX ADMIN — Medication 1000 UNIT(S): at 18:35

## 2022-04-24 RX ADMIN — BUPROPION HYDROCHLORIDE 150 MILLIGRAM(S): 150 TABLET, EXTENDED RELEASE ORAL at 18:36

## 2022-04-24 RX ADMIN — Medication 1200 MILLIGRAM(S): at 06:45

## 2022-04-24 NOTE — PROGRESS NOTE ADULT - SUBJECTIVE AND OBJECTIVE BOX
DAIJA ESTEVEZ 71y [id number]    Patient is a 71y old  Female who presents with a chief complaint of SOB, cough x3-4 days (23 Apr 2022 13:53)      Follow Up/CC:  ID following for PNA    Interval History/ROS: no fever, occ cough     Allergies    adhesives (Rash)  Gadavist (Rash; Urticaria; Hives)    Intolerances        ANTIMICROBIALS:  cefTRIAXone   IVPB 1000 every 24 hours      MEDICATIONS  (STANDING):  aspirin enteric coated 81 milliGRAM(s) Oral daily  atorvastatin 80 milliGRAM(s) Oral at bedtime  buPROPion XL (24-Hour) . 150 milliGRAM(s) Oral daily  cefTRIAXone   IVPB 1000 milliGRAM(s) IV Intermittent every 24 hours  cholecalciferol 1000 Unit(s) Oral daily  enoxaparin Injectable 40 milliGRAM(s) SubCutaneous every 24 hours  guaiFENesin ER 1200 milliGRAM(s) Oral every 12 hours  levothyroxine 112 MICROGram(s) Oral daily  metoprolol tartrate 25 milliGRAM(s) Oral daily  mupirocin 2% Ointment 1 Application(s) Both Nostrils two times a day  pantoprazole    Tablet 40 milliGRAM(s) Oral before breakfast  PARoxetine 30 milliGRAM(s) Oral at bedtime  ticagrelor 90 milliGRAM(s) Oral two times a day    MEDICATIONS  (PRN):  acetaminophen     Tablet .. 650 milliGRAM(s) Oral every 6 hours PRN Temp greater or equal to 38C (100.4F), Mild Pain (1 - 3)  benzonatate 100 milliGRAM(s) Oral every 8 hours PRN Cough  clonazePAM  Tablet 2 milliGRAM(s) Oral at bedtime PRN insomnia  melatonin 3 milliGRAM(s) Oral at bedtime PRN Insomnia        Vital Signs Last 24 Hrs  T(C): 36.6 (24 Apr 2022 06:00), Max: 36.8 (23 Apr 2022 12:18)  T(F): 97.9 (24 Apr 2022 06:00), Max: 98.2 (23 Apr 2022 12:18)  HR: 78 (24 Apr 2022 06:00) (67 - 78)  BP: 135/64 (24 Apr 2022 06:00) (112/68 - 135/64)  BP(mean): --  RR: 18 (24 Apr 2022 06:00) (18 - 18)  SpO2: 95% (24 Apr 2022 06:00) (95% - 96%)    CBC Full  -  ( 24 Apr 2022 06:57 )  WBC Count : 7.96 K/uL  RBC Count : 4.61 M/uL  Hemoglobin : 12.8 g/dL  Hematocrit : 38.8 %  Platelet Count - Automated : 239 K/uL  Mean Cell Volume : 84.2 fL  Mean Cell Hemoglobin : 27.8 pg  Mean Cell Hemoglobin Concentration : 33.0 gm/dL  Auto Neutrophil # : x  Auto Lymphocyte # : x  Auto Monocyte # : x  Auto Eosinophil # : x  Auto Basophil # : x  Auto Neutrophil % : x  Auto Lymphocyte % : x  Auto Monocyte % : x  Auto Eosinophil % : x  Auto Basophil % : x    04-23    138  |  104  |  12  ----------------------------<  108<H>  3.9   |  19<L>  |  0.80    Ca    9.3      23 Apr 2022 05:52  Phos  3.4     04-23  Mg     2.00     04-23            MICROBIOLOGY:        Rapid RVP Result: Leeanna (04-22 @ 13:10)        RADIOLOGY

## 2022-04-24 NOTE — PROVIDER CONTACT NOTE (OTHER) - ACTION/TREATMENT ORDERED:
as per EVE Pierre, Leslee continue to monitor the pt. RN will continue to monitor .now provider put in  order.  already in place.
RN Carl placed pt on 2L NC and pt is now satting 95%. ACP Leslee Pierre recommends to continue to monitor. ACP ordered d-dimer for AM labs and states she will mention to day team possible TOYIN.

## 2022-04-24 NOTE — PROVIDER CONTACT NOTE (OTHER) - REASON
704V Gale Amador desatting to 88% while sleeping and sustaining for 2 minutes, pt asymptomatic
706A Perry Beasley sustaining 88% oxygen on room air, pt asymptomatic and sleeping

## 2022-04-24 NOTE — PROGRESS NOTE ADULT - SUBJECTIVE AND OBJECTIVE BOX
Date of Service: 04-24-22 @ 16:01    Patient is a 71y old  Female who presents with a chief complaint of SOB, cough x3-4 days (24 Apr 2022 11:18)      Any change in ROS: She is feeling much better:  no sob:  no wheeing: no cough:       MEDICATIONS  (STANDING):  aspirin enteric coated 81 milliGRAM(s) Oral daily  atorvastatin 80 milliGRAM(s) Oral at bedtime  buPROPion XL (24-Hour) . 150 milliGRAM(s) Oral daily  cefTRIAXone   IVPB 1000 milliGRAM(s) IV Intermittent every 24 hours  cholecalciferol 1000 Unit(s) Oral daily  enoxaparin Injectable 40 milliGRAM(s) SubCutaneous every 24 hours  guaiFENesin ER 1200 milliGRAM(s) Oral every 12 hours  levothyroxine 112 MICROGram(s) Oral daily  metoprolol tartrate 25 milliGRAM(s) Oral daily  mupirocin 2% Ointment 1 Application(s) Both Nostrils two times a day  pantoprazole    Tablet 40 milliGRAM(s) Oral before breakfast  PARoxetine 30 milliGRAM(s) Oral at bedtime  ticagrelor 90 milliGRAM(s) Oral two times a day    MEDICATIONS  (PRN):  acetaminophen     Tablet .. 650 milliGRAM(s) Oral every 6 hours PRN Temp greater or equal to 38C (100.4F), Mild Pain (1 - 3)  benzonatate 100 milliGRAM(s) Oral every 8 hours PRN Cough  clonazePAM  Tablet 2 milliGRAM(s) Oral at bedtime PRN insomnia  melatonin 3 milliGRAM(s) Oral at bedtime PRN Insomnia    Vital Signs Last 24 Hrs  T(C): 36.7 (24 Apr 2022 11:40), Max: 36.7 (23 Apr 2022 20:23)  T(F): 98 (24 Apr 2022 11:40), Max: 98 (23 Apr 2022 20:23)  HR: 72 (24 Apr 2022 11:40) (72 - 78)  BP: 128/62 (24 Apr 2022 11:40) (127/55 - 135/64)  BP(mean): --  RR: 19 (24 Apr 2022 11:40) (18 - 19)  SpO2: 97% (24 Apr 2022 11:40) (95% - 97%)    I&O's Summary        Physical Exam:   GENERAL: NAD, well-groomed, well-developed  HEENT: MYRON/   Atraumatic, Normocephalic  ENMT: No tonsillar erythema, exudates, or enlargement; Moist mucous membranes, Good dentition, No lesions  NECK: Supple, No JVD, Normal thyroid  CHEST/LUNG: Clear to auscultaion  CVS: Regular rate and rhythm; No murmurs, rubs, or gallops  GI: : Soft, Nontender, Nondistended; Bowel sounds present  NERVOUS SYSTEM:  Alert & Oriented X3  EXTREMITIES: mild edema  LYMPH: No lymphadenopathy noted  SKIN: No rashes or lesions  ENDOCRINOLOGY: No Thyromegaly  PSYCH: Appropriate    Labs:                              12.8   7.96  )-----------( 239      ( 24 Apr 2022 06:57 )             38.8                         12.8   12.01 )-----------( 255      ( 23 Apr 2022 05:52 )             38.9                         12.7   15.70 )-----------( 235      ( 22 Apr 2022 10:32 )             39.1                         13.3   17.86 )-----------( 252      ( 21 Apr 2022 20:04 )             40.5     04-23    138  |  104  |  12  ----------------------------<  108<H>  3.9   |  19<L>  |  0.80  04-22    139  |  105  |  13  ----------------------------<  135<H>  3.9   |  22  |  0.78  04-21    138  |  106  |  18  ----------------------------<  113<H>  4.8   |  19<L>  |  0.76    Ca    9.3      23 Apr 2022 05:52  Phos  3.4     04-23  Mg     2.00     04-23    TPro  7.1  /  Alb  3.4  /  TBili  1.0  /  DBili  x   /  AST  8   /  ALT  17  /  AlkPhos  135<H>  04-22  TPro  6.9  /  Alb  3.5  /  TBili  0.6  /  DBili  x   /  AST  18  /  ALT  17  /  AlkPhos  132<H>  04-21    CAPILLARY BLOOD GLUCOSE        < from: CT Angio Chest PE Protocol w/ IV Cont (04.21.22 @ 21:58) >  UPPERABDOMEN: Small hiatal hernia. Partially visualized left renal cyst.    BONES/SOFT TISSUES: Multilevel degenerative changes of the spine.   Scoliosis.    IMPRESSION:  No main, right main, left main, lobar or segmental pulmonary embolism.    Patchy airspace opacities with more consolidative opacity peripherally in   the right upper lobe. Limited evaluation secondary to patient motion.   Findings are suspicious for pneumonia. Correlate clinically and follow to   resolution.    --- End of Report ---      < end of copied text >          Procalcitonin, Serum: 1.57 ng/mL (04-21 @ 22:44)  Serum Pro-Brain Natriuretic Peptide: 472 pg/mL (04-21 @ 20:04)        RECENT CULTURES:        RESPIRATORY CULTURES:          Studies  Chest X-RAY  CT SCAN Chest   Venous Dopplers: LE:   CT Abdomen  Others

## 2022-04-24 NOTE — PROVIDER CONTACT NOTE (OTHER) - RECOMMENDATIONS
RN Carl placed pt on 2L NC and pt is now satting 95%. ACP Leslee Pierre recommends to continue to monitor. ACP ordered d-dimer for AM labs and states she will mention to day team possible TOYIN.
as per EVE Pierre, Leslee continue to monitor the pt. RN will continue to monitor. now provider put in  order.  already in place.

## 2022-04-24 NOTE — CHART NOTE - NSCHARTNOTEFT_GEN_A_CORE
Contacted by RN patient desatted during sleep and was sustained for approximately 5 minutes. Patient placed on 2 L NC and O2 improved to 92%. Patient asymptomatic and VSS. Of note last CTA r/o 4/21 negative. Added d-dimer to AM labs and will notify day team to continue to monitor. Consider TOYIN workup. Contacted by RN patient desatted during sleep and was sustained for approximately 5 minutes. Patient placed on 2 L NC and O2 improved to 92%. RN aware of goal  sats >90%. Patient asymptomatic and VSS. Of note last CTA r/o 4/21 negative. Added d-dimer to AM labs and will notify day team to continue to monitor. Consider TOYIN workup. Will continue to monitor.

## 2022-04-24 NOTE — PROGRESS NOTE ADULT - PROBLEM SELECTOR PLAN 1
with sepsis on admission   clinically improving   no audible wheeze  pulmonary help appreciated  discussed with ID no need for vancomycin

## 2022-04-24 NOTE — PROGRESS NOTE ADULT - SUBJECTIVE AND OBJECTIVE BOX
Patient is a 71y old  Female who presents with a chief complaint of SOB, cough x3-4 days (23 Apr 2022 13:53)      DATE OF SERVICE: 04-24-22 @ 11:16    SUBJECTIVE / OVERNIGHT EVENTS: overnight events noted  reports 60% improvement     ROS:  Resp: No cough no sputum production  CVS: No chest pain no palpitations no orthopnea  GI: no N/V/D  : no dysuria, no hematuria  Neuro: no weakness no paresthesias  Heme: No petechiae no easy bruising  Msk: No joint pain no swelling  Skin: No rash no itching        MEDICATIONS  (STANDING):  aspirin enteric coated 81 milliGRAM(s) Oral daily  atorvastatin 80 milliGRAM(s) Oral at bedtime  buPROPion XL (24-Hour) . 150 milliGRAM(s) Oral daily  cefTRIAXone   IVPB 1000 milliGRAM(s) IV Intermittent every 24 hours  cholecalciferol 1000 Unit(s) Oral daily  enoxaparin Injectable 40 milliGRAM(s) SubCutaneous every 24 hours  guaiFENesin ER 1200 milliGRAM(s) Oral every 12 hours  levothyroxine 112 MICROGram(s) Oral daily  metoprolol tartrate 25 milliGRAM(s) Oral daily  mupirocin 2% Ointment 1 Application(s) Both Nostrils two times a day  pantoprazole    Tablet 40 milliGRAM(s) Oral before breakfast  PARoxetine 30 milliGRAM(s) Oral at bedtime  ticagrelor 90 milliGRAM(s) Oral two times a day    MEDICATIONS  (PRN):  acetaminophen     Tablet .. 650 milliGRAM(s) Oral every 6 hours PRN Temp greater or equal to 38C (100.4F), Mild Pain (1 - 3)  benzonatate 100 milliGRAM(s) Oral every 8 hours PRN Cough  clonazePAM  Tablet 2 milliGRAM(s) Oral at bedtime PRN insomnia  melatonin 3 milliGRAM(s) Oral at bedtime PRN Insomnia        CAPILLARY BLOOD GLUCOSE        I&O's Summary      Vital Signs Last 24 Hrs  T(C): 36.6 (24 Apr 2022 06:00), Max: 36.8 (23 Apr 2022 12:18)  T(F): 97.9 (24 Apr 2022 06:00), Max: 98.2 (23 Apr 2022 12:18)  HR: 78 (24 Apr 2022 06:00) (67 - 78)  BP: 135/64 (24 Apr 2022 06:00) (112/68 - 135/64)  BP(mean): --  RR: 18 (24 Apr 2022 06:00) (18 - 18)  SpO2: 95% (24 Apr 2022 06:00) (95% - 96%)    PHYSICAL EXAM:  HEAD:  Atraumatic, Normocephalic  EYES: EOMI, PERRLA,  NECK: Supple, No JVD  CHEST/LUNG: clear   HEART: S1 S2; no murmurs   ABDOMEN: Soft, Nontender  EXTREMITIES:   no edema  NEUROLOGY: AO x 3 non-focal  SKIN: No rashes or lesions    LABS:                        12.8   7.96  )-----------( 239      ( 24 Apr 2022 06:57 )             38.8     04-23    138  |  104  |  12  ----------------------------<  108<H>  3.9   |  19<L>  |  0.80    Ca    9.3      23 Apr 2022 05:52  Phos  3.4     04-23  Mg     2.00     04-23                  All consultant(s) notes reviewed and care discussed with other providers        Contact Number, Dr Hercules [phone]

## 2022-04-24 NOTE — PROVIDER CONTACT NOTE (OTHER) - SITUATION
[address]asley sustaining 88% oxygen on room air, pt asymptomatic and sleeping
[address] Gale Amador desatting to 88% while sleeping and sustaining for 2 minutes, pt asymptomatic. ACP paged RN back att approximately 04:15.

## 2022-04-24 NOTE — PROVIDER CONTACT NOTE (OTHER) - ASSESSMENT
pt is sleeping, pt is asymptomatic. pt with no  order at this time, but RN had patient on  since admission due to the fact that she was admitted with pneumonia.
pt is asymptomatic and sleeping. pt denies SOB. VSS.

## 2022-04-24 NOTE — PROGRESS NOTE ADULT - PROBLEM SELECTOR PLAN 1
CTA chest with patchy opacities, more consolidated in RUL suspicious for PNA  -Endorsing cough with associated SOB x3-4 days  -Leukocytosis on admission (17.8) with elevated PCT (1.57)  -Afebrile, leukocytosis now improving  -C/w ABX  -F/u urine legionella, MRSA/MSSA nasal swab  -Start Mucinex 1200 mg PO BID x5 days.    4/24: sterept ag negative: on ceftriaxone: she looks and feels much better:

## 2022-04-24 NOTE — PROVIDER CONTACT NOTE (OTHER) - BACKGROUND
pt admitted for pneumonia and SOB. pt admitted for HLD, HTN, and CAD.
pt admitted with SOB, admitted with pneumonia, history of HTN/depression/CAD/s/p stent

## 2022-04-25 ENCOUNTER — TRANSCRIPTION ENCOUNTER (OUTPATIENT)
Age: 72
End: 2022-04-25

## 2022-04-25 VITALS
SYSTOLIC BLOOD PRESSURE: 130 MMHG | OXYGEN SATURATION: 97 % | HEART RATE: 82 BPM | TEMPERATURE: 98 F | RESPIRATION RATE: 19 BRPM | DIASTOLIC BLOOD PRESSURE: 70 MMHG

## 2022-04-25 LAB
ANION GAP SERPL CALC-SCNC: 14 MMOL/L — SIGNIFICANT CHANGE UP (ref 7–14)
BUN SERPL-MCNC: 14 MG/DL — SIGNIFICANT CHANGE UP (ref 7–23)
CALCIUM SERPL-MCNC: 9.6 MG/DL — SIGNIFICANT CHANGE UP (ref 8.4–10.5)
CHLORIDE SERPL-SCNC: 103 MMOL/L — SIGNIFICANT CHANGE UP (ref 98–107)
CO2 SERPL-SCNC: 22 MMOL/L — SIGNIFICANT CHANGE UP (ref 22–31)
CREAT SERPL-MCNC: 0.7 MG/DL — SIGNIFICANT CHANGE UP (ref 0.5–1.3)
D DIMER BLD IA.RAPID-MCNC: <150 NG/ML DDU — SIGNIFICANT CHANGE UP
EGFR: 91 ML/MIN/1.73M2 — SIGNIFICANT CHANGE UP
GLUCOSE BLDC GLUCOMTR-MCNC: 121 MG/DL — HIGH (ref 70–99)
GLUCOSE SERPL-MCNC: 93 MG/DL — SIGNIFICANT CHANGE UP (ref 70–99)
HCT VFR BLD CALC: 40.3 % — SIGNIFICANT CHANGE UP (ref 34.5–45)
HGB BLD-MCNC: 13 G/DL — SIGNIFICANT CHANGE UP (ref 11.5–15.5)
LEGIONELLA AG UR QL: NEGATIVE — SIGNIFICANT CHANGE UP
MAGNESIUM SERPL-MCNC: 2.1 MG/DL — SIGNIFICANT CHANGE UP (ref 1.6–2.6)
MCHC RBC-ENTMCNC: 27.8 PG — SIGNIFICANT CHANGE UP (ref 27–34)
MCHC RBC-ENTMCNC: 32.3 GM/DL — SIGNIFICANT CHANGE UP (ref 32–36)
MCV RBC AUTO: 86.1 FL — SIGNIFICANT CHANGE UP (ref 80–100)
NRBC # BLD: 0 /100 WBCS — SIGNIFICANT CHANGE UP
NRBC # FLD: 0 K/UL — SIGNIFICANT CHANGE UP
PHOSPHATE SERPL-MCNC: 3.5 MG/DL — SIGNIFICANT CHANGE UP (ref 2.5–4.5)
PLATELET # BLD AUTO: 294 K/UL — SIGNIFICANT CHANGE UP (ref 150–400)
POTASSIUM SERPL-MCNC: 3.7 MMOL/L — SIGNIFICANT CHANGE UP (ref 3.5–5.3)
POTASSIUM SERPL-SCNC: 3.7 MMOL/L — SIGNIFICANT CHANGE UP (ref 3.5–5.3)
RBC # BLD: 4.68 M/UL — SIGNIFICANT CHANGE UP (ref 3.8–5.2)
RBC # FLD: 14.2 % — SIGNIFICANT CHANGE UP (ref 10.3–14.5)
SODIUM SERPL-SCNC: 139 MMOL/L — SIGNIFICANT CHANGE UP (ref 135–145)
WBC # BLD: 8.14 K/UL — SIGNIFICANT CHANGE UP (ref 3.8–10.5)
WBC # FLD AUTO: 8.14 K/UL — SIGNIFICANT CHANGE UP (ref 3.8–10.5)

## 2022-04-25 PROCEDURE — 99232 SBSQ HOSP IP/OBS MODERATE 35: CPT

## 2022-04-25 RX ORDER — ZOLPIDEM TARTRATE 10 MG/1
1 TABLET ORAL
Qty: 0 | Refills: 0 | DISCHARGE

## 2022-04-25 RX ORDER — CEFUROXIME AXETIL 250 MG
500 TABLET ORAL EVERY 12 HOURS
Refills: 0 | Status: DISCONTINUED | OUTPATIENT
Start: 2022-04-25 | End: 2022-04-25

## 2022-04-25 RX ORDER — CEFUROXIME AXETIL 250 MG
1 TABLET ORAL
Qty: 6 | Refills: 0
Start: 2022-04-25 | End: 2022-04-27

## 2022-04-25 RX ADMIN — Medication 112 MICROGRAM(S): at 06:23

## 2022-04-25 RX ADMIN — Medication 3 MILLIGRAM(S): at 00:28

## 2022-04-25 RX ADMIN — Medication 500 MILLIGRAM(S): at 17:16

## 2022-04-25 RX ADMIN — ENOXAPARIN SODIUM 40 MILLIGRAM(S): 100 INJECTION SUBCUTANEOUS at 06:23

## 2022-04-25 RX ADMIN — Medication 1000 UNIT(S): at 17:16

## 2022-04-25 RX ADMIN — Medication 81 MILLIGRAM(S): at 17:17

## 2022-04-25 RX ADMIN — TICAGRELOR 90 MILLIGRAM(S): 90 TABLET ORAL at 06:23

## 2022-04-25 RX ADMIN — Medication 1200 MILLIGRAM(S): at 06:25

## 2022-04-25 RX ADMIN — MUPIROCIN 1 APPLICATION(S): 20 OINTMENT TOPICAL at 17:17

## 2022-04-25 RX ADMIN — Medication 30 MILLIGRAM(S): at 00:24

## 2022-04-25 RX ADMIN — MUPIROCIN 1 APPLICATION(S): 20 OINTMENT TOPICAL at 06:24

## 2022-04-25 RX ADMIN — Medication 1200 MILLIGRAM(S): at 17:16

## 2022-04-25 RX ADMIN — BUPROPION HYDROCHLORIDE 150 MILLIGRAM(S): 150 TABLET, EXTENDED RELEASE ORAL at 17:17

## 2022-04-25 RX ADMIN — TICAGRELOR 90 MILLIGRAM(S): 90 TABLET ORAL at 17:16

## 2022-04-25 RX ADMIN — PANTOPRAZOLE SODIUM 40 MILLIGRAM(S): 20 TABLET, DELAYED RELEASE ORAL at 06:23

## 2022-04-25 RX ADMIN — Medication 25 MILLIGRAM(S): at 06:24

## 2022-04-25 NOTE — DISCHARGE NOTE PROVIDER - NSDCMRMEDTOKEN_GEN_ALL_CORE_FT
aspirin 81 mg oral delayed release tablet: 1 tab(s) orally once a day  atorvastatin 80 mg oral tablet: 1 tab(s) orally once a day (at bedtime)  cefuroxime 500 mg oral tablet: 1 tab(s) orally 2 times a day   clonazePAM 2 mg oral tablet: 1 tab(s) orally once a day (at bedtime)  levothyroxine 112 mcg (0.112 mg) oral tablet: 1 tab(s) orally once a day  metoprolol tartrate 25 mg oral tablet: 1 tab(s) orally once a day  (Filled as BID).   Paxil 40 mg oral tablet: 1 tab(s) orally once a day (at bedtime)  Protonix 40 mg oral delayed release tablet: 1 tab(s) orally once a day  ticagrelor 90 mg oral tablet: 1 tab(s) orally 2 times a day   Vitamin D3 25 mcg (1000 intl units) oral tablet: 1 tab(s) orally once a day  Wellbutrin  mg/24 hours oral tablet, extended release: 1 tab(s) orally every 24 hours  (Wellbutrin XL 300mg QD filled on 4/7/22)   aspirin 81 mg oral delayed release tablet: 1 tab(s) orally once a day  atorvastatin 80 mg oral tablet: 1 tab(s) orally once a day (at bedtime)  cefuroxime 500 mg oral tablet: 1 tab(s) orally 2 times a day   clonazePAM 2 mg oral tablet: 1 tab(s) orally once a day (at bedtime)  levothyroxine 112 mcg (0.112 mg) oral tablet: 1 tab(s) orally once a day  metoprolol tartrate 25 mg oral tablet: 1 tab(s) orally once a day  (Filled as BID).   Paxil 40 mg oral tablet: 1 tab(s) orally once a day (at bedtime)  Phyical Therapy : 3-5 times a week   Protonix 40 mg oral delayed release tablet: 1 tab(s) orally once a day  ticagrelor 90 mg oral tablet: 1 tab(s) orally 2 times a day   Vitamin D3 25 mcg (1000 intl units) oral tablet: 1 tab(s) orally once a day  Wellbutrin  mg/24 hours oral tablet, extended release: 1 tab(s) orally every 24 hours  (Wellbutrin XL 300mg QD filled on 4/7/22)

## 2022-04-25 NOTE — PROGRESS NOTE ADULT - NEGATIVE GENERAL GENITOURINARY SYMPTOMS
no hematuria/no flank pain L/no flank pain R/no dysuria
no hematuria/no flank pain L/no flank pain R/no dysuria

## 2022-04-25 NOTE — PROGRESS NOTE ADULT - PROBLEM SELECTOR PROBLEM 2
Shortness of breath
Sepsis
Shortness of breath
Shortness of breath

## 2022-04-25 NOTE — DISCHARGE NOTE PROVIDER - NSDCCPCAREPLAN_GEN_ALL_CORE_FT
PRINCIPAL DISCHARGE DIAGNOSIS  Diagnosis: Pneumonia  Assessment and Plan of Treatment: Your were treated for lung infection please continue antibiotics tab twice a day for 3 days following discharge home      SECONDARY DISCHARGE DIAGNOSES  Diagnosis: Chronic CHF  Assessment and Plan of Treatment: Continue recommended medication regimen, fluid restriction (Less than 1.5 Liters per day). Monitor for signs/symptoms of fluid overload and electrolyte abnormalities, such as, shortness of breath, cough, swelling, chest discomfort, changes in heart rate, dizziness, fainting, or changes in mental status. Keep track of your weight and call your outpatient physician if there are abrupt changes in weight.   Follow-up with your outpatient provider after you've been discharged from the hospital for further care/recommendations.    Diagnosis: CAD (coronary artery disease)  Assessment and Plan of Treatment: Follow-up with your primary provider/cardiologist as outpatient for ongoing care. If you have persistent chest pain, shortness of breath, heart palpitations, or dizziness you should return to the emergency room.    Diagnosis: HLD (hyperlipidemia)  Assessment and Plan of Treatment: Continue prescribed medications to control your cholesterol levels and a DASH (Low fat/salt) diet. Follow up with your primary care provider upon discharge for further management and monitoring of cholesterol levels.

## 2022-04-25 NOTE — PROGRESS NOTE ADULT - PROBLEM SELECTOR PLAN 1
CTA chest with patchy opacities, more consolidated in RUL suspicious for PNA  -Endorsing cough with associated SOB x3-4 days  -Leukocytosis on admission (17.8) with elevated PCT (1.57)  -Afebrile, leukocytosis now improving  -C/w ABX, to be d/c home on Ceftin   -Cough improving with Mucinex  -Normoxic, dyspnea resolved.

## 2022-04-25 NOTE — PROGRESS NOTE ADULT - PROBLEM SELECTOR PLAN 2
Likely 2nd to PNA as above  -CTA chest negative for PE  -Keep sats >90% with supplemental O2 PRN
Likely 2nd to PNA as above  -CTA chest negative for PE  -Keep sats >90% with supplemental O2 PRN (currently RA).
Likely 2nd to PNA as above  -CTA chest negative for PE  -Keep sats >90% with supplemental O2 PRN    4/24: resolved: on room air:
-better  -ambulating on RA  -cont abx for pna
resolving  continue to monitor   continue antibiotics
resolving  continue to monitor   continue antibiotics
-better  -ambulating on RA  -cont abx for pna
resolving  continue to monitor   continue antibiotics

## 2022-04-25 NOTE — PROGRESS NOTE ADULT - SUBJECTIVE AND OBJECTIVE BOX
SUBJECTIVE: The patient denies chest pain, shortness of breath, arm pain or jaw pain, dizziness or palpitations.    	  MEDICATIONS:  metoprolol tartrate 25 milliGRAM(s) Oral daily    cefuroxime   Tablet 500 milliGRAM(s) Oral every 12 hours    benzonatate 100 milliGRAM(s) Oral every 8 hours PRN  guaiFENesin ER 1200 milliGRAM(s) Oral every 12 hours    acetaminophen     Tablet .. 650 milliGRAM(s) Oral every 6 hours PRN  buPROPion XL (24-Hour) . 150 milliGRAM(s) Oral daily  clonazePAM  Tablet 2 milliGRAM(s) Oral at bedtime PRN  melatonin 3 milliGRAM(s) Oral at bedtime PRN  PARoxetine 30 milliGRAM(s) Oral at bedtime    pantoprazole    Tablet 40 milliGRAM(s) Oral before breakfast    atorvastatin 80 milliGRAM(s) Oral at bedtime  levothyroxine 112 MICROGram(s) Oral daily    aspirin enteric coated 81 milliGRAM(s) Oral daily  cholecalciferol 1000 Unit(s) Oral daily  enoxaparin Injectable 40 milliGRAM(s) SubCutaneous every 24 hours  mupirocin 2% Ointment 1 Application(s) Both Nostrils two times a day  ticagrelor 90 milliGRAM(s) Oral two times a day      REVIEW OF SYSTEMS:    CONSTITUTIONAL: No fever, weight loss, or fatigue  EYES: No eye pain, visual disturbances, or discharge  NECK: No pain or stiffness  RESPIRATORY: No cough, wheezing, chills or hemoptysis; No Shortness of Breath  CARDIOVASCULAR: No chest pain, palpitations, dizziness, or leg swelling  GASTROINTESTINAL: No abdominal or epigastric pain. No nausea, vomiting, or hematemesis; No diarrhea or constipation. No melena or hematochezia.  GENITOURINARY: No dysuria, frequency, hematuria, or incontinence  NEUROLOGICAL: No headaches, memory loss, loss of strength, numbness, or tremors  SKIN: No itching, burning, rashes, or lesions   LYMPH Nodes: No enlarged glands  MUSCULOSKELETAL: No joint pain or swelling; No muscle, back, or extremity pain  All other review of systems are negative.  	  [ ] Unable to obtain    PHYSICAL EXAM:  T(C): 36.3 (04-25-22 @ 06:00), Max: 36.7 (04-24-22 @ 11:40)  HR: 89 (04-25-22 @ 06:00) (72 - 89)  BP: 133/86 (04-25-22 @ 06:00) (128/62 - 133/86)  RR: 18 (04-25-22 @ 06:00) (18 - 19)  SpO2: 97% (04-25-22 @ 06:00) (97% - 97%)  Wt(kg): --  I&O's Summary        PHYSICAL EXAM    Appearance: Normal	  HEENT:   Normal oral mucosa, PERRL, EOMI	  NECK: Soft and supple, No LAD, No JVD  Cardiovascular: Regular Rate and Rhythm, Normal S1 S2, No murmurs, No clicks, gallops or rubs  Respiratory: Lungs clear to auscultation	  Gastrointestinal:  Soft, Non-tender, + BS	  Skin: No rashes, No ecchymoses, No cyanosis  Neurologic: Non-focal  Extremities: No clubbing, cyanosis or edema  Vascular: Peripheral pulses palpable 2+ bilaterally    	    LABS:	 	                          13.0   8.14  )-----------( 294      ( 25 Apr 2022 07:26 )             40.3     04-25    139  |  103  |  14  ----------------------------<  93  3.7   |  22  |  0.70    Ca    9.6      25 Apr 2022 07:26  Phos  3.5     04-25  Mg     2.10     04-25

## 2022-04-25 NOTE — PROGRESS NOTE ADULT - REASON FOR ADMISSION
SOB, cough x3-4 days

## 2022-04-25 NOTE — DISCHARGE NOTE NURSING/CASE MANAGEMENT/SOCIAL WORK - PATIENT PORTAL LINK FT
You can access the FollowMyHealth Patient Portal offered by [facility] by registering at the following website: [email or web address]. By joining [facility]’s FollowMyHealth portal, you will also be able to view your health information using other applications (apps) compatible with our system.
Abdomen soft, non-tender, no guarding.

## 2022-04-25 NOTE — PROGRESS NOTE ADULT - PROBLEM SELECTOR PLAN 8
continue Zolpidem and clonazepam PRN

## 2022-04-25 NOTE — PHYSICAL THERAPY INITIAL EVALUATION ADULT - LIVES WITH, PROFILE
in an apartment alone , + elevator , no steps , spouse lives in another house but will be available if pt needs any help as per pt.

## 2022-04-25 NOTE — PROGRESS NOTE ADULT - PROBLEM SELECTOR PLAN 3
Per cards.   -S/p ALO 11/2021.
asymptomatic  no chest pain  continue ASA, ticagrelor, metoprolol, statin
Per cards.   -S/p ALO 11/2021.
asymptomatic  no chest pain  continue ASA, ticagrelor, metoprolol, statin
Per cards.   -S/p ALO 11/2021.
asymptomatic  no chest pain  continue ASA, ticagrelor, metoprolol, statin
-resolved   -cont abx  -monitor cbc
-resolved   -cont abx  -monitor cbc

## 2022-04-25 NOTE — PROGRESS NOTE ADULT - PROBLEM SELECTOR PROBLEM 3
CAD (coronary artery disease)
Leukocytosis
CAD (coronary artery disease)
Leukocytosis

## 2022-04-25 NOTE — PHYSICAL THERAPY INITIAL EVALUATION ADULT - PERTINENT HX OF CURRENT PROBLEM, REHAB EVAL
This is a 71F with hx breast cancer s/p lumpectomy presenting with cough and SOB for 3-4 days. Admitted with sepsis 2/2 PNA

## 2022-04-25 NOTE — PROGRESS NOTE ADULT - PROBLEM SELECTOR PLAN 7
continue Wellbutrin and Paxil  affect is normal

## 2022-04-25 NOTE — PROGRESS NOTE ADULT - PROBLEM SELECTOR PROBLEM 4
Chronic CHF
Specimen positive for methicillin resistant Staphylococcus aureus (MRSA)
Specimen positive for methicillin resistant Staphylococcus aureus (MRSA)

## 2022-04-25 NOTE — PROGRESS NOTE ADULT - NSPROGADDITIONALINFOA_GEN_ALL_CORE
encounter  45 min  discussed with patient in detail, expresses understanding of treatment plans.
stable for discharge if cleared by PT
discussed with patient, expresses understanding of treatment plans.
Please call the ID service [phone] with questions or concerns over the weekend.

## 2022-04-25 NOTE — PROGRESS NOTE ADULT - PROBLEM SELECTOR PLAN 1
-better  -cont abx  -monitor temps/pulse ox/wbc  -change abx to ceftin 500 mg po bid x 3 days   -potential side effects of abx explained including GI, Cdiff, allergy issues, development of resisitance, etc.

## 2022-04-25 NOTE — PROGRESS NOTE ADULT - SUBJECTIVE AND OBJECTIVE BOX
Patient is a 71y old  Female who presents with a chief complaint of SOB, cough x3-4 days (25 Apr 2022 10:19)      DATE OF SERVICE: 04-25-22 @ 13:08    SUBJECTIVE / OVERNIGHT EVENTS: overnight events noted  "I feel much better"     ROS:  Resp: No cough no sputum production  CVS: No chest pain no palpitations no orthopnea  GI: no N/V/D  : no dysuria, no hematuria  Neuro: no weakness no paresthesias  Heme: No petechiae no easy bruising  Msk: No joint pain no swelling  Skin: No rash no itching        MEDICATIONS  (STANDING):  aspirin enteric coated 81 milliGRAM(s) Oral daily  atorvastatin 80 milliGRAM(s) Oral at bedtime  buPROPion XL (24-Hour) . 150 milliGRAM(s) Oral daily  cefuroxime   Tablet 500 milliGRAM(s) Oral every 12 hours  cholecalciferol 1000 Unit(s) Oral daily  enoxaparin Injectable 40 milliGRAM(s) SubCutaneous every 24 hours  guaiFENesin ER 1200 milliGRAM(s) Oral every 12 hours  levothyroxine 112 MICROGram(s) Oral daily  metoprolol tartrate 25 milliGRAM(s) Oral daily  mupirocin 2% Ointment 1 Application(s) Both Nostrils two times a day  pantoprazole    Tablet 40 milliGRAM(s) Oral before breakfast  PARoxetine 30 milliGRAM(s) Oral at bedtime  ticagrelor 90 milliGRAM(s) Oral two times a day    MEDICATIONS  (PRN):  acetaminophen     Tablet .. 650 milliGRAM(s) Oral every 6 hours PRN Temp greater or equal to 38C (100.4F), Mild Pain (1 - 3)  benzonatate 100 milliGRAM(s) Oral every 8 hours PRN Cough  clonazePAM  Tablet 2 milliGRAM(s) Oral at bedtime PRN insomnia  melatonin 3 milliGRAM(s) Oral at bedtime PRN Insomnia        CAPILLARY BLOOD GLUCOSE      POCT Blood Glucose.: 121 mg/dL (25 Apr 2022 05:59)    I&O's Summary      Vital Signs Last 24 Hrs  T(C): 36.3 (25 Apr 2022 06:00), Max: 36.3 (25 Apr 2022 06:00)  T(F): 97.3 (25 Apr 2022 06:00), Max: 97.3 (25 Apr 2022 06:00)  HR: 89 (25 Apr 2022 06:00) (89 - 89)  BP: 133/86 (25 Apr 2022 06:00) (133/86 - 133/86)  BP(mean): --  RR: 18 (25 Apr 2022 06:00) (18 - 18)  SpO2: 97% (25 Apr 2022 06:00) (97% - 97%)    PHYSICAL EXAM:  EYES: EOMI, PERRLA,  NECK: Supple, No JVD  CHEST/LUNG: clear   HEART: S1 S2; no murmurs   ABDOMEN: Soft, Nontender  EXTREMITIES:   no edema  NEUROLOGY: AO x 3 non-focal  SKIN: No rashes or lesions    LABS:                        13.0   8.14  )-----------( 294      ( 25 Apr 2022 07:26 )             40.3     04-25    139  |  103  |  14  ----------------------------<  93  3.7   |  22  |  0.70    Ca    9.6      25 Apr 2022 07:26  Phos  3.5     04-25  Mg     2.10     04-25                  All consultant(s) notes reviewed and care discussed with other providers        Contact Number, Dr Hercules [phone]

## 2022-04-25 NOTE — PROGRESS NOTE ADULT - SUBJECTIVE AND OBJECTIVE BOX
Date of Service: 04-25-22 @ 13:46    Patient is a 71y old  Female who presents with a chief complaint of SOB, cough x3-4 days (25 Apr 2022 13:08)      Any change in ROS:   O2 sats 97% on RA  Denies CP,SOB    MEDICATIONS  (STANDING):  aspirin enteric coated 81 milliGRAM(s) Oral daily  atorvastatin 80 milliGRAM(s) Oral at bedtime  buPROPion XL (24-Hour) . 150 milliGRAM(s) Oral daily  cefuroxime   Tablet 500 milliGRAM(s) Oral every 12 hours  cholecalciferol 1000 Unit(s) Oral daily  enoxaparin Injectable 40 milliGRAM(s) SubCutaneous every 24 hours  guaiFENesin ER 1200 milliGRAM(s) Oral every 12 hours  levothyroxine 112 MICROGram(s) Oral daily  metoprolol tartrate 25 milliGRAM(s) Oral daily  mupirocin 2% Ointment 1 Application(s) Both Nostrils two times a day  pantoprazole    Tablet 40 milliGRAM(s) Oral before breakfast  PARoxetine 30 milliGRAM(s) Oral at bedtime  ticagrelor 90 milliGRAM(s) Oral two times a day    MEDICATIONS  (PRN):  acetaminophen     Tablet .. 650 milliGRAM(s) Oral every 6 hours PRN Temp greater or equal to 38C (100.4F), Mild Pain (1 - 3)  benzonatate 100 milliGRAM(s) Oral every 8 hours PRN Cough  clonazePAM  Tablet 2 milliGRAM(s) Oral at bedtime PRN insomnia  melatonin 3 milliGRAM(s) Oral at bedtime PRN Insomnia    Vital Signs Last 24 Hrs  T(C): 36.7 (25 Apr 2022 12:23), Max: 36.7 (25 Apr 2022 12:23)  T(F): 98 (25 Apr 2022 12:23), Max: 98 (25 Apr 2022 12:23)  HR: 76 (25 Apr 2022 12:23) (76 - 89)  BP: 128/76 (25 Apr 2022 12:23) (128/76 - 133/86)  BP(mean): --  RR: 17 (25 Apr 2022 12:23) (17 - 18)  SpO2: 97% (25 Apr 2022 12:23) (97% - 97%)    Physical Exam:   GENERAL: NAD  HEENT: MYRON  ENMT: No tonsillar erythema, exudates, or enlargement  NECK: Supple, No JVD  CHEST/LUNG: Clear to auscultation b/l   CVS: Regular rate and rhythm  GI: : Soft, Nontender, Nondistended  NERVOUS SYSTEM:  Alert & Oriented X3  EXTREMITIES:  2+ Peripheral Pulses, No clubbing, cyanosis, or edema  SKIN: No rashes or lesions  PSYCH: Appropriate    Labs:                          13.0   8.14  )-----------( 294      ( 25 Apr 2022 07:26 )             40.3                         12.8   7.96  )-----------( 239      ( 24 Apr 2022 06:57 )             38.8                         12.8   12.01 )-----------( 255      ( 23 Apr 2022 05:52 )             38.9                         12.7   15.70 )-----------( 235      ( 22 Apr 2022 10:32 )             39.1                         13.3   17.86 )-----------( 252      ( 21 Apr 2022 20:04 )             40.5     04-25    139  |  103  |  14  ----------------------------<  93  3.7   |  22  |  0.70  04-23    138  |  104  |  12  ----------------------------<  108<H>  3.9   |  19<L>  |  0.80  04-22    139  |  105  |  13  ----------------------------<  135<H>  3.9   |  22  |  0.78  04-21    138  |  106  |  18  ----------------------------<  113<H>  4.8   |  19<L>  |  0.76    Ca    9.6      25 Apr 2022 07:26  Phos  3.5     04-25  Mg     2.10     04-25    TPro  7.1  /  Alb  3.4  /  TBili  1.0  /  DBili  x   /  AST  8   /  ALT  17  /  AlkPhos  135<H>  04-22  TPro  6.9  /  Alb  3.5  /  TBili  0.6  /  DBili  x   /  AST  18  /  ALT  17  /  AlkPhos  132<H>  04-21    CAPILLARY BLOOD GLUCOSE  POCT Blood Glucose.: 121 mg/dL (25 Apr 2022 05:59)    D-Dimer Assay, Quantitative: <150 ng/mL DDU (04-25 @ 07:26)  Procalcitonin, Serum: 1.57 ng/mL (04-21 @ 22:44)    RECENT CULTURES:  04-23 @ 16:54 .Blood Blood-Peripheral   No growth to date.    Studies    CT SCAN Chest < from: CT Angio Chest PE Protocol w/ IV Cont (04.21.22 @ 21:58) >  FINDINGS:    PULMONARY ANGIOGRAM: No main, right main, left main, lobar or segmental   pulmonary embolism. Limited evaluation of subsegmental pulmonary arteries   secondary to motion and mixing artifact.    LYMPH NODES:No axillary or mediastinal lymphadenopathy.    HEART/VASCULATURE: The heart is normal size. There are coronary artery   calcifications/stents. No pericardial effusion.    AIRWAYS/LUNGS/PLEURA: Patent central airways. Patchy airspace opacities   with more consolidative opacity peripherally in the right upper lobe.   Limited evaluation secondary to patient motion. Findings are suspicious   for pneumonia. Correlate clinically and follow to resolution. Right upper   lobe calcified granuloma.    UPPERABDOMEN: Small hiatal hernia. Partially visualized left renal cyst.    BONES/SOFT TISSUES: Multilevel degenerative changes of the spine.   Scoliosis.    IMPRESSION:  No main, right main, left main, lobar or segmental pulmonary embolism.    Patchy airspace opacities with more consolidative opacity peripherally in   the right upper lobe. Limited evaluation secondary to patient motion.   Findings are suspicious for pneumonia. Correlate clinically and follow to   resolution.    --- End of Report ---      < end of copied text >

## 2022-04-25 NOTE — PROGRESS NOTE ADULT - ASSESSMENT
71F with PMHx HTN, HLD, CAD s/p stent (11/22/2021 ALO to LCx), depression, h/o BRCA  s/p lumpectomy presenting with cough and SOB for 3-4 days. Dx'ed with sepsis secondary to Community acquired pneumonia CTA (-) for PE. CXR with RML/RLL opacities, CTA --> RUL opacities  Last ECHO 12/11/21 - Normal LV and RV function. Mild LAE Minimally dilated aortic root MAC Trace MR/TR Mild diastolic dysfunction  Last Carotid 12/21 Normal   Blood pressure well controlled.  The patient is stable from a cardiovascular perspective.  
71F with PMHx HTN, HLD, CAD s/p stent (11/2021 ALO to LCx), depression, hx breast cancer s/p lumpectomy presenting with cough and SOB for 3-4 days. Admitted with sepsis 2/2 PNA. 
71F with PMHx HTN, HLD, CAD s/p stent (11/2021 ALO to LCx), depression, hx breast cancer s/p lumpectomy presenting with cough and SOB for 3-4 days. Admitted with sepsis 2/2 PNA. 
71F with PMHx HTN, HLD, CAD s/p stent (11/2021 ALO to LCx), depression, hx breast cancer s/p lumpectomy presenting with cough and SOB for 3-4 days. Admitted with sepsis 2/2 PNA
71F with PMHx HTN, HLD, CAD s/p stent (11/2021 ALO to LCx), depression, hx breast cancer s/p lumpectomy presenting with cough and SOB for 3-4 days. Admitted with sepsis 2/2 PNA. 
71F with PMHx HTN, HLD, CAD s/p stent (11/2021 ALO to LCx), depression, hx breast cancer s/p lumpectomy presenting with cough and SOB for 3-4 days. Admitted with sepsis 2/2 PNA
71F with PMHx HTN, HLD, CAD s/p stent (11/2021 ALO to LCx), depression, hx breast cancer s/p lumpectomy presenting with cough and SOB for 3-4 days with CAP and positive MRSA PCR, leukocytosis     Corona Correia  Attending Physician   Division of Infectious Disease  Office #[phone]  Available on Microsoft Teams also  After 5pm/weekend or no response, call #[phone] 
71F with PMHx HTN, HLD, CAD s/p stent (11/2021 ALO to LCx), depression, hx breast cancer s/p lumpectomy presenting with cough and SOB for 3-4 days. Admitted with sepsis 2/2 PNA
71F with PMHx HTN, HLD, CAD s/p stent (11/2021 ALO to LCx), depression, hx breast cancer s/p lumpectomy presenting with cough and SOB for 3-4 days with CAP and positive MRSA PCR, leukocytosis     Corona Correia  Attending Physician   Division of Infectious Disease  Office #[phone]  Available on Microsoft Teams also  After 5pm/weekend or no response, call #[phone]

## 2022-04-25 NOTE — PROGRESS NOTE ADULT - PROVIDER SPECIALTY LIST ADULT
Cardiology
Internal Medicine
Pulmonology
Pulmonology
Internal Medicine
Internal Medicine
Pulmonology
Internal Medicine
Infectious Disease
Infectious Disease

## 2022-04-25 NOTE — PROGRESS NOTE ADULT - PROBLEM SELECTOR PLAN 5
Appears controlled.    4/24: controlled
continue statin
Appears controlled.
continue statin
Appears controlled.
continue statin

## 2022-04-25 NOTE — PROGRESS NOTE ADULT - PROBLEM SELECTOR PROBLEM 1
Pneumonia
CAP (community acquired pneumonia)
CAP (community acquired pneumonia)

## 2022-04-25 NOTE — PROGRESS NOTE ADULT - PROBLEM SELECTOR PROBLEM 5
HTN (hypertension)
HLD (hyperlipidemia)

## 2022-04-25 NOTE — PROGRESS NOTE ADULT - PROBLEM SELECTOR PLAN 4
Compensated at this time  -TTE 11/21 with moderate global LV systolic dysfunction (LVEF 41%), normal RV   -Per cards.
Compensated at this time  -TTE 11/21 with moderate global LV systolic dysfunction (LVEF 41%), normal RV   -Per cards.
cardiology help appreciated  no intervention at this time  appears euvolemic
EF 41% on echo 11/2021. Possibly ischemic given NSTEMI and occlusion to LCx  cardiology help appreciated  no intervention at this time  appears euvolemic
Compensated at this time  -TTE 11/21 with moderate global LV systolic dysfunction (LVEF 41%), normal RV   -Per cards.
-pt likely colonized  -overall better  -doubt vanco is needed at this time
cardiology help appreciated  no intervention at this time  appears euvolemic
-pt likely colonized  -overall better  -doubt vanco is needed at this time

## 2022-04-25 NOTE — DISCHARGE NOTE PROVIDER - HOSPITAL COURSE
English
71F with PMHx HTN, HLD, CAD s/p stent (11/2021 ALO to LCx), depression, hx breast cancer s/p lumpectomy presenting with cough and SOB for 3-4 days. Admitted with sepsis 2/2 PNA    Pneumonia.   -sepsis on admission   -pulmonary help appreciated  -Ceftin 500 BID x 3 days.    Sepsis.   -continue antibiotics as Outpatient    CAD (coronary artery disease).   -continue ASA, ticagrelor, metoprolol, statin.    Chronic CHF.   -appears euvolemic.    HLD (hyperlipidemia).   -continue statin.    HTN (hypertension).   -continue home meds with hold parameters.    Depression.   · continue Wellbutrin and Paxil      Stable for discharge PT recommends Home PT 3-5 week and rolling walker.,     Patient seen and evaluated. Reviewed discharge medications with patient and attending. All new medications requiring new prescriptions were sent to the pharmacy of patient's choice. Reviewed need for prescription for previous home medications and new prescriptions sent if requested. Medically cleared/stable for discharge as per Dr. Hercules with appropriate follow up. Patient understands and agrees with plan of care.     Dispo: Home

## 2022-04-25 NOTE — PROGRESS NOTE ADULT - SUBJECTIVE AND OBJECTIVE BOX
DAIJA ESTEVEZ 71y [id number]    Patient is a 71y old  Female who presents with a chief complaint of SOB, cough x3-4 days (24 Apr 2022 16:00)      Follow Up/CC:  ID following for PNA    Interval History/ROS: no fever, cough+    Allergies    adhesives (Rash)  Gadavist (Rash; Urticaria; Hives)    Intolerances        ANTIMICROBIALS:  cefuroxime   Tablet 500 every 12 hours      MEDICATIONS  (STANDING):  aspirin enteric coated 81 milliGRAM(s) Oral daily  atorvastatin 80 milliGRAM(s) Oral at bedtime  buPROPion XL (24-Hour) . 150 milliGRAM(s) Oral daily  cefuroxime   Tablet 500 milliGRAM(s) Oral every 12 hours  cholecalciferol 1000 Unit(s) Oral daily  enoxaparin Injectable 40 milliGRAM(s) SubCutaneous every 24 hours  guaiFENesin ER 1200 milliGRAM(s) Oral every 12 hours  levothyroxine 112 MICROGram(s) Oral daily  metoprolol tartrate 25 milliGRAM(s) Oral daily  mupirocin 2% Ointment 1 Application(s) Both Nostrils two times a day  pantoprazole    Tablet 40 milliGRAM(s) Oral before breakfast  PARoxetine 30 milliGRAM(s) Oral at bedtime  ticagrelor 90 milliGRAM(s) Oral two times a day    MEDICATIONS  (PRN):  acetaminophen     Tablet .. 650 milliGRAM(s) Oral every 6 hours PRN Temp greater or equal to 38C (100.4F), Mild Pain (1 - 3)  benzonatate 100 milliGRAM(s) Oral every 8 hours PRN Cough  clonazePAM  Tablet 2 milliGRAM(s) Oral at bedtime PRN insomnia  melatonin 3 milliGRAM(s) Oral at bedtime PRN Insomnia        Vital Signs Last 24 Hrs  T(C): 36.3 (25 Apr 2022 06:00), Max: 36.7 (24 Apr 2022 11:40)  T(F): 97.3 (25 Apr 2022 06:00), Max: 98 (24 Apr 2022 11:40)  HR: 89 (25 Apr 2022 06:00) (72 - 89)  BP: 133/86 (25 Apr 2022 06:00) (128/62 - 133/86)  BP(mean): --  RR: 18 (25 Apr 2022 06:00) (18 - 19)  SpO2: 97% (25 Apr 2022 06:00) (97% - 97%)    CBC Full  -  ( 25 Apr 2022 07:26 )  WBC Count : 8.14 K/uL  RBC Count : 4.68 M/uL  Hemoglobin : 13.0 g/dL  Hematocrit : 40.3 %  Platelet Count - Automated : 294 K/uL  Mean Cell Volume : 86.1 fL  Mean Cell Hemoglobin : 27.8 pg  Mean Cell Hemoglobin Concentration : 32.3 gm/dL  Auto Neutrophil # : x  Auto Lymphocyte # : x  Auto Monocyte # : x  Auto Eosinophil # : x  Auto Basophil # : x  Auto Neutrophil % : x  Auto Lymphocyte % : x  Auto Monocyte % : x  Auto Eosinophil % : x  Auto Basophil % : x    04-25    139  |  103  |  14  ----------------------------<  93  3.7   |  22  |  0.70    Ca    9.6      25 Apr 2022 07:26  Phos  3.5     04-25  Mg     2.10     04-25            MICROBIOLOGY:  .Blood Blood-Peripheral  04-23-22   No growth to date.  --  --              v    Rapid RVP Result: NotDete (04-22 @ 13:10)          RADIOLOGY

## 2022-04-25 NOTE — DISCHARGE NOTE NURSING/CASE MANAGEMENT/SOCIAL WORK - NSDCPEFALRISK_GEN_ALL_CORE
For information on Fall & Injury Prevention, visit: [email or web address] OR  [email or web address] OR  https://www.cdc.gov/steadi/patient.html

## 2022-04-26 RX ORDER — CEFUROXIME AXETIL 250 MG
1 TABLET ORAL
Qty: 6 | Refills: 0
Start: 2022-04-26 | End: 2022-04-28

## 2022-04-28 LAB
CULTURE RESULTS: SIGNIFICANT CHANGE UP
SPECIMEN SOURCE: SIGNIFICANT CHANGE UP

## 2022-06-27 ENCOUNTER — APPOINTMENT (OUTPATIENT)
Dept: SURGERY | Facility: CLINIC | Age: 72
End: 2022-06-27
Payer: MEDICARE

## 2022-06-27 PROCEDURE — 99213K: CUSTOM

## 2022-07-19 NOTE — PHYSICAL THERAPY INITIAL EVALUATION ADULT - SITTING BALANCE: DYNAMIC
What Type Of Note Output Would You Prefer (Optional)?: Standard Output What Is The Reason For Today's Visit?: Full Body Skin Examination What Is The Reason For Today's Visit? (Being Monitored For X): concerning skin lesions on an annual basis How Severe Are Your Spot(S)?: mild normal balance

## 2022-11-22 ENCOUNTER — EMERGENCY (EMERGENCY)
Facility: HOSPITAL | Age: 72
LOS: 1 days | Discharge: ANOTHER HOSPITAL | End: 2022-11-22
Admitting: STUDENT IN AN ORGANIZED HEALTH CARE EDUCATION/TRAINING PROGRAM

## 2022-11-22 VITALS
SYSTOLIC BLOOD PRESSURE: 135 MMHG | TEMPERATURE: 98 F | OXYGEN SATURATION: 100 % | RESPIRATION RATE: 18 BRPM | DIASTOLIC BLOOD PRESSURE: 95 MMHG

## 2022-11-22 VITALS
DIASTOLIC BLOOD PRESSURE: 94 MMHG | SYSTOLIC BLOOD PRESSURE: 123 MMHG | HEART RATE: 100 BPM | RESPIRATION RATE: 18 BRPM | OXYGEN SATURATION: 99 %

## 2022-11-22 DIAGNOSIS — Z98.89 OTHER SPECIFIED POSTPROCEDURAL STATES: Chronic | ICD-10-CM

## 2022-11-22 DIAGNOSIS — Z98.890 OTHER SPECIFIED POSTPROCEDURAL STATES: Chronic | ICD-10-CM

## 2022-11-22 DIAGNOSIS — F31.9 BIPOLAR DISORDER, UNSPECIFIED: ICD-10-CM

## 2022-11-22 LAB
ALBUMIN SERPL ELPH-MCNC: 4 G/DL — SIGNIFICANT CHANGE UP (ref 3.3–5)
ALBUMIN SERPL ELPH-MCNC: 4.4 G/DL — SIGNIFICANT CHANGE UP (ref 3.3–5)
ALP SERPL-CCNC: 127 U/L — HIGH (ref 40–120)
ALP SERPL-CCNC: 147 U/L — HIGH (ref 40–120)
ALT FLD-CCNC: 18 U/L — SIGNIFICANT CHANGE UP (ref 4–33)
ALT FLD-CCNC: 24 U/L — SIGNIFICANT CHANGE UP (ref 4–33)
ANION GAP SERPL CALC-SCNC: 11 MMOL/L — SIGNIFICANT CHANGE UP (ref 7–14)
ANION GAP SERPL CALC-SCNC: 23 MMOL/L — HIGH (ref 7–14)
APAP SERPL-MCNC: <10 UG/ML — LOW (ref 15–25)
AST SERPL-CCNC: 21 U/L — SIGNIFICANT CHANGE UP (ref 4–32)
AST SERPL-CCNC: 33 U/L — HIGH (ref 4–32)
BASOPHILS # BLD AUTO: 0.03 K/UL — SIGNIFICANT CHANGE UP (ref 0–0.2)
BASOPHILS # BLD AUTO: 0.05 K/UL — SIGNIFICANT CHANGE UP (ref 0–0.2)
BASOPHILS NFR BLD AUTO: 0.3 % — SIGNIFICANT CHANGE UP (ref 0–2)
BASOPHILS NFR BLD AUTO: 0.5 % — SIGNIFICANT CHANGE UP (ref 0–2)
BILIRUB SERPL-MCNC: 1 MG/DL — SIGNIFICANT CHANGE UP (ref 0.2–1.2)
BILIRUB SERPL-MCNC: 1.3 MG/DL — HIGH (ref 0.2–1.2)
BUN SERPL-MCNC: 16 MG/DL — SIGNIFICANT CHANGE UP (ref 7–23)
BUN SERPL-MCNC: 19 MG/DL — SIGNIFICANT CHANGE UP (ref 7–23)
CALCIUM SERPL-MCNC: 10.2 MG/DL — SIGNIFICANT CHANGE UP (ref 8.4–10.5)
CALCIUM SERPL-MCNC: 9.7 MG/DL — SIGNIFICANT CHANGE UP (ref 8.4–10.5)
CHLORIDE SERPL-SCNC: 101 MMOL/L — SIGNIFICANT CHANGE UP (ref 98–107)
CHLORIDE SERPL-SCNC: 103 MMOL/L — SIGNIFICANT CHANGE UP (ref 98–107)
CO2 SERPL-SCNC: 14 MMOL/L — LOW (ref 22–31)
CO2 SERPL-SCNC: 25 MMOL/L — SIGNIFICANT CHANGE UP (ref 22–31)
CREAT SERPL-MCNC: 0.85 MG/DL — SIGNIFICANT CHANGE UP (ref 0.5–1.3)
CREAT SERPL-MCNC: 0.91 MG/DL — SIGNIFICANT CHANGE UP (ref 0.5–1.3)
EGFR: 66 ML/MIN/1.73M2 — SIGNIFICANT CHANGE UP
EGFR: 72 ML/MIN/1.73M2 — SIGNIFICANT CHANGE UP
EOSINOPHIL # BLD AUTO: 0.03 K/UL — SIGNIFICANT CHANGE UP (ref 0–0.5)
EOSINOPHIL # BLD AUTO: 0.03 K/UL — SIGNIFICANT CHANGE UP (ref 0–0.5)
EOSINOPHIL NFR BLD AUTO: 0.3 % — SIGNIFICANT CHANGE UP (ref 0–6)
EOSINOPHIL NFR BLD AUTO: 0.3 % — SIGNIFICANT CHANGE UP (ref 0–6)
ETHANOL SERPL-MCNC: <10 MG/DL — SIGNIFICANT CHANGE UP
FLUAV AG NPH QL: SIGNIFICANT CHANGE UP
FLUBV AG NPH QL: SIGNIFICANT CHANGE UP
GLUCOSE SERPL-MCNC: 130 MG/DL — HIGH (ref 70–99)
GLUCOSE SERPL-MCNC: 184 MG/DL — HIGH (ref 70–99)
HCT VFR BLD CALC: 42.3 % — SIGNIFICANT CHANGE UP (ref 34.5–45)
HCT VFR BLD CALC: 49.5 % — HIGH (ref 34.5–45)
HGB BLD-MCNC: 13.8 G/DL — SIGNIFICANT CHANGE UP (ref 11.5–15.5)
HGB BLD-MCNC: 15.9 G/DL — HIGH (ref 11.5–15.5)
IANC: 7.22 K/UL — SIGNIFICANT CHANGE UP (ref 1.8–7.4)
IANC: 7.52 K/UL — HIGH (ref 1.8–7.4)
IMM GRANULOCYTES NFR BLD AUTO: 0.3 % — SIGNIFICANT CHANGE UP (ref 0–0.9)
IMM GRANULOCYTES NFR BLD AUTO: 0.5 % — SIGNIFICANT CHANGE UP (ref 0–0.9)
LYMPHOCYTES # BLD AUTO: 2.51 K/UL — SIGNIFICANT CHANGE UP (ref 1–3.3)
LYMPHOCYTES # BLD AUTO: 22.9 % — SIGNIFICANT CHANGE UP (ref 13–44)
LYMPHOCYTES # BLD AUTO: 3.42 K/UL — HIGH (ref 1–3.3)
LYMPHOCYTES # BLD AUTO: 30.2 % — SIGNIFICANT CHANGE UP (ref 13–44)
MCHC RBC-ENTMCNC: 27 PG — SIGNIFICANT CHANGE UP (ref 27–34)
MCHC RBC-ENTMCNC: 27.3 PG — SIGNIFICANT CHANGE UP (ref 27–34)
MCHC RBC-ENTMCNC: 32.1 GM/DL — SIGNIFICANT CHANGE UP (ref 32–36)
MCHC RBC-ENTMCNC: 32.6 GM/DL — SIGNIFICANT CHANGE UP (ref 32–36)
MCV RBC AUTO: 83.8 FL — SIGNIFICANT CHANGE UP (ref 80–100)
MCV RBC AUTO: 84 FL — SIGNIFICANT CHANGE UP (ref 80–100)
MONOCYTES # BLD AUTO: 0.58 K/UL — SIGNIFICANT CHANGE UP (ref 0–0.9)
MONOCYTES # BLD AUTO: 0.82 K/UL — SIGNIFICANT CHANGE UP (ref 0–0.9)
MONOCYTES NFR BLD AUTO: 5.1 % — SIGNIFICANT CHANGE UP (ref 2–14)
MONOCYTES NFR BLD AUTO: 7.5 % — SIGNIFICANT CHANGE UP (ref 2–14)
NEUTROPHILS # BLD AUTO: 7.22 K/UL — SIGNIFICANT CHANGE UP (ref 1.8–7.4)
NEUTROPHILS # BLD AUTO: 7.52 K/UL — HIGH (ref 1.8–7.4)
NEUTROPHILS NFR BLD AUTO: 63.8 % — SIGNIFICANT CHANGE UP (ref 43–77)
NEUTROPHILS NFR BLD AUTO: 68.3 % — SIGNIFICANT CHANGE UP (ref 43–77)
NRBC # BLD: 0 /100 WBCS — SIGNIFICANT CHANGE UP (ref 0–0)
NRBC # BLD: 0 /100 WBCS — SIGNIFICANT CHANGE UP (ref 0–0)
NRBC # FLD: 0 K/UL — SIGNIFICANT CHANGE UP (ref 0–0)
NRBC # FLD: 0 K/UL — SIGNIFICANT CHANGE UP (ref 0–0)
PLATELET # BLD AUTO: 303 K/UL — SIGNIFICANT CHANGE UP (ref 150–400)
PLATELET # BLD AUTO: 353 K/UL — SIGNIFICANT CHANGE UP (ref 150–400)
POTASSIUM SERPL-MCNC: 4.1 MMOL/L — SIGNIFICANT CHANGE UP (ref 3.5–5.3)
POTASSIUM SERPL-MCNC: 4.8 MMOL/L — SIGNIFICANT CHANGE UP (ref 3.5–5.3)
POTASSIUM SERPL-SCNC: 4.1 MMOL/L — SIGNIFICANT CHANGE UP (ref 3.5–5.3)
POTASSIUM SERPL-SCNC: 4.8 MMOL/L — SIGNIFICANT CHANGE UP (ref 3.5–5.3)
PROT SERPL-MCNC: 7.2 G/DL — SIGNIFICANT CHANGE UP (ref 6–8.3)
PROT SERPL-MCNC: 8.6 G/DL — HIGH (ref 6–8.3)
RBC # BLD: 5.05 M/UL — SIGNIFICANT CHANGE UP (ref 3.8–5.2)
RBC # BLD: 5.89 M/UL — HIGH (ref 3.8–5.2)
RBC # FLD: 16.5 % — HIGH (ref 10.3–14.5)
RBC # FLD: 16.7 % — HIGH (ref 10.3–14.5)
RSV RNA NPH QL NAA+NON-PROBE: SIGNIFICANT CHANGE UP
SALICYLATES SERPL-MCNC: <0.3 MG/DL — LOW (ref 15–30)
SARS-COV-2 RNA SPEC QL NAA+PROBE: SIGNIFICANT CHANGE UP
SODIUM SERPL-SCNC: 138 MMOL/L — SIGNIFICANT CHANGE UP (ref 135–145)
SODIUM SERPL-SCNC: 139 MMOL/L — SIGNIFICANT CHANGE UP (ref 135–145)
TSH SERPL-MCNC: 2.08 UIU/ML — SIGNIFICANT CHANGE UP (ref 0.27–4.2)
WBC # BLD: 10.98 K/UL — HIGH (ref 3.8–10.5)
WBC # BLD: 11.31 K/UL — HIGH (ref 3.8–10.5)
WBC # FLD AUTO: 10.98 K/UL — HIGH (ref 3.8–10.5)
WBC # FLD AUTO: 11.31 K/UL — HIGH (ref 3.8–10.5)

## 2022-11-22 PROCEDURE — 99284 EMERGENCY DEPT VISIT MOD MDM: CPT

## 2022-11-22 RX ORDER — FAMOTIDINE 10 MG/ML
20 INJECTION INTRAVENOUS DAILY
Refills: 0 | Status: DISCONTINUED | OUTPATIENT
Start: 2022-11-22 | End: 2022-11-26

## 2022-11-22 RX ORDER — SODIUM CHLORIDE 9 MG/ML
1000 INJECTION, SOLUTION INTRAVENOUS ONCE
Refills: 0 | Status: COMPLETED | OUTPATIENT
Start: 2022-11-22 | End: 2022-11-22

## 2022-11-22 RX ADMIN — SODIUM CHLORIDE 1000 MILLILITER(S): 9 INJECTION, SOLUTION INTRAVENOUS at 15:46

## 2022-11-22 RX ADMIN — Medication 1 MILLIGRAM(S): at 15:16

## 2022-11-22 RX ADMIN — Medication 30 MILLILITER(S): at 20:53

## 2022-11-22 RX ADMIN — FAMOTIDINE 20 MILLIGRAM(S): 10 INJECTION INTRAVENOUS at 22:41

## 2022-11-22 NOTE — ED ADULT NURSE NOTE - OBJECTIVE STATEMENT
73 yo female sent to Ed from home via ems call. Call made by ex  for worsening anxiety and depression. Pt is anxious on arrival speaking incoherently. Denies SI,HI,AH,VH,ETOH, drug use. Labs/ covid sent

## 2022-11-22 NOTE — ED BEHAVIORAL HEALTH NOTE - BEHAVIORAL HEALTH NOTE
SW reached out to patient’s ex- (Jacky [phone]) to obtain collateral. Patients’ ex- stated that patient resides alone and is ambulatory. Patient has a history of depression for most of her life and anxiety.  Patient had a stent placed at [facility] 6 months ago, as well as a history of breast cancer. Patient is prescribed several medications, but patients ex- was unsure what she is prescribed but stated that she is non-compliant with them and gets them mixed up.  Patients’ psychiatrist is Dr. Ryan Sparrow ([address], [place] [address] [phone]).  Patients’ ex- stated that patient has recently been deteriorating, staying in bed for 18+ hours and not eating properly.  Jacky stated that patient does have a history of SI years ago and was admitted to the hospital for attempting to overdose on Ambien.  Patient was admitted to the hospital and stayed for several weeks.  Patients’ ex- was unsure of what hospital patient was admitted to. Patient has no current SI or HI. Patients’ ex- denied any drug or alcohol use, access to guns or weapons, or history of hallucinations/paranoia.  Patients’ ex- does not feel that it is safe for patient to return home as she is not caring for herself and is deteriorating. SW reached out to patient’s ex- (Jacky [phone]) to obtain collateral. Patients ex- stated that he speaks with patient daily and had not heard from her in 1 1/2 days, so he facilitated EMS today. Patients’ ex- stated that patient resides alone and is ambulatory. Patient has a history of depression for most of her life and anxiety.  Patient had a stent placed at [facility] 6 months ago, as well as a history of breast cancer. Patient is prescribed several medications, but patients ex- was unsure what she is prescribed but stated that she is non-compliant with them and gets them mixed up.  Patients’ psychiatrist is Dr. Ryan Sparrow ([address], [place] [address] [phone]).  Patients’ ex- stated that patient has recently been deteriorating, staying in bed for 18+ hours and not eating properly.  Jacky stated that patient does have a history of SI years ago and was admitted to the hospital for attempting to overdose on Ambien.  Patient was admitted to the hospital and stayed for several weeks.  Patients’ ex- was unsure of what hospital patient was admitted to. Patient has no current SI or HI. Patients’ ex- denied any drug or alcohol use, access to guns or weapons, or history of hallucinations/paranoia.  Patients’ ex- does not feel that it is safe for patient to return home as she is not caring for herself such as bathing or eating.

## 2022-11-22 NOTE — ED BEHAVIORAL HEALTH NOTE - BEHAVIORAL HEALTH NOTE
COVID Exposure Screen- Patient  1.	*Have you had a COVID-19 test in the last 90 days?  (  ) Yes   ( x ) No   (  ) Unknown- Reason: _____  IF YES PROCEED TO QUESTION #2. IF NO OR UNKNOWN, PLEASE SKIP TO QUESTION #3.  2.	Date of test(s) and result(s): ________  3.	*Have you tested positive for COVID-19 antibodies? (  ) Yes   ( x ) No   (  ) Unknown- Reason: _____  IF YES PROCEED TO QUESTION #4. IF NO or UNKNOWN, PLEASE SKIP TO QUESTION #5.  4.	Date of positive antibody test: ________  5.	*Have you received 2 doses of the COVID-19 vaccine? (x  ) Yes   (  ) No   (  ) Unknown- Reason: _____   IF YES PROCEED TO QUESTION #6. IF NO or UNKNOWN, PLEASE SKIP TO QUESTION #7.  6.	Date of second dose: ________  7.	*In the past 10 days, have you been around anyone with a positive COVID-19 test?* (  ) Yes   (x  ) No   (  ) Unknown- Reason: ____  IF YES PROCEED TO QUESTION #8. IF NO or UNKNOWN, PLEASE SKIP TO QUESTION #13.  8.	Were you within 6 feet of them for at least 15 minutes? (  ) Yes   (  ) No   (  ) Unknown- Reason: _____  9.	Have you provided care for them? (  ) Yes   (  ) No   (  ) Unknown- Reason: ______  10.	Have you had direct physical contact with them (touched, hugged, or kissed them)? (  ) Yes   (  ) No    (  ) Unknown- Reason: _____  11.	Have you shared eating or drinking utensils with them? (  ) Yes   (  ) No    (  ) Unknown- Reason: ____  12.	Have they sneezed, coughed, or somehow gotten respiratory droplets on you? (  ) Yes   (  ) No    (  ) Unknown- Reason: ______  13.	*Have you been out of [place] within the past 10 days?* (  ) Yes   ( x ) No   (  ) Unknown- Reason: _____  IF YES PLEASE ANSWER THE FOLLOWING QUESTIONS:  14.	Which state/country have you been to? ______  15.	Were you there over 24 hours? (  ) Yes   (  ) No    (  ) Unknown- Reason: ______  16.	Date of return to [facility]: ______     COVID Exposure Screen- collateral (i.e. third-party, chart review, belongings, etc; include EMS and ED staff)  1.	*Has the patient had a COVID-19 test in the last 90 days?  (  ) Yes   (  ) No   (  ) Unknown- Reason: _____  IF YES PROCEED TO QUESTION #2. IF NO OR UNKNOWN, PLEASE SKIP TO QUESTION #3.  2.	Date of test(s) and result(s): ________  3.	*Has the patient tested positive for COVID-19 antibodies? (  ) Yes   (  ) No   (  ) Unknown- Reason: _____  IF YES PROCEED TO QUESTION #4. IF NO or UNKNOWN, PLEASE SKIP TO QUESTION #5.  4.	Date of positive antibody test: ________  5.	*Has the patient received 2 doses of the COVID-19 vaccine? (  ) Yes   (  ) No   (  ) Unknown- Reason: _____  IF YES PROCEED TO QUESTION #6. IF NO or UNKNOWN, PLEASE SKIP TO QUESTION #7.  6.	 Date of second dose: ________  7.	*In the past 10 days, has the patient been around anyone with a positive COVID-19 test?* (  ) Yes   (  ) No   (  ) Unknown- Reason: __  IF YES PROCEED TO QUESTION #8. IF NO or UNKNOWN, PLEASE SKIP TO QUESTION #13.  8.	Was the patient within 6 feet of them for at least 15 minutes? (  ) Yes   (  ) No   (  ) Unknown- Reason: _____  9.	Did the patient provide care for them? (  ) Yes   (  ) No   (  ) Unknown- Reason: ______  10.	Did the patient have direct physical contact with them (touched, hugged, or kissed them)? (  ) Yes   (  ) No    (  ) Unknown- Reason: __  11.	Did the patient share eating or drinking utensils with them? (  ) Yes   (  ) No    (  ) Unknown- Reason: ____  12.	Did they sneeze, cough, or somehow get respiratory droplets on the patient? (  ) Yes   (  ) No    (  ) Unknown- Reason: ______  13.	*Has the patient been out of [place] within the past 10 days?* (  ) Yes   (  ) No   (  ) Unknown- Reason: _____  IF YES PLEASE ANSWER THE FOLLOWING QUESTIONS:  14.	Which state/country did they go to? ______  15.	Were they there over 24 hours? (  ) Yes   (  ) No    (  ) Unknown- Reason: ______  16.	Date of return to [facility]: ______

## 2022-11-22 NOTE — ED BEHAVIORAL HEALTH ASSESSMENT NOTE - HPI (INCLUDE ILLNESS QUALITY, SEVERITY, DURATION, TIMING, CONTEXT, MODIFYING FACTORS, ASSOCIATED SIGNS AND SYMPTOMS)
72 F, domiciled in [place], retired ,  (though physically  from  for 20 years), past psychiatric history of benzo/alcohol use disorder, denies complicated withdrawal/rehab, chronic depression, at least 10 past psychiatric hospitalizations, none recently, 1 history of suicide attempt via OD on ambien in 2004, PMHx hypothyroid, Breast CA s/p lumpectomy, CAD with stent placement, GERD, "pinched disc", who was BIBA after her ex- called EMS for a wellness check and she was found to be not doing well.   Pt was last seen here at [facility] in 2018 s/o ingestion of Ambien, Klonopin and Valium.      Pt seen in the  ED, in her room. She is willing and able to speak, but does make a repetitive sound (zuh, zuh, zuh) and cannot say why, or stop. She reports that she is not functioning or doing well at home. She resides by herself, and has not been taking care of herself. She is not showering or eating. Her sleep is erratic at this time. She mostly stays in bed all day, not leaving her apartment. She reports that she stopped taking all her medications, but cannot say why.  She denies any current SI, denies any alcohol or drug use.     Collateral obtained from ex-, please see SW note.   Message left for her psychiatrist. 72 F, domiciled in [place], retired ,  (though physically  from  for 20 years), past psychiatric history of benzo/alcohol use disorder, denies complicated withdrawal/rehab, chronic depression, at least 10 past psychiatric hospitalizations, none recently, 1 history of suicide attempt via OD on ambien in 2004, PMHx hypothyroid, Breast CA s/p lumpectomy, CAD with stent placement, GERD, "pinched disc", who was BIBA after her ex- called EMS for a wellness check and she was found to be not doing well.   Pt was last seen here at [facility] in 2018 s/o ingestion of Ambien, Klonopin and Valium.      Pt seen in the  ED, in her room. She is willing and able to speak, but does make a repetitive sound (zuh, zuh, zuh) and cannot say why, or stop. She reports that she is not functioning or doing well at home. She resides by herself, and has not been taking care of herself. She is not showering or eating. Her sleep is erratic at this time. She mostly stays in bed all day, not leaving her apartment. She reports that she stopped taking all her medications, but cannot say why.  She denies any current SI, denies any alcohol or drug use.     Collateral obtained from ex-, please see SW note.   Message left for her psychiatrist.    Unable to locate patient on I-stop- varying iterations of her last names were done.

## 2022-11-22 NOTE — ED PROVIDER NOTE - CLINICAL SUMMARY MEDICAL DECISION MAKING FREE TEXT BOX
This is a 72-year-old female,  past medical history of bipolar disorder,  depression, benzos misuse,  with complaint of unable to function.  Patient  activated 911 after tried to call patient's who did not answer to the phone.  He sent over 911 for a wellness check.  Patient is sad depressed,  disconnected her phone from the wall,  noncompliant with the medication, unable to take care of herself. She is not eating well, not sleeping well and stuttering, very anxious. Denies any suicidal homicidal auditory visual hallucinations reports previous psychiatric hospitalizations for depression.   labs, psych consult

## 2022-11-22 NOTE — ED PROVIDER NOTE - PROGRESS NOTE DETAILS
NP Susan- pt with elevated gap 23- ivf adm , repeat labs resolving, much improved, pt medically cleared and will go to [facility] for mental health stabilization due to psychiatric team recommendation.

## 2022-11-22 NOTE — ED ADULT NURSE REASSESSMENT NOTE - NS ED NURSE REASSESS COMMENT FT1
Break RN: 20G IV placed in L hand, IVF ongoing. patient placed on CO for safety while IV fluids are infusing. all safety maintained at this time.

## 2022-11-22 NOTE — ED BEHAVIORAL HEALTH ASSESSMENT NOTE - DESCRIPTION
Lives alone in [place],  with 1 adult son. Former .  from  though she they remain in close contact. Vital Signs Last 24 Hrs  T(C): --  T(F): --  HR: 100 (22 Nov 2022 12:30) (100 - 100)  BP: 123/94 (22 Nov 2022 12:30) (123/94 - 123/94)  BP(mean): --  RR: 18 (22 Nov 2022 12:30) (18 - 18)  SpO2: 99% (22 Nov 2022 12:30) (99% - 99%) HTN, HLD, CAD s/p stent, h/o Breast CA s/p lumpectomy;

## 2022-11-22 NOTE — ED BEHAVIORAL HEALTH ASSESSMENT NOTE - PSYCHIATRIC ISSUES AND PLAN (INCLUDE STANDING AND PRN MEDICATION)
tbd; psychiatrist did not call back, unknown what meds pt was on prior; currently non-compliant; Ativan 1mg poq 6hrs prn; haldol 2mg po q 6hrs prn

## 2022-11-22 NOTE — ED BEHAVIORAL HEALTH ASSESSMENT NOTE - CURRENT MEDICATION
Per medical d/c summary:  Wellbutrin XL 150mg daily; Paxil 40mg daily; ASA 81mg daily; Atorvastatin 80mg hs; Klonopin 2mg hs; Synthroid 112mcg daily; Metoprolol 25mg daily; Protonix 40mg daily; Per medical d/c summary from April 2022:  Wellbutrin XL 150mg daily; Paxil 40mg daily; ASA 81mg daily; Atorvastatin 80mg hs; Klonopin 2mg hs; Synthroid 112mcg daily; Metoprolol 25mg daily; Protonix 40mg daily;

## 2022-11-22 NOTE — ED BEHAVIORAL HEALTH ASSESSMENT NOTE - RISK ASSESSMENT
Risk factors:  h/o SA, h/o psych admissions, noncompliant with treatment, not compliant with treatment, unable to care for self 2/2 psychiatric illness, poor physical health    Protective factors: no current SIIP/HIIP, no access to weapons, no active substance abuse, no psychosis,  domiciled, social supports,    Overall, pt is a ___low/moderate/high___ risk of harm to self/others and ___ requires/does not require_____ psychiatric admission for safety and stabilization.

## 2022-11-22 NOTE — ED BEHAVIORAL HEALTH ASSESSMENT NOTE - OTHER PAST PSYCHIATRIC HISTORY (INCLUDE DETAILS REGARDING ONSET, COURSE OF ILLNESS, INPATIENT/OUTPATIENT TREATMENT)
prior hospitlalizations, none in the past 6 years  Per chart, pt has a h/o alcohol use, and also of abusing benzos. Prior collateral indicates that pt used to doctor shop for prescriptions.  Currently seeing Dr. Sparrow, meds unverified

## 2022-11-22 NOTE — ED BEHAVIORAL HEALTH NOTE - BEHAVIORAL HEALTH NOTE
SW reached out to patient’s ex- (Jacky [phone]) and informed him of patient's admission to [facility] SW reached out to patient’s ex- (Jacky [phone]) and informed him of patient's admission to [facility].    Writer contacted [facility] Phone: [phone] on [address] in [place] to confirm medication. Pharmacy is currently closed. Writer will continue to f/u.

## 2022-11-22 NOTE — ED PROVIDER NOTE - OBJECTIVE STATEMENT
This is a 72-year-old female,  past medical history of bipolar disorder,  depression, benzos misuse,  with complaint of unable to function.  Patient  activated 911 after tried to call patient's who did not answer to the phone.  He sent over 911 for a wellness check.  Patient is sad depressed,  disconnected her phone from the wall,  noncompliant with the medication, unable to take care of herself. She is not eating well, not sleeping well and stuttering, very anxious. Denies any suicidal homicidal auditory visual hallucinations reports previous psychiatric hospitalizations for depression.

## 2022-11-22 NOTE — ED PROVIDER NOTE - NS_EDPROVIDERDISPOUSERTYPE_ED_A_ED
Pt on comfort cares. Lethargic. RR 20- labored when laying flat. Family requesting turns every 4 hours with pain meds before turns. Aguilar in place. Iv dilaudid given for pain. Family daughters at bedside. Pt transferred to [address].   I have personally evaluated and examined the patient. The Attending was available to me as a supervising provider if needed.

## 2022-11-22 NOTE — ED BEHAVIORAL HEALTH ASSESSMENT NOTE - SUMMARY
72 F, domiciled in [place], retired ,  (though physically  from  for 20 years), past psychiatric history of benzo/alcohol use disorder, denies complicated withdrawal/rehab, chronic depression, at least 10 past psychiatric hospitalizations, none recently, 1 history of suicide attempt via OD on ambien in 2004, PMHx hypothyroid, Breast CA s/p lumpectomy, CAD with stent placement, GERD, "pinched disc", who was BIBA after her ex- called EMS for a wellness check and she was found to be not doing well. On exam which was limited, she is calm, but fidgety, restless, unable to fully sit still. She is smiling, but appears anxious, unkempt; it is evident she is unable to care for herself at this time. Pt does feel that she should be hospitalized and agreed to voluntary admission. Currently no beds available, will discuss dispo with medical team. Labs/Covid pending.

## 2022-11-23 LAB
COVID-19 SPIKE DOMAIN AB INTERP: POSITIVE
COVID-19 SPIKE DOMAIN ANTIBODY RESULT: >250 U/ML — HIGH
SARS-COV-2 IGG+IGM SERPL QL IA: >250 U/ML — HIGH
SARS-COV-2 IGG+IGM SERPL QL IA: POSITIVE

## 2022-11-24 ENCOUNTER — EMERGENCY (EMERGENCY)
Facility: HOSPITAL | Age: 72
LOS: 1 days | Discharge: HOME | End: 2022-11-24
Attending: STUDENT IN AN ORGANIZED HEALTH CARE EDUCATION/TRAINING PROGRAM
Payer: COMMERCIAL

## 2022-11-24 VITALS
RESPIRATION RATE: 18 BRPM | DIASTOLIC BLOOD PRESSURE: 79 MMHG | TEMPERATURE: 97 F | SYSTOLIC BLOOD PRESSURE: 116 MMHG | HEART RATE: 80 BPM | HEIGHT: 67 IN | WEIGHT: 164.91 LBS | OXYGEN SATURATION: 99 %

## 2022-11-24 VITALS
DIASTOLIC BLOOD PRESSURE: 78 MMHG | HEART RATE: 71 BPM | OXYGEN SATURATION: 98 % | TEMPERATURE: 98 F | RESPIRATION RATE: 15 BRPM | SYSTOLIC BLOOD PRESSURE: 133 MMHG

## 2022-11-24 DIAGNOSIS — Z98.89 OTHER SPECIFIED POSTPROCEDURAL STATES: Chronic | ICD-10-CM

## 2022-11-24 DIAGNOSIS — Z98.890 OTHER SPECIFIED POSTPROCEDURAL STATES: Chronic | ICD-10-CM

## 2022-11-24 LAB
ALBUMIN SERPL ELPH-MCNC: 3.9 G/DL — SIGNIFICANT CHANGE UP (ref 3.3–5.2)
ALP SERPL-CCNC: 123 U/L — HIGH (ref 40–120)
ALT FLD-CCNC: 19 U/L — SIGNIFICANT CHANGE UP
ANION GAP SERPL CALC-SCNC: 10 MMOL/L — SIGNIFICANT CHANGE UP (ref 5–17)
AST SERPL-CCNC: 20 U/L — SIGNIFICANT CHANGE UP
BASOPHILS # BLD AUTO: 0.05 K/UL — SIGNIFICANT CHANGE UP (ref 0–0.2)
BASOPHILS NFR BLD AUTO: 0.7 % — SIGNIFICANT CHANGE UP (ref 0–2)
BILIRUB SERPL-MCNC: 0.6 MG/DL — SIGNIFICANT CHANGE UP (ref 0.4–2)
BUN SERPL-MCNC: 17.8 MG/DL — SIGNIFICANT CHANGE UP (ref 8–20)
CALCIUM SERPL-MCNC: 10.1 MG/DL — SIGNIFICANT CHANGE UP (ref 8.4–10.5)
CHLORIDE SERPL-SCNC: 101 MMOL/L — SIGNIFICANT CHANGE UP (ref 96–108)
CO2 SERPL-SCNC: 25 MMOL/L — SIGNIFICANT CHANGE UP (ref 22–29)
CREAT SERPL-MCNC: 0.78 MG/DL — SIGNIFICANT CHANGE UP (ref 0.5–1.3)
D DIMER BLD IA.RAPID-MCNC: <150 NG/ML DDU — SIGNIFICANT CHANGE UP
EGFR: 80 ML/MIN/1.73M2 — SIGNIFICANT CHANGE UP
EOSINOPHIL # BLD AUTO: 0.11 K/UL — SIGNIFICANT CHANGE UP (ref 0–0.5)
EOSINOPHIL NFR BLD AUTO: 1.4 % — SIGNIFICANT CHANGE UP (ref 0–6)
GLUCOSE SERPL-MCNC: 102 MG/DL — HIGH (ref 70–99)
HCT VFR BLD CALC: 39.6 % — SIGNIFICANT CHANGE UP (ref 34.5–45)
HGB BLD-MCNC: 13 G/DL — SIGNIFICANT CHANGE UP (ref 11.5–15.5)
IMM GRANULOCYTES NFR BLD AUTO: 0.4 % — SIGNIFICANT CHANGE UP (ref 0–0.9)
LYMPHOCYTES # BLD AUTO: 2.49 K/UL — SIGNIFICANT CHANGE UP (ref 1–3.3)
LYMPHOCYTES # BLD AUTO: 32.8 % — SIGNIFICANT CHANGE UP (ref 13–44)
MCHC RBC-ENTMCNC: 27.8 PG — SIGNIFICANT CHANGE UP (ref 27–34)
MCHC RBC-ENTMCNC: 32.8 GM/DL — SIGNIFICANT CHANGE UP (ref 32–36)
MCV RBC AUTO: 84.8 FL — SIGNIFICANT CHANGE UP (ref 80–100)
MONOCYTES # BLD AUTO: 0.51 K/UL — SIGNIFICANT CHANGE UP (ref 0–0.9)
MONOCYTES NFR BLD AUTO: 6.7 % — SIGNIFICANT CHANGE UP (ref 2–14)
NEUTROPHILS # BLD AUTO: 4.4 K/UL — SIGNIFICANT CHANGE UP (ref 1.8–7.4)
NEUTROPHILS NFR BLD AUTO: 58 % — SIGNIFICANT CHANGE UP (ref 43–77)
PLATELET # BLD AUTO: 253 K/UL — SIGNIFICANT CHANGE UP (ref 150–400)
POTASSIUM SERPL-MCNC: 4.8 MMOL/L — SIGNIFICANT CHANGE UP (ref 3.5–5.3)
POTASSIUM SERPL-SCNC: 4.8 MMOL/L — SIGNIFICANT CHANGE UP (ref 3.5–5.3)
PROT SERPL-MCNC: 7.2 G/DL — SIGNIFICANT CHANGE UP (ref 6.6–8.7)
RBC # BLD: 4.67 M/UL — SIGNIFICANT CHANGE UP (ref 3.8–5.2)
RBC # FLD: 16.2 % — HIGH (ref 10.3–14.5)
SODIUM SERPL-SCNC: 136 MMOL/L — SIGNIFICANT CHANGE UP (ref 135–145)
TROPONIN T SERPL-MCNC: <0.01 NG/ML — SIGNIFICANT CHANGE UP (ref 0–0.06)
WBC # BLD: 7.59 K/UL — SIGNIFICANT CHANGE UP (ref 3.8–10.5)
WBC # FLD AUTO: 7.59 K/UL — SIGNIFICANT CHANGE UP (ref 3.8–10.5)

## 2022-11-24 PROCEDURE — 76937 US GUIDE VASCULAR ACCESS: CPT | Mod: 26

## 2022-11-24 PROCEDURE — 99285 EMERGENCY DEPT VISIT HI MDM: CPT

## 2022-11-24 PROCEDURE — 85379 FIBRIN DEGRADATION QUANT: CPT

## 2022-11-24 PROCEDURE — 71046 X-RAY EXAM CHEST 2 VIEWS: CPT

## 2022-11-24 PROCEDURE — 71046 X-RAY EXAM CHEST 2 VIEWS: CPT | Mod: 26

## 2022-11-24 PROCEDURE — 93010 ELECTROCARDIOGRAM REPORT: CPT

## 2022-11-24 PROCEDURE — 84484 ASSAY OF TROPONIN QUANT: CPT

## 2022-11-24 PROCEDURE — 80053 COMPREHEN METABOLIC PANEL: CPT

## 2022-11-24 PROCEDURE — 93005 ELECTROCARDIOGRAM TRACING: CPT

## 2022-11-24 PROCEDURE — 36415 COLL VENOUS BLD VENIPUNCTURE: CPT

## 2022-11-24 PROCEDURE — 99285 EMERGENCY DEPT VISIT HI MDM: CPT | Mod: 25

## 2022-11-24 PROCEDURE — 76937 US GUIDE VASCULAR ACCESS: CPT

## 2022-11-24 PROCEDURE — 85025 COMPLETE CBC W/AUTO DIFF WBC: CPT

## 2022-11-24 RX ORDER — ACETAMINOPHEN 500 MG
975 TABLET ORAL ONCE
Refills: 0 | Status: COMPLETED | OUTPATIENT
Start: 2022-11-24 | End: 2022-11-24

## 2022-11-24 RX ADMIN — Medication 975 MILLIGRAM(S): at 13:20

## 2022-11-24 RX ADMIN — Medication 975 MILLIGRAM(S): at 13:31

## 2022-11-24 NOTE — ED ADULT TRIAGE NOTE - CHIEF COMPLAINT QUOTE
pt a+ox3, BIBA from [facility] c/o non-radiating right sided chest pain. pt states she is new to [facility] and they are still trying to get her medication in order, has been given meds in the AM but not cardiac meds.

## 2022-11-24 NOTE — FIREARM INJURY PREVENTION - TOTAL SCORE:
CC:  SUSY CHALINO SHAH is here today for abdominal pain, constipation and bloating.      Medications: medications verified and updated  Refills needed today?  YES  denies Latex allergy or sensitivity  Tobacco history: verified  Health Maintenance Due   Topic Date Due   • Pneumococcal Vaccine 0-64 (1 of 1 - PPSV23) 06/08/1998   • DTaP/Tdap/Td Vaccine (7 - Td) 08/19/2018   • Depression Screening  10/23/2019   • Influenza Vaccine (1) 09/01/2020     Patient is due for the topics as listed above and wishes to discuss with the provider. Patient decline the flu shot at this time and will receive one at later date.   Patient would like communication of messages and results via:        Cell Phone:   Telephone Information:   Mobile [phone]   Mobile [phone]     Okay to leave a message containing results? Yes     Has ABC/Hippa form been filled out?  No    Has the patient completed Advanced Directives? No     0

## 2022-11-24 NOTE — ED PROVIDER NOTE - NSFOLLOWUPINSTRUCTIONS_ED_ALL_ED_FT
Return to [facility].     Continue all your medications as prescribed.     Follow up with your cardiologist for repeat evaluation as needed. Today your workup in the emergency department did not elucidate a cause for your chest discomfort, though it is unlikely from acute heart damage, a blood clot, or an infection based on your findings today.     Chest Pain    Chest pain can be caused by many different conditions which may or may not be dangerous. Causes include heartburn, lung infections, heart attack, blood clot in lungs, skin infections, strain or damage to muscle, cartilage, or bones, etc. In addition to a history and physical examination, an electrocardiogram (ECG) or other lab tests may have been performed to determine the cause of your chest pain. Follow up with your primary care provider or with a cardiologist as instructed.     SEEK IMMEDIATE MEDICAL CARE IF YOU HAVE ANY OF THE FOLLOWING SYMPTOMS: worsening chest pain, coughing up blood, unexplained back/neck/jaw pain, severe abdominal pain, dizziness or lightheadedness, fainting, shortness of breath, sweaty or clammy skin, vomiting, or racing heart beat. These symptoms may represent a serious problem that is an emergency. Do not wait to see if the symptoms will go away. Get medical help right away. Call 911 and do not drive yourself to the hospital.

## 2022-11-24 NOTE — ED ADULT NURSE NOTE - CHIEF COMPLAINT QUOTE
pt a+ox3, BIBA from [facility] c/o non-radiating right sided chest pain. pt states she is new to [facility] and they are still trying to get her medication in order, has been given meds in the AM but not cardiac meds.

## 2022-11-24 NOTE — ED PROVIDER NOTE - CLINICAL SUMMARY MEDICAL DECISION MAKING FREE TEXT BOX
72F presenting for evaluation Of chest pain.  Patient is coming from [facility], states that she has a history of 1 cardiac stent in the past, now placed approximately 6 months ago. She has been compliant with her medications.  Notes that her current pain is right-sided on her chest, feels like it comes and goes, does not have associated shortness of breath, does not radiate into her abdomen, feels different from prior stenting episode. She is s/p cholecystectomy.

## 2022-11-24 NOTE — ED PROVIDER NOTE - PROGRESS NOTE DETAILS
SHAYLA Wick: Labs and imaging result negative for acute findings.  D-dimer is negative, troponin negative, EKG without any findings other than PVC, no signs of ischemia.  We will discharge her to return to [facility], have her follow-up outpatient with her cardiologist.

## 2022-11-24 NOTE — ED ADULT NURSE REASSESSMENT NOTE - NS ED NURSE REASSESS COMMENT FT1
Spoke with EMS for transport, Rajiv at [facility], review of pts hx, reason for admission, medications received and needs for transport were reviewed, pt status for admission and psychiatric dx were reviewed, pt is calm and cooperative, follows instruction and commands, pt to be transferred back to [facility] via EMS.

## 2022-11-24 NOTE — ED ADULT NURSE NOTE - OBJECTIVE STATEMENT
Patient presented to ED complaining of right sided chest pain for the past day. Patient states its intermittent pain, comes and goes and sharp at times. Patient AXOX3. Cardiac monitor NSR with Cpox sating @98-99% RA.  Ultrasound guided IV placed with Labs sent, and meds given. No distress noted at this time

## 2022-11-24 NOTE — ED PROVIDER NOTE - NSFOLLOWUPCLINICS_GEN_ALL_ED_FT
[facility] Cardiology  Cardiology  [address] [place] [address]  Phone: [phone]  Fax:   Follow Up Time: Routine

## 2022-11-24 NOTE — ED PROVIDER NOTE - IV ALTEPLASE EXCL REL HIDDEN
Interval History:  He has less swelling of his legs.  His shortness has improved.  He denies chest pain palpitations or cough.    Review of Systems   Constitutional: Negative.    HENT: Negative.    Eyes: Negative.    Respiratory: Positive for shortness of breath.    Cardiovascular: Negative.    Gastrointestinal: Negative.    Endocrine: Negative.    Genitourinary: Negative.    Musculoskeletal: Negative.    Skin: Negative.    Hematological: Negative.    Psychiatric/Behavioral: Negative.      Objective:     Vital Signs (Most Recent):  Temp: 98.7 °F (37.1 °C) (01/26/22 1510)  Pulse: 82 (01/26/22 1510)  Resp: 20 (01/26/22 1510)  BP: 120/75 (01/26/22 1510)  SpO2: 100 % (01/26/22 1510) Vital Signs (24h Range):  Temp:  [97.7 °F (36.5 °C)-101.3 °F (38.5 °C)] 98.7 °F (37.1 °C)  Pulse:  [] 82  Resp:  [18-20] 20  SpO2:  [97 %-100 %] 100 %  BP: (114-124)/(60-80) 120/75     Weight: 96.8 kg (213 lb 6.5 oz)  Body mass index is 33.42 kg/m².    Intake/Output Summary (Last 24 hours) at 1/26/2022 1934  Last data filed at 1/26/2022 1755  Gross per 24 hour   Intake 1120.52 ml   Output 725 ml   Net 395.52 ml      Physical Exam  Constitutional:       Appearance: Normal appearance.   HENT:      Head: Normocephalic and atraumatic.      Nose: Nose normal.      Mouth/Throat:      Mouth: Mucous membranes are moist.   Cardiovascular:      Rate and Rhythm: Normal rate and regular rhythm.      Pulses: Normal pulses.   Pulmonary:      Effort: Pulmonary effort is normal.      Breath sounds: Rales present.   Abdominal:      General: Abdomen is flat.   Musculoskeletal:         General: Swelling present.      Cervical back: Normal range of motion and neck supple.      Right lower leg: Edema present.      Left lower leg: Edema present.   Skin:     General: Skin is warm.      Capillary Refill: Capillary refill takes 2 to 3 seconds.   Neurological:      General: No focal deficit present.      Mental Status: He is alert.   Psychiatric:         Mood  and Affect: Mood normal.         Significant Labs: All pertinent labs within the past 24 hours have been reviewed.    Significant Imaging: I have reviewed all pertinent imaging results/findings within the past 24 hours.   show

## 2022-11-24 NOTE — ED PROVIDER NOTE - PATIENT PORTAL LINK FT
You can access the FollowMyHealth Patient Portal offered by [facility] by registering at the following website: [email or web address]. By joining [facility]’s FollowMyHealth portal, you will also be able to view your health information using other applications (apps) compatible with our system.

## 2022-11-24 NOTE — ED PROVIDER NOTE - OBJECTIVE STATEMENT
72F history of CAD, presenting from [facility] for evaluation of right-sided chest pain for the past day.  States that it comes and goes, is not currently present, feels like stabbing pain when it is present, is located in the right lateral breast area, no associated diaphoresis, shortness of breath, fevers, chills.  Does not have any history of this pain, did not have any antecedent trauma, has not associated with eating any specific foods, no associated diarrhea, constipation, sore throat, runny nose, cough, congestion.

## 2022-12-29 NOTE — ED BEHAVIORAL HEALTH ASSESSMENT NOTE - INTERRUPTED ATTEMPT:
None known DR. CRAFT, ATTENDING MD-  I personally saw the patient with the PA and performed a substantive portion of the visit including all aspects of the medical decision making.    30 y/o female with acute on chronic back pain.  Low right back pain x1 wk.  Similar to pain in past.  No red flags, ambulatory.  Will obtain ucg, tx symptomatically, dc with spine center f/u.

## 2023-01-09 NOTE — PATIENT PROFILE ADULT - HISTORY OF COVID-19 VACCINATION
Post-Op Assessment Note    CV Status:  Stable    Pain management: adequate     Mental Status:  Alert and awake   Hydration Status:  Euvolemic   PONV Controlled:  Controlled   Airway Patency:  Patent      Post Op Vitals Reviewed: Yes            No notable events documented      /70   Temp      Pulse  66   Resp   20   SpO2   98
Yes

## 2023-01-31 ENCOUNTER — EMERGENCY (EMERGENCY)
Facility: HOSPITAL | Age: 73
LOS: 1 days | Discharge: PSYCHIATRIC OR SUBSTANCE USE FACILITY | End: 2023-01-31
Attending: EMERGENCY MEDICINE | Admitting: EMERGENCY MEDICINE
Payer: MEDICARE

## 2023-01-31 VITALS
SYSTOLIC BLOOD PRESSURE: 155 MMHG | TEMPERATURE: 98 F | RESPIRATION RATE: 18 BRPM | OXYGEN SATURATION: 99 % | HEART RATE: 117 BPM | DIASTOLIC BLOOD PRESSURE: 100 MMHG

## 2023-01-31 DIAGNOSIS — Z98.890 OTHER SPECIFIED POSTPROCEDURAL STATES: Chronic | ICD-10-CM

## 2023-01-31 DIAGNOSIS — Z98.89 OTHER SPECIFIED POSTPROCEDURAL STATES: Chronic | ICD-10-CM

## 2023-01-31 LAB
ALBUMIN SERPL ELPH-MCNC: 4.5 G/DL — SIGNIFICANT CHANGE UP (ref 3.3–5)
ALP SERPL-CCNC: 139 U/L — HIGH (ref 40–120)
ALT FLD-CCNC: 9 U/L — SIGNIFICANT CHANGE UP (ref 4–33)
ANION GAP SERPL CALC-SCNC: 16 MMOL/L — HIGH (ref 7–14)
AST SERPL-CCNC: 22 U/L — SIGNIFICANT CHANGE UP (ref 4–32)
BASE EXCESS BLDV CALC-SCNC: -3.9 MMOL/L — LOW (ref -2–3)
BILIRUB SERPL-MCNC: 0.9 MG/DL — SIGNIFICANT CHANGE UP (ref 0.2–1.2)
BLOOD GAS VENOUS COMPREHENSIVE RESULT: SIGNIFICANT CHANGE UP
BUN SERPL-MCNC: 16 MG/DL — SIGNIFICANT CHANGE UP (ref 7–23)
CALCIUM SERPL-MCNC: 10.8 MG/DL — HIGH (ref 8.4–10.5)
CHLORIDE BLDV-SCNC: 108 MMOL/L — SIGNIFICANT CHANGE UP (ref 96–108)
CHLORIDE SERPL-SCNC: 107 MMOL/L — SIGNIFICANT CHANGE UP (ref 98–107)
CO2 BLDV-SCNC: 20.6 MMOL/L — LOW (ref 22–26)
CO2 SERPL-SCNC: 18 MMOL/L — LOW (ref 22–31)
CREAT SERPL-MCNC: 0.78 MG/DL — SIGNIFICANT CHANGE UP (ref 0.5–1.3)
EGFR: 80 ML/MIN/1.73M2 — SIGNIFICANT CHANGE UP
GAS PNL BLDV: 139 MMOL/L — SIGNIFICANT CHANGE UP (ref 136–145)
GLUCOSE BLDV-MCNC: 110 MG/DL — HIGH (ref 70–99)
GLUCOSE SERPL-MCNC: 107 MG/DL — HIGH (ref 70–99)
HCO3 BLDV-SCNC: 20 MMOL/L — LOW (ref 22–29)
HCT VFR BLDA CALC: 43 % — SIGNIFICANT CHANGE UP (ref 34.5–46.5)
HGB BLD CALC-MCNC: 14.4 G/DL — SIGNIFICANT CHANGE UP (ref 11.5–15.5)
LACTATE BLDV-MCNC: 2.6 MMOL/L — HIGH (ref 0.5–2)
PCO2 BLDV: 31 MMHG — LOW (ref 39–42)
PH BLDV: 7.41 — SIGNIFICANT CHANGE UP (ref 7.32–7.43)
PO2 BLDV: 55 MMHG — SIGNIFICANT CHANGE UP
POTASSIUM BLDV-SCNC: 3.8 MMOL/L — SIGNIFICANT CHANGE UP (ref 3.5–5.1)
POTASSIUM SERPL-MCNC: 4.3 MMOL/L — SIGNIFICANT CHANGE UP (ref 3.5–5.3)
POTASSIUM SERPL-SCNC: 4.3 MMOL/L — SIGNIFICANT CHANGE UP (ref 3.5–5.3)
PROT SERPL-MCNC: 8.4 G/DL — HIGH (ref 6–8.3)
SAO2 % BLDV: 84.7 % — SIGNIFICANT CHANGE UP
SODIUM SERPL-SCNC: 141 MMOL/L — SIGNIFICANT CHANGE UP (ref 135–145)
TSH SERPL-MCNC: 2.49 UIU/ML — SIGNIFICANT CHANGE UP (ref 0.27–4.2)

## 2023-01-31 PROCEDURE — 70450 CT HEAD/BRAIN W/O DYE: CPT | Mod: 26,MD

## 2023-01-31 PROCEDURE — 99285 EMERGENCY DEPT VISIT HI MDM: CPT | Mod: FS

## 2023-01-31 RX ORDER — SODIUM CHLORIDE 9 MG/ML
1000 INJECTION INTRAMUSCULAR; INTRAVENOUS; SUBCUTANEOUS ONCE
Refills: 0 | Status: COMPLETED | OUTPATIENT
Start: 2023-01-31 | End: 2023-01-31

## 2023-01-31 RX ADMIN — SODIUM CHLORIDE 1000 MILLILITER(S): 9 INJECTION INTRAMUSCULAR; INTRAVENOUS; SUBCUTANEOUS at 23:00

## 2023-01-31 NOTE — ED ADULT NURSE NOTE - OBJECTIVE STATEMENT
patient presents to ed c/o depression "for a long time".  denies SI/HI. denies cp, sob, n/v, fever/chills

## 2023-01-31 NOTE — ED ADULT NURSE NOTE - CHIEF COMPLAINT QUOTE
pt brought in by  for worsening depression, states " she is sleeping too much and is mumbling to herself" and is incoherent at times. spouse also reports increased confusion x2 days. denies fever/chills/dysuria.

## 2023-01-31 NOTE — ED PROVIDER NOTE - PROGRESS NOTE DETAILS
PA Wilkins- UA normal. CT negative for acute findings. Psych attending called. Will evaluate patient. Silver:  Patient signed out to me pending urinalysis to look for possible infection.  It was negative and CT was negative as well.   came to evaluate the patient for [facility] psych placement and they will have her board for placement at [facility].  There was no psychiatric or behavioral interventions that required over the course the evening. Medications were confirmed with  and patient herself.  Confirmed Synthroid atorvastatin aspirin Brilinta,metoprolol,  and her recent psych meds included Remeron and Abilify and Desyrel.  After discussion with the psych attending.  We will start Ativan 0.5 mg twice a day, Remeron 15 mg at bedtime and Abilify 2 mg at bedtime.

## 2023-01-31 NOTE — ED ADULT NURSE NOTE - ED STAT RN HANDOFF DETAILS
Original legals in chart. All paperwork verified with SW prior to transport. EMS report given. Respirations even and unlabored. TRansported via [facility] services  No signs of acute distress noted. Patient stable for transport

## 2023-01-31 NOTE — ED PROVIDER NOTE - CLINICAL SUMMARY MEDICAL DECISION MAKING FREE TEXT BOX
71 y/o female pmh depression, bipolar, htn c/o not feeling well and worsening depression. A per family, pt has had increased depression and confusion, is mumbling a lot to herself and appears off balance when walking. Pt is also having urinary frequency. WIll obtain labs, tox screen, UA, CT head, likely psych consult and reassess.

## 2023-01-31 NOTE — ED PROVIDER NOTE - CHIEF COMPLAINT
Attempted to contact patient by phone for Diabetic Eye Exam visit, was able to speak to patient.  Patient declined at this time.   Updated Care Everywhere and Team.    The patient is a 68y Female complaining of abdominal pain.

## 2023-01-31 NOTE — ED PROVIDER NOTE - ATTENDING APP SHARED VISIT CONTRIBUTION OF CARE
73 y/o female pmh depression, bipolar, htn, hld c/o worsening depression and confusion over the last 2-3 days. As per family, pt has been not acting like herself and very confused. Pt has been mumbling a lot to herself and appears extremely off balance when walking. Pt has been confused as well. Pt states that she does not feel well. Pt does admits to urinary frequency over the last several days. Pt admits to thinking about suicide but denies having an active plan and has never attempted suicide in the past and states that she has no plan to now. Denies auditory or visual hallucinations. Pt denies chest pain, sob, abd pain, n/v/d, numbness, tingling ,syncope, fever or chills.  73 y/o female pmh depression, bipolar, htn c/o not feeling well and worsening depression. A per family, pt has had increased depression and confusion, is mumbling a lot to herself and appears off balance when walking. Pt is also having urinary frequency. WIll obtain labs, tox screen, UA, CT head, likely psych consult and reassess.

## 2023-01-31 NOTE — ED PROVIDER NOTE - OBJECTIVE STATEMENT
73 y/o female pmh depression, bipolar, htn, hld c/o worsening depression and confusion over the last 2-3 days. As per family, pt has been not acting like herself and very confused. Pt has been mumbling a lot to herself and appears extremely off balance when walking. Pt has been confused as well. Pt states that she does not feel well. Pt does admits to urinary frequency over the last several days. Pt admits to thinking about suicide but denies having an active plan and has never attempted suicide in the past and states that she has no plan to now. Denies auditory or visual hallucinations. Pt denies chest pain, sob, abd pain, n/v/d, numbness, tingling ,syncope, fever or chills.    Of note, pt was admitted to [facility] for 2 weeks earlier this year.

## 2023-02-01 ENCOUNTER — INPATIENT (INPATIENT)
Facility: HOSPITAL | Age: 73
LOS: 8 days | Discharge: HOME | DRG: 881 | End: 2023-02-10
Attending: PSYCHIATRY & NEUROLOGY | Admitting: PSYCHIATRY & NEUROLOGY
Payer: MEDICARE

## 2023-02-01 VITALS
SYSTOLIC BLOOD PRESSURE: 150 MMHG | HEART RATE: 84 BPM | OXYGEN SATURATION: 98 % | TEMPERATURE: 98 F | RESPIRATION RATE: 16 BRPM | DIASTOLIC BLOOD PRESSURE: 89 MMHG

## 2023-02-01 VITALS — WEIGHT: 184.97 LBS | TEMPERATURE: 98 F | HEIGHT: 65 IN | OXYGEN SATURATION: 99 % | RESPIRATION RATE: 14 BRPM

## 2023-02-01 DIAGNOSIS — F32.2 MAJOR DEPRESSIVE DISORDER, SINGLE EPISODE, SEVERE WITHOUT PSYCHOTIC FEATURES: ICD-10-CM

## 2023-02-01 DIAGNOSIS — F32.A DEPRESSION, UNSPECIFIED: ICD-10-CM

## 2023-02-01 DIAGNOSIS — Z98.89 OTHER SPECIFIED POSTPROCEDURAL STATES: Chronic | ICD-10-CM

## 2023-02-01 DIAGNOSIS — Z98.890 OTHER SPECIFIED POSTPROCEDURAL STATES: Chronic | ICD-10-CM

## 2023-02-01 DIAGNOSIS — F41.9 ANXIETY DISORDER, UNSPECIFIED: ICD-10-CM

## 2023-02-01 LAB
APPEARANCE UR: CLEAR — SIGNIFICANT CHANGE UP
BACTERIA # UR AUTO: ABNORMAL
BASE EXCESS BLDV CALC-SCNC: -3.5 MMOL/L — LOW (ref -2–3)
BASOPHILS # BLD AUTO: 0.05 K/UL — SIGNIFICANT CHANGE UP (ref 0–0.2)
BASOPHILS NFR BLD AUTO: 0.4 % — SIGNIFICANT CHANGE UP (ref 0–2)
BILIRUB UR-MCNC: NEGATIVE — SIGNIFICANT CHANGE UP
BLOOD GAS VENOUS COMPREHENSIVE RESULT: SIGNIFICANT CHANGE UP
CHLORIDE BLDV-SCNC: 110 MMOL/L — HIGH (ref 96–108)
CO2 BLDV-SCNC: 22.5 MMOL/L — SIGNIFICANT CHANGE UP (ref 22–26)
COLOR SPEC: YELLOW — SIGNIFICANT CHANGE UP
DIFF PNL FLD: NEGATIVE — SIGNIFICANT CHANGE UP
EOSINOPHIL # BLD AUTO: 0.04 K/UL — SIGNIFICANT CHANGE UP (ref 0–0.5)
EOSINOPHIL NFR BLD AUTO: 0.3 % — SIGNIFICANT CHANGE UP (ref 0–6)
EPI CELLS # UR: 1 /HPF — SIGNIFICANT CHANGE UP (ref 0–5)
FLUAV AG NPH QL: SIGNIFICANT CHANGE UP
FLUBV AG NPH QL: SIGNIFICANT CHANGE UP
GAS PNL BLDV: 141 MMOL/L — SIGNIFICANT CHANGE UP (ref 136–145)
GLUCOSE BLDV-MCNC: 97 MG/DL — SIGNIFICANT CHANGE UP (ref 70–99)
GLUCOSE UR QL: NEGATIVE — SIGNIFICANT CHANGE UP
HCO3 BLDV-SCNC: 21 MMOL/L — LOW (ref 22–29)
HCT VFR BLD CALC: 43.7 % — SIGNIFICANT CHANGE UP (ref 34.5–45)
HCT VFR BLDA CALC: 35 % — SIGNIFICANT CHANGE UP (ref 34.5–46.5)
HGB BLD CALC-MCNC: 11.6 G/DL — SIGNIFICANT CHANGE UP (ref 11.5–15.5)
HGB BLD-MCNC: 13.6 G/DL — SIGNIFICANT CHANGE UP (ref 11.5–15.5)
HYALINE CASTS # UR AUTO: 0 /LPF — SIGNIFICANT CHANGE UP (ref 0–7)
IANC: 8.11 K/UL — HIGH (ref 1.8–7.4)
IMM GRANULOCYTES NFR BLD AUTO: 0.2 % — SIGNIFICANT CHANGE UP (ref 0–0.9)
KETONES UR-MCNC: ABNORMAL
LACTATE BLDV-MCNC: 1.8 MMOL/L — SIGNIFICANT CHANGE UP (ref 0.5–2)
LEUKOCYTE ESTERASE UR-ACNC: ABNORMAL
LYMPHOCYTES # BLD AUTO: 2.89 K/UL — SIGNIFICANT CHANGE UP (ref 1–3.3)
LYMPHOCYTES # BLD AUTO: 23.7 % — SIGNIFICANT CHANGE UP (ref 13–44)
MCHC RBC-ENTMCNC: 25.8 PG — LOW (ref 27–34)
MCHC RBC-ENTMCNC: 31.1 GM/DL — LOW (ref 32–36)
MCV RBC AUTO: 82.8 FL — SIGNIFICANT CHANGE UP (ref 80–100)
MONOCYTES # BLD AUTO: 1.09 K/UL — HIGH (ref 0–0.9)
MONOCYTES NFR BLD AUTO: 8.9 % — SIGNIFICANT CHANGE UP (ref 2–14)
NEUTROPHILS # BLD AUTO: 8.11 K/UL — HIGH (ref 1.8–7.4)
NEUTROPHILS NFR BLD AUTO: 66.5 % — SIGNIFICANT CHANGE UP (ref 43–77)
NITRITE UR-MCNC: NEGATIVE — SIGNIFICANT CHANGE UP
NRBC # BLD: 0 /100 WBCS — SIGNIFICANT CHANGE UP (ref 0–0)
NRBC # FLD: 0 K/UL — SIGNIFICANT CHANGE UP (ref 0–0)
PCO2 BLDV: 37 MMHG — LOW (ref 39–42)
PH BLDV: 7.37 — SIGNIFICANT CHANGE UP (ref 7.32–7.43)
PH UR: 6 — SIGNIFICANT CHANGE UP (ref 5–8)
PLATELET # BLD AUTO: 327 K/UL — SIGNIFICANT CHANGE UP (ref 150–400)
PO2 BLDV: 57 MMHG — SIGNIFICANT CHANGE UP
POTASSIUM BLDV-SCNC: 3.8 MMOL/L — SIGNIFICANT CHANGE UP (ref 3.5–5.1)
PROT UR-MCNC: ABNORMAL
RBC # BLD: 5.28 M/UL — HIGH (ref 3.8–5.2)
RBC # FLD: 14.6 % — HIGH (ref 10.3–14.5)
RBC CASTS # UR COMP ASSIST: 2 /HPF — SIGNIFICANT CHANGE UP (ref 0–4)
RSV RNA NPH QL NAA+NON-PROBE: SIGNIFICANT CHANGE UP
SAO2 % BLDV: 85.3 % — SIGNIFICANT CHANGE UP
SARS-COV-2 RNA SPEC QL NAA+PROBE: SIGNIFICANT CHANGE UP
SP GR SPEC: 1.03 — SIGNIFICANT CHANGE UP (ref 1.01–1.05)
UROBILINOGEN FLD QL: SIGNIFICANT CHANGE UP
WBC # BLD: 12.2 K/UL — HIGH (ref 3.8–10.5)
WBC # FLD AUTO: 12.2 K/UL — HIGH (ref 3.8–10.5)
WBC UR QL: 5 /HPF — SIGNIFICANT CHANGE UP (ref 0–5)

## 2023-02-01 PROCEDURE — 80061 LIPID PANEL: CPT

## 2023-02-01 PROCEDURE — 83036 HEMOGLOBIN GLYCOSYLATED A1C: CPT

## 2023-02-01 PROCEDURE — 82607 VITAMIN B-12: CPT

## 2023-02-01 PROCEDURE — 87086 URINE CULTURE/COLONY COUNT: CPT

## 2023-02-01 PROCEDURE — 36415 COLL VENOUS BLD VENIPUNCTURE: CPT

## 2023-02-01 PROCEDURE — 82746 ASSAY OF FOLIC ACID SERUM: CPT

## 2023-02-01 PROCEDURE — 93005 ELECTROCARDIOGRAM TRACING: CPT

## 2023-02-01 PROCEDURE — 71046 X-RAY EXAM CHEST 2 VIEWS: CPT | Mod: 26

## 2023-02-01 PROCEDURE — 86780 TREPONEMA PALLIDUM: CPT

## 2023-02-01 PROCEDURE — 86769 SARS-COV-2 COVID-19 ANTIBODY: CPT

## 2023-02-01 PROCEDURE — 99285 EMERGENCY DEPT VISIT HI MDM: CPT

## 2023-02-01 PROCEDURE — 84443 ASSAY THYROID STIM HORMONE: CPT

## 2023-02-01 PROCEDURE — 99222 1ST HOSP IP/OBS MODERATE 55: CPT | Mod: FS

## 2023-02-01 PROCEDURE — 97163 PT EVAL HIGH COMPLEX 45 MIN: CPT | Mod: GP

## 2023-02-01 PROCEDURE — 97116 GAIT TRAINING THERAPY: CPT | Mod: GP

## 2023-02-01 RX ORDER — METOPROLOL TARTRATE 50 MG
25 TABLET ORAL DAILY
Refills: 0 | Status: DISCONTINUED | OUTPATIENT
Start: 2023-02-01 | End: 2023-02-10

## 2023-02-01 RX ORDER — ASPIRIN/CALCIUM CARB/MAGNESIUM 324 MG
81 TABLET ORAL DAILY
Refills: 0 | Status: DISCONTINUED | OUTPATIENT
Start: 2023-02-01 | End: 2023-02-10

## 2023-02-01 RX ORDER — PANTOPRAZOLE SODIUM 20 MG/1
40 TABLET, DELAYED RELEASE ORAL
Refills: 0 | Status: DISCONTINUED | OUTPATIENT
Start: 2023-02-01 | End: 2023-02-10

## 2023-02-01 RX ORDER — LEVOTHYROXINE SODIUM 125 MCG
112 TABLET ORAL DAILY
Refills: 0 | Status: DISCONTINUED | OUTPATIENT
Start: 2023-02-01 | End: 2023-02-04

## 2023-02-01 RX ORDER — ARIPIPRAZOLE 15 MG/1
2 TABLET ORAL AT BEDTIME
Refills: 0 | Status: DISCONTINUED | OUTPATIENT
Start: 2023-02-01 | End: 2023-02-04

## 2023-02-01 RX ORDER — BUPROPION HYDROCHLORIDE 150 MG/1
150 TABLET, EXTENDED RELEASE ORAL DAILY
Refills: 0 | Status: DISCONTINUED | OUTPATIENT
Start: 2023-02-01 | End: 2023-02-10

## 2023-02-01 RX ORDER — SODIUM CHLORIDE 9 MG/ML
500 INJECTION INTRAMUSCULAR; INTRAVENOUS; SUBCUTANEOUS ONCE
Refills: 0 | Status: DISCONTINUED | OUTPATIENT
Start: 2023-02-01 | End: 2023-02-01

## 2023-02-01 RX ORDER — MIRTAZAPINE 45 MG/1
15 TABLET, ORALLY DISINTEGRATING ORAL AT BEDTIME
Refills: 0 | Status: DISCONTINUED | OUTPATIENT
Start: 2023-02-01 | End: 2023-02-04

## 2023-02-01 RX ORDER — HALOPERIDOL DECANOATE 100 MG/ML
5 INJECTION INTRAMUSCULAR EVERY 6 HOURS
Refills: 0 | Status: DISCONTINUED | OUTPATIENT
Start: 2023-02-01 | End: 2023-02-10

## 2023-02-01 RX ORDER — INFLUENZA VIRUS VACCINE 15; 15; 15; 15 UG/.5ML; UG/.5ML; UG/.5ML; UG/.5ML
0.7 SUSPENSION INTRAMUSCULAR ONCE
Refills: 0 | Status: DISCONTINUED | OUTPATIENT
Start: 2023-02-01 | End: 2023-02-10

## 2023-02-01 RX ORDER — PANTOPRAZOLE SODIUM 20 MG/1
40 TABLET, DELAYED RELEASE ORAL
Refills: 0 | Status: DISCONTINUED | OUTPATIENT
Start: 2023-02-01 | End: 2023-02-04

## 2023-02-01 RX ORDER — DIPHENHYDRAMINE HCL 50 MG
50 CAPSULE ORAL EVERY 6 HOURS
Refills: 0 | Status: DISCONTINUED | OUTPATIENT
Start: 2023-02-01 | End: 2023-02-10

## 2023-02-01 RX ORDER — TICAGRELOR 90 MG/1
90 TABLET ORAL DAILY
Refills: 0 | Status: DISCONTINUED | OUTPATIENT
Start: 2023-02-01 | End: 2023-02-04

## 2023-02-01 RX ORDER — ASPIRIN/CALCIUM CARB/MAGNESIUM 324 MG
81 TABLET ORAL DAILY
Refills: 0 | Status: DISCONTINUED | OUTPATIENT
Start: 2023-02-01 | End: 2023-02-04

## 2023-02-01 RX ORDER — METOPROLOL TARTRATE 50 MG
25 TABLET ORAL DAILY
Refills: 0 | Status: DISCONTINUED | OUTPATIENT
Start: 2023-02-01 | End: 2023-02-04

## 2023-02-01 RX ORDER — ATORVASTATIN CALCIUM 80 MG/1
80 TABLET, FILM COATED ORAL AT BEDTIME
Refills: 0 | Status: DISCONTINUED | OUTPATIENT
Start: 2023-02-01 | End: 2023-02-10

## 2023-02-01 RX ORDER — TICAGRELOR 90 MG/1
60 TABLET ORAL EVERY 12 HOURS
Refills: 0 | Status: DISCONTINUED | OUTPATIENT
Start: 2023-02-01 | End: 2023-02-10

## 2023-02-01 RX ORDER — BUPROPION HYDROCHLORIDE 150 MG/1
150 TABLET, EXTENDED RELEASE ORAL DAILY
Refills: 0 | Status: DISCONTINUED | OUTPATIENT
Start: 2023-02-01 | End: 2023-02-01

## 2023-02-01 RX ORDER — LEVOTHYROXINE SODIUM 125 MCG
112 TABLET ORAL DAILY
Refills: 0 | Status: DISCONTINUED | OUTPATIENT
Start: 2023-02-01 | End: 2023-02-10

## 2023-02-01 RX ORDER — CLONAZEPAM 1 MG
2 TABLET ORAL AT BEDTIME
Refills: 0 | Status: DISCONTINUED | OUTPATIENT
Start: 2023-02-01 | End: 2023-02-08

## 2023-02-01 RX ORDER — ATORVASTATIN CALCIUM 80 MG/1
80 TABLET, FILM COATED ORAL AT BEDTIME
Refills: 0 | Status: DISCONTINUED | OUTPATIENT
Start: 2023-02-01 | End: 2023-02-04

## 2023-02-01 RX ADMIN — Medication 1 MILLIGRAM(S): at 11:03

## 2023-02-01 RX ADMIN — TICAGRELOR 60 MILLIGRAM(S): 90 TABLET ORAL at 21:29

## 2023-02-01 RX ADMIN — Medication 2 MILLIGRAM(S): at 23:03

## 2023-02-01 RX ADMIN — Medication 112 MICROGRAM(S): at 09:55

## 2023-02-01 RX ADMIN — TICAGRELOR 90 MILLIGRAM(S): 90 TABLET ORAL at 11:03

## 2023-02-01 RX ADMIN — SODIUM CHLORIDE 1000 MILLILITER(S): 9 INJECTION INTRAMUSCULAR; INTRAVENOUS; SUBCUTANEOUS at 01:32

## 2023-02-01 RX ADMIN — Medication 0.5 MILLIGRAM(S): at 09:55

## 2023-02-01 RX ADMIN — Medication 81 MILLIGRAM(S): at 11:06

## 2023-02-01 RX ADMIN — PANTOPRAZOLE SODIUM 40 MILLIGRAM(S): 20 TABLET, DELAYED RELEASE ORAL at 09:55

## 2023-02-01 RX ADMIN — ATORVASTATIN CALCIUM 80 MILLIGRAM(S): 80 TABLET, FILM COATED ORAL at 21:29

## 2023-02-01 NOTE — ED ADULT NURSE REASSESSMENT NOTE - NS ED NURSE REASSESS COMMENT FT1
Patient received in stretcher. AOX4. Respirations even and unlabored. Spontaneous movement of all extremities noted. Denies SI, HI, visual or tactile disturbances. No signs of acute distress noted. As per MD Block patient boarding for admission to psych. Patient calm and cooperative, no levels of redirection needed. Comfort and safety maintained. All current care needs met. Care plan continued Alli SMALL

## 2023-02-01 NOTE — BH TREATMENT PLAN - ANXIETY/PANIC/FEAR NURSING INTERVENTIONS/RECOMMENDATIONS
Encourage participation in unit activities with emphasis on coping/stress management.  Teach relaxation exercises  Offer PRN medication for anxiety when needed.

## 2023-02-01 NOTE — BH SAFETY PLAN - INTERNAL COPING STRATEGY 2
Listening to talk radio to stay updated on current events and hear people discussing interesting topics.

## 2023-02-01 NOTE — BH INPATIENT PSYCHIATRY ASSESSMENT NOTE - NSBHCRANIAL_PSY_ALL_CORE
Smiles, shows teeth, opens mouth, sticks out tongue (V, VII, XI)/Normal speech (IX, X, XII)/Extraocular Eye Movement Intact  (III, IV, VI)/Hearing intact (VIII)

## 2023-02-01 NOTE — ED BEHAVIORAL HEALTH ASSESSMENT NOTE - DESCRIPTION
Lives alone in [place],  with 1 adult son. retired .  from  though she they remain in close contact. hx of HTN, HLD, CAD s/p stent, hx of Breast CA s/p lumpectomy. meds listed as per last documentation back in 11/22/2022: ASA 81mg daily; Atorvastatin 80mg HS, Synthroid 112mcg daily; Metoprolol 25mg daily; Protonix 40mg daily; Since her ED arrival, the Pt has been cooperative.  There has been no occurrence of agitation/aggressive behavior.  No verbalization of active/ passive SI/HI.   There are no signs/symptoms of acute christofer or florid psychosis.      Vital Signs Last 24 Hrs  T(C): 36.7 (01 Feb 2023 01:07), Max: 36.9 (31 Jan 2023 16:59)  T(F): 98.1 (01 Feb 2023 01:07), Max: 98.4 (31 Jan 2023 16:59)  HR: 92 (01 Feb 2023 01:07) (92 - 117)  BP: 152/90 (01 Feb 2023 01:07) (137/93 - 155/100)  BP(mean): --  RR: 17 (01 Feb 2023 01:07) (17 - 18)  SpO2: 98% (01 Feb 2023 01:07) (98% - 99%)    Parameters below as of 31 Jan 2023 20:08  Patient On (Oxygen Delivery Method): room air

## 2023-02-01 NOTE — BH INPATIENT PSYCHIATRY ASSESSMENT NOTE - NSBHCHARTREVIEWVS_PSY_A_CORE FT
Vital Signs Last 24 Hrs  T(C): 36.7 (02-01-23 @ 14:48), Max: 36.9 (01-31-23 @ 16:59)  T(F): 98 (02-01-23 @ 14:48), Max: 98.4 (01-31-23 @ 16:59)  HR: 84 (02-01-23 @ 12:04) (84 - 117)  BP: 150/89 (02-01-23 @ 12:04) (136/84 - 155/100)  BP(mean): --  RR: 14 (02-01-23 @ 14:48) (14 - 18)  SpO2: 99% (02-01-23 @ 14:48) (96% - 99%)    Orthostatic VS  02-01-23 @ 14:48  Lying BP: --/-- HR: --  Sitting BP: 154/104 HR: 104  Standing BP: 124/93 HR: 103  Site: --  Mode: --  Orthostatic VS  02-01-23 @ 14:00  Lying BP: --/-- HR: --  Sitting BP: 154/104 HR: 104  Standing BP: 124/93 HR: 103  Site: --  Mode: --

## 2023-02-01 NOTE — ED BEHAVIORAL HEALTH NOTE - BEHAVIORAL HEALTH NOTE
COVID Exposure Screen- COLLATERAL   1.	*Have you had a COVID-19 test in the last 90 days?  ( X ) Yes   (  ) No   (  ) Unknown- Reason: _____  IF YES PROCEED TO QUESTION #2. IF NO OR UNKNOWN, PLEASE SKIP TO QUESTION #3.  2.	Date of test(s) and result(s): ________ negative last 11/22/2022  3.	*Have you tested positive for COVID-19 antibodies? ( X ) Yes   (  ) No   (  ) Unknown- Reason: _____  IF YES PROCEED TO QUESTION #4. IF NO or UNKNOWN, PLEASE SKIP TO QUESTION #5.  4.	Date of positive antibody test: ________ 11/22/2022  5.	*Have you received 2 doses of the COVID-19 vaccine? ( X  ) Yes   (  ) No   (  ) Unknown- Reason: _____   IF YES PROCEED TO QUESTION #6. IF NO or UNKNOWN, PLEASE SKIP TO QUESTION #7.  6.	Date of second dose: ________  7.	*In the past 10 days, have you been around anyone with a positive COVID-19 test?* (  ) Yes   ( X  ) No   (  ) Unknown- Reason: ____  IF YES PROCEED TO QUESTION #8. IF NO or UNKNOWN, PLEASE SKIP TO QUESTION #13.  8.	Were you within 6 feet of them for at least 15 minutes? (  ) Yes   (  ) No   (  ) Unknown- Reason: _____  9.	Have you provided care for them? (  ) Yes   (  ) No   (  ) Unknown- Reason: ______  10.	Have you had direct physical contact with them (touched, hugged, or kissed them)? (  ) Yes   (  ) No    (  ) Unknown- Reason: _____  11.	Have you shared eating or drinking utensils with them? (  ) Yes   (  ) No    (  ) Unknown- Reason: ____  12.	Have they sneezed, coughed, or somehow gotten respiratory droplets on you? (  ) Yes   (  ) No    (  ) Unknown- Reason: ______  13.	*Have you been out of [place] within the past 10 days?* (  ) Yes   ( X ) No   (  ) Unknown- Reason: _____  IF YES PLEASE ANSWER THE FOLLOWING QUESTIONS:  14.	Which state/country have you been to? ______  15.	Were you there over 24 hours? (  ) Yes   (  ) No    (  ) Unknown- Reason: ______  16.	Date of return to [facility]: ______

## 2023-02-01 NOTE — BH PATIENT PROFILE - FUNCTIONAL ASSESSMENT - BASIC MOBILITY 6.
3-calculated by average/Not able to assess (calculate score using [facility] averaging method)

## 2023-02-01 NOTE — ED BEHAVIORAL HEALTH ASSESSMENT NOTE - CURRENT MEDICATION
Per medical d/c summary from April 2022 and as documented from her last  ED evaluation back in 11/22/2022: was on Wellbutrin XL 150mg daily; Paxil 40mg daily; Klonopin 2mg HS as confirmed by ED attending, Dr Bloch: Pt on abilify 2mg HS and remeron HS    Per medical d/c summary from April 2022 and as documented from her last  ED evaluation back in 11/22/2022: was on Wellbutrin XL 150mg daily; Paxil 40mg daily; Klonopin 2mg HS

## 2023-02-01 NOTE — ED BEHAVIORAL HEALTH ASSESSMENT NOTE - HPI (INCLUDE ILLNESS QUALITY, SEVERITY, DURATION, TIMING, CONTEXT, MODIFYING FACTORS, ASSOCIATED SIGNS AND SYMPTOMS)
72 yr old feamle, domiciled, living alone, and retired .  she  (though physically  from  of > 20 yrs). premorbidly Ambulant without assist; reports bADL and iADL independent; no HHA.  background hx of depression and anxiety; other diagnosis of bipolar disorder; hx of multiple past psych admissions including last admission to [facility] back in 11/2022.  with past hx of BZD and alcohol use - no documented complicated withdrawal/rehabs. hx of a suicide attempt via OD on Ambien in 2004.  pertinent medical issues include: hypothyroidism, Breast cancer s/p lumpectomy, CAD with stent placement, GERD, and "pinched disc".  previously seen at [facility] in 2018 due to ingestion of Ambien, Klonopin and Valium. tonight, presented to the ED accompanied by  due to worsening depression.   reported that Pt was hypersomnolent; previously mumbling to self; incoherent at times. psych was consulted as there had been concern re: SI     she is seen bedside.  knows she is in a hospital; names month as January but not the year. reports that she is not feeling well; "not good", she says - shaking her head. endorses progressively feeling more sad and anxious. there is poor sleep, decreased energy level, impaired concentration and decreased appetite.  currently feeling more helpless/ hopeless/ worthless. admits that she "feels suicidal" but no intention or plans. no HI. unable to fully elaborate on the anxiety symptoms. ruminating on not feeling well. denied any somatic complaints.      denied experiencing any recent christofer symptoms of: elevated mood, no grandiosity, no increased goal directed activities or engaged in risk taking behavior; denied any pressured speech;  no increased in energy level causing sleep disruption.  she denied feeling paranoid. denied any perceptual disturbances.  denied abusing any illicit substances including alcohol. admits that she has been getting more forgetful. struggled with recalling events including meds she is taking currently - though claimed that she has been compliant with all prescribed meds     as is with this current presentation, she was able to engage and speak to writer but it is noted that she still does make repetitive sound (zuh, zuh, zuh) and cannot further articulate on why, or stop. She continues to endorse that she has not been functioning or doing well at home. She admits struggling with her ADLs like showering or eating. Her sleep continues to be erratic.  like from the last presentation, she reports staying in bed most of the days.  denies any current SI/ HI

## 2023-02-01 NOTE — ED BEHAVIORAL HEALTH ASSESSMENT NOTE - DETAILS
1 suicide attempt in 2004 via OD on 68+ ambien per records Admissions as per transfer protocol neglect as a child discussed with Dr ADAN Galindo Accepted by Dr. Paulson

## 2023-02-01 NOTE — ED BEHAVIORAL HEALTH ASSESSMENT NOTE - NS ED BHA PLAN ADMIT TO PSYCHIATRY BH CONTACTED FT
after hrs after hrs. attempted to call Dr Ryan Sparrow at 8:30AM, [phone]: no answer; left voice mail

## 2023-02-01 NOTE — ED BEHAVIORAL HEALTH NOTE - BEHAVIORAL HEALTH NOTE
Patient is accepted to [facility]. Patient is accepted by Dr. Paulson and nurse to nurse to be provided at [phone]. Legals and ekg emailed. Nurse and nurse report to be provided [phone]. Ems arranged and transfer form placed in chart. EMS to be arrived by 3pm.    worker confirmed active medicare insurance with registration.

## 2023-02-01 NOTE — H&P ADULT - ASSESSMENT
71 y/o female with PMHx of  hypothyroidism, Breast cancer s/p lumpectomy and chemo, CAD with stent placement 2022, GERD, herniated disc and a past psychiatry history of depression and anxiety, bipolar disorder, polysubstance abuse (BZD and alcohol) and hx of a suicide attempt via OD on Ambien in 2004 admitted to inpatient psychiatry for worsening depression and suicidal ideation.     # Bipolar disorder   # Major depression   - Management per psych    # Prolonged QT  - Avoid meds that causes prolonged QT  - Repeat EKG    # UTI  - Keflex 500 mg x 7 days   - Po hydration     # CAD s/p stents/ HTN  - C/w Berlinta 60 mg bid   - C/w Aspirin   - C/w Metoprolol 25 mg daily   - Outpatient follow up with cardio after discharge     # Confusion, unsteady gait  CTH negative   - Fall precaution   - 1:1 observation   - Outpatient  neuro evaluation after discharge   - Vitamin B12, folate, RPR  - PT consult     DVT ppx  Encourage ambulation     Will sign off, reconsult PRN    D/w Dr. Nelson  71 y/o female with PMHx of  hypothyroidism, Breast cancer s/p lumpectomy and chemo, CAD with stent placement 2022, GERD, herniated disc and a past psychiatry history of depression and anxiety, bipolar disorder, polysubstance abuse (BZD and alcohol) and hx of a suicide attempt via OD on Ambien in 2004 admitted to inpatient psychiatry for worsening depression and suicidal ideation.     # Bipolar disorder   # Major depression   - Management per psych    # Prolonged QT  - Avoid meds that causes prolonged QT  - Repeat EKG    # UTI  - Keflex 500 mg x 7 days   - Po hydration     # CAD s/p stents/ HTN  - C/w Berlinta 60 mg bid   - C/w Aspirin   - C/w Metoprolol 25 mg daily   - Outpatient follow up with cardio after discharge     # Hypothyroidism   - C/w Synthroid 112 mcg daily     # Confusion, unsteady gait  CTH negative   - Fall precaution   - 1:1 observation   - Outpatient  neuro evaluation after discharge   - Vitamin B12, folate, RPR  - PT consult     DVT ppx  Encourage ambulation     Will sign off, reconsult PRN    D/w Dr. Nelson  71 y/o female with PMHx of  hypothyroidism, Breast cancer s/p lumpectomy and chemo, CAD with stent placement 2022, GERD, herniated disc and a past psychiatry history of depression and anxiety, bipolar disorder, polysubstance abuse (BZD and alcohol) and hx of a suicide attempt via OD on Ambien in 2004 admitted to inpatient psychiatry for worsening depression and suicidal ideation.     # Bipolar disorder   # Major depression   - Management per psych    # Prolonged QT  - Avoid meds that causes prolonged QT  - Repeat EKG    # UTI  - Keflex 500 mg x 7 days   - Po hydration     # CAD s/p stents/ HTN  - C/w Brelinta 60 mg bid   - C/w Aspirin   - C/w Metoprolol 25 mg daily   - Outpatient follow up with cardio after discharge     # Hypothyroidism   - C/w Synthroid 112 mcg daily     # Confusion, unsteady gait  CTH negative   - Fall precaution   - 1:1 observation   - Outpatient  neuro evaluation after discharge   - Vitamin B12, folate, RPR  - PT consult     DVT ppx  Encourage ambulation     Will sign off, reconsult PRN    D/w Dr. Nelson  73 y/o female with PMHx of  hypothyroidism, Breast cancer s/p lumpectomy and chemo, CAD with stent placement 2022, GERD, herniated disc and a past psychiatry history of depression and anxiety, bipolar disorder, polysubstance abuse (BZD and alcohol) and hx of a suicide attempt via OD on Ambien in 2004 admitted to inpatient psychiatry for worsening depression and suicidal ideation.     # Bipolar disorder   # Major depression   - Management per psych    # Prolonged QT  - Avoid meds that causes prolonged QT  - Repeat EKG    # UTI  - Keflex 500 mg x 7 days   - Po hydration     # CAD s/p stents/ HTN  - C/w Brelinta 60 mg bid   - C/w Aspirin   - C/w Metoprolol 25 mg daily   - Outpatient follow up with cardio after discharge     # Hypothyroidism   - C/w Synthroid 112 mcg daily     # Confusion, unsteady gait  CTH negative   - Treat  UTI  - Fall precaution   - 1:1 observation   - Outpatient  neuro evaluation after discharge   - Vitamin B12, folate, RPR  - PT consult     DVT ppx  Encourage ambulation     Will continue to follow     D/w Dr. Nelson

## 2023-02-01 NOTE — H&P ADULT - NS ATTEND AMEND GEN_ALL_CORE FT
73 y/o F PMHx as noted above admitted to inpatient Psychiatry for worsening depression and suicidal ideation.     # Bipolar disorder   # Major depression   -cont. management per inpatient Psychiatry    #Prolonged QT  -as above avoid medications which prolong QT interval  -repeat EKG in the am    #UTI  -as above cont. Keflex 500 mg x 7 days   -encourage PO hydration     # CAD s/p stents/ HTN  -cont. Brillinta 60 mg bid   -cont. Aspirin 81mg po daily   -cont. Metoprolol 25 mg po daily     #Hypothyroidism   -cont. Levothyroxine 112 mcg po qam     #Confusion, unsteady gait  -as above CTH negative   -cont. to treat  UTI  -fall precautions   -cont. 1:1 observation   -as above outpatient  neuro evaluation after discharge   -f/u  Vitamin B12, folate, RPR  -PT consult     #DVT ppx  -Encourage ambulation

## 2023-02-01 NOTE — BH INPATIENT PSYCHIATRY ASSESSMENT NOTE - NSBHASSESSSUMMFT_PSY_ALL_CORE
Patient is 71 years old white  woman with history of major depressive disorder, multiple hospitalizations and 1 suicide attempt in the past currently followed by Dr. Angel Sparrow and was supposed to be taking Remeron and wellbutrin .  Per records she is on Paxil and Wellbutrin.  Patient is transferred from [facility] emergency room for severe depressive episode and no suicidal thinking or planning.  On arrival patient reports depressed mood anhedonia hopelessness low energy impaired sleep and denies having thoughts about harming herself or anybody else.  Patient reports having difficulties dealing with aging, losing independence.    Plan:    Admitted to [facility].    Resume home medication and completed reconciliation.    No need for one-to-one while patient is on inpatient unit    Hospitalist consult and physical therapy consult.

## 2023-02-01 NOTE — ED ADULT NURSE REASSESSMENT NOTE - NS ED NURSE REASSESS COMMENT FT1
Hand off report given to RN at receiving unit at receiving hospital. Transfer center report given to EMS. No signs of acute distress noted. Patient stable for transport Alli SMALL

## 2023-02-01 NOTE — ED BEHAVIORAL HEALTH ASSESSMENT NOTE - PSYCHIATRIC ISSUES AND PLAN (INCLUDE STANDING AND PRN MEDICATION)
TBD. there are listed meds but these needs confirmation. she is unable to recall current meds.  PRN: Ativan 1mg PO/IM + haldol 2mg PO/IM q6hrs PRN TBD. there are listed meds but these needs confirmation. she is unable to recall current meds.  PRN: Ativan 1mg PO/IM + haldol 2mg PO/IM q6hrs PRN.. PROGRESS AS DISCUSSED WITH DTR BLOCH, 8:25AM: will continue with abilify 2mg HS and remeron at 15mg HS. defer trazodone for now - risk for orthostasis.

## 2023-02-01 NOTE — ED BEHAVIORAL HEALTH NOTE - BEHAVIORAL HEALTH NOTE
worker called [facility] and there are no beds. Worker then called [facility] and they cannot accommodate for a geriatric female. Worker called Jesus and chart is pending review.

## 2023-02-01 NOTE — ED BEHAVIORAL HEALTH NOTE - BEHAVIORAL HEALTH NOTE
Worker spoke to registration and they confirmed that patient has active Medicare Part A and Part B.  Worker also confirmed with patient that she has active blue cross but no card on her.  Worker called [facility] ([phone]) AND SPOKE TO Raffy HERNANDEZ who states that insurance is listed under patient’s last name Jonelle. They are not able to find an active secondary and medicare will ultimately be primary. Worker updated Natalie at [place].

## 2023-02-01 NOTE — ED BEHAVIORAL HEALTH ASSESSMENT NOTE - OTHER PAST PSYCHIATRIC HISTORY (INCLUDE DETAILS REGARDING ONSET, COURSE OF ILLNESS, INPATIENT/OUTPATIENT TREATMENT)
hx of depression and anxiety; other diagnosis: bipolar disorder  past psych admissions including most recent [facility] admission in 11/2022   Prior collateral indicates that Pt used to doctor shop for prescriptions.  Currently seeing Dr. Sparrow  hx of OD on ambien in 2004

## 2023-02-01 NOTE — ED BEHAVIORAL HEALTH NOTE - BEHAVIORAL HEALTH NOTE
FARRAH called pt’s son, Anirudh, and spouse, Santhosh, at [phone] for collateral information.  Call rang and went to voice mail. FARRAH left a message requesting a return phone call.

## 2023-02-01 NOTE — ED BEHAVIORAL HEALTH ASSESSMENT NOTE - NSPRESENTSXS_PSY_ALL_CORE
anxiety/Depressed mood/Anhedonia/Hopelessness or despair/Global insomnia/Severe anxiety, agitation or panic

## 2023-02-01 NOTE — BH PATIENT PROFILE - NSPROEDAABILITYLEARN_GEN_A_NUR
PROGRESS NOTE    Data:  Mattie Leone Teresa Pandey, PhD, LP      anxiety

## 2023-02-01 NOTE — ED BEHAVIORAL HEALTH ASSESSMENT NOTE - OTHER
concrete boarding not assessed unable to care for self ex- [facility] Reference #: [id number] - no controlled substances prescribed

## 2023-02-01 NOTE — BH INPATIENT PSYCHIATRY ASSESSMENT NOTE - NSBHCHARTREVIEWLAB_PSY_A_CORE FT
13.6   12.20 )-----------( 327      ( 01 Feb 2023 02:00 )             43.7   01-31    141  |  107  |  16  ----------------------------<  107<H>  4.3   |  18<L>  |  0.78    Ca    10.8<H>      31 Jan 2023 17:57    TPro  8.4<H>  /  Alb  4.5  /  TBili  0.9  /  DBili  x   /  AST  22  /  ALT  9   /  AlkPhos  139<H>  01-31

## 2023-02-01 NOTE — BH PATIENT PROFILE - FALL HARM RISK - HARM RISK INTERVENTIONS
Assistance with ambulation/Assistance OOB with selected safe patient handling equipment/Communicate Risk of Fall with Harm to all staff/Discuss with provider need for PT consult/Monitor gait and stability/Reinforce activity limits and safety measures with patient and family/Tailored Fall Risk Interventions/Toileting schedule using arm’s reach rule for commode and bathroom/Use of alarms - bed, chair and/or voice tab/Visual Cue: Yellow wristband and red socks/Bed in lowest position, wheels locked, appropriate side rails in place/Call bell, personal items and telephone in reach/Instruct patient to call for assistance before getting out of bed or chair/Non-slip footwear when patient is out of bed/[facility] to call system/Physically safe environment - no spills, clutter or unnecessary equipment/Purposeful Proactive Rounding/Room/bathroom lighting operational, light cord in reach

## 2023-02-01 NOTE — BH SAFETY PLAN - WARNING SIGN 5
No sleep for 4-5 days at a time. Dependent on Ambien and taking more than prescribed. I ran out early.

## 2023-02-01 NOTE — BH PATIENT PROFILE - HOME MEDICATIONS
cefuroxime 500 mg oral tablet , 1 tab(s) orally 2 times a day   Phyical Therapy  , 3-5 times a week   Vitamin D3 25 mcg (1000 intl units) oral tablet , 1 tab(s) orally once a day  metoprolol tartrate 25 mg oral tablet , 1 tab(s) orally once a day  (Filled as BID).   levothyroxine 112 mcg (0.112 mg) oral tablet , 1 tab(s) orally once a day  ticagrelor 90 mg oral tablet , 1 tab(s) orally 2 times a day   aspirin 81 mg oral delayed release tablet , 1 tab(s) orally once a day  atorvastatin 80 mg oral tablet , 1 tab(s) orally once a day (at bedtime)  Wellbutrin  mg/24 hours oral tablet, extended release , 1 tab(s) orally every 24 hours  (Wellbutrin XL 300mg QD filled on 4/7/22)  Paxil 40 mg oral tablet , 1 tab(s) orally once a day (at bedtime)  clonazePAM 2 mg oral tablet , 1 tab(s) orally once a day (at bedtime)  Protonix 40 mg oral delayed release tablet , 1 tab(s) orally once a day

## 2023-02-01 NOTE — H&P ADULT - NSHPPHYSICALEXAM_GEN_ALL_CORE
Vital Signs Last 24 Hrs  T(C): 36.7 (01 Feb 2023 14:48), Max: 36.7 (01 Feb 2023 01:07)  T(F): 98 (01 Feb 2023 14:48), Max: 98.1 (01 Feb 2023 01:07)  HR: 84 (01 Feb 2023 12:04) (84 - 92)  BP: 150/89 (01 Feb 2023 12:04) (136/84 - 152/90)  RR: 14 (01 Feb 2023 14:48) (14 - 17)  SpO2: 99% (01 Feb 2023 14:48) (96% - 99%)

## 2023-02-01 NOTE — ED BEHAVIORAL HEALTH ASSESSMENT NOTE - RISK ASSESSMENT
Risk factors:  h/o SA, h/o psych admissions, noncompliant with treatment, not compliant with treatment, unable to care for self 2/2 psychiatric illness, poor physical health    Protective factors: no current SIIP/HIIP, no access to weapons, no active substance abuse, no psychosis,  domiciled, social supports      At this time given all risks taken into consideration, the Pt is Not at imminent risk for self harm/ suicide but does remain at chronically elevated risk of harming self.  Pt's current presentation is not be deemed dischargeable back to the community.  Current increased in risks can be mitigated by a psychiatric admission with supervision, continuing psych meds with titration towards efficacious dosing range

## 2023-02-01 NOTE — ED BEHAVIORAL HEALTH ASSESSMENT NOTE - SUMMARY
72/F with hx of bipolar depression and anxiety; multiple past psych admissions;  past hx of BZD and alcohol use - no documented complicated withdrawal/rehabs. hx of a suicide attempt via OD on Ambien in 2004.  tonight, presented to the ED due to worsening depression.   reported that Pt was hypersomnolent; previously mumbling to self; incoherent at times. psych was consulted as there had been concern re: SI     at this time, endorses feeling more depressed and anxious as well as being hopeless/ helpless/ worthless. has been harboring passive SI but no intent or plans divulged.  she was noted to be limited, calm and fidgety.  appears unkempt; is not acutely manic nor psychotic.  concern for an undiagnosed underlying evolving cognitive impairment should be taken into consideration complicating ongoing primary affective disorder.. re: the confusion, will not exclude for hypoactive delirium which may present with similar symptoms of apathy, withdrawal, dysphoria, "confusion"..     at this time, it is evident current presentation has caused functional impairment for this Pt.  she is unable to care for herself.  Pt does feel that she should be hospitalized and has agreed to voluntary admission. Currently no beds available, will discuss dispo with medical team. 72/F with hx of bipolar depression and anxiety; multiple past psych admissions;  past hx of BZD and alcohol use - no documented complicated withdrawal/rehabs. hx of a suicide attempt via OD on Ambien in 2004.  tonight, presented to the ED due to worsening depression.   reported that Pt was hypersomnolent; previously mumbling to self; incoherent at times. psych was consulted as there had been concern re: SI     at this time, endorses feeling more depressed and anxious as well as being hopeless/ helpless/ worthless. has been harboring passive SI but no intent or plans divulged.  she was noted to be limited, calm and fidgety.  appears unkempt; is not acutely manic nor psychotic.  concern for an undiagnosed underlying evolving cognitive impairment should be taken into consideration complicating ongoing primary affective disorder.. re: the confusion, will not exclude for hypoactive delirium which may present with similar symptoms of apathy, withdrawal, dysphoria, "confusion"..     at this time, it is evident current presentation has caused functional impairment for this Pt.  she is unable to care for herself.  Pt does feel that she should be hospitalized and has agreed to voluntary admission. Currently no beds available, will discuss dispo with medical team.    Patient converted to 9.27 given level of anxiety and severe depression with a decrease in functioning.   Discussed with Dr. Paulson at [facility].

## 2023-02-01 NOTE — BH TREATMENT PLAN - NSTXDEPRESINTERRN_PSY_ALL_CORE
Establish trust/therapeutic rapport to encourage ventilation of feelings.  Provide emotional support.  Assess mood/suicidality Q shift, document and report changes.  Maintain safe environment.  Encourage medication compliance and provide education.

## 2023-02-01 NOTE — BH INPATIENT PSYCHIATRY ASSESSMENT NOTE - NSBHCHARTREVIEWINVESTIGATE_PSY_A_CORE FT
Ventricular Rate 68 BPM    Atrial Rate 68 BPM    P-R Interval 142 ms    QRS Duration 86 ms    Q-T Interval 406 ms    QTC Calculation(Bazett) 431 ms    P Axis 52 degrees    R Axis -20 degrees    T Axis 112 degrees    Diagnosis Line *** Poor data quality, interpretation may be adversely affected  Sinus rhythm with occasional Premature ventricular complexes  Left ventricular hypertrophy with repolarization abnormality  Abnormal ECG    Confirmed by DONIS MCCOY ([id number]) on 11/27/2022 4:49:07 PM

## 2023-02-01 NOTE — BH INPATIENT PSYCHIATRY ASSESSMENT NOTE - HPI (INCLUDE ILLNESS QUALITY, SEVERITY, DURATION, TIMING, CONTEXT, MODIFYING FACTORS, ASSOCIATED SIGNS AND SYMPTOMS)
Direct transfer from ED [facility]. PT reports depression is worse, does not take meds for a  week. Anhedonic, feeling [id number], low energy, no active SI, plan or intent. No AH.  See more HPI from earlier assessment in ED.   72 yr old feamle, domiciled, living alone, and retired .  she  (though physically  from  of > 20 yrs). premorbidly Ambulant without assist; reports bADL and iADL independent; no HHA.  background hx of depression and anxiety; other diagnosis of bipolar disorder; hx of multiple past psych admissions including last admission to [facility] back in 11/2022.  with past hx of BZD and alcohol use - no documented complicated withdrawal/rehabs. hx of a suicide attempt via OD on Ambien in 2004.  pertinent medical issues include: hypothyroidism, Breast cancer s/p lumpectomy, CAD with stent placement, GERD, and "pinched disc".  previously seen at [facility] in 2018 due to ingestion of Ambien, Klonopin and Valium. tonight, presented to the ED accompanied by  due to worsening depression.   reported that Pt was hypersomnolent; previously mumbling to self; incoherent at times. psych was consulted as there had been concern re: SI     she is seen bedside.  knows she is in a hospital; names month as January but not the year. reports that she is not feeling well; "not good", she says - shaking her head. endorses progressively feeling more sad and anxious. there is poor sleep, decreased energy level, impaired concentration and decreased appetite.  currently feeling more helpless/ hopeless/ worthless. admits that she "feels suicidal" but no intention or plans. no HI. unable to fully elaborate on the anxiety symptoms. ruminating on not feeling well. denied any somatic complaints.      denied experiencing any recent christofer symptoms of: elevated mood, no grandiosity, no increased goal directed activities or engaged in risk taking behavior; denied any pressured speech;  no increased in energy level causing sleep disruption.  she denied feeling paranoid. denied any perceptual disturbances.  denied abusing any illicit substances including alcohol. admits that she has been getting more forgetful. struggled with recalling events including meds she is taking currently - though claimed that she has been compliant with all prescribed meds     as is with this current presentation, she was able to engage and speak to writer but it is noted that she still does make repetitive sound (zuh, zuh, zuh) and cannot further articulate on why, or stop. She continues to endorse that she has not been functioning or doing well at home. She admits struggling with her ADLs like showering or eating. Her sleep continues to be erratic.  like from the last presentation, she reports staying in bed most of the days.  denies any current SI/ HI

## 2023-02-01 NOTE — BH SAFETY PLAN - WARNING SIGN 1
Triggers to SI: My  is going through his own physical conditions and is not able to support me as much. I am weak and have poor balance. 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 Change and aging.

## 2023-02-01 NOTE — BH INPATIENT PSYCHIATRY ASSESSMENT NOTE - DESCRIPTION
Lives alone in [place],  with 1 adult son. retired .  from  though she they remain in close contact.

## 2023-02-01 NOTE — BH INPATIENT PSYCHIATRY ASSESSMENT NOTE - NSICDXBHSECONDARYDX_PSY_ALL_CORE
Current severe episode of major depressive disorder without psychotic features, unspecified whether recurrent   F32.2

## 2023-02-01 NOTE — BH INPATIENT PSYCHIATRY ASSESSMENT NOTE - NSBHMETABOLIC_PSY_ALL_CORE_FT
BMI: BMI (kg/m2): 30.8 (02-01-23 @ 14:00)  HbA1c: A1C with Estimated Average Glucose Result: 6.3 % (04-24-22 @ 06:57)    Glucose: POCT Blood Glucose.: 121 mg/dL (04-25-22 @ 05:59)    BP: --  Lipid Panel:

## 2023-02-01 NOTE — H&P ADULT - HISTORY OF PRESENT ILLNESS
73 y/o female with PMHx of  hypothyroidism, Breast cancer s/p lumpectomy and chemo, CAD with stent placement 2022, GERD, herniated disc and a past psychiatry history of depression and anxiety, bipolar disorder, polysubstance abuse (BZD and alcohol) and hx of a suicide attempt via OD on Ambien in 2004 admitted to inpatient psychiatry because she is not feeling well, "not good", Patient endorses progressively feeling more sad and anxious. Reports poor sleep, decreased energy level, impaired concentration, decreased appetite, feeling more helpless/ hopeless/ worthless. Has suicidal ideation without intention or plans.     Medicine consulted for medical management. At time of exam, patient reports feeling confused lately. Endorses unsteady gait and using a walker since stent placement in 2022. Denies headache, dizziness, CP, SOB, palpitation, weakness, numbness and tingling. CTH done in ED unremarkable.   EKG: NSR @ 87 bpm, prolonged QT - 493   UA: + leuko esterase and few bacteria   73 y/o female with PMHx of  hypothyroidism, Breast cancer s/p lumpectomy and chemo, MI x 2, CAD with stent placement 2022, GERD, herniated disc and a past psychiatry history of depression and anxiety, bipolar disorder, polysubstance abuse (BZD and alcohol) and hx of a suicide attempt via OD on Ambien in 2004 admitted to inpatient psychiatry because she is not feeling well, "not good", Patient endorses progressively feeling more sad and anxious. Reports poor sleep, decreased energy level, impaired concentration, decreased appetite, feeling more helpless/ hopeless/ worthless. Has suicidal ideation without intention or plans.     Medicine consulted for medical management. At time of exam, patient reports feeling confused lately. Endorses unsteady gait and using a walker since stent placement in 2022. Denies headache, dizziness, CP, SOB, palpitation, weakness, numbness and tingling. CTH done in ED unremarkable.   EKG: NSR @ 87 bpm, prolonged QT - 493   UA: + leuko esterase and few bacteria

## 2023-02-02 LAB
A1C WITH ESTIMATED AVERAGE GLUCOSE RESULT: 6.3 % — HIGH (ref 4–5.6)
CHOLEST SERPL-MCNC: 86 MG/DL — SIGNIFICANT CHANGE UP
COVID-19 SPIKE DOMAIN AB INTERP: POSITIVE
COVID-19 SPIKE DOMAIN ANTIBODY RESULT: >250 U/ML — HIGH
ESTIMATED AVERAGE GLUCOSE: 134 MG/DL — HIGH (ref 68–114)
FOLATE SERPL-MCNC: 9.9 NG/ML — SIGNIFICANT CHANGE UP
HDLC SERPL-MCNC: 34 MG/DL — LOW
LIPID PNL WITH DIRECT LDL SERPL: 32 MG/DL — SIGNIFICANT CHANGE UP
NON HDL CHOLESTEROL: 52 MG/DL — SIGNIFICANT CHANGE UP
SARS-COV-2 IGG+IGM SERPL QL IA: >250 U/ML — HIGH
SARS-COV-2 IGG+IGM SERPL QL IA: POSITIVE
T PALLIDUM AB TITR SER: NEGATIVE — SIGNIFICANT CHANGE UP
TRIGL SERPL-MCNC: 102 MG/DL — SIGNIFICANT CHANGE UP
TSH SERPL-MCNC: 1.01 UU/ML — SIGNIFICANT CHANGE UP (ref 0.34–4.82)
VIT B12 SERPL-MCNC: 388 PG/ML — SIGNIFICANT CHANGE UP (ref 232–1245)

## 2023-02-02 PROCEDURE — 99232 SBSQ HOSP IP/OBS MODERATE 35: CPT

## 2023-02-02 PROCEDURE — 93010 ELECTROCARDIOGRAM REPORT: CPT

## 2023-02-02 RX ORDER — CEPHALEXIN 500 MG
500 CAPSULE ORAL EVERY 12 HOURS
Refills: 0 | Status: COMPLETED | OUTPATIENT
Start: 2023-02-02 | End: 2023-02-08

## 2023-02-02 RX ADMIN — PANTOPRAZOLE SODIUM 40 MILLIGRAM(S): 20 TABLET, DELAYED RELEASE ORAL at 06:34

## 2023-02-02 RX ADMIN — TICAGRELOR 60 MILLIGRAM(S): 90 TABLET ORAL at 09:31

## 2023-02-02 RX ADMIN — BUPROPION HYDROCHLORIDE 150 MILLIGRAM(S): 150 TABLET, EXTENDED RELEASE ORAL at 09:31

## 2023-02-02 RX ADMIN — Medication 500 MILLIGRAM(S): at 09:31

## 2023-02-02 RX ADMIN — Medication 2 MILLIGRAM(S): at 21:28

## 2023-02-02 RX ADMIN — TICAGRELOR 60 MILLIGRAM(S): 90 TABLET ORAL at 21:28

## 2023-02-02 RX ADMIN — Medication 500 MILLIGRAM(S): at 21:28

## 2023-02-02 RX ADMIN — Medication 81 MILLIGRAM(S): at 09:31

## 2023-02-02 RX ADMIN — Medication 25 MILLIGRAM(S): at 09:31

## 2023-02-02 RX ADMIN — Medication 112 MICROGRAM(S): at 06:35

## 2023-02-02 RX ADMIN — Medication 40 MILLIGRAM(S): at 09:31

## 2023-02-02 RX ADMIN — ATORVASTATIN CALCIUM 80 MILLIGRAM(S): 80 TABLET, FILM COATED ORAL at 21:28

## 2023-02-02 NOTE — PHYSICAL THERAPY INITIAL EVALUATION ADULT - REHAB POTENTIAL, PT EVAL
Pt to be seen for ~ 2 more visits to ensure independence with amb with RW, transfers, and bed mobility. Left RW in room with pt to amb with floor care. RN made aware./good, to achieve stated therapy goals

## 2023-02-02 NOTE — PHYSICAL THERAPY INITIAL EVALUATION ADULT - PLANNED THERAPY INTERVENTIONS, PT EVAL
Eval, amb, transfers, bed mobility x 15'. Pt left in bed on 5N with RN informed. Pt with no co pain. Will cont to follow pt and increase as tolerated./bed mobility training/gait training/transfer training

## 2023-02-03 PROCEDURE — 99232 SBSQ HOSP IP/OBS MODERATE 35: CPT

## 2023-02-03 RX ADMIN — Medication 20 MILLIGRAM(S): at 09:55

## 2023-02-03 RX ADMIN — Medication 112 MICROGRAM(S): at 10:00

## 2023-02-03 RX ADMIN — Medication 81 MILLIGRAM(S): at 09:54

## 2023-02-03 RX ADMIN — TICAGRELOR 60 MILLIGRAM(S): 90 TABLET ORAL at 09:54

## 2023-02-03 RX ADMIN — BUPROPION HYDROCHLORIDE 150 MILLIGRAM(S): 150 TABLET, EXTENDED RELEASE ORAL at 09:54

## 2023-02-03 RX ADMIN — Medication 2 MILLIGRAM(S): at 20:24

## 2023-02-03 RX ADMIN — Medication 500 MILLIGRAM(S): at 09:55

## 2023-02-03 RX ADMIN — Medication 500 MILLIGRAM(S): at 20:24

## 2023-02-03 RX ADMIN — ATORVASTATIN CALCIUM 80 MILLIGRAM(S): 80 TABLET, FILM COATED ORAL at 20:24

## 2023-02-03 RX ADMIN — PANTOPRAZOLE SODIUM 40 MILLIGRAM(S): 20 TABLET, DELAYED RELEASE ORAL at 06:41

## 2023-02-03 RX ADMIN — TICAGRELOR 60 MILLIGRAM(S): 90 TABLET ORAL at 20:24

## 2023-02-03 RX ADMIN — Medication 25 MILLIGRAM(S): at 09:54

## 2023-02-03 NOTE — PROGRESS NOTE ADULT - ASSESSMENT
Assessment:  · Assessment	  73 y/o female with PMHx of  hypothyroidism, Breast cancer s/p lumpectomy and chemo, CAD with stent placement 2022, GERD, herniated disc and a past psychiatry history of depression and anxiety, bipolar disorder, polysubstance abuse (BZD and alcohol) and hx of a suicide attempt via OD on Ambien in 2004 admitted to inpatient psychiatry for worsening depression and suicidal ideation.     # Bipolar disorder   # Major depression   - Management per psych    # Prolonged QT  - Avoid meds that causes prolonged QT  - Repeat EKG    # UTI  - Keflex 500 mg x 7 days   - Po hydration     # CAD s/p stents/ HTN  - C/w Brelinta 60 mg bid   - C/w Aspirin   - C/w Metoprolol 25 mg daily   - Outpatient follow up with cardio after discharge     # Hypothyroidism   - C/w Synthroid 112 mcg daily     # Confusion, unsteady gait  CTH negative   - Ucx: pending  - Fall precaution   - 1:1 observation   - Outpatient  neuro evaluation after discharge   - Vitamin B12, folate, RPR: wnl  - PT consult     DVT ppx  Encourage ambulation

## 2023-02-03 NOTE — PROGRESS NOTE ADULT - SUBJECTIVE AND OBJECTIVE BOX
History of Present Illness:  73 y/o female with PMHx of  hypothyroidism, Breast cancer s/p lumpectomy and chemo, MI x 2, CAD with stent placement 2022, GERD, herniated disc and a past psychiatry history of depression and anxiety, bipolar disorder, polysubstance abuse (BZD and alcohol) and hx of a suicide attempt via OD on Ambien in 2004 admitted to inpatient psychiatry because she is not feeling well, "not good", Patient endorses progressively feeling more sad and anxious. Reports poor sleep, decreased energy level, impaired concentration, decreased appetite, feeling more helpless/ hopeless/ worthless. Has suicidal ideation without intention or plans.   Medicine consulted for medical management. At time of exam, patient reports feeling confused lately. Endorses unsteady gait and using a walker since stent placement in 2022. Denies headache, dizziness, CP, SOB, palpitation, weakness, numbness and tingling. CTH done in ED unremarkable.  EKG: NSR @ 87 bpm, prolonged QT - 493  UA: + leuko esterase and few bacteria    PAST MEDICAL HISTORY: Acid reflux   Affective Bipolar Disorder   Benzodiazepine Dependence   Calculus of gallbladder without cholecystitis   Carcinoma in situ Breast Cancer  bilateral lumpectomies s/p chemo  had treatment 2008 and 2015  current on Arimidex  Chronic GERD   Depression last admission 1- 2 year ago has been admitted several times  ECT last 20 years ago  currently on medication "antidepressants do not really work well"  Hypertension   Hypothyroidism   Insomnia.   PAST SURGICAL HISTORY: History of lumpectomy left 2002  & right 2008  left 2013  treated with chemotherapy & radiation therapy  S/P colonoscopy 3 years ago negative  S/P D&C (status post dilation and curettage)   S/P endoscopy 3 years ago negative.   FAMILY HISTORY: Father Still living? No Family history of heart failure, Age at diagnosis: Age Unknown  Mother Still living? No Family history of Alzheimer's disease, Age at diagnosis: Age Unknown.   Social History: · Substance use	Yes · Alcohol use	Past hx of alcohol use. denies active use · Substance use	Past hx of BZD use. denied any recent use · Tobacco use	Never smoke · Social History (marital status, living situation, occupation, and sexual history)	Lives alone in [place],  with 1 adult son. retired .  from  though she they remain in close contact    Physical Exam:  ICU Vital Signs Last 24 Hrs T(C): 36.1 (03 Feb 2023 08:19), Max: 36.1 (03 Feb 2023 08:19) T(F): 97 (03 Feb 2023 08:19), Max: 97 (03 Feb 2023 08:19) HR: -- BP: -- BP(mean): -- ABP: -- ABP(mean): -- RR: 16 (03 Feb 2023 08:19) (16 - 16) SpO2: 100% (03 Feb 2023 08:19) (100% - 100%)     Physical Exam: · Constitutional	well-groomed; no distress · Eyes	PERRL; EOMI; conjunctiva clear · ENMT	no gross abnormalities · Respiratory	clear to auscultation bilaterally; no wheezes; no rales; no rhonchi · Cardiovascular	regular rate and rhythm; S1 S2 present; no gallops; no rub; no murmur · Gastrointestinal	soft; nontender; nondistended; normal active bowel sounds · Neurological	cranial nerves II-XII intact; sensation intact · Mental Status	Alert and oriented to self and month · Gait/Balance	unsteady gait · Skin	warm and dry; color normal; no rashes; no ulcers · Lymphatic	No lymphadedenopathy · Musculoskeletal	normal; normal gait; ROM intact; strength 5/5 bilateral upper extremities; strength 5/5 bilateral lower extremities · Psychiatric	flat affect   Laboratory:  Blood Gas:   31-Jan-2023 17:57, Blood Gas Profile - Venous pH, Venous: 7.41, [7.32 - 7.43] Base Excess, Venous:    -3.9, [-2.0 - 3.0 mmol/L] Total CO2, Venous:    20.6, [22.0 - 26.0 mmol/L] pCO2, Venous:    31, [39 - 42 mmHg] pO2, Venous: 55, [ mmHg] HCO3, Venous:    20, [22 - 29 mmol/L] Oxygen Saturation, Venous: 84.7, [ %]   31-Jan-2023 17:57, Blood Gas Venous - Chloride Blood Gas Venous - Chloride: 108, [96 - 108 mmol/L]   31-Jan-2023 17:57, Blood Gas Venous - Glucose

## 2023-02-04 LAB
CULTURE RESULTS: SIGNIFICANT CHANGE UP
SPECIMEN SOURCE: SIGNIFICANT CHANGE UP

## 2023-02-04 PROCEDURE — 99232 SBSQ HOSP IP/OBS MODERATE 35: CPT

## 2023-02-04 RX ORDER — DOCOSANOL 100 MG/G
1 CREAM TOPICAL
Refills: 0 | Status: DISCONTINUED | OUTPATIENT
Start: 2023-02-04 | End: 2023-02-10

## 2023-02-04 RX ORDER — CHOLECALCIFEROL (VITAMIN D3) 125 MCG
1000 CAPSULE ORAL DAILY
Refills: 0 | Status: DISCONTINUED | OUTPATIENT
Start: 2023-02-04 | End: 2023-02-10

## 2023-02-04 RX ADMIN — Medication 112 MICROGRAM(S): at 06:34

## 2023-02-04 RX ADMIN — TICAGRELOR 60 MILLIGRAM(S): 90 TABLET ORAL at 09:56

## 2023-02-04 RX ADMIN — Medication 25 MILLIGRAM(S): at 09:56

## 2023-02-04 RX ADMIN — ATORVASTATIN CALCIUM 80 MILLIGRAM(S): 80 TABLET, FILM COATED ORAL at 20:23

## 2023-02-04 RX ADMIN — DOCOSANOL 1 APPLICATION(S): 100 CREAM TOPICAL at 21:36

## 2023-02-04 RX ADMIN — Medication 500 MILLIGRAM(S): at 09:56

## 2023-02-04 RX ADMIN — Medication 20 MILLIGRAM(S): at 09:56

## 2023-02-04 RX ADMIN — BUPROPION HYDROCHLORIDE 150 MILLIGRAM(S): 150 TABLET, EXTENDED RELEASE ORAL at 09:56

## 2023-02-04 RX ADMIN — Medication 1000 UNIT(S): at 12:58

## 2023-02-04 RX ADMIN — PANTOPRAZOLE SODIUM 40 MILLIGRAM(S): 20 TABLET, DELAYED RELEASE ORAL at 06:34

## 2023-02-04 RX ADMIN — TICAGRELOR 60 MILLIGRAM(S): 90 TABLET ORAL at 21:36

## 2023-02-04 RX ADMIN — Medication 500 MILLIGRAM(S): at 20:25

## 2023-02-04 RX ADMIN — Medication 81 MILLIGRAM(S): at 09:56

## 2023-02-04 RX ADMIN — Medication 2 MILLIGRAM(S): at 20:25

## 2023-02-05 PROCEDURE — 99232 SBSQ HOSP IP/OBS MODERATE 35: CPT

## 2023-02-05 RX ADMIN — DOCOSANOL 1 APPLICATION(S): 100 CREAM TOPICAL at 20:08

## 2023-02-05 RX ADMIN — Medication 81 MILLIGRAM(S): at 09:07

## 2023-02-05 RX ADMIN — Medication 500 MILLIGRAM(S): at 20:07

## 2023-02-05 RX ADMIN — Medication 20 MILLIGRAM(S): at 09:07

## 2023-02-05 RX ADMIN — BUPROPION HYDROCHLORIDE 150 MILLIGRAM(S): 150 TABLET, EXTENDED RELEASE ORAL at 09:07

## 2023-02-05 RX ADMIN — Medication 25 MILLIGRAM(S): at 09:07

## 2023-02-05 RX ADMIN — TICAGRELOR 60 MILLIGRAM(S): 90 TABLET ORAL at 09:06

## 2023-02-05 RX ADMIN — PANTOPRAZOLE SODIUM 40 MILLIGRAM(S): 20 TABLET, DELAYED RELEASE ORAL at 06:58

## 2023-02-05 RX ADMIN — DOCOSANOL 1 APPLICATION(S): 100 CREAM TOPICAL at 09:11

## 2023-02-05 RX ADMIN — TICAGRELOR 60 MILLIGRAM(S): 90 TABLET ORAL at 20:08

## 2023-02-05 RX ADMIN — Medication 500 MILLIGRAM(S): at 09:07

## 2023-02-05 RX ADMIN — Medication 1000 UNIT(S): at 09:06

## 2023-02-05 RX ADMIN — Medication 112 MICROGRAM(S): at 06:58

## 2023-02-05 RX ADMIN — ATORVASTATIN CALCIUM 80 MILLIGRAM(S): 80 TABLET, FILM COATED ORAL at 20:07

## 2023-02-05 RX ADMIN — Medication 2 MILLIGRAM(S): at 20:07

## 2023-02-05 NOTE — BH INPATIENT PSYCHIATRY PROGRESS NOTE - NSBHFUPINTERVALCCFT_PSY_A_CORE
" I have a cold sore on my lip"    Mathtieu ordered bid .  " I was feeling depressed today but thankfully I found  this book to read and now I feel better."    Matthieu ordered bid  for pt application to cold sore on her upper lip.

## 2023-02-05 NOTE — BH SOCIAL WORK INITIAL PSYCHOSOCIAL EVALUATION - OTHER PAST PSYCHIATRIC HISTORY (INCLUDE DETAILS REGARDING ONSET, COURSE OF ILLNESS, INPATIENT/OUTPATIENT TREATMENT)
hx of depression and anxiety; other diagnosis of bipolar disorder; hx of multiple past psych admissions including last admission to [facility] back in 11/2022.  with past hx of BZD and alcohol use, hx of a suicide attempt via OD on Ambien in 2004, 2018 [facility] due to ingestion of Ambien, Klonopin and Valium.

## 2023-02-05 NOTE — BH SOCIAL WORK INITIAL PSYCHOSOCIAL EVALUATION - NSHIGHRISKBEHFT_PSY_ALL_CORE
Patient reports having difficulties dealing with aging, losing independence., Increased hopelessness, depression. Pt with h/o [facility] admits, Prior SA  Major depressive disorder   F32.9  Affective Bipolar Disorder  Benzodiazepine Dependence

## 2023-02-06 PROCEDURE — 99232 SBSQ HOSP IP/OBS MODERATE 35: CPT

## 2023-02-06 RX ADMIN — Medication 20 MILLIGRAM(S): at 09:35

## 2023-02-06 RX ADMIN — Medication 25 MILLIGRAM(S): at 09:36

## 2023-02-06 RX ADMIN — ATORVASTATIN CALCIUM 80 MILLIGRAM(S): 80 TABLET, FILM COATED ORAL at 21:12

## 2023-02-06 RX ADMIN — Medication 1000 UNIT(S): at 09:35

## 2023-02-06 RX ADMIN — PANTOPRAZOLE SODIUM 40 MILLIGRAM(S): 20 TABLET, DELAYED RELEASE ORAL at 06:52

## 2023-02-06 RX ADMIN — TICAGRELOR 60 MILLIGRAM(S): 90 TABLET ORAL at 09:36

## 2023-02-06 RX ADMIN — Medication 112 MICROGRAM(S): at 06:53

## 2023-02-06 RX ADMIN — Medication 2 MILLIGRAM(S): at 21:12

## 2023-02-06 RX ADMIN — BUPROPION HYDROCHLORIDE 150 MILLIGRAM(S): 150 TABLET, EXTENDED RELEASE ORAL at 09:36

## 2023-02-06 RX ADMIN — Medication 81 MILLIGRAM(S): at 09:35

## 2023-02-06 RX ADMIN — TICAGRELOR 60 MILLIGRAM(S): 90 TABLET ORAL at 21:12

## 2023-02-06 RX ADMIN — Medication 500 MILLIGRAM(S): at 09:35

## 2023-02-06 RX ADMIN — Medication 500 MILLIGRAM(S): at 21:12

## 2023-02-06 RX ADMIN — DOCOSANOL 1 APPLICATION(S): 100 CREAM TOPICAL at 21:12

## 2023-02-06 RX ADMIN — DOCOSANOL 1 APPLICATION(S): 100 CREAM TOPICAL at 09:36

## 2023-02-06 NOTE — BH INPATIENT PSYCHIATRY PROGRESS NOTE - NSBHMSESPABN_PSY_A_CORE
making "zuh zuh zuh" sounds/Decreased productivity/Increased latency

## 2023-02-06 NOTE — BH INPATIENT PSYCHIATRY PROGRESS NOTE - NSBHANTIPSYCHOTIC_PSY_ALL_CORE
CIRCUMCISION   Procedure explained to parents. Questions answered. Consent signed.    identified and restrained.   Area prep'ed and draped.   0.8 cc of 1% Lidocaine used for local anesthesia/ penile block.   Adhesions/phimosis lysed bluntly using hemostat.   Mogan placed without difficulty.   Scalpel used to remove foreskin without any problems.   Clamp removed.   Foreskin pulled back.   Good hemostasis.   EBL: 0.2 cc   tolerated the procedure well.   No specimen.  No complication.     Familia Perea MD, FACOG     No

## 2023-02-07 PROCEDURE — 99233 SBSQ HOSP IP/OBS HIGH 50: CPT

## 2023-02-07 RX ADMIN — PANTOPRAZOLE SODIUM 40 MILLIGRAM(S): 20 TABLET, DELAYED RELEASE ORAL at 06:54

## 2023-02-07 RX ADMIN — TICAGRELOR 60 MILLIGRAM(S): 90 TABLET ORAL at 20:15

## 2023-02-07 RX ADMIN — Medication 2 MILLIGRAM(S): at 20:14

## 2023-02-07 RX ADMIN — DOCOSANOL 1 APPLICATION(S): 100 CREAM TOPICAL at 20:14

## 2023-02-07 RX ADMIN — Medication 25 MILLIGRAM(S): at 10:04

## 2023-02-07 RX ADMIN — ATORVASTATIN CALCIUM 80 MILLIGRAM(S): 80 TABLET, FILM COATED ORAL at 20:15

## 2023-02-07 RX ADMIN — TICAGRELOR 60 MILLIGRAM(S): 90 TABLET ORAL at 10:04

## 2023-02-07 RX ADMIN — Medication 500 MILLIGRAM(S): at 10:05

## 2023-02-07 RX ADMIN — BUPROPION HYDROCHLORIDE 150 MILLIGRAM(S): 150 TABLET, EXTENDED RELEASE ORAL at 10:05

## 2023-02-07 RX ADMIN — Medication 20 MILLIGRAM(S): at 10:05

## 2023-02-07 RX ADMIN — Medication 1000 UNIT(S): at 10:04

## 2023-02-07 RX ADMIN — Medication 112 MICROGRAM(S): at 10:04

## 2023-02-07 RX ADMIN — Medication 500 MILLIGRAM(S): at 20:15

## 2023-02-07 RX ADMIN — DOCOSANOL 1 APPLICATION(S): 100 CREAM TOPICAL at 10:02

## 2023-02-07 RX ADMIN — Medication 81 MILLIGRAM(S): at 10:05

## 2023-02-08 PROCEDURE — 99233 SBSQ HOSP IP/OBS HIGH 50: CPT

## 2023-02-08 RX ORDER — BENZOCAINE AND MENTHOL 5; 1 G/100ML; G/100ML
1 LIQUID ORAL
Refills: 0 | Status: DISCONTINUED | OUTPATIENT
Start: 2023-02-08 | End: 2023-02-10

## 2023-02-08 RX ORDER — LANOLIN ALCOHOL/MO/W.PET/CERES
3 CREAM (GRAM) TOPICAL AT BEDTIME
Refills: 0 | Status: DISCONTINUED | OUTPATIENT
Start: 2023-02-08 | End: 2023-02-10

## 2023-02-08 RX ORDER — CLONAZEPAM 1 MG
2 TABLET ORAL AT BEDTIME
Refills: 0 | Status: DISCONTINUED | OUTPATIENT
Start: 2023-02-09 | End: 2023-02-10

## 2023-02-08 RX ADMIN — Medication 2 MILLIGRAM(S): at 21:52

## 2023-02-08 RX ADMIN — DOCOSANOL 1 APPLICATION(S): 100 CREAM TOPICAL at 21:54

## 2023-02-08 RX ADMIN — Medication 500 MILLIGRAM(S): at 09:16

## 2023-02-08 RX ADMIN — ATORVASTATIN CALCIUM 80 MILLIGRAM(S): 80 TABLET, FILM COATED ORAL at 21:52

## 2023-02-08 RX ADMIN — Medication 20 MILLIGRAM(S): at 09:16

## 2023-02-08 RX ADMIN — Medication 25 MILLIGRAM(S): at 09:16

## 2023-02-08 RX ADMIN — TICAGRELOR 60 MILLIGRAM(S): 90 TABLET ORAL at 21:52

## 2023-02-08 RX ADMIN — Medication 1000 UNIT(S): at 09:16

## 2023-02-08 RX ADMIN — PANTOPRAZOLE SODIUM 40 MILLIGRAM(S): 20 TABLET, DELAYED RELEASE ORAL at 06:36

## 2023-02-08 RX ADMIN — BUPROPION HYDROCHLORIDE 150 MILLIGRAM(S): 150 TABLET, EXTENDED RELEASE ORAL at 09:15

## 2023-02-08 RX ADMIN — TICAGRELOR 60 MILLIGRAM(S): 90 TABLET ORAL at 09:16

## 2023-02-08 RX ADMIN — DOCOSANOL 1 APPLICATION(S): 100 CREAM TOPICAL at 10:29

## 2023-02-08 RX ADMIN — Medication 500 MILLIGRAM(S): at 21:52

## 2023-02-08 RX ADMIN — Medication 3 MILLIGRAM(S): at 22:58

## 2023-02-08 RX ADMIN — Medication 81 MILLIGRAM(S): at 09:15

## 2023-02-08 RX ADMIN — Medication 112 MICROGRAM(S): at 06:36

## 2023-02-08 NOTE — BH INPATIENT PSYCHIATRY PROGRESS NOTE - NSBHPSYCHOLCOGABN_PSY_A_CORE
disoriented to time

## 2023-02-08 NOTE — BH INPATIENT PSYCHIATRY DISCHARGE NOTE - NSBHMETABOLIC_PSY_ALL_CORE_FT
BMI: BMI (kg/m2): 30.8 (02-01-23 @ 14:00)  HbA1c: A1C with Estimated Average Glucose Result: 6.3 % (02-02-23 @ 08:42)    Glucose: POCT Blood Glucose.: 121 mg/dL (04-25-22 @ 05:59)    BP: --  Lipid Panel: Date/Time: 02-02-23 @ 08:42  Cholesterol, Serum: 86  Direct LDL: --  HDL Cholesterol, Serum: 34  Total Cholesterol/HDL Ration Measurement: --  Triglycerides, Serum: 102

## 2023-02-08 NOTE — BH INPATIENT PSYCHIATRY DISCHARGE NOTE - DESCRIPTION
Lives alone in [place],  with 1 adult son. retired .  from  though she they remain in close contact.

## 2023-02-08 NOTE — BH TREATMENT PLAN - NSTXCAREGIVERPARTICIPATE_PSY_P_CORE
Family/Caregiver participated in development of after care plan
No, patient unwilling to involve family/caregiver

## 2023-02-08 NOTE — BH TREATMENT PLAN - NSTXDCOTHRINTERSW_PSY_ALL_CORE
Case discussed w/ tx. team and pt. to be dc'd this week. SW met w/ pt. multiple times 2/7-2/8 for d/c planning. Pt. initially reporting concerns to return home where she lives in apt. alone as she states needs support for tasks like cooking and cleaning and inquiring about aid services. Pt. reports income does not qualify for Medicaid in the past and believes she could afford private hire if needed. Pt. stating the next day that she no longer feels she requires aid for services mentioned and feels she can manage. She reports that she is currently  to  but lives in a separate residence. She reports has adult son but that she does not want to involve him in care needs at this time. Pt. initially reluctant to have tx. team speak to  as she states she has been keeping him updated but on 2/8 granted consent to reach out to him to discuss d/c plans. Pt. expressing interest in social activites for seniors and SW explored senior centers in [place] area (below) to inquire about availability. FARRAH provided pt. w/ written resources for local senior centers at pt.'s request to explore on her own. For aftercare, pt. reporting plans to return to Dr. Ryan Sparrow of [place] for outpt. psychiatry (ph: [phone]) SW LMs for Dr. Sparrow multiple times but did not yet receive return call. Pt. reports her therapist had retired and is ambivalent about needing this service. Pt. reports she is not computer literate but would be open to telephone counseling serivces. FARRAH called [facility] and spoke to Rhonda who reports would be able to service pt. in [place] with phone therapist services and advised can make referral online (hospital referral form), can take pt. info. over phone, or can have pt. call on her own. Pt. reports  will transport home but will need advanced notice of time because drive is far away. Case discussed with tx. team and MD advised pt. ready for d/c 2/10. FARRAH to provide handoff to covering SW to f/u on aftercare and call w/ pt. . FARRAH prepared resources for d/c packet that include senior centers, private hire, and [facility] as part of handoff.

## 2023-02-08 NOTE — BH INPATIENT PSYCHIATRY DISCHARGE NOTE - NSBHDCMEDICALFT_PSY_A_CORE
NOTIFICATION RETURN TO WORK / SCHOOL 
 
3/9/2018 1:51 PM 
 
Ms. Lulú James [address] [facility] [id number] To Whom It May Concern: 
 
Arlene Gonzalez is currently under the care of Christoph Marks. She was seen in our office 03/09/2018 and will return to work on: Monday, 03/12/2018. If there are questions or concerns please have the patient contact our office. Sincerely, Yana Chu MD 
 
                                
 

hypothyroid

## 2023-02-08 NOTE — BH INPATIENT PSYCHIATRY DISCHARGE NOTE - HOSPITAL COURSE
Pt was restarted on medication as she had stopped all of her medication 2 weeks prior to admission . Pt with hx of multiple hospitalization . Pt with euthymic mood and affect is mood congruent.  Pt denies any suicidal ideation intent or plan

## 2023-02-08 NOTE — BH TREATMENT PLAN - NSTXCOPEINTERMD_PSY_ALL_CORE
frequent hospitalizations  encourage pt to attend groups for coping skills 
frequent hospitalizations  encourage pt to attend groups for coping skills  and insight

## 2023-02-08 NOTE — BH TREATMENT PLAN - NSTXCOPEINTERPR_PSY_ALL_CORE
Coping skills are improving.  Discharge planned for February 9, 2023.
Patient will be encouraged to attend 1-2 psych rehab groups a day to work on coping skill development.

## 2023-02-08 NOTE — BH INPATIENT PSYCHIATRY DISCHARGE NOTE - HPI (INCLUDE ILLNESS QUALITY, SEVERITY, DURATION, TIMING, CONTEXT, MODIFYING FACTORS, ASSOCIATED SIGNS AND SYMPTOMS)
Direct transfer from ED [facility]. PT reports depression is worse, does not take meds for a  week. Anhedonic, feeling hiyd2hgq, low energy, no active SI, plan or intent. No AH.  See more HPI from earlier assessment in ED.   72 yr old feamle, domiciled, living alone, and retired .  she  (though physically  from  of > 20 yrs). premorbidly Ambulant without assist; reports bADL and iADL independent; no HHA.  background hx of depression and anxiety; other diagnosis of bipolar disorder; hx of multiple past psych admissions including last admission to [facility] back in 11/2022.  with past hx of BZD and alcohol use - no documented complicated withdrawal/rehabs. hx of a suicide attempt via OD on Ambien in 2004.  pertinent medical issues include: hypothyroidism, Breast cancer s/p lumpectomy, CAD with stent placement, GERD, and "pinched disc".  previously seen at [facility] in 2018 due to ingestion of Ambien, Klonopin and Valium. tonight, presented to the ED accompanied by  due to worsening depression.   reported that Pt was hypersomnolent; previously mumbling to self; incoherent at times. psych was consulted as there had been concern re: SI     she is seen bedside.  knows she is in a hospital; names month as January but not the year. reports that she is not feeling well; "not good", she says - shaking her head. endorses progressively feeling more sad and anxious. there is poor sleep, decreased energy level, impaired concentration and decreased appetite.  currently feeling more helpless/ hopeless/ worthless. admits that she "feels suicidal" but no intention or plans. no HI. unable to fully elaborate on the anxiety symptoms. ruminating on not feeling well. denied any somatic complaints.      denied experiencing any recent christofer symptoms of: elevated mood, no grandiosity, no increased goal directed activities or engaged in risk taking behavior; denied any pressured speech;  no increased in energy level causing sleep disruption.  she denied feeling paranoid. denied any perceptual disturbances.  denied abusing any illicit substances including alcohol. admits that she has been getting more forgetful. struggled with recalling events including meds she is taking currently - though claimed that she has been compliant with all prescribed meds     as is with this current presentation, she was able to engage and speak to writer but it is noted that she still does make repetitive sound (zuh, zuh, zuh) and cannot further articulate on why, or stop. She continues to endorse that she has not been functioning or doing well at home. She admits struggling with her ADLs like showering or eating. Her sleep continues to be erratic.  like from the last presentation, she reports staying in bed most of the days.  denies any current SI/ HI

## 2023-02-08 NOTE — BH TREATMENT PLAN - NSTXPLANTHERAPYSESSIONSFT_PSY_ALL_CORE
02-03-23  Type of therapy: Inspiration and motiviation,Self esteem  Type of session: Group  Level of patient participation: Engaged,Participates  Duration of participation: 60 minutes  Therapy conducted by: Psych rehab  Therapy Summary: Patient attended Community Meeting and Morning Self Esteem Group and was able to engage in group topics.    02-07-23  Type of therapy: Dialectical behavior therapy,Coping skills  Type of session: Group  Level of patient participation: Engaged,Participates  Duration of participation: 60 minutes  Therapy conducted by: Psych rehab  Therapy Summary: Patient attended group therapy and recreation today. Engaged in intervention and reporting improvement. Worried about returning home where she lives alone. Social with peers. Education and support ongoing.    02-08-23  Type of therapy: Cognitive behavior therapy  Type of session: Group  Level of patient participation: Engaged,Participates  Duration of participation: 60 minutes  Therapy conducted by: Psych rehab  Therapy Summary: Patient attended group therapy today. Reporting improvement and expressed a different perspective on her current stressors. Reported feeling relieved that she is no longer dependent on Ambien for sleep.

## 2023-02-08 NOTE — BH INPATIENT PSYCHIATRY DISCHARGE NOTE - OTHER PAST PSYCHIATRIC HISTORY (INCLUDE DETAILS REGARDING ONSET, COURSE OF ILLNESS, INPATIENT/OUTPATIENT TREATMENT)
hx of depression and anxiety; other diagnosis of bipolar disorder; hx of multiple past psych admissions including last admission to [facility] back in 11/2022.  with past hx of BZD and alcohol use, hx of a suicide attempt via OD on Ambien in 2004, 2018 [facility] due to ingestion of Ambien, Klonopin and Valium.

## 2023-02-08 NOTE — BH INPATIENT PSYCHIATRY DISCHARGE NOTE - NSDCMRMEDTOKEN_GEN_ALL_CORE_FT
aspirin 81 mg oral delayed release tablet: 1 tab(s) orally once a day  atorvastatin 80 mg oral tablet: 1 tab(s) orally once a day (at bedtime)  cholecalciferol oral tablet: 1000 unit(s) orally once a day  clonazePAM 2 mg oral tablet: 1 tab(s) orally once a day (at bedtime) MDD:2mg  docosanol 10% topical cream: 1 application topically 2 times a day  levothyroxine 112 mcg (0.112 mg) oral tablet: 1 tab(s) orally once a day  melatonin 3 mg oral tablet: 1 tab(s) orally once a day (at bedtime), As needed, Insomnia  metoprolol tartrate 25 mg oral tablet: 1 tab(s) orally once a day  pantoprazole 40 mg oral delayed release tablet: 1 tab(s) orally once a day (before a meal)  PARoxetine 20 mg oral tablet: 1 tab(s) orally once a day  ticagrelor 90 mg oral tablet: 1 tab(s) orally 2 times a day   Wellbutrin  mg/24 hours oral tablet, extended release: 1 tab(s) orally every 24 hours  (Wellbutrin XL 300mg QD filled on 4/7/22)

## 2023-02-09 PROCEDURE — 99232 SBSQ HOSP IP/OBS MODERATE 35: CPT

## 2023-02-09 RX ADMIN — BUPROPION HYDROCHLORIDE 150 MILLIGRAM(S): 150 TABLET, EXTENDED RELEASE ORAL at 09:22

## 2023-02-09 RX ADMIN — Medication 1000 UNIT(S): at 09:24

## 2023-02-09 RX ADMIN — TICAGRELOR 60 MILLIGRAM(S): 90 TABLET ORAL at 21:56

## 2023-02-09 RX ADMIN — ATORVASTATIN CALCIUM 80 MILLIGRAM(S): 80 TABLET, FILM COATED ORAL at 21:55

## 2023-02-09 RX ADMIN — Medication 81 MILLIGRAM(S): at 09:24

## 2023-02-09 RX ADMIN — DOCOSANOL 1 APPLICATION(S): 100 CREAM TOPICAL at 21:55

## 2023-02-09 RX ADMIN — DOCOSANOL 1 APPLICATION(S): 100 CREAM TOPICAL at 09:21

## 2023-02-09 RX ADMIN — Medication 20 MILLIGRAM(S): at 09:22

## 2023-02-09 RX ADMIN — PANTOPRAZOLE SODIUM 40 MILLIGRAM(S): 20 TABLET, DELAYED RELEASE ORAL at 09:22

## 2023-02-09 RX ADMIN — Medication 3 MILLIGRAM(S): at 22:01

## 2023-02-09 RX ADMIN — Medication 112 MICROGRAM(S): at 09:25

## 2023-02-09 RX ADMIN — Medication 25 MILLIGRAM(S): at 09:24

## 2023-02-09 RX ADMIN — TICAGRELOR 60 MILLIGRAM(S): 90 TABLET ORAL at 09:24

## 2023-02-09 NOTE — BH INPATIENT PSYCHIATRY PROGRESS NOTE - NSBHCHARTREVIEWVS_PSY_A_CORE FT
Vital Signs Last 24 Hrs  T(C): 36.3 (02-09-23 @ 07:26), Max: 36.3 (02-09-23 @ 07:26)  T(F): 97.3 (02-09-23 @ 07:26), Max: 97.3 (02-09-23 @ 07:26)  HR: --  BP: --  BP(mean): --  RR: 16 (02-09-23 @ 07:26) (16 - 16)  SpO2: 97% (02-09-23 @ 07:26) (97% - 97%)    Orthostatic VS  02-09-23 @ 07:26  Lying BP: --/-- HR: --  Sitting BP: 138/82 HR: 69  Standing BP: 113/77 HR: 77  Site: upper right arm  Mode: electronic  Orthostatic VS  02-08-23 @ 07:44  Lying BP: --/-- HR: --  Sitting BP: 118/72 HR: 74  Standing BP: 108/70 HR: 85  Site: upper right arm  Mode: electronic  
Vital Signs Last 24 Hrs  T(C): 36.4 (02-07-23 @ 07:46), Max: 36.4 (02-07-23 @ 07:46)  T(F): 97.5 (02-07-23 @ 07:46), Max: 97.5 (02-07-23 @ 07:46)  HR: --  BP: --  BP(mean): --  RR: 18 (02-07-23 @ 07:46) (18 - 18)  SpO2: 100% (02-07-23 @ 07:46) (100% - 100%)    Orthostatic VS  02-07-23 @ 07:46  Lying BP: --/-- HR: --  Sitting BP: 118/90 HR: 75  Standing BP: 101/77 HR: 75  Site: upper right arm  Mode: electronic  Orthostatic VS  02-06-23 @ 08:20  Lying BP: --/-- HR: --  Sitting BP: 155/88 HR: 85  Standing BP: 129/93 HR: 95  Site: upper right arm  Mode: electronic  
Vital Signs Last 24 Hrs  T(C): 36.4 (02-06-23 @ 08:20), Max: 36.4 (02-06-23 @ 08:20)  T(F): 97.5 (02-06-23 @ 08:20), Max: 97.5 (02-06-23 @ 08:20)  HR: --  BP: --  BP(mean): --  RR: 16 (02-06-23 @ 08:20) (16 - 16)  SpO2: 100% (02-06-23 @ 08:20) (100% - 100%)    Orthostatic VS  02-06-23 @ 08:20  Lying BP: --/-- HR: --  Sitting BP: 155/88 HR: 85  Standing BP: 129/93 HR: 95  Site: upper right arm  Mode: electronic  Orthostatic VS  02-05-23 @ 08:16  Lying BP: --/-- HR: --  Sitting BP: 130/85 HR: 101  Standing BP: 138/88 HR: 102  Site: upper right arm  Mode: electronic  
Vital Signs Last 24 Hrs  T(C): 36.1 (02-03-23 @ 08:19), Max: 36.1 (02-03-23 @ 08:19)  T(F): 97 (02-03-23 @ 08:19), Max: 97 (02-03-23 @ 08:19)  HR: --  BP: --  BP(mean): --  RR: 16 (02-03-23 @ 08:19) (16 - 16)  SpO2: 100% (02-03-23 @ 08:19) (100% - 100%)    Orthostatic VS  02-03-23 @ 08:19  Lying BP: --/-- HR: --  Sitting BP: 137/64 HR: 93  Standing BP: 124/100 HR: 94  Site: upper right arm  Mode: electronic  Orthostatic VS  02-02-23 @ 08:19  Lying BP: --/-- HR: --  Sitting BP: 108/61 HR: 88  Standing BP: --/-- HR: --  Site: upper right arm  Mode: electronic  
Vital Signs Last 24 Hrs  T(C): 36.4 (02-04-23 @ 08:13), Max: 36.4 (02-04-23 @ 08:13)  T(F): 97.5 (02-04-23 @ 08:13), Max: 97.5 (02-04-23 @ 08:13)  HR: --  BP: --  BP(mean): --  RR: 16 (02-04-23 @ 08:13) (16 - 16)  SpO2: 98% (02-04-23 @ 08:13) (98% - 98%)    Orthostatic VS  02-04-23 @ 08:13  Lying BP: --/-- HR: --  Sitting BP: 136/79 HR: 73  Standing BP: 116/87 HR: 112  Site: upper right arm  Mode: electronic  Orthostatic VS  02-03-23 @ 08:19  Lying BP: --/-- HR: --  Sitting BP: 137/64 HR: 93  Standing BP: 124/100 HR: 94  Site: upper right arm  Mode: electronic  
Vital Signs Last 24 Hrs  T(C): 36.3 (02-08-23 @ 07:44), Max: 36.3 (02-08-23 @ 07:44)  T(F): 97.4 (02-08-23 @ 07:44), Max: 97.4 (02-08-23 @ 07:44)  HR: --  BP: --  BP(mean): --  RR: 15 (02-08-23 @ 07:44) (15 - 15)  SpO2: 95% (02-08-23 @ 07:44) (95% - 95%)    Orthostatic VS  02-08-23 @ 07:44  Lying BP: --/-- HR: --  Sitting BP: 118/72 HR: 74  Standing BP: 108/70 HR: 85  Site: upper right arm  Mode: electronic  Orthostatic VS  02-07-23 @ 07:46  Lying BP: --/-- HR: --  Sitting BP: 118/90 HR: 75  Standing BP: 101/77 HR: 75  Site: upper right arm  Mode: electronic  
Vital Signs Last 24 Hrs  T(C): 36.3 (02-05-23 @ 08:16), Max: 36.3 (02-05-23 @ 08:16)  T(F): 97.4 (02-05-23 @ 08:16), Max: 97.4 (02-05-23 @ 08:16)  HR: --  BP: --  BP(mean): --  RR: 18 (02-05-23 @ 08:16) (18 - 18)  SpO2: 99% (02-05-23 @ 08:16) (99% - 99%)    Orthostatic VS  02-05-23 @ 08:16  Lying BP: --/-- HR: --  Sitting BP: 130/85 HR: 101  Standing BP: 138/88 HR: 102  Site: upper right arm  Mode: electronic  Orthostatic VS  02-04-23 @ 08:13  Lying BP: --/-- HR: --  Sitting BP: 136/79 HR: 73  Standing BP: 116/87 HR: 112  Site: upper right arm  Mode: electronic  
Vital Signs Last 24 Hrs  T(C): 36.4 (02-02-23 @ 08:19), Max: 36.4 (02-02-23 @ 08:19)  T(F): 97.5 (02-02-23 @ 08:19), Max: 97.5 (02-02-23 @ 08:19)  HR: --  BP: --  BP(mean): --  RR: 16 (02-02-23 @ 08:19) (16 - 16)  SpO2: 96% (02-02-23 @ 08:19) (96% - 96%)    Orthostatic VS  02-02-23 @ 08:19  Lying BP: --/-- HR: --  Sitting BP: 108/61 HR: 88  Standing BP: --/-- HR: --  Site: upper right arm  Mode: electronic  Orthostatic VS  02-01-23 @ 14:48  Lying BP: --/-- HR: --  Sitting BP: 154/104 HR: 104  Standing BP: 124/93 HR: 103  Site: --  Mode: --  Orthostatic VS  02-01-23 @ 14:00  Lying BP: --/-- HR: --  Sitting BP: 154/104 HR: 104  Standing BP: 124/93 HR: 103  Site: --  Mode: --

## 2023-02-09 NOTE — BH DISCHARGE NOTE NURSING/SOCIAL WORK/PSYCH REHAB - PATIENT PORTAL LINK FT
You can access the FollowMyHealth Patient Portal offered by [facility] by registering at the following website: [email or web address]. By joining [employer]’s FollowMyHealth portal, you will also be able to view your health information using other applications (apps) compatible with our system.

## 2023-02-09 NOTE — BH INPATIENT PSYCHIATRY PROGRESS NOTE - PRN MEDS
MEDICATIONS  (PRN):  diphenhydrAMINE Injectable 50 milliGRAM(s) IntraMuscular every 6 hours PRN EPS  haloperidol    Injectable 5 milliGRAM(s) IntraMuscular every 6 hours PRN severe psychiotic agitation  LORazepam   Injectable 2 milliGRAM(s) IntraMuscular every 4 hours PRN severe agitation  
MEDICATIONS  (PRN):  benzocaine/menthol Lozenge 1 Lozenge Oral every 2 hours PRN dry mouth  diphenhydrAMINE Injectable 50 milliGRAM(s) IntraMuscular every 6 hours PRN EPS  haloperidol    Injectable 5 milliGRAM(s) IntraMuscular every 6 hours PRN severe psychiotic agitation  melatonin 3 milliGRAM(s) Oral at bedtime PRN Insomnia  
MEDICATIONS  (PRN):  diphenhydrAMINE Injectable 50 milliGRAM(s) IntraMuscular every 6 hours PRN EPS  haloperidol    Injectable 5 milliGRAM(s) IntraMuscular every 6 hours PRN severe psychiotic agitation  LORazepam   Injectable 2 milliGRAM(s) IntraMuscular every 4 hours PRN severe agitation  
MEDICATIONS  (PRN):  benzocaine/menthol Lozenge 1 Lozenge Oral every 2 hours PRN dry mouth  diphenhydrAMINE Injectable 50 milliGRAM(s) IntraMuscular every 6 hours PRN EPS  haloperidol    Injectable 5 milliGRAM(s) IntraMuscular every 6 hours PRN severe psychiotic agitation  LORazepam   Injectable 2 milliGRAM(s) IntraMuscular every 4 hours PRN severe agitation

## 2023-02-09 NOTE — BH DISCHARGE NOTE NURSING/SOCIAL WORK/PSYCH REHAB - NSCDUDCCRISIS_PSY_A_CORE
[facility] Well  [phone] [facility] [phone])  Text "WELL" to [phone]  Website: [email or web address]  [facility] – Crisis Care for Children, Adults and Families  [address] [place] [address]  Mobile Crisis Hotline – (322) 378-2368/.National Suicide Prevention Lifeline 8 (357) 724-8442/.  [facility] Response Crisis Hotline  (236) 485-7142  24 hour telephone crisis intervention and suicide prevention hotline concerned with all mental health issues

## 2023-02-09 NOTE — BH DISCHARGE NOTE NURSING/SOCIAL WORK/PSYCH REHAB - NSDCADDINFO1FT_PSY_ALL_CORE
Patient has an appointment with Dr. Sparrow on Monday 2/13/23 at 11am.   Patient has an appointment with Dr. Sparrow on Monday 2/13/23 at 11am.    Patient also referred to [facility] - [phone] (they will call to set up an intake appointment)

## 2023-02-09 NOTE — BH INPATIENT PSYCHIATRY PROGRESS NOTE - NSBHASSESSSUMMFT_PSY_ALL_CORE
Pt with multiple hospitalization for the same depression and anxiety .  Pt verbalized that inspite of multiple medication trials and ECT "nothing works ." 
Pt with increased hopefulness and claiming willingness to continue with PT and to maintain her independence. 
Pt denies any suicidal ideation intent or plan. Pt with good adjustment to being on the unit.  Pt with improved sleep and appetite. Pt with potential  financial /legal problem at place of residence due to difficulties in managing her bills . 
Pt denies any suicidal ideation intent or plan. Pt with good adjustment to being on the unit.  Pt with improved sleep and appetite. Pt with potential  financial /legal problem at place of residence due to difficulties in managing her bills . 
Pt with improved sleep and appetite.  Pt denies any suicidal ideation intent or plan . 
Pt with increased hopefulness and claiming willingness to continue with PT and to maintain her independence. 
Pt denies any suicidal ideation intent or plan. Pt with good adjustment to being on the unit.  Pt with improved sleep and appetite. Pt with potential  financial /legal problem at place of residence due to difficulties in managing her bills . 
pt with frequent hospitalization as she was dealing supposedly with her loneliness and also of her difficulty in paying for her bills on time.

## 2023-02-09 NOTE — BH INPATIENT PSYCHIATRY PROGRESS NOTE - NSDCCRITERIA_PSY_ALL_CORE
Patient will not be depressed

## 2023-02-09 NOTE — BH INPATIENT PSYCHIATRY PROGRESS NOTE - NSBHTIMEACTIVITIESPERFORMED_PSY_A_CORE
1. interviewed the pt to assess current mental status  2.spoke with pt about her plan to be involved in more supportive community activities    3.arranged for Pt to continue with PT on outpt  basis  to maintain mobility   4. conferred with social work , appts are not set as the psychiatrist has yet to return calls.    5. conferred with  
1. interviewed the pt to assess current mental status  2.spoke with pt about her plan to be involved in more supportive community activities    3.arranged for Pt to continue with PT on outpt  basis  to maintain mobility   4. conferred with social work , appts are not set as the psychiatrist has yet to return calls.    5. conferred with  
1. interviewed the pt to assess current mental status  2. reviewed medications    3. reviewed lab results   4. conferred with social work as the pt asking about medicaid and of her medicare benefits.

## 2023-02-09 NOTE — BH INPATIENT PSYCHIATRY PROGRESS NOTE - NSBHMSEBODY_PSY_A_CORE
Overweight
Average build/Overweight
Overweight

## 2023-02-09 NOTE — BH INPATIENT PSYCHIATRY PROGRESS NOTE - NSBHMSEKNOW_PSY_A_CORE
Unable to assess
Unable to assess
Normal
Unable to assess
Normal
Unable to assess
Unable to assess
Normal

## 2023-02-09 NOTE — BH INPATIENT PSYCHIATRY PROGRESS NOTE - NSBHPSYCHOLCOGORIENT_PSY_A_CORE
Not fully oriented...
Oriented to time, place, person, situation
Not fully oriented...

## 2023-02-09 NOTE — BH INPATIENT PSYCHIATRY PROGRESS NOTE - NSBHMSESPEECH_PSY_A_CORE
Normal volume, rate, productivity, spontaneity and articulation
Abnormal as indicated, otherwise normal...
Normal volume, rate, productivity, spontaneity and articulation
Normal volume, rate, productivity, spontaneity and articulation
Abnormal as indicated, otherwise normal...

## 2023-02-09 NOTE — BH INPATIENT PSYCHIATRY PROGRESS NOTE - CURRENT MEDICATION
MEDICATIONS  (STANDING):  aspirin enteric coated 81 milliGRAM(s) Oral daily  atorvastatin 80 milliGRAM(s) Oral at bedtime  buPROPion XL (24-Hour) . 150 milliGRAM(s) Oral daily  cephalexin 500 milliGRAM(s) Oral every 12 hours  clonazePAM  Tablet 2 milliGRAM(s) Oral at bedtime  influenza  Vaccine (HIGH DOSE) 0.7 milliLiter(s) IntraMuscular once  levothyroxine 112 MICROGram(s) Oral daily  metoprolol tartrate 25 milliGRAM(s) Oral daily  pantoprazole    Tablet 40 milliGRAM(s) Oral before breakfast  PARoxetine 20 milliGRAM(s) Oral daily  ticagrelor 60 milliGRAM(s) Oral every 12 hours    MEDICATIONS  (PRN):  diphenhydrAMINE Injectable 50 milliGRAM(s) IntraMuscular every 6 hours PRN EPS  haloperidol    Injectable 5 milliGRAM(s) IntraMuscular every 6 hours PRN severe psychiotic agitation  LORazepam   Injectable 2 milliGRAM(s) IntraMuscular every 4 hours PRN severe agitation  
MEDICATIONS  (STANDING):  aspirin enteric coated 81 milliGRAM(s) Oral daily  atorvastatin 80 milliGRAM(s) Oral at bedtime  buPROPion XL (24-Hour) . 150 milliGRAM(s) Oral daily  cephalexin 500 milliGRAM(s) Oral every 12 hours  cholecalciferol 1000 Unit(s) Oral daily  clonazePAM  Tablet 2 milliGRAM(s) Oral at bedtime  docosanol 10% Cream 1 Application(s) Topical two times a day  influenza  Vaccine (HIGH DOSE) 0.7 milliLiter(s) IntraMuscular once  levothyroxine 112 MICROGram(s) Oral daily  metoprolol tartrate 25 milliGRAM(s) Oral daily  pantoprazole    Tablet 40 milliGRAM(s) Oral before breakfast  PARoxetine 20 milliGRAM(s) Oral daily  ticagrelor 60 milliGRAM(s) Oral every 12 hours    MEDICATIONS  (PRN):  diphenhydrAMINE Injectable 50 milliGRAM(s) IntraMuscular every 6 hours PRN EPS  haloperidol    Injectable 5 milliGRAM(s) IntraMuscular every 6 hours PRN severe psychiotic agitation  LORazepam   Injectable 2 milliGRAM(s) IntraMuscular every 4 hours PRN severe agitation  
MEDICATIONS  (STANDING):  aspirin enteric coated 81 milliGRAM(s) Oral daily  atorvastatin 80 milliGRAM(s) Oral at bedtime  buPROPion XL (24-Hour) . 150 milliGRAM(s) Oral daily  cephalexin 500 milliGRAM(s) Oral every 12 hours  cholecalciferol 1000 Unit(s) Oral daily  clonazePAM  Tablet 2 milliGRAM(s) Oral at bedtime  docosanol 10% Cream 1 Application(s) Topical two times a day  influenza  Vaccine (HIGH DOSE) 0.7 milliLiter(s) IntraMuscular once  levothyroxine 112 MICROGram(s) Oral daily  metoprolol tartrate 25 milliGRAM(s) Oral daily  pantoprazole    Tablet 40 milliGRAM(s) Oral before breakfast  PARoxetine 20 milliGRAM(s) Oral daily  ticagrelor 60 milliGRAM(s) Oral every 12 hours    MEDICATIONS  (PRN):  diphenhydrAMINE Injectable 50 milliGRAM(s) IntraMuscular every 6 hours PRN EPS  haloperidol    Injectable 5 milliGRAM(s) IntraMuscular every 6 hours PRN severe psychiotic agitation  LORazepam   Injectable 2 milliGRAM(s) IntraMuscular every 4 hours PRN severe agitation  
MEDICATIONS  (STANDING):  aspirin enteric coated 81 milliGRAM(s) Oral daily  atorvastatin 80 milliGRAM(s) Oral at bedtime  buPROPion XL (24-Hour) . 150 milliGRAM(s) Oral daily  cephalexin 500 milliGRAM(s) Oral every 12 hours  cholecalciferol 1000 Unit(s) Oral daily  clonazePAM  Tablet 2 milliGRAM(s) Oral at bedtime  influenza  Vaccine (HIGH DOSE) 0.7 milliLiter(s) IntraMuscular once  levothyroxine 112 MICROGram(s) Oral daily  metoprolol tartrate 25 milliGRAM(s) Oral daily  pantoprazole    Tablet 40 milliGRAM(s) Oral before breakfast  PARoxetine 20 milliGRAM(s) Oral daily  ticagrelor 60 milliGRAM(s) Oral every 12 hours    MEDICATIONS  (PRN):  diphenhydrAMINE Injectable 50 milliGRAM(s) IntraMuscular every 6 hours PRN EPS  haloperidol    Injectable 5 milliGRAM(s) IntraMuscular every 6 hours PRN severe psychiotic agitation  LORazepam   Injectable 2 milliGRAM(s) IntraMuscular every 4 hours PRN severe agitation  
MEDICATIONS  (STANDING):  aspirin enteric coated 81 milliGRAM(s) Oral daily  atorvastatin 80 milliGRAM(s) Oral at bedtime  buPROPion XL (24-Hour) . 150 milliGRAM(s) Oral daily  cephalexin 500 milliGRAM(s) Oral every 12 hours  cholecalciferol 1000 Unit(s) Oral daily  clonazePAM  Tablet 2 milliGRAM(s) Oral at bedtime  docosanol 10% Cream 1 Application(s) Topical two times a day  influenza  Vaccine (HIGH DOSE) 0.7 milliLiter(s) IntraMuscular once  levothyroxine 112 MICROGram(s) Oral daily  metoprolol tartrate 25 milliGRAM(s) Oral daily  pantoprazole    Tablet 40 milliGRAM(s) Oral before breakfast  PARoxetine 20 milliGRAM(s) Oral daily  ticagrelor 60 milliGRAM(s) Oral every 12 hours    MEDICATIONS  (PRN):  benzocaine/menthol Lozenge 1 Lozenge Oral every 2 hours PRN dry mouth  diphenhydrAMINE Injectable 50 milliGRAM(s) IntraMuscular every 6 hours PRN EPS  haloperidol    Injectable 5 milliGRAM(s) IntraMuscular every 6 hours PRN severe psychiotic agitation  LORazepam   Injectable 2 milliGRAM(s) IntraMuscular every 4 hours PRN severe agitation  
MEDICATIONS  (STANDING):  aspirin enteric coated 81 milliGRAM(s) Oral daily  atorvastatin 80 milliGRAM(s) Oral at bedtime  buPROPion XL (24-Hour) . 150 milliGRAM(s) Oral daily  cholecalciferol 1000 Unit(s) Oral daily  clonazePAM  Tablet 2 milliGRAM(s) Oral at bedtime  docosanol 10% Cream 1 Application(s) Topical two times a day  influenza  Vaccine (HIGH DOSE) 0.7 milliLiter(s) IntraMuscular once  levothyroxine 112 MICROGram(s) Oral daily  metoprolol tartrate 25 milliGRAM(s) Oral daily  pantoprazole    Tablet 40 milliGRAM(s) Oral before breakfast  PARoxetine 20 milliGRAM(s) Oral daily  ticagrelor 60 milliGRAM(s) Oral every 12 hours    MEDICATIONS  (PRN):  benzocaine/menthol Lozenge 1 Lozenge Oral every 2 hours PRN dry mouth  diphenhydrAMINE Injectable 50 milliGRAM(s) IntraMuscular every 6 hours PRN EPS  haloperidol    Injectable 5 milliGRAM(s) IntraMuscular every 6 hours PRN severe psychiotic agitation  melatonin 3 milliGRAM(s) Oral at bedtime PRN Insomnia  
MEDICATIONS  (STANDING):  aspirin enteric coated 81 milliGRAM(s) Oral daily  atorvastatin 80 milliGRAM(s) Oral at bedtime  buPROPion XL (24-Hour) . 150 milliGRAM(s) Oral daily  cephalexin 500 milliGRAM(s) Oral every 12 hours  clonazePAM  Tablet 2 milliGRAM(s) Oral at bedtime  influenza  Vaccine (HIGH DOSE) 0.7 milliLiter(s) IntraMuscular once  levothyroxine 112 MICROGram(s) Oral daily  metoprolol tartrate 25 milliGRAM(s) Oral daily  pantoprazole    Tablet 40 milliGRAM(s) Oral before breakfast  PARoxetine 20 milliGRAM(s) Oral daily  ticagrelor 60 milliGRAM(s) Oral every 12 hours    MEDICATIONS  (PRN):  diphenhydrAMINE Injectable 50 milliGRAM(s) IntraMuscular every 6 hours PRN EPS  haloperidol    Injectable 5 milliGRAM(s) IntraMuscular every 6 hours PRN severe psychiotic agitation  LORazepam   Injectable 2 milliGRAM(s) IntraMuscular every 4 hours PRN severe agitation  
MEDICATIONS  (STANDING):  aspirin enteric coated 81 milliGRAM(s) Oral daily  atorvastatin 80 milliGRAM(s) Oral at bedtime  buPROPion XL (24-Hour) . 150 milliGRAM(s) Oral daily  cephalexin 500 milliGRAM(s) Oral every 12 hours  cholecalciferol 1000 Unit(s) Oral daily  clonazePAM  Tablet 2 milliGRAM(s) Oral at bedtime  docosanol 10% Cream 1 Application(s) Topical two times a day  influenza  Vaccine (HIGH DOSE) 0.7 milliLiter(s) IntraMuscular once  levothyroxine 112 MICROGram(s) Oral daily  metoprolol tartrate 25 milliGRAM(s) Oral daily  pantoprazole    Tablet 40 milliGRAM(s) Oral before breakfast  PARoxetine 20 milliGRAM(s) Oral daily  ticagrelor 60 milliGRAM(s) Oral every 12 hours    MEDICATIONS  (PRN):  diphenhydrAMINE Injectable 50 milliGRAM(s) IntraMuscular every 6 hours PRN EPS  haloperidol    Injectable 5 milliGRAM(s) IntraMuscular every 6 hours PRN severe psychiotic agitation  LORazepam   Injectable 2 milliGRAM(s) IntraMuscular every 4 hours PRN severe agitation

## 2023-02-09 NOTE — BH INPATIENT PSYCHIATRY PROGRESS NOTE - NSBHMSEMOOD_PSY_A_CORE
Depressed/Anxious
Anxious/Other

## 2023-02-09 NOTE — BH INPATIENT PSYCHIATRY PROGRESS NOTE - NSBHFUPINTERVALHXFT_PSY_A_CORE
Covering MD Note ( 2/5/2023)   Pt seen as she ambulated steadily in the hallway using  her walker . The pt remains visible on the unit and is seen actively participating with a small select peer group. The pt was neatly and appropriately attired and was quite observant of others and the marcia on noted on  the unit.  The pt was pleasant and personable and gracious  in a socially cordial manner. The pt is tolerating her med regimen without any reported adverse effects and with evident clinical efficacy. The pt reported good sleep and appetite and she denied any current active SI /HI  or any current wishes to harm either herself or anyone else at this time.  The pt has been attending therapy groups and appearing to enjoy them.   As above, the pt has begun application of Abreva bid to a  small cold sore on the pt's  right upper lip. The pt's judgment and insight remain largely intact and well defined.   
Intervenous Administration:2/21/2022 at 1730 p.m. infusion WONC 22G 3/4 IN  Left antecubital to remain, capped with 2ml's 0.9 Sodium Chloride solution and dressed per IV procedure.      
Pt currently attend groups but also working on the aftercare appts and planning for activities that she would consider engaging in after the hospitalization  Pt denies any suicidal ideation intent or plan  Pt is not in distress and denies any side effects from medication   
Pt spoke of her being alone and having " trouble doing the shopping for myself." , pt also mentioning of feeling lonely" 
Pt denies any suicidal ideation intent or plan Pt with improved sleep and is denying any side effects from medication . Pt still with claims that she is having problems with the bills at home as she apparently was not paying her bills even through she claims she has the funds.  Pt seen interactive with peers and not appearing to be in distress.  
Pt with good eye contact Alert . Pt is goal directed . She denies any suicidal ideation intent or plan . Pt is concerned that she "living in a  co op and I haven't paid the maintenance  for a while and have  fees $700 . I don't think I have to pay if I'm mostly in the hospital . Can social work check this out for me? "   Pt is encouraged to use the walker rather than wheelchair as PT has determined that she has enough balance and leg strength. 
Covering MD Note ( 2/9/2023)  Pt seen in her room where she was comfortably relaxing and reading a book.  The pt reported good sleep and appetite and she has neem tolerating her med regimen without side effects and with goo clinical effect.  The pt has been participating more frequently and meaningfully in currently attend groups but also working on the aftercare appointments  and planning for activities that she would consider engaging in after the hospitalization  The pt. continues to deny any current active SI / HI /AH /VH and she denies any current intent or plan to harm either herself or anyone else at this time.  The pt appears  more future oriented and hopeful. with improving judgment and insight into her illness and her aftercare treatment.  
Covering MD Note ( 2/4/2023)   Pt seen as she ambulated steadily in the hallway using  her walker . The pt was neatly and appropriately attired and was quite observant of others and the marcia on noted on  the unit.  The pt was pleasant and personable and gracious  in a socially cordial manner. The pt is tolerating her med regimen without any reported adverse effects and with evident clinical efficacy. The pt reported good sleep and appetite and she denied any current active SI /HI  or any current wishes to harm either herself or anyone else at this time.  The pt has been attending therapy groups and appearing to enjoy them.    Pt with small cold sore to right upper lip. To order lip balm to treat. The pt's judgment and insight remain largely intact and well defined.   
Pt was resting in bed yet asking for "something for my nerves ".  Pt with past hx of dependency with sedative hypnotics. Encouraged pt to attend groups for insight and coping skills. Pt with hx of ect and many medication trials but pt claimed "they all don't work"

## 2023-02-09 NOTE — BH INPATIENT PSYCHIATRY PROGRESS NOTE - NSTXCOPEINTERMD_PSY_ALL_CORE
frequent hospitalizations  encourage pt to attend groups for coping skills 
frequent hospitalizations  encourage pt to attend groups for coping skills  and insight 
frequent hospitalizations  encourage pt to attend groups for coping skills 

## 2023-02-09 NOTE — BH INPATIENT PSYCHIATRY PROGRESS NOTE - NSTXDEPRESINTERMD_PSY_ALL_CORE
pt on wellbutrin for depression 
pt on wellbutrin and paxil for depression

## 2023-02-09 NOTE — BH INPATIENT PSYCHIATRY PROGRESS NOTE - NSBHFUPINTERVALCCFT_PSY_A_CORE
Pt will require a rolling walker due to decreased velocity of limb motion;  decreased step length;  decreased trunk rotation;  decreased flexibility and weakness.    The patient’s mobility limitation requires the use of an appropriately fitted walker.

## 2023-02-09 NOTE — BH DISCHARGE NOTE NURSING/SOCIAL WORK/PSYCH REHAB - NSDCPLANREVIEWED_PSY_ALL_CORE
Patient alert, oriented x3, pleasant and cooperative.  Pt denies S/H IIP currently.  Nurse and  reviewed discharge plan with patient who agrees and accepts plan.  Pt provided with a copy of discharge paperwork.  Pt appropriately dressed for discharge and is transported home by family , friend or taxi to ensure safe arrival home./Yes

## 2023-02-09 NOTE — BH INPATIENT PSYCHIATRY PROGRESS NOTE - NSBHMSEAFFQUAL_PSY_A_CORE
Euthymic
Euthymic
Depressed/Anxious
Statement Selected

## 2023-02-09 NOTE — BH INPATIENT PSYCHIATRY PROGRESS NOTE - NSBHATTESTBILLING_PSY_A_CORE
Billing in another system
[id number] OBS or IP - moderate complexity OR 35-49 mins
Attending Attestation (For Attendings USE Only)...
Billing in another system
[id number] OBS or IP - moderate complexity OR 35-49 mins
Billing in another system

## 2023-02-09 NOTE — BH INPATIENT PSYCHIATRY PROGRESS NOTE - NSBHMSELANG_PSY_A_CORE
minimal answers/Impaired repetition
minimal answers/No abnormalities noted
minimal answers/Impaired repetition
minimal answers/No abnormalities noted/Impaired repetition
minimal answers/Impaired repetition
minimal answers/No abnormalities noted/Impaired repetition

## 2023-02-09 NOTE — BH INPATIENT PSYCHIATRY PROGRESS NOTE - NSTXDCOTHRGOAL_PSY_ALL_CORE
Pt. will have appt. for ongoing mental health care within 5-7 days of discharge  Pt. will have safe housing upon discharge.  Social work will follow up to insure Pts. benefits are in place  SW to explore with pt. the interest and need for substance use referral for discharge
Nsaids Counseling: NSAID Counseling: I discussed with the patient that NSAIDs should be taken with food. Prolonged use of NSAIDs can result in the development of stomach ulcers.  Patient advised to stop taking NSAIDs if abdominal pain occurs.  The patient verbalized understanding of the proper use and possible adverse effects of NSAIDs.  All of the patient's questions and concerns were addressed.

## 2023-02-09 NOTE — BH DISCHARGE NOTE NURSING/SOCIAL WORK/PSYCH REHAB - NSDPDISTO_PSY_ALL_CORE
Patient will be discharged to her private home at [address] [place] [address].  [place] [address] [phone]/Home

## 2023-02-10 VITALS — TEMPERATURE: 96 F | OXYGEN SATURATION: 100 % | RESPIRATION RATE: 16 BRPM

## 2023-02-10 RX ORDER — DOCOSANOL 100 MG/G
1 CREAM TOPICAL
Qty: 0 | Refills: 0 | DISCHARGE
Start: 2023-02-10

## 2023-02-10 RX ORDER — LANOLIN ALCOHOL/MO/W.PET/CERES
1 CREAM (GRAM) TOPICAL
Qty: 15 | Refills: 1
Start: 2023-02-10 | End: 2023-03-11

## 2023-02-10 RX ORDER — ATORVASTATIN CALCIUM 80 MG/1
1 TABLET, FILM COATED ORAL
Qty: 15 | Refills: 1
Start: 2023-02-10 | End: 2023-03-11

## 2023-02-10 RX ORDER — LEVOTHYROXINE SODIUM 125 MCG
1 TABLET ORAL
Qty: 0 | Refills: 0 | DISCHARGE

## 2023-02-10 RX ORDER — TICAGRELOR 90 MG/1
1 TABLET ORAL
Qty: 30 | Refills: 1
Start: 2023-02-10 | End: 2023-03-11

## 2023-02-10 RX ORDER — CLONAZEPAM 1 MG
1 TABLET ORAL
Qty: 15 | Refills: 0
Start: 2023-02-10 | End: 2023-02-24

## 2023-02-10 RX ORDER — PANTOPRAZOLE SODIUM 20 MG/1
1 TABLET, DELAYED RELEASE ORAL
Qty: 15 | Refills: 1
Start: 2023-02-10 | End: 2023-03-11

## 2023-02-10 RX ORDER — LEVOTHYROXINE SODIUM 125 MCG
1 TABLET ORAL
Qty: 15 | Refills: 1
Start: 2023-02-10 | End: 2023-03-11

## 2023-02-10 RX ORDER — BUPROPION HYDROCHLORIDE 150 MG/1
1 TABLET, EXTENDED RELEASE ORAL
Qty: 15 | Refills: 1
Start: 2023-02-10 | End: 2023-03-11

## 2023-02-10 RX ORDER — CHOLECALCIFEROL (VITAMIN D3) 125 MCG
1 CAPSULE ORAL
Qty: 0 | Refills: 0 | DISCHARGE

## 2023-02-10 RX ORDER — CLONAZEPAM 1 MG
1 TABLET ORAL
Qty: 0 | Refills: 0 | DISCHARGE

## 2023-02-10 RX ORDER — BUPROPION HYDROCHLORIDE 150 MG/1
1 TABLET, EXTENDED RELEASE ORAL
Qty: 0 | Refills: 0 | DISCHARGE

## 2023-02-10 RX ORDER — CHOLECALCIFEROL (VITAMIN D3) 125 MCG
1000 CAPSULE ORAL
Qty: 15 | Refills: 1
Start: 2023-02-10 | End: 2023-03-11

## 2023-02-10 RX ORDER — PANTOPRAZOLE SODIUM 20 MG/1
1 TABLET, DELAYED RELEASE ORAL
Qty: 0 | Refills: 0 | DISCHARGE

## 2023-02-10 RX ORDER — ASPIRIN/CALCIUM CARB/MAGNESIUM 324 MG
1 TABLET ORAL
Qty: 15 | Refills: 1
Start: 2023-02-10 | End: 2023-03-11

## 2023-02-10 RX ORDER — METOPROLOL TARTRATE 50 MG
1 TABLET ORAL
Qty: 15 | Refills: 1
Start: 2023-02-10 | End: 2023-03-11

## 2023-02-10 RX ORDER — METOPROLOL TARTRATE 50 MG
1 TABLET ORAL
Qty: 0 | Refills: 0 | DISCHARGE

## 2023-02-10 RX ADMIN — Medication 20 MILLIGRAM(S): at 09:44

## 2023-02-10 RX ADMIN — Medication 1000 UNIT(S): at 09:44

## 2023-02-10 RX ADMIN — BUPROPION HYDROCHLORIDE 150 MILLIGRAM(S): 150 TABLET, EXTENDED RELEASE ORAL at 09:44

## 2023-02-10 RX ADMIN — PANTOPRAZOLE SODIUM 40 MILLIGRAM(S): 20 TABLET, DELAYED RELEASE ORAL at 09:43

## 2023-02-10 RX ADMIN — Medication 81 MILLIGRAM(S): at 09:44

## 2023-02-10 RX ADMIN — DOCOSANOL 1 APPLICATION(S): 100 CREAM TOPICAL at 09:45

## 2023-02-10 RX ADMIN — TICAGRELOR 60 MILLIGRAM(S): 90 TABLET ORAL at 09:45

## 2023-02-10 RX ADMIN — Medication 25 MILLIGRAM(S): at 09:44

## 2023-02-10 RX ADMIN — Medication 112 MICROGRAM(S): at 09:44

## 2023-02-13 NOTE — CHART NOTE - NSCHARTNOTEFT_GEN_A_CORE
Attempted to contact . Phone busy multiple times.  Will continue trying.
Will sign off, please re consult as needed
Writer attempted to contact patient at 10:36 am for 24 Hour Follow-up with phone numbers provided in the chart, left message on patient's voicemail.
Left voice message for patient.  Awaiting return call.
Writer attempted to call patient with provided phone numbers for 24 Hour Follow-up Call. Left message with patient's spouse with unit phone number to call back.

## 2023-02-13 NOTE — CHART NOTE - NSCHARTNOTESELECT_GEN_ALL_CORE
24 Hour Patient Followup/Event Note
24 hr. follow up call/Event Note
24 Hour Patient Followup/Event Note
Event Note
Event Note

## 2023-02-14 DIAGNOSIS — R45.851 SUICIDAL IDEATIONS: ICD-10-CM

## 2023-02-14 DIAGNOSIS — E03.9 HYPOTHYROIDISM, UNSPECIFIED: ICD-10-CM

## 2023-02-14 DIAGNOSIS — I10 ESSENTIAL (PRIMARY) HYPERTENSION: ICD-10-CM

## 2023-02-14 DIAGNOSIS — Z98.61 CORONARY ANGIOPLASTY STATUS: ICD-10-CM

## 2023-02-14 DIAGNOSIS — F32.9 MAJOR DEPRESSIVE DISORDER, SINGLE EPISODE, UNSPECIFIED: ICD-10-CM

## 2023-02-14 DIAGNOSIS — I25.10 ATHEROSCLEROTIC HEART DISEASE OF NATIVE CORONARY ARTERY WITHOUT ANGINA PECTORIS: ICD-10-CM

## 2023-02-14 DIAGNOSIS — C50.919 MALIGNANT NEOPLASM OF UNSPECIFIED SITE OF UNSPECIFIED FEMALE BREAST: ICD-10-CM

## 2023-02-14 DIAGNOSIS — Z91.51 PERSONAL HISTORY OF SUICIDAL BEHAVIOR: ICD-10-CM

## 2023-02-14 DIAGNOSIS — R94.31 ABNORMAL ELECTROCARDIOGRAM [ECG] [EKG]: ICD-10-CM

## 2023-02-14 DIAGNOSIS — E78.5 HYPERLIPIDEMIA, UNSPECIFIED: ICD-10-CM

## 2023-02-14 DIAGNOSIS — N39.0 URINARY TRACT INFECTION, SITE NOT SPECIFIED: ICD-10-CM

## 2023-02-14 DIAGNOSIS — K21.9 GASTRO-ESOPHAGEAL REFLUX DISEASE WITHOUT ESOPHAGITIS: ICD-10-CM

## 2023-04-10 NOTE — PROGRESS NOTE ADULT - PROBLEM SELECTOR PLAN 1
[phone]   CTA chest with patchy opacities, more consolidated in RUL suspicious for PNA  -Endorsing cough with associated SOB x3-4 days  -Leukocytosis on admission (17.8) with elevated PCT (1.57)  -Afebrile  -C/w ABX  -F/u urine legionella, MRSA/MSSA nasal swab. CTA chest with patchy opacities, more consolidated in RUL suspicious for PNA  -Endorsing cough with associated SOB x3-4 days  -Leukocytosis on admission (17.8) with elevated PCT (1.57)  -Afebrile, leukocytosis now improving  -C/w ABX  -F/u urine legionella, MRSA/MSSA nasal swab  -Start Mucinex 1200 mg PO BID x5 days.

## 2023-05-15 ENCOUNTER — APPOINTMENT (OUTPATIENT)
Dept: SURGERY | Facility: CLINIC | Age: 73
End: 2023-05-15

## 2023-05-25 ENCOUNTER — APPOINTMENT (OUTPATIENT)
Dept: ULTRASOUND IMAGING | Facility: IMAGING CENTER | Age: 73
End: 2023-05-25

## 2023-05-25 ENCOUNTER — APPOINTMENT (OUTPATIENT)
Dept: MAMMOGRAPHY | Facility: IMAGING CENTER | Age: 73
End: 2023-05-25

## 2023-06-18 NOTE — ED ADULT NURSE NOTE - NSINTERVENTIONOPT_GEN_ALL_ED
<-- Click to add NO significant Past Surgical History appropriate Parkinsons Dementia Hourly Rounding

## 2023-06-28 ENCOUNTER — APPOINTMENT (OUTPATIENT)
Dept: SURGERY | Facility: CLINIC | Age: 73
End: 2023-06-28

## 2023-07-03 ENCOUNTER — INPATIENT (INPATIENT)
Facility: HOSPITAL | Age: 73
LOS: 4 days | Discharge: ANOTHER HOSPITAL | DRG: 885 | End: 2023-07-08
Attending: INTERNAL MEDICINE | Admitting: INTERNAL MEDICINE
Payer: MEDICARE

## 2023-07-03 VITALS
WEIGHT: 160.06 LBS | SYSTOLIC BLOOD PRESSURE: 89 MMHG | HEART RATE: 92 BPM | HEIGHT: 65 IN | OXYGEN SATURATION: 97 % | DIASTOLIC BLOOD PRESSURE: 60 MMHG | RESPIRATION RATE: 17 BRPM

## 2023-07-03 DIAGNOSIS — Z98.890 OTHER SPECIFIED POSTPROCEDURAL STATES: Chronic | ICD-10-CM

## 2023-07-03 DIAGNOSIS — Z98.89 OTHER SPECIFIED POSTPROCEDURAL STATES: Chronic | ICD-10-CM

## 2023-07-03 DIAGNOSIS — R07.9 CHEST PAIN, UNSPECIFIED: ICD-10-CM

## 2023-07-03 LAB
ALBUMIN SERPL ELPH-MCNC: 4.2 G/DL — SIGNIFICANT CHANGE UP (ref 3.3–5)
ALP SERPL-CCNC: 147 U/L — HIGH (ref 40–120)
ALT FLD-CCNC: 16 U/L — SIGNIFICANT CHANGE UP (ref 10–45)
ANION GAP SERPL CALC-SCNC: 22 MMOL/L — HIGH (ref 5–17)
APAP SERPL-MCNC: <15 UG/ML — SIGNIFICANT CHANGE UP (ref 10–30)
APPEARANCE UR: CLEAR — SIGNIFICANT CHANGE UP
AST SERPL-CCNC: 31 U/L — SIGNIFICANT CHANGE UP (ref 10–40)
BACTERIA # UR AUTO: ABNORMAL
BASE EXCESS BLDV CALC-SCNC: -4.7 MMOL/L — LOW (ref -2–3)
BASOPHILS # BLD AUTO: 0.06 K/UL — SIGNIFICANT CHANGE UP (ref 0–0.2)
BASOPHILS NFR BLD AUTO: 0.4 % — SIGNIFICANT CHANGE UP (ref 0–2)
BILIRUB SERPL-MCNC: 1.2 MG/DL — SIGNIFICANT CHANGE UP (ref 0.2–1.2)
BILIRUB UR-MCNC: NEGATIVE — SIGNIFICANT CHANGE UP
BUN SERPL-MCNC: 30 MG/DL — HIGH (ref 7–23)
CA-I SERPL-SCNC: 1.26 MMOL/L — SIGNIFICANT CHANGE UP (ref 1.15–1.33)
CALCIUM SERPL-MCNC: 10.7 MG/DL — HIGH (ref 8.4–10.5)
CHLORIDE BLDV-SCNC: 106 MMOL/L — SIGNIFICANT CHANGE UP (ref 96–108)
CHLORIDE SERPL-SCNC: 103 MMOL/L — SIGNIFICANT CHANGE UP (ref 96–108)
CO2 BLDV-SCNC: 22 MMOL/L — SIGNIFICANT CHANGE UP (ref 22–26)
CO2 SERPL-SCNC: 12 MMOL/L — LOW (ref 22–31)
COLOR SPEC: YELLOW — SIGNIFICANT CHANGE UP
CREAT SERPL-MCNC: 1.28 MG/DL — SIGNIFICANT CHANGE UP (ref 0.5–1.3)
DIFF PNL FLD: NEGATIVE — SIGNIFICANT CHANGE UP
EGFR: 44 ML/MIN/1.73M2 — LOW
EOSINOPHIL # BLD AUTO: 0.02 K/UL — SIGNIFICANT CHANGE UP (ref 0–0.5)
EOSINOPHIL NFR BLD AUTO: 0.1 % — SIGNIFICANT CHANGE UP (ref 0–6)
EPI CELLS # UR: 25 — SIGNIFICANT CHANGE UP
ETHANOL SERPL-MCNC: <10 MG/DL — SIGNIFICANT CHANGE UP (ref 0–10)
GAS PNL BLDV: 135 MMOL/L — LOW (ref 136–145)
GAS PNL BLDV: SIGNIFICANT CHANGE UP
GAS PNL BLDV: SIGNIFICANT CHANGE UP
GLUCOSE BLDV-MCNC: 92 MG/DL — SIGNIFICANT CHANGE UP (ref 70–99)
GLUCOSE SERPL-MCNC: 117 MG/DL — HIGH (ref 70–99)
GLUCOSE UR QL: NEGATIVE — SIGNIFICANT CHANGE UP
HCO3 BLDV-SCNC: 20 MMOL/L — LOW (ref 22–29)
HCT VFR BLD CALC: 42.6 % — SIGNIFICANT CHANGE UP (ref 34.5–45)
HCT VFR BLDA CALC: 39 % — SIGNIFICANT CHANGE UP (ref 34.5–46.5)
HGB BLD CALC-MCNC: 12.9 G/DL — SIGNIFICANT CHANGE UP (ref 11.7–16.1)
HGB BLD-MCNC: 13.6 G/DL — SIGNIFICANT CHANGE UP (ref 11.5–15.5)
HYALINE CASTS # UR AUTO: 60 /LPF — HIGH (ref 0–7)
IMM GRANULOCYTES NFR BLD AUTO: 0.6 % — SIGNIFICANT CHANGE UP (ref 0–0.9)
KETONES UR-MCNC: SIGNIFICANT CHANGE UP
LACTATE BLDV-MCNC: 2.2 MMOL/L — HIGH (ref 0.5–2)
LEUKOCYTE ESTERASE UR-ACNC: ABNORMAL
LYMPHOCYTES # BLD AUTO: 1.3 K/UL — SIGNIFICANT CHANGE UP (ref 1–3.3)
LYMPHOCYTES # BLD AUTO: 7.6 % — LOW (ref 13–44)
MCHC RBC-ENTMCNC: 26.1 PG — LOW (ref 27–34)
MCHC RBC-ENTMCNC: 31.9 GM/DL — LOW (ref 32–36)
MCV RBC AUTO: 81.8 FL — SIGNIFICANT CHANGE UP (ref 80–100)
MONOCYTES # BLD AUTO: 1.03 K/UL — HIGH (ref 0–0.9)
MONOCYTES NFR BLD AUTO: 6 % — SIGNIFICANT CHANGE UP (ref 2–14)
NEUTROPHILS # BLD AUTO: 14.58 K/UL — HIGH (ref 1.8–7.4)
NEUTROPHILS NFR BLD AUTO: 85.3 % — HIGH (ref 43–77)
NITRITE UR-MCNC: NEGATIVE — SIGNIFICANT CHANGE UP
NRBC # BLD: 0 /100 WBCS — SIGNIFICANT CHANGE UP (ref 0–0)
NT-PROBNP SERPL-SCNC: 1134 PG/ML — HIGH (ref 0–300)
PCO2 BLDV: 37 MMHG — LOW (ref 39–42)
PH BLDV: 7.35 — SIGNIFICANT CHANGE UP (ref 7.32–7.43)
PH UR: 6 — SIGNIFICANT CHANGE UP (ref 5–8)
PLATELET # BLD AUTO: 327 K/UL — SIGNIFICANT CHANGE UP (ref 150–400)
PO2 BLDV: 31 MMHG — SIGNIFICANT CHANGE UP (ref 25–45)
POTASSIUM BLDV-SCNC: 3 MMOL/L — LOW (ref 3.5–5.1)
POTASSIUM SERPL-MCNC: 4.1 MMOL/L — SIGNIFICANT CHANGE UP (ref 3.5–5.3)
POTASSIUM SERPL-SCNC: 4.1 MMOL/L — SIGNIFICANT CHANGE UP (ref 3.5–5.3)
PROT SERPL-MCNC: 7.8 G/DL — SIGNIFICANT CHANGE UP (ref 6–8.3)
PROT UR-MCNC: NEGATIVE — SIGNIFICANT CHANGE UP
RBC # BLD: 5.21 M/UL — HIGH (ref 3.8–5.2)
RBC # FLD: 15.7 % — HIGH (ref 10.3–14.5)
RBC CASTS # UR COMP ASSIST: 4 /HPF — SIGNIFICANT CHANGE UP (ref 0–4)
SALICYLATES SERPL-MCNC: <2 MG/DL — LOW (ref 15–30)
SAO2 % BLDV: 45.1 % — LOW (ref 67–88)
SODIUM SERPL-SCNC: 137 MMOL/L — SIGNIFICANT CHANGE UP (ref 135–145)
SP GR SPEC: 1.03 — SIGNIFICANT CHANGE UP (ref 1.01–1.02)
TROPONIN T, HIGH SENSITIVITY RESULT: 34 NG/L — SIGNIFICANT CHANGE UP (ref 0–51)
TROPONIN T, HIGH SENSITIVITY RESULT: 54 NG/L — HIGH (ref 0–51)
TROPONIN T, HIGH SENSITIVITY RESULT: 62 NG/L — HIGH (ref 0–51)
UROBILINOGEN FLD QL: SIGNIFICANT CHANGE UP
WBC # BLD: 17.09 K/UL — HIGH (ref 3.8–10.5)
WBC # FLD AUTO: 17.09 K/UL — HIGH (ref 3.8–10.5)
WBC UR QL: 16 /HPF — HIGH (ref 0–5)

## 2023-07-03 PROCEDURE — 99285 EMERGENCY DEPT VISIT HI MDM: CPT

## 2023-07-03 PROCEDURE — 71045 X-RAY EXAM CHEST 1 VIEW: CPT | Mod: 26

## 2023-07-03 RX ORDER — ASPIRIN/CALCIUM CARB/MAGNESIUM 324 MG
81 TABLET ORAL DAILY
Refills: 0 | Status: DISCONTINUED | OUTPATIENT
Start: 2023-07-03 | End: 2023-07-08

## 2023-07-03 RX ORDER — SODIUM CHLORIDE 9 MG/ML
1000 INJECTION INTRAMUSCULAR; INTRAVENOUS; SUBCUTANEOUS ONCE
Refills: 0 | Status: COMPLETED | OUTPATIENT
Start: 2023-07-03 | End: 2023-07-03

## 2023-07-03 RX ORDER — METOPROLOL TARTRATE 50 MG
25 TABLET ORAL DAILY
Refills: 0 | Status: DISCONTINUED | OUTPATIENT
Start: 2023-07-03 | End: 2023-07-08

## 2023-07-03 RX ORDER — LEVOTHYROXINE SODIUM 125 MCG
112 TABLET ORAL DAILY
Refills: 0 | Status: DISCONTINUED | OUTPATIENT
Start: 2023-07-03 | End: 2023-07-08

## 2023-07-03 RX ORDER — PANTOPRAZOLE SODIUM 20 MG/1
40 TABLET, DELAYED RELEASE ORAL
Refills: 0 | Status: DISCONTINUED | OUTPATIENT
Start: 2023-07-03 | End: 2023-07-08

## 2023-07-03 RX ORDER — BUPROPION HYDROCHLORIDE 150 MG/1
150 TABLET, EXTENDED RELEASE ORAL DAILY
Refills: 0 | Status: DISCONTINUED | OUTPATIENT
Start: 2023-07-03 | End: 2023-07-06

## 2023-07-03 RX ORDER — TICAGRELOR 90 MG/1
90 TABLET ORAL EVERY 12 HOURS
Refills: 0 | Status: DISCONTINUED | OUTPATIENT
Start: 2023-07-03 | End: 2023-07-08

## 2023-07-03 RX ORDER — CLONAZEPAM 1 MG
2 TABLET ORAL AT BEDTIME
Refills: 0 | Status: DISCONTINUED | OUTPATIENT
Start: 2023-07-03 | End: 2023-07-07

## 2023-07-03 RX ORDER — ATORVASTATIN CALCIUM 80 MG/1
80 TABLET, FILM COATED ORAL AT BEDTIME
Refills: 0 | Status: DISCONTINUED | OUTPATIENT
Start: 2023-07-03 | End: 2023-07-08

## 2023-07-03 RX ADMIN — SODIUM CHLORIDE 1000 MILLILITER(S): 9 INJECTION INTRAMUSCULAR; INTRAVENOUS; SUBCUTANEOUS at 17:46

## 2023-07-03 RX ADMIN — Medication 0.5 MILLIGRAM(S): at 19:42

## 2023-07-03 NOTE — ED PROVIDER NOTE - NSFOLLOWUPINSTRUCTIONS_ED_ALL_ED_FT
Chest Pain    Chest pain can be caused by many different conditions which may or may not be dangerous. Causes include heartburn, lung infections, heart attack, blood clot in lungs, skin infections, strain or damage to muscle, cartilage, or bones, etc. In addition to a history and physical examination, an electrocardiogram (ECG) or other lab tests may have been performed to determine the cause of your chest pain. Follow up with your primary care provider or with a cardiologist as instructed.     SEEK IMMEDIATE MEDICAL CARE IF YOU HAVE ANY OF THE FOLLOWING SYMPTOMS: worsening chest pain, coughing up blood, unexplained back/neck/jaw pain, severe abdominal pain, dizziness or lightheadedness, fainting, shortness of breath, sweaty or clammy skin, vomiting, or racing heart beat. These symptoms may represent a serious problem that is an emergency. Do not wait to see if the symptoms will go away. Get medical help right away. Call 911 and do not drive yourself to the hospital.    The results of any blood tests and imaging studies completed during your visit today were discussed and explained to you and a copy provided with your discharge instructions. Please follow up with your primary care doctor within 48 hours.

## 2023-07-03 NOTE — H&P ADULT - ASSESSMENT
The patient is a 72y Female complaining of depression.    FTT:    Likely from depression  Not taking meds  Psych eval  Cont home meds    CAD:    cont ASA/Brilinta    On 1:1 observation for safety

## 2023-07-03 NOTE — ED ADULT NURSE NOTE - OBJECTIVE STATEMENT
72 y old female with PMH of GERD, depression, affective bipolar disorder, HTN, hypothyroidism, and calculus of gallbladder presents to the ED BIBEMS from home c/o depression. Per EMS, pt was staying with  who called EMS because patient is depressed and did not take her medication today. Pt states "I'm depressed, I don't know why I didn't take my medications." Pt also endorsing chest pain but is unsure when it started. Pt requests to be called "Maddie." Pt A&O x 3, confused, poor historian. Respirations nonlabored on RA. Abdomen soft and nontender. Skin warm, dry and intact. Stretcher locked in lowest position, side rails up and call bell in reach.

## 2023-07-03 NOTE — ED PROVIDER NOTE - OBJECTIVE STATEMENT
73 y/o female w/ PMH depression, bipolar, benzo dependence, (non-compliant w/ medications) KE was called by  because she did not take her home meds. Pt admits to 1 day history of localized mild left-sided chest pain, otherwise asymptomatic. Denies fevers, chills, nausea, vomiting, dizziness, SOB, abdominal pain, dysuria, hematuria. Denies SI/HI, visual/auditory hallucinations.

## 2023-07-03 NOTE — ED PROVIDER NOTE - CLINICAL SUMMARY MEDICAL DECISION MAKING FREE TEXT BOX
73 y/o female w/ PMH depression, bipolar, benzo dependence, (non-compliant w/ medications) KE was called by  because she did not take her home meds. Pt admits to 1 day history of localized mild left-sided chest pain, otherwise asymptomatic. Denies fevers, chills, nausea, vomiting, dizziness, SOB, abdominal pain, dysuria, hematuria. Denies SI/HI, visual/auditory hallucinations. Will screen for ACS (troponin), anemia/electrolytes, and chest x ray to screen for cardiopulmonary pathology. BNP for heart failure. Denies SI/HI. Pt is hypotensive, will administer fluids. 73 y/o female w/ PMH depression, bipolar, benzo dependence, (non-compliant w/ medications) KE was called by  because she did not take her home meds. Pt admits to 1 day history of localized mild left-sided chest pain, otherwise asymptomatic. Denies fevers, chills, nausea, vomiting, dizziness, SOB, abdominal pain, dysuria, hematuria. Denies SI/HI, visual/auditory hallucinations. Will screen for ACS (troponin), anemia/electrolytes, and chest x ray to screen for cardiopulmonary pathology. BNP for heart failure. Denies SI/HI. Pt is hypotensive, will administer fluids.    Dr. Card (Attending Physician)

## 2023-07-03 NOTE — ED ADULT NURSE NOTE - NSFALLRISKINTERV_ED_ALL_ED
Assistance OOB with selected safe patient handling equipment if applicable/Assistance with ambulation/Communicate fall risk and risk factors to all staff, patient, and family/Monitor gait and stability/Monitor for mental status changes and reorient to person, place, and time, as needed/Provide patient with walking aids/Provide visual cue: yellow wristband, yellow gown, etc/Reinforce activity limits and safety measures with patient and family/Toileting schedule using arm’s reach rule for commode and bathroom/Use of alarms - bed, stretcher, chair and/or video monitoring/Call bell, personal items and telephone in reach/Instruct patient to call for assistance before getting out of bed/chair/stretcher/Non-slip footwear applied when patient is off stretcher/[place] to call system/Physically safe environment - no spills, clutter or unnecessary equipment/Purposeful Proactive Rounding/Room/bathroom lighting operational, light cord in reach

## 2023-07-03 NOTE — ED PROVIDER NOTE - ATTENDING CONTRIBUTION TO CARE
Dr. Card (Attending Physician)  I performed a history and physical exam of the patient and discussed their management with the resident. I reviewed the resident's note and agree with the documented findings and plan of care. My medical decision making and observations are found above.

## 2023-07-03 NOTE — ED PROVIDER NOTE - PROGRESS NOTE DETAILS
Vasyl MS4: Spoke w/ . Pt has had decreased PO intake 2/2 dental pain. Pt fell w/o head trauma, confused, off of baseline mental status. Usually compliant w/ meds but not today.

## 2023-07-04 LAB
ANION GAP SERPL CALC-SCNC: 14 MMOL/L — SIGNIFICANT CHANGE UP (ref 5–17)
APPEARANCE UR: CLEAR — SIGNIFICANT CHANGE UP
BILIRUB UR-MCNC: NEGATIVE — SIGNIFICANT CHANGE UP
BUN SERPL-MCNC: 20 MG/DL — SIGNIFICANT CHANGE UP (ref 7–23)
CALCIUM SERPL-MCNC: 9.6 MG/DL — SIGNIFICANT CHANGE UP (ref 8.4–10.5)
CHLORIDE SERPL-SCNC: 105 MMOL/L — SIGNIFICANT CHANGE UP (ref 96–108)
CO2 SERPL-SCNC: 19 MMOL/L — LOW (ref 22–31)
COLOR SPEC: YELLOW — SIGNIFICANT CHANGE UP
CREAT SERPL-MCNC: 0.76 MG/DL — SIGNIFICANT CHANGE UP (ref 0.5–1.3)
DIFF PNL FLD: NEGATIVE — SIGNIFICANT CHANGE UP
EGFR: 82 ML/MIN/1.73M2 — SIGNIFICANT CHANGE UP
GLUCOSE SERPL-MCNC: 90 MG/DL — SIGNIFICANT CHANGE UP (ref 70–99)
GLUCOSE UR QL: NEGATIVE — SIGNIFICANT CHANGE UP
HCT VFR BLD CALC: 36 % — SIGNIFICANT CHANGE UP (ref 34.5–45)
HGB BLD-MCNC: 11.9 G/DL — SIGNIFICANT CHANGE UP (ref 11.5–15.5)
KETONES UR-MCNC: ABNORMAL
LEUKOCYTE ESTERASE UR-ACNC: NEGATIVE — SIGNIFICANT CHANGE UP
MCHC RBC-ENTMCNC: 26.7 PG — LOW (ref 27–34)
MCHC RBC-ENTMCNC: 33.1 GM/DL — SIGNIFICANT CHANGE UP (ref 32–36)
MCV RBC AUTO: 80.9 FL — SIGNIFICANT CHANGE UP (ref 80–100)
NITRITE UR-MCNC: NEGATIVE — SIGNIFICANT CHANGE UP
NRBC # BLD: 0 /100 WBCS — SIGNIFICANT CHANGE UP (ref 0–0)
PH UR: 6 — SIGNIFICANT CHANGE UP (ref 5–8)
PLATELET # BLD AUTO: 250 K/UL — SIGNIFICANT CHANGE UP (ref 150–400)
POTASSIUM SERPL-MCNC: 3.5 MMOL/L — SIGNIFICANT CHANGE UP (ref 3.5–5.3)
POTASSIUM SERPL-SCNC: 3.5 MMOL/L — SIGNIFICANT CHANGE UP (ref 3.5–5.3)
PROT UR-MCNC: SIGNIFICANT CHANGE UP
RAPID RVP RESULT: SIGNIFICANT CHANGE UP
RBC # BLD: 4.45 M/UL — SIGNIFICANT CHANGE UP (ref 3.8–5.2)
RBC # FLD: 16.1 % — HIGH (ref 10.3–14.5)
SARS-COV-2 RNA SPEC QL NAA+PROBE: SIGNIFICANT CHANGE UP
SARS-COV-2 RNA SPEC QL NAA+PROBE: SIGNIFICANT CHANGE UP
SODIUM SERPL-SCNC: 138 MMOL/L — SIGNIFICANT CHANGE UP (ref 135–145)
SP GR SPEC: 1.02 — SIGNIFICANT CHANGE UP (ref 1.01–1.02)
TROPONIN T, HIGH SENSITIVITY RESULT: 48 NG/L — SIGNIFICANT CHANGE UP (ref 0–51)
UROBILINOGEN FLD QL: NEGATIVE — SIGNIFICANT CHANGE UP
WBC # BLD: 8.24 K/UL — SIGNIFICANT CHANGE UP (ref 3.8–10.5)
WBC # FLD AUTO: 8.24 K/UL — SIGNIFICANT CHANGE UP (ref 3.8–10.5)

## 2023-07-04 RX ORDER — ACETAMINOPHEN 500 MG
650 TABLET ORAL ONCE
Refills: 0 | Status: COMPLETED | OUTPATIENT
Start: 2023-07-04 | End: 2023-07-04

## 2023-07-04 RX ORDER — DIPHENHYDRAMINE HYDROCHLORIDE AND LIDOCAINE HYDROCHLORIDE AND ALUMINUM HYDROXIDE AND MAGNESIUM HYDRO
10 KIT THREE TIMES A DAY
Refills: 0 | Status: DISCONTINUED | OUTPATIENT
Start: 2023-07-04 | End: 2023-07-08

## 2023-07-04 RX ADMIN — BUPROPION HYDROCHLORIDE 150 MILLIGRAM(S): 150 TABLET, EXTENDED RELEASE ORAL at 11:47

## 2023-07-04 RX ADMIN — PANTOPRAZOLE SODIUM 40 MILLIGRAM(S): 20 TABLET, DELAYED RELEASE ORAL at 06:02

## 2023-07-04 RX ADMIN — TICAGRELOR 90 MILLIGRAM(S): 90 TABLET ORAL at 06:02

## 2023-07-04 RX ADMIN — DIPHENHYDRAMINE HYDROCHLORIDE AND LIDOCAINE HYDROCHLORIDE AND ALUMINUM HYDROXIDE AND MAGNESIUM HYDRO 10 MILLILITER(S): KIT at 16:50

## 2023-07-04 RX ADMIN — Medication 650 MILLIGRAM(S): at 06:02

## 2023-07-04 RX ADMIN — TICAGRELOR 90 MILLIGRAM(S): 90 TABLET ORAL at 17:47

## 2023-07-04 RX ADMIN — ATORVASTATIN CALCIUM 80 MILLIGRAM(S): 80 TABLET, FILM COATED ORAL at 21:04

## 2023-07-04 RX ADMIN — Medication 25 MILLIGRAM(S): at 06:02

## 2023-07-04 RX ADMIN — Medication 112 MICROGRAM(S): at 06:02

## 2023-07-04 RX ADMIN — Medication 20 MILLIGRAM(S): at 11:47

## 2023-07-04 RX ADMIN — Medication 81 MILLIGRAM(S): at 11:46

## 2023-07-04 RX ADMIN — Medication 2 MILLIGRAM(S): at 21:04

## 2023-07-04 RX ADMIN — Medication 650 MILLIGRAM(S): at 07:00

## 2023-07-04 NOTE — PATIENT PROFILE ADULT - MST SCORE
Intensive Care Note                                              Name:  Dayanna (Female-B Fredrick Chambers MRN# [id number]   Parents: Estrella and Anjel Chambers  Date/Time of Birth: 2024     11:09 PM  Date of Admission:   2022       History of Present Illness   Late , appropriate for gestational age, 35w4d, 4 lb 7.6 oz (2030 g), Twin B, female infant born due to PTL/PROM. Breech delivery with difficult extraction of the head and left shoulder dystocia.     Patient Active Problem List   Diagnosis      , gestational age 35 completed weeks     Liveborn infant, of twin pregnancy, born in hospital by vaginal delivery     Breech delivery, fetus 2     Feeding problem of      Low birth weight     Shoulder dystocia during labor and delivery     Hyperbilirubinemia requiring phototherapy      Interval History   No acute concerns overnight. Remains in RA. Tolerating gavage feeds, improving PO.     Assessment & Plan   Overall Status:    11 day old,  Late , appropriate for gestational age, now 37w1d PMA.   Resolved RDS. Physiologic hyperbilirubinemia. Transitioning to oral feedings.     This patient, whose weight is < 5000 grams, is no longer critically ill.   She still requires gavage feeds and CR monitoring, due to prematurity.      FEN:  Vitals:    22 0030 22 0330 22 0030   Weight: 1.865 kg (4 lb 1.8 oz) 1.866 kg (4 lb 1.8 oz) 1.869 kg (4 lb 1.9 oz)   Weight change: 0.003 kg (0.1 oz)  -8% change for BW    Still below BW, but now starting to consistently gain wt.   Growth Curves: Initially AGA at ~12%ile.     Appropriate I/O, ~ at fluid goal with adequate UO and stool.  Oral Intake:33% (improved)    Continue:  - TF at 160-165 ml/kg/day.   - to support breast feeding attempts - with assistance from the lactation specialist.   - gavage feeds of OMM/dHM +NS to 22 kcal/oz  - vitamins/ supplements/ fortification/ nutrition labs per dietician's recs.  - dietician to  make assessment of malnutrition status at/after 2 weeks of age.   - Monitor fluid status, and overall growth       Resp: Currently stable on RA  Respiratory failure requiring nasal CPAP x 9 hrs  - Continue routine CR monitoring.     Apnea of Prematurity:  At low risk due to PMA >34 weeks. Last spell 6/1.  Never rec'd caffeine.    CV: Stable. Good perfusion and BP.  Passed CCHD screen.   - Continue routine CR monitoring.      ID: No current concerns for infection.   Initial sepsis evaluation NTD. Received empiric ampicillin/ gentamicin for 48 hours.  Routine IP surveillance tests for MRSA and SARS-CoV-2 remains negative.    Hematology:   Risk for anemia of prematurity/phlebotomy.  - plan to consider supplemental iron at 2 weeks of age.    Hemoglobin   Date Value Ref Range Status   2022 16.5 15.0 - 24.0 g/dL Final       Renal: At risk for RANDALL due to prematurity.  Good UO. Initial CR acceptable (DOL 2). BP appropriate.   - monitor UO.  Creatinine   Date Value Ref Range Status   2022 0.41 0.33 - 1.01 mg/dL Final   2022 0.70 0.33 - 1.01 mg/dL Final       Jaundice: Physiologic. Improved with phototherapy (6/1- 2022)  Mom A-. Antibody screen positive for Rhogam.  Baby A neg, KEEGAN neg.  - check bili level in 2 days  Recent Labs   Lab 06/08/22  0614 06/06/22  0454 06/05/22  0540 06/04/22  0500 06/03/22  0633 06/02/22  0624   BILITOTAL 9.5 7.5 7.2 10.3 11.6 15.4*       CNS/ Neuro: Exam wnl. OFC at 12%ile (symmetric)   At low risk for IVH/PVL due to GA >34 weeks.    - Developmental cares per NICU protocol  - Monitor clinical exam and weekly OFC measurements.      > H/o left shoulder dystocia at delivery. Infant with normal exam and no clavicular fx palpated (no xray.) on admission.   - monitor clinical exam- normal      Sedation/Pain Management: No concerns.  - Non-pharmacologic comfort measures.Sweet-ease for painful procedures.    HCM and Discharge Planning:  Passed CCHD screen.   [place] NMS with bordeline  aa profile, o/w normal  - repeat screen 6/5 with elevated C5  - repeat 6/14  Screening tests indicated PTD:  - Hearing test PTD  - Carseat trial PTD for infants less 37 weeks   - OT input.  - Breech presentation with consideration for US follow-up at 44-46 weeks CGA.  - Continue standard NICU cares and family education plan.    Immunizations    Up to date.   Most Recent Immunizations   Administered Date(s) Administered     Hep B, Peds or Adolescent 2022      Medications   Current Facility-Administered Medications   Medication     Breast Milk label for barcode scanning 1 Bottle     cholecalciferol (D-VI-SOL, Vitamin D3) 10 mcg/mL (400 units/mL) liquid 10 mcg     sucrose (SWEET-EASE) solution 0.2-2 mL      Physical Exam    NAD, female infant. AFOF. CTA, no retractions. RRR, no murmur. Normal pulses and perfusion. Abd soft, +BS, no HSM. Normal tone for age. Small area of mild erythema/pigmentation near on mid-back to right of spine - does not look like hemangioma.     Communications   Parents:  Name Home Phone Work Phone Mobile Phone Relationship Lgl Grd   IRAM GASCA [phone] Parent    SAMANTHA GASCA [phone]   Mother       Family lives in [place]  Both parents updated during rounds.    PCPs:  Infant PCP: [facility]  (Primary MD SMART)  Maternal OB PCP: Lizeth Tavarez MD  Delivering Provider: Lizeth Tavarez MD    Health Care Team:  Patient discussed with the care team.  A/P, imaging studies, laboratory data, medications and family situation reviewed.    Kaela Castro MD                           0

## 2023-07-04 NOTE — PATIENT PROFILE ADULT - FALL HARM RISK - HARM RISK INTERVENTIONS
Assistance with ambulation/Assistance OOB with selected safe patient handling equipment/Communicate Risk of Fall with Harm to all staff/Discuss with provider need for PT consult/Monitor gait and stability/Reinforce activity limits and safety measures with patient and family/Tailored Fall Risk Interventions/Visual Cue: Yellow wristband and red socks/Bed in lowest position, wheels locked, appropriate side rails in place/Call bell, personal items and telephone in reach/Instruct patient to call for assistance before getting out of bed or chair/Non-slip footwear when patient is out of bed/[place] to call system/Physically safe environment - no spills, clutter or unnecessary equipment/Purposeful Proactive Rounding/Room/bathroom lighting operational, light cord in reach

## 2023-07-04 NOTE — PHYSICAL THERAPY INITIAL EVALUATION ADULT - NSPTDISCHREC_GEN_A_CORE
DC plan subacute rehab at this time; however; working towards home PT services, assist from spouse/will benefit from HHA support for mobility/ADLs, recommend rolling walker for long distances, decr strength and decr balance, recommend shower chair for decr endurance, CM/SW to be notified.

## 2023-07-04 NOTE — PHYSICAL THERAPY INITIAL EVALUATION ADULT - ACTIVE RANGE OF MOTION EXAMINATION, REHAB EVAL
pt with overall fatigue, moving all extremities, +bruises/Left UE Active ROM was WFL (within functional limits)/Right UE Active ROM was WFL (within functional limits)/Left LE Active ROM was WFL (within functional limits)/Right LE Active ROM was WFL (within functional limits)

## 2023-07-04 NOTE — PHYSICAL THERAPY INITIAL EVALUATION ADULT - GENERAL OBSERVATIONS, REHAB EVAL
Pt a/w L side chest pain, noncompliant with meds. Pt received supine in bed, +IVL, +purewick, +1:1 obs for safety, A&Ox3, follows commands, +bruises; noticably large bruise L side mid-back; hx scoliosis, tangential conversation, hands to face at times, c/o recent dental work pain, RN eduardo aware.

## 2023-07-04 NOTE — PROVIDER CONTACT NOTE (OTHER) - SITUATION
patient states she had multiple lower dental implants approx 2 weeeks ago and is feeling discomfort. Patient also has large bruising on back from recent falls at home.

## 2023-07-04 NOTE — PHYSICAL THERAPY INITIAL EVALUATION ADULT - PERTINENT HX OF CURRENT PROBLEM, REHAB EVAL
Pt is a 71 y/o female admitted to [facility] on 7/[facility]/23 w/ PMH depression, bipolar, benzo dependence, (non-compliant w/ medications) BIBEMS was called by  because she did not take her home meds. Pt admits to 1 day history of localized mild left-sided chest pain, otherwise asymptomatic. Denies fevers, chills, nausea, vomiting, dizziness, SOB, abdominal pain, dysuria, hematuria. Denies SI/HI, visual/auditory hallucinations. Hospital course: Trops 54, 62, 48, CXR: clear lungs

## 2023-07-04 NOTE — PHYSICAL THERAPY INITIAL EVALUATION ADULT - ADDITIONAL COMMENTS
Pt reports living at home in an apartment with ramp entrance and elevator access; spouse lives outside of home; assists pt as needed, pt has borrowed spouses cane; +walkin shower with grabbars, +groceries delivered, +takes a cab for appts, spouse drives, +pt is retired (former 2nd/); spouse reports 3 recent falls (day of admission) & recent unsteady gait.

## 2023-07-05 LAB
CULTURE RESULTS: SIGNIFICANT CHANGE UP
SPECIMEN SOURCE: SIGNIFICANT CHANGE UP

## 2023-07-05 PROCEDURE — 99222 1ST HOSP IP/OBS MODERATE 55: CPT

## 2023-07-05 RX ORDER — TRAZODONE HCL 50 MG
100 TABLET ORAL AT BEDTIME
Refills: 0 | Status: DISCONTINUED | OUTPATIENT
Start: 2023-07-05 | End: 2023-07-08

## 2023-07-05 RX ADMIN — TICAGRELOR 90 MILLIGRAM(S): 90 TABLET ORAL at 17:05

## 2023-07-05 RX ADMIN — Medication 100 MILLIGRAM(S): at 21:08

## 2023-07-05 RX ADMIN — Medication 81 MILLIGRAM(S): at 11:32

## 2023-07-05 RX ADMIN — Medication 20 MILLIGRAM(S): at 11:31

## 2023-07-05 RX ADMIN — Medication 112 MICROGRAM(S): at 05:29

## 2023-07-05 RX ADMIN — Medication 2 MILLIGRAM(S): at 21:08

## 2023-07-05 RX ADMIN — DIPHENHYDRAMINE HYDROCHLORIDE AND LIDOCAINE HYDROCHLORIDE AND ALUMINUM HYDROXIDE AND MAGNESIUM HYDRO 10 MILLILITER(S): KIT at 15:35

## 2023-07-05 RX ADMIN — ATORVASTATIN CALCIUM 80 MILLIGRAM(S): 80 TABLET, FILM COATED ORAL at 21:08

## 2023-07-05 RX ADMIN — TICAGRELOR 90 MILLIGRAM(S): 90 TABLET ORAL at 05:29

## 2023-07-05 RX ADMIN — Medication 25 MILLIGRAM(S): at 05:29

## 2023-07-05 RX ADMIN — DIPHENHYDRAMINE HYDROCHLORIDE AND LIDOCAINE HYDROCHLORIDE AND ALUMINUM HYDROXIDE AND MAGNESIUM HYDRO 10 MILLILITER(S): KIT at 05:29

## 2023-07-05 RX ADMIN — PANTOPRAZOLE SODIUM 40 MILLIGRAM(S): 20 TABLET, DELAYED RELEASE ORAL at 05:29

## 2023-07-05 RX ADMIN — DIPHENHYDRAMINE HYDROCHLORIDE AND LIDOCAINE HYDROCHLORIDE AND ALUMINUM HYDROXIDE AND MAGNESIUM HYDRO 10 MILLILITER(S): KIT at 21:08

## 2023-07-05 RX ADMIN — BUPROPION HYDROCHLORIDE 150 MILLIGRAM(S): 150 TABLET, EXTENDED RELEASE ORAL at 11:32
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
## 2023-07-05 NOTE — BH CONSULTATION LIAISON ASSESSMENT NOTE - CURRENT MEDICATION
MEDICATIONS  (STANDING):  aspirin enteric coated 81 milliGRAM(s) Oral daily  atorvastatin 80 milliGRAM(s) Oral at bedtime  buPROPion XL (24-Hour) . 150 milliGRAM(s) Oral daily  clonazePAM  Tablet 2 milliGRAM(s) Oral at bedtime  FIRST- Mouthwash  BLM 10 milliLiter(s) Swish and Spit three times a day  levothyroxine 112 MICROGram(s) Oral daily  metoprolol tartrate 25 milliGRAM(s) Oral daily  pantoprazole    Tablet 40 milliGRAM(s) Oral before breakfast  PARoxetine 20 milliGRAM(s) Oral daily  ticagrelor 90 milliGRAM(s) Oral every 12 hours    MEDICATIONS  (PRN):

## 2023-07-05 NOTE — BH CONSULTATION LIAISON ASSESSMENT NOTE - SUMMARY
Patient is a 72 y.o female with the past hx of depression, SA, overuse of benzos and hypnotics, presented with worsening depression and anxiety and passive suicidal ideations secondary to worsening medical health and perceived loss of independence as well as intermittent adherence to treatment. She denied manic symptoms. Denied psychotic symptoms. Currently she denies active suicidal ideations and reported that she has hope to get better. She is supported by her  who is involved in her life. Patient is seeking help and is willing to take medications to get better. She has an outpatient provider who prescribes her psych medications and monitors their effectiveness. As per her medical team she will be discharged to rehab as per PT.

## 2023-07-05 NOTE — BH CONSULTATION LIAISON ASSESSMENT NOTE - RISK ASSESSMENT
Patient meets criteria for MDD ( sad and anxious mood, decreased energy, difficulties sleeping, difficulties to focus and fleeing suicidal thoughts) in the context of non adherence to treatment ( missed her medications for 1 week). Currently no active SI, not plan, no intent.  -Re-start home medications.  - Currently does not meet criteria for inpatient psych admission  - Address patient's medical concerns and comfort measures if possible ( access to dentures, wig and glasses) Patient meets criteria for MDD ( sad and anxious mood, decreased energy, difficulties sleeping, difficulties to focus and fleeing suicidal thoughts) in the context of non adherence to treatment ( missed her medications for 1 week). Currently no active SI, not plan, no intent.  -Re-start home medications.  - Consider to taper down clonazepam  - Consider to start Trazodone 100 mg qhs with subsequent increase up to 200 mg if needed  - Currently does not meet criteria for inpatient psych admission  - Address patient's medical concerns and comfort measures if possible ( access to dentures, wig and glasses)

## 2023-07-05 NOTE — BH CONSULTATION LIAISON ASSESSMENT NOTE - PAST PSYCHOTROPIC MEDICATION
Seroquel, Lithium, Effexor, Zoloft, MAOI, bupropion, abilify 2 mg,  zolpidem 10 mg, mirtazapine 30 mg, trazodone 100 mg, paxil 40 mg
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
## 2023-07-05 NOTE — BH CONSULTATION LIAISON ASSESSMENT NOTE - DIFFERENTIAL
Patient has a long standing sleep issues ( last 5-6 years), multiple psych medications failed to help, elevated BMI. Would be helpful to rule out TOYIN.  The use of Benzodiazepines may contribute to falls and gait/balance issues.

## 2023-07-05 NOTE — BH CONSULTATION LIAISON ASSESSMENT NOTE - DESCRIPTION
pertinent medical issues include: hypothyroidism, Breast cancer s/p lumpectomy, CAD with stent placement, GERD, and "pinched disc".

## 2023-07-05 NOTE — BH CONSULTATION LIAISON ASSESSMENT NOTE - NSBHCHARTREVIEWLAB_PSY_A_CORE FT
11.9   8.24  )-----------( 250      ( 04 Jul 2023 07:07 )             36.0     07-04    138  |  105  |  20  ----------------------------<  90  3.5   |  19<L>  |  0.76    Ca    9.6      04 Jul 2023 07:05    TPro  7.8  /  Alb  4.2  /  TBili  1.2  /  DBili  x   /  AST  31  /  ALT  16  /  AlkPhos  147<H>  07-03

## 2023-07-05 NOTE — BH CONSULTATION LIAISON ASSESSMENT NOTE - NSBHCHARTREVIEWVS_PSY_A_CORE FT
Vital Signs Last 24 Hrs  T(C): 36.7 (05 Jul 2023 05:07), Max: 36.7 (05 Jul 2023 05:07)  T(F): 98.1 (05 Jul 2023 05:07), Max: 98.1 (05 Jul 2023 05:07)  HR: 75 (05 Jul 2023 05:07) (63 - 80)  BP: 105/67 (05 Jul 2023 05:07) (105/67 - 113/66)  BP(mean): --  RR: 18 (05 Jul 2023 05:07) (18 - 18)  SpO2: 93% (05 Jul 2023 05:07) (93% - 97%)    Parameters below as of 05 Jul 2023 05:07  Patient On (Oxygen Delivery Method): room air

## 2023-07-05 NOTE — BH CONSULTATION LIAISON ASSESSMENT NOTE - NSBHATTESTCOMMENTATTENDFT_PSY_A_CORE
Patient is a 72 y.o female with the past hx of depression, SA, overuse of benzos and hypnotics, presented with worsening depression and anxiety and passive suicidal ideations secondary to worsening medical health and perceived loss of independence as well as intermittent adherence to treatment. She denied manic symptoms. Denied psychotic symptoms. Currently she denies active suicidal ideations and reported that she has hope to get better. She is supported by her  who is involved in her life. Patient is seeking help and is willing to take medications to get better. She has an outpatient provider who prescribes her psych medications and monitors their effectiveness. As per her medical team she will be discharged to rehab as per PT. rec trazodone, cont rest of meds

## 2023-07-06 RX ORDER — ACETAMINOPHEN 500 MG
650 TABLET ORAL ONCE
Refills: 0 | Status: COMPLETED | OUTPATIENT
Start: 2023-07-06 | End: 2023-07-06

## 2023-07-06 RX ADMIN — TICAGRELOR 90 MILLIGRAM(S): 90 TABLET ORAL at 17:32

## 2023-07-06 RX ADMIN — Medication 20 MILLIGRAM(S): at 12:23

## 2023-07-06 RX ADMIN — Medication 2 MILLIGRAM(S): at 22:25

## 2023-07-06 RX ADMIN — PANTOPRAZOLE SODIUM 40 MILLIGRAM(S): 20 TABLET, DELAYED RELEASE ORAL at 05:48

## 2023-07-06 RX ADMIN — DIPHENHYDRAMINE HYDROCHLORIDE AND LIDOCAINE HYDROCHLORIDE AND ALUMINUM HYDROXIDE AND MAGNESIUM HYDRO 10 MILLILITER(S): KIT at 15:12

## 2023-07-06 RX ADMIN — Medication 112 MICROGRAM(S): at 05:48

## 2023-07-06 RX ADMIN — Medication 25 MILLIGRAM(S): at 05:48

## 2023-07-06 RX ADMIN — DIPHENHYDRAMINE HYDROCHLORIDE AND LIDOCAINE HYDROCHLORIDE AND ALUMINUM HYDROXIDE AND MAGNESIUM HYDRO 10 MILLILITER(S): KIT at 22:24

## 2023-07-06 RX ADMIN — ATORVASTATIN CALCIUM 80 MILLIGRAM(S): 80 TABLET, FILM COATED ORAL at 22:25

## 2023-07-06 RX ADMIN — DIPHENHYDRAMINE HYDROCHLORIDE AND LIDOCAINE HYDROCHLORIDE AND ALUMINUM HYDROXIDE AND MAGNESIUM HYDRO 10 MILLILITER(S): KIT at 05:48

## 2023-07-06 RX ADMIN — Medication 81 MILLIGRAM(S): at 12:23

## 2023-07-06 RX ADMIN — TICAGRELOR 90 MILLIGRAM(S): 90 TABLET ORAL at 05:48

## 2023-07-06 RX ADMIN — Medication 100 MILLIGRAM(S): at 23:18

## 2023-07-06 RX ADMIN — Medication 650 MILLIGRAM(S): at 12:22

## 2023-07-06 RX ADMIN — Medication 650 MILLIGRAM(S): at 12:52

## 2023-07-06 NOTE — BH CONSULTATION LIAISON PROGRESS NOTE - NSBHCHARTREVIEWVS_PSY_A_CORE FT
Vital Signs Last 24 Hrs  T(C): 36.7 (06 Jul 2023 05:11), Max: 36.8 (05 Jul 2023 11:28)  T(F): 98 (06 Jul 2023 05:11), Max: 98.3 (05 Jul 2023 11:28)  HR: 80 (06 Jul 2023 05:11) (58 - 80)  BP: 120/78 (06 Jul 2023 05:11) (110/70 - 130/73)  BP(mean): --  RR: 18 (06 Jul 2023 05:11) (17 - 18)  SpO2: 97% (06 Jul 2023 05:11) (95% - 97%)    Parameters below as of 06 Jul 2023 05:11  Patient On (Oxygen Delivery Method): room air

## 2023-07-06 NOTE — BH CONSULTATION LIAISON PROGRESS NOTE - NSBHCONSULTRECOMMENDOTHER_PSY_A_CORE FT
Taper down Wellbutrin to eventually discontinue (may increase blood level of trazodone).  Continue Paxil 20 mg daily for depression.  Trazodone 100 mg qhs for sleep ( may be gradually increased to 200 mg if needed) Discontinue Wellbutrin ( may increase blood level of trazodone)  Continue Paxil 20 mg daily for depression.  Trazodone 100 mg qhs for sleep ( may be gradually increased to 200 mg if needed)  Appointment with psychiatrist within 2 weeks after discharge  Pt would benefit from an appointment with a therapist after her discharge.

## 2023-07-06 NOTE — BH CONSULTATION LIAISON PROGRESS NOTE - CURRENT MEDICATION
MEDICATIONS  (STANDING):  aspirin enteric coated 81 milliGRAM(s) Oral daily  atorvastatin 80 milliGRAM(s) Oral at bedtime  buPROPion XL (24-Hour) . 150 milliGRAM(s) Oral daily  clonazePAM  Tablet 2 milliGRAM(s) Oral at bedtime  FIRST- Mouthwash  BLM 10 milliLiter(s) Swish and Spit three times a day  levothyroxine 112 MICROGram(s) Oral daily  metoprolol tartrate 25 milliGRAM(s) Oral daily  pantoprazole    Tablet 40 milliGRAM(s) Oral before breakfast  PARoxetine 20 milliGRAM(s) Oral daily  ticagrelor 90 milliGRAM(s) Oral every 12 hours  traZODone 100 milliGRAM(s) Oral at bedtime    MEDICATIONS  (PRN):

## 2023-07-06 NOTE — BH CONSULTATION LIAISON PROGRESS NOTE - NSBHASSESSMENTFT_PSY_ALL_CORE
Patient is improving on treatment. No side effects from medications. Denied SI. Does not meet criteria for inpatient admission.

## 2023-07-06 NOTE — BH CONSULTATION LIAISON PROGRESS NOTE - NSBHCONSULTFOLLOWAFTERCARE_PSY_A_CORE FT
Appointment with psychiatrist within 2 weeks after discharge  Pt would benefit from an appointment with a therapist after her discharge. f/u [facility]

## 2023-07-06 NOTE — BH CONSULTATION LIAISON PROGRESS NOTE - NSBHFUPINTERVALHXFT_PSY_A_CORE
Chart reviewed. Patient seen at the bedside.   Patient had a brighter affect today. She felt better because " they gave me rinse for my teeth". Denied anxiety, reported feeling sad. She was visited by her  and received a call from her friend. She reported that she " just does not want people to see me like this" and that is why she has limited number of visitors. Patient denied suicidal or homicidal ideations, intent or a plan. She is future oriented and is looking for home help arrangements to assist her after her discharge, as well as psychiatric appointment. She denied suicidal thoughts today, however, continues to report low energy and lack of sleep ( slept from 10 pm until 5 am), " because they wake you up early here". Reported that she never takes naps during the day. No hallucinations in any modality or delusion were elicited during the interview.

## 2023-07-07 LAB
ANION GAP SERPL CALC-SCNC: 10 MMOL/L — SIGNIFICANT CHANGE UP (ref 5–17)
BUN SERPL-MCNC: 13 MG/DL — SIGNIFICANT CHANGE UP (ref 7–23)
CALCIUM SERPL-MCNC: 9.3 MG/DL — SIGNIFICANT CHANGE UP (ref 8.4–10.5)
CHLORIDE SERPL-SCNC: 108 MMOL/L — SIGNIFICANT CHANGE UP (ref 96–108)
CO2 SERPL-SCNC: 23 MMOL/L — SIGNIFICANT CHANGE UP (ref 22–31)
CREAT SERPL-MCNC: 0.62 MG/DL — SIGNIFICANT CHANGE UP (ref 0.5–1.3)
EGFR: 93 ML/MIN/1.73M2 — SIGNIFICANT CHANGE UP
GLUCOSE SERPL-MCNC: 99 MG/DL — SIGNIFICANT CHANGE UP (ref 70–99)
POTASSIUM SERPL-MCNC: 3.6 MMOL/L — SIGNIFICANT CHANGE UP (ref 3.5–5.3)
POTASSIUM SERPL-SCNC: 3.6 MMOL/L — SIGNIFICANT CHANGE UP (ref 3.5–5.3)
SODIUM SERPL-SCNC: 141 MMOL/L — SIGNIFICANT CHANGE UP (ref 135–145)

## 2023-07-07 RX ADMIN — DIPHENHYDRAMINE HYDROCHLORIDE AND LIDOCAINE HYDROCHLORIDE AND ALUMINUM HYDROXIDE AND MAGNESIUM HYDRO 10 MILLILITER(S): KIT at 05:09

## 2023-07-07 RX ADMIN — Medication 100 MILLIGRAM(S): at 22:14

## 2023-07-07 RX ADMIN — Medication 81 MILLIGRAM(S): at 12:19

## 2023-07-07 RX ADMIN — ATORVASTATIN CALCIUM 80 MILLIGRAM(S): 80 TABLET, FILM COATED ORAL at 22:14

## 2023-07-07 RX ADMIN — TICAGRELOR 90 MILLIGRAM(S): 90 TABLET ORAL at 17:56

## 2023-07-07 RX ADMIN — TICAGRELOR 90 MILLIGRAM(S): 90 TABLET ORAL at 05:08

## 2023-07-07 RX ADMIN — Medication 112 MICROGRAM(S): at 05:08

## 2023-07-07 RX ADMIN — Medication 20 MILLIGRAM(S): at 12:19

## 2023-07-07 RX ADMIN — DIPHENHYDRAMINE HYDROCHLORIDE AND LIDOCAINE HYDROCHLORIDE AND ALUMINUM HYDROXIDE AND MAGNESIUM HYDRO 10 MILLILITER(S): KIT at 22:15

## 2023-07-07 RX ADMIN — DIPHENHYDRAMINE HYDROCHLORIDE AND LIDOCAINE HYDROCHLORIDE AND ALUMINUM HYDROXIDE AND MAGNESIUM HYDRO 10 MILLILITER(S): KIT at 14:32

## 2023-07-07 RX ADMIN — Medication 25 MILLIGRAM(S): at 05:08

## 2023-07-07 RX ADMIN — PANTOPRAZOLE SODIUM 40 MILLIGRAM(S): 20 TABLET, DELAYED RELEASE ORAL at 05:30

## 2023-07-07 NOTE — PROGRESS NOTE ADULT - ASSESSMENT
The patient is a 72y Female complaining of depression.    FTT:    Likely from depression  Not taking meds  Psych eval appreciated  Cont home meds    CAD:    cont ASA/Brilinta    On 1:1 observation for safety 
The patient is a 72y Female complaining of depression.    FTT:    Likely from depression  Not taking meds  Psych eval  Cont home meds    CAD:    cont ASA/Brilinta    On 1:1 observation for safety 
The patient is a 72y Female complaining of depression.    FTT:    Likely from depression  Not taking meds  Psych eval appreciated  Cont home meds    CAD:    cont ASA/Brilinta      D/C planning DILMA 
The patient is a 72y Female complaining of depression.    FTT:    Likely from depression  Not taking meds  Psych eval appreciated  Cont home meds    CAD:    cont ASA/Brilinta    On 1:1 observation for safety

## 2023-07-07 NOTE — PROGRESS NOTE ADULT - REASON FOR ADMISSION
The patient is a 72y Female complaining of depression.

## 2023-07-08 ENCOUNTER — TRANSCRIPTION ENCOUNTER (OUTPATIENT)
Age: 73
End: 2023-07-08

## 2023-07-08 VITALS
SYSTOLIC BLOOD PRESSURE: 114 MMHG | TEMPERATURE: 98 F | HEART RATE: 70 BPM | DIASTOLIC BLOOD PRESSURE: 78 MMHG | OXYGEN SATURATION: 97 % | RESPIRATION RATE: 18 BRPM

## 2023-07-08 LAB — SARS-COV-2 RNA SPEC QL NAA+PROBE: SIGNIFICANT CHANGE UP

## 2023-07-08 PROCEDURE — 82803 BLOOD GASES ANY COMBINATION: CPT

## 2023-07-08 PROCEDURE — 84295 ASSAY OF SERUM SODIUM: CPT

## 2023-07-08 PROCEDURE — 83605 ASSAY OF LACTIC ACID: CPT

## 2023-07-08 PROCEDURE — 80307 DRUG TEST PRSMV CHEM ANLYZR: CPT

## 2023-07-08 PROCEDURE — 87635 SARS-COV-2 COVID-19 AMP PRB: CPT

## 2023-07-08 PROCEDURE — 97116 GAIT TRAINING THERAPY: CPT

## 2023-07-08 PROCEDURE — 82435 ASSAY OF BLOOD CHLORIDE: CPT

## 2023-07-08 PROCEDURE — 82330 ASSAY OF CALCIUM: CPT

## 2023-07-08 PROCEDURE — 82947 ASSAY GLUCOSE BLOOD QUANT: CPT

## 2023-07-08 PROCEDURE — 84132 ASSAY OF SERUM POTASSIUM: CPT

## 2023-07-08 PROCEDURE — 97162 PT EVAL MOD COMPLEX 30 MIN: CPT

## 2023-07-08 PROCEDURE — 85027 COMPLETE CBC AUTOMATED: CPT

## 2023-07-08 PROCEDURE — 97530 THERAPEUTIC ACTIVITIES: CPT

## 2023-07-08 PROCEDURE — 93005 ELECTROCARDIOGRAM TRACING: CPT

## 2023-07-08 PROCEDURE — 81001 URINALYSIS AUTO W/SCOPE: CPT

## 2023-07-08 PROCEDURE — 99285 EMERGENCY DEPT VISIT HI MDM: CPT

## 2023-07-08 PROCEDURE — 80048 BASIC METABOLIC PNL TOTAL CA: CPT

## 2023-07-08 PROCEDURE — 0225U NFCT DS DNA&RNA 21 SARSCOV2: CPT

## 2023-07-08 PROCEDURE — 87040 BLOOD CULTURE FOR BACTERIA: CPT

## 2023-07-08 PROCEDURE — 71045 X-RAY EXAM CHEST 1 VIEW: CPT

## 2023-07-08 PROCEDURE — 83880 ASSAY OF NATRIURETIC PEPTIDE: CPT

## 2023-07-08 PROCEDURE — 36415 COLL VENOUS BLD VENIPUNCTURE: CPT

## 2023-07-08 PROCEDURE — 87086 URINE CULTURE/COLONY COUNT: CPT

## 2023-07-08 PROCEDURE — 85014 HEMATOCRIT: CPT

## 2023-07-08 PROCEDURE — 36000 PLACE NEEDLE IN VEIN: CPT

## 2023-07-08 PROCEDURE — 85025 COMPLETE CBC W/AUTO DIFF WBC: CPT

## 2023-07-08 PROCEDURE — 80053 COMPREHEN METABOLIC PANEL: CPT

## 2023-07-08 PROCEDURE — 85018 HEMOGLOBIN: CPT

## 2023-07-08 PROCEDURE — 81003 URINALYSIS AUTO W/O SCOPE: CPT

## 2023-07-08 PROCEDURE — 84484 ASSAY OF TROPONIN QUANT: CPT

## 2023-07-08 RX ORDER — TRAZODONE HCL 50 MG
1 TABLET ORAL
Qty: 0 | Refills: 0 | DISCHARGE
Start: 2023-07-08

## 2023-07-08 RX ADMIN — DIPHENHYDRAMINE HYDROCHLORIDE AND LIDOCAINE HYDROCHLORIDE AND ALUMINUM HYDROXIDE AND MAGNESIUM HYDRO 10 MILLILITER(S): KIT at 14:22

## 2023-07-08 RX ADMIN — Medication 112 MICROGRAM(S): at 05:05

## 2023-07-08 RX ADMIN — PANTOPRAZOLE SODIUM 40 MILLIGRAM(S): 20 TABLET, DELAYED RELEASE ORAL at 06:49

## 2023-07-08 RX ADMIN — Medication 20 MILLIGRAM(S): at 11:21

## 2023-07-08 RX ADMIN — Medication 25 MILLIGRAM(S): at 05:05

## 2023-07-08 RX ADMIN — DIPHENHYDRAMINE HYDROCHLORIDE AND LIDOCAINE HYDROCHLORIDE AND ALUMINUM HYDROXIDE AND MAGNESIUM HYDRO 10 MILLILITER(S): KIT at 05:05

## 2023-07-08 RX ADMIN — Medication 81 MILLIGRAM(S): at 11:21

## 2023-07-08 RX ADMIN — TICAGRELOR 90 MILLIGRAM(S): 90 TABLET ORAL at 05:05

## 2023-07-08 NOTE — DISCHARGE NOTE PROVIDER - NSDCCPCAREPLAN_GEN_ALL_CORE_FT
PRINCIPAL DISCHARGE DIAGNOSIS  Diagnosis: Chest pain  Assessment and Plan of Treatment:   HOME CARE INSTRUCTIONS  For the next few days, avoid physical activities that bring on chest pain. Continue physical activities as directed.  Do not smoke.  Avoid drinking alcohol.   Only take over-the-counter or prescription medicine for pain, discomfort, or fever as directed by your caregiver.  Follow your caregiver's suggestions for further testing if your chest pain does not go away.  Keep any follow-up appointments you made. If you do not go to an appointment, you could develop lasting (chronic) problems with pain. If there is any problem keeping an appointment, you must call to reschedule.   SEEK MEDICAL CARE IF:  You think you are having problems from the medicine you are taking. Read your medicine instructions carefully.  Your chest pain does not go away, even after treatment.  You develop a rash with blisters on your chest.  SEEK IMMEDIATE MEDICAL CARE IF:  You have increased chest pain or pain that spreads to your arm, neck, jaw, back, or abdomen.   You develop shortness of breath, an increasing cough, or you are coughing up blood.  You have severe back or abdominal pain, feel nauseous, or vomit.  You develop severe weakness, fainting, or chills.  You have a fever.  THIS IS AN EMERGENCY. Do not wait to see if the pain will go away. Get medical help at once. Call your local emergency services (_____________________). Do not drive yourself to the hospital.      SECONDARY DISCHARGE DIAGNOSES  Diagnosis: Bipolar illness  Assessment and Plan of Treatment: Continue to follow up with psych in 1-2 weeks

## 2023-07-08 NOTE — DISCHARGE NOTE NURSING/CASE MANAGEMENT/SOCIAL WORK - NSDCFUADDAPPT_GEN_ALL_CORE_FT
APPTS ARE READY TO BE MADE: [x ] YES    Best Family or Patient Contact (if needed):    Additional Information about above appointments (if needed):    1: psych appt 1-2 weeks   2:   3:     Other comments or requests:

## 2023-07-08 NOTE — DISCHARGE NOTE PROVIDER - NSDCACTIVITY_GEN_ALL_CORE
TRANSFER - OUT REPORT:    Verbal report given to [address] on Jl Lemus  being transferred to [phone] No heavy lifting/straining

## 2023-07-08 NOTE — DISCHARGE NOTE NURSING/CASE MANAGEMENT/SOCIAL WORK - PATIENT PORTAL LINK FT
You can access the FollowMyHealth Patient Portal offered by [facility] by registering at the following website: [email or web address]. By joining [facility]’s FollowMyHealth portal, you will also be able to view your health information using other applications (apps) compatible with our system.

## 2023-07-08 NOTE — DISCHARGE NOTE PROVIDER - NSDCFUADDAPPT_GEN_ALL_CORE_FT
APPTS ARE READY TO BE MADE: [x ] YES    Best Family or Patient Contact (if needed):    Additional Information about above appointments (if needed):    1: psych appt 1-2 weeks   2:   3:     Other comments or requests:    APPTS ARE READY TO BE MADE: [x ] YES    Best Family or Patient Contact (if needed):    Additional Information about above appointments (if needed):    1: psych appt 1-2 weeks   2:   3:     Other comments or requests:     Patient is being discharged to rehab. Caregiver will arrange follow up.

## 2023-07-08 NOTE — DISCHARGE NOTE PROVIDER - NSFOLLOWUPCLINICS_GEN_ALL_ED_FT
[facility]  Psychiatry  [address] [place] [address]  Phone: [phone]  Fax:   Follow Up Time: 1 week

## 2023-07-08 NOTE — DISCHARGE NOTE PROVIDER - NSDCMRMEDTOKEN_GEN_ALL_CORE_FT
aspirin 81 mg oral delayed release tablet: 1 tab(s) orally once a day  atorvastatin 80 mg oral tablet: 1 tab(s) orally once a day (at bedtime)  cholecalciferol oral tablet: 1000 unit(s) orally once a day  docosanol 10% topical cream: 1 application topically 2 times a day  First mouthwash , 10ml swish and spit 3x a day: First mouthwash MDD: 30ml  levothyroxine 112 mcg (0.112 mg) oral tablet: 1 tab(s) orally once a day  melatonin 3 mg oral tablet: 1 tab(s) orally once a day (at bedtime), As needed, Insomnia  metoprolol tartrate 25 mg oral tablet: 1 tab(s) orally once a day  pantoprazole 40 mg oral delayed release tablet: 1 tab(s) orally once a day (before a meal)  PARoxetine 20 mg oral tablet: 1 tab(s) orally once a day  ticagrelor 90 mg oral tablet: 1 tab(s) orally 2 times a day   traZODone 100 mg oral tablet: 1 tab(s) orally once a day (at bedtime)

## 2023-07-08 NOTE — DISCHARGE NOTE PROVIDER - HOSPITAL COURSE
The patient is a 72y Female complaining of depression.    FTT:    Likely from depression  Not taking meds  Psych eval appreciated  Cont home meds    CAD:    cont ASA/Brilinta      D/C planning DILMA

## 2023-07-09 LAB
CULTURE RESULTS: SIGNIFICANT CHANGE UP
CULTURE RESULTS: SIGNIFICANT CHANGE UP
SPECIMEN SOURCE: SIGNIFICANT CHANGE UP
SPECIMEN SOURCE: SIGNIFICANT CHANGE UP

## 2023-08-30 NOTE — H&P ADULT - NEUROLOGICAL
details… cranial nerves II-XII intact/sensation intact Tetracycline Counseling: Patient counseled regarding possible photosensitivity and increased risk for sunburn.  Patient instructed to avoid sunlight, if possible.  When exposed to sunlight, patients should wear protective clothing, sunglasses, and sunscreen.  The patient was instructed to call the office immediately if the following severe adverse effects occur:  hearing changes, easy bruising/bleeding, severe headache, or vision changes.  The patient verbalized understanding of the proper use and possible adverse effects of tetracycline.  All of the patient's questions and concerns were addressed. Patient understands to avoid pregnancy while on therapy due to potential birth defects.

## 2023-08-31 NOTE — BH CONSULTATION LIAISON ASSESSMENT NOTE - RECORDS REVIEWED
----- Message from Kimberly Pate Barbara FUNEZ  [phone]; OK to leave message on voicemail  ---------------------------------------------------------------------------  --------------  SCRIPT ANSWERS  Relationship to Patient?  Self Hospital chart (includes HIE)

## 2023-10-03 NOTE — BH SOCIAL WORK INITIAL PSYCHOSOCIAL EVALUATION - NSBHTREATHXADM_PSY_ALL_CORE
No
PAST SURGICAL HISTORY:  H/O inguinal hernia repair left 2000    History of coronary artery stent placement stents x 2  2014    History of prior ablation treatment 2011 for atrial fibrillation    S/P CABG x 3 2012    S/P cholecystectomy laparoscopic 2013    S/P colonoscopy x 4  last 2018 at [facility]

## 2023-11-22 ENCOUNTER — APPOINTMENT (OUTPATIENT)
Dept: SURGERY | Facility: CLINIC | Age: 73
End: 2023-11-22

## 2023-12-07 NOTE — PHYSICAL THERAPY INITIAL EVALUATION ADULT - SITTING BALANCE: STATIC
Show Aperture Variable?: Yes Duration Of Freeze Thaw-Cycle (Seconds): 0 Consent: The patient's consent was obtained including but not limited to risks of crusting, scabbing, blistering, scarring, darker or lighter pigmentary change, recurrence, incomplete removal and infection. Post-Care Instructions: I reviewed with the patient in detail post-care instructions. Patient is to wear sunprotection, and avoid picking at any of the treated lesions. Pt may apply Vaseline to crusted or scabbing areas. Render Post-Care Instructions In Note?: no Detail Level: Zone normal balance

## 2023-12-13 ENCOUNTER — APPOINTMENT (OUTPATIENT)
Dept: SURGERY | Facility: CLINIC | Age: 73
End: 2023-12-13
Payer: MEDICARE

## 2023-12-13 PROCEDURE — 99213K: CUSTOM

## 2024-01-02 ENCOUNTER — APPOINTMENT (OUTPATIENT)
Dept: MAMMOGRAPHY | Facility: CLINIC | Age: 74
End: 2024-01-02

## 2024-01-02 ENCOUNTER — APPOINTMENT (OUTPATIENT)
Dept: ULTRASOUND IMAGING | Facility: CLINIC | Age: 74
End: 2024-01-02

## 2024-02-07 ENCOUNTER — APPOINTMENT (OUTPATIENT)
Dept: ULTRASOUND IMAGING | Facility: IMAGING CENTER | Age: 74
End: 2024-02-07

## 2024-02-07 ENCOUNTER — APPOINTMENT (OUTPATIENT)
Dept: MAMMOGRAPHY | Facility: IMAGING CENTER | Age: 74
End: 2024-02-07

## 2024-02-13 NOTE — BH PATIENT PROFILE - NSPROGENSOURCEINFO_GEN_A_NUR
----- Message from Yrn Cox sent at 2/13/2024  2:26 PM CST -----  elodia       [id number] - [place] - [address] AT [address]  [address]  [place] [address]  Phone: [phone] Fax: [phone]         patient/facility verbal report/health record

## 2024-03-13 NOTE — BH INPATIENT PSYCHIATRY ASSESSMENT NOTE - TELEPSYCHIATRY?
LM on patient's mom's VM rx approved and sent to pharmacy. Alma Villatoro on 3/13/2024 at 1:23 PM     No

## 2024-06-04 ENCOUNTER — EMERGENCY (EMERGENCY)
Facility: HOSPITAL | Age: 74
LOS: 1 days | Discharge: HOME | End: 2024-06-04
Attending: EMERGENCY MEDICINE
Payer: MEDICARE

## 2024-06-04 VITALS
RESPIRATION RATE: 16 BRPM | HEART RATE: 101 BPM | DIASTOLIC BLOOD PRESSURE: 93 MMHG | OXYGEN SATURATION: 94 % | WEIGHT: 125 LBS | SYSTOLIC BLOOD PRESSURE: 134 MMHG | TEMPERATURE: 97 F | HEIGHT: 62 IN

## 2024-06-04 DIAGNOSIS — Z98.89 OTHER SPECIFIED POSTPROCEDURAL STATES: Chronic | ICD-10-CM

## 2024-06-04 DIAGNOSIS — Z98.890 OTHER SPECIFIED POSTPROCEDURAL STATES: Chronic | ICD-10-CM

## 2024-06-04 LAB
ANION GAP SERPL CALC-SCNC: 15 MMOL/L — SIGNIFICANT CHANGE UP (ref 5–17)
APAP SERPL-MCNC: <15 UG/ML — SIGNIFICANT CHANGE UP (ref 10–30)
BUN SERPL-MCNC: 9 MG/DL — SIGNIFICANT CHANGE UP (ref 7–23)
CALCIUM SERPL-MCNC: 10.2 MG/DL — SIGNIFICANT CHANGE UP (ref 8.4–10.5)
CHLORIDE SERPL-SCNC: 106 MMOL/L — SIGNIFICANT CHANGE UP (ref 96–108)
CO2 SERPL-SCNC: 20 MMOL/L — LOW (ref 22–31)
CREAT SERPL-MCNC: 0.69 MG/DL — SIGNIFICANT CHANGE UP (ref 0.5–1.3)
EGFR: 90 ML/MIN/1.73M2 — SIGNIFICANT CHANGE UP
ETHANOL SERPL-MCNC: <10 MG/DL — SIGNIFICANT CHANGE UP (ref 0–10)
GLUCOSE SERPL-MCNC: 111 MG/DL — HIGH (ref 70–99)
POTASSIUM SERPL-MCNC: 3.6 MMOL/L — SIGNIFICANT CHANGE UP (ref 3.5–5.3)
POTASSIUM SERPL-SCNC: 3.6 MMOL/L — SIGNIFICANT CHANGE UP (ref 3.5–5.3)
SALICYLATES SERPL-MCNC: <2 MG/DL — LOW (ref 15–30)
SODIUM SERPL-SCNC: 141 MMOL/L — SIGNIFICANT CHANGE UP (ref 135–145)

## 2024-06-04 PROCEDURE — 99285 EMERGENCY DEPT VISIT HI MDM: CPT | Mod: GC

## 2024-06-04 PROCEDURE — 93010 ELECTROCARDIOGRAM REPORT: CPT

## 2024-06-04 NOTE — ED PROVIDER NOTE - ATTENDING CONTRIBUTION TO CARE
71 y/o female w/ PMH depression, bipolar, benzo dependence Presenting bifid knee concern for Ambien overdose of 7 to 1010 mg pills tonight states was trying to sleep but has been depressed denies SI HI does have a history of overdose in the past tox workup was ordered patient is awake and alert at this time.  psych clearance labs, psych consult.

## 2024-06-04 NOTE — ED PROVIDER NOTE - PATIENT PORTAL LINK FT
You can access the FollowMyHealth Patient Portal offered by [facility] by registering at the following website: [email or web address]. By joining [facility]’s FollowMyHealth portal, you will also be able to view your health information using other applications (apps) compatible with our system.

## 2024-06-04 NOTE — ED ADULT NURSE REASSESSMENT NOTE - NS ED NURSE REASSESS COMMENT FT1
Pt valuable belongings placed in envelope [id number] and locked in red desk safe. Security contacted at ext [phone] to martín pt. ED Resident Anaya permits pt to wear prescription eye glasses and dentures.

## 2024-06-04 NOTE — ED PROVIDER NOTE - OBJECTIVE STATEMENT
74 yo F PMHx of depression, bipolar, benzo dependence took 7 10mg ambien at 8pm tonight. Denies SIHI. She has had an overdose remotely with ambien as a suicide attempt in the past. She denies any pain, sleepiness, lightheadedness currently. No AVH.

## 2024-06-04 NOTE — ED PROVIDER NOTE - PROGRESS NOTE DETAILS
Psych would like to keep pt until they can contact her  and psychiatrist    Ana Hernandez MD, PGY3 Enoc Ruffin MD:  Cleared by psych, no acute concerns from their standpoint, stable for DC medically, discussed with SW.

## 2024-06-04 NOTE — ED PROVIDER NOTE - NSFOLLOWUPINSTRUCTIONS_ED_ALL_ED_FT
Advance activity as tolerated.  Continue all previously prescribed medications as directed unless otherwise instructed.  Follow up with your primary care physician in 48-72 hours- bring copies of your results.  Return to the ER for worsening or persistent symptoms, and/or ANY NEW OR CONCERNING SYMPTOMS. If you have issues obtaining follow up, please call: [phone] to obtain a doctor or specialist who takes your insurance in your area.  You may call [phone] to make an appointment with the internal medicine clinic.    Please see a psychiatrist at [facility] for short-term psychiatric services and making a connection to long-term care, the hours are m-f 9am-3pm and the phone # is [phone]. The [facility] is located on the first floor of the [facility], within the campus of [facility]. The main entrance to our building is at the corner of [address] [place] [address] and [address] in [place], [place]. You can also access our campus through the hospital entrance at [phone] [place] [address]

## 2024-06-05 ENCOUNTER — APPOINTMENT (OUTPATIENT)
Dept: SURGERY | Facility: CLINIC | Age: 74
End: 2024-06-05

## 2024-06-05 VITALS
TEMPERATURE: 98 F | SYSTOLIC BLOOD PRESSURE: 143 MMHG | RESPIRATION RATE: 18 BRPM | HEART RATE: 53 BPM | DIASTOLIC BLOOD PRESSURE: 86 MMHG | OXYGEN SATURATION: 96 %

## 2024-06-05 DIAGNOSIS — F32.A DEPRESSION, UNSPECIFIED: ICD-10-CM

## 2024-06-05 LAB
ADD ON TEST-SPECIMEN IN LAB: SIGNIFICANT CHANGE UP
APPEARANCE UR: CLEAR — SIGNIFICANT CHANGE UP
BASOPHILS # BLD AUTO: 0.07 K/UL — SIGNIFICANT CHANGE UP (ref 0–0.2)
BASOPHILS NFR BLD AUTO: 0.9 % — SIGNIFICANT CHANGE UP (ref 0–2)
BILIRUB UR-MCNC: NEGATIVE — SIGNIFICANT CHANGE UP
COLOR SPEC: YELLOW — SIGNIFICANT CHANGE UP
DIFF PNL FLD: NEGATIVE — SIGNIFICANT CHANGE UP
EOSINOPHIL # BLD AUTO: 0.07 K/UL — SIGNIFICANT CHANGE UP (ref 0–0.5)
EOSINOPHIL NFR BLD AUTO: 0.9 % — SIGNIFICANT CHANGE UP (ref 0–6)
GLUCOSE UR QL: NEGATIVE MG/DL — SIGNIFICANT CHANGE UP
HCT VFR BLD CALC: 43.8 % — SIGNIFICANT CHANGE UP (ref 34.5–45)
HGB BLD-MCNC: 15 G/DL — SIGNIFICANT CHANGE UP (ref 11.5–15.5)
IMM GRANULOCYTES NFR BLD AUTO: 0.4 % — SIGNIFICANT CHANGE UP (ref 0–0.9)
KETONES UR-MCNC: NEGATIVE MG/DL — SIGNIFICANT CHANGE UP
LEUKOCYTE ESTERASE UR-ACNC: NEGATIVE — SIGNIFICANT CHANGE UP
LYMPHOCYTES # BLD AUTO: 2.66 K/UL — SIGNIFICANT CHANGE UP (ref 1–3.3)
LYMPHOCYTES # BLD AUTO: 34 % — SIGNIFICANT CHANGE UP (ref 13–44)
MCHC RBC-ENTMCNC: 28.2 PG — SIGNIFICANT CHANGE UP (ref 27–34)
MCHC RBC-ENTMCNC: 34.2 GM/DL — SIGNIFICANT CHANGE UP (ref 32–36)
MCV RBC AUTO: 82.3 FL — SIGNIFICANT CHANGE UP (ref 80–100)
MONOCYTES # BLD AUTO: 0.52 K/UL — SIGNIFICANT CHANGE UP (ref 0–0.9)
MONOCYTES NFR BLD AUTO: 6.6 % — SIGNIFICANT CHANGE UP (ref 2–14)
NEUTROPHILS # BLD AUTO: 4.47 K/UL — SIGNIFICANT CHANGE UP (ref 1.8–7.4)
NEUTROPHILS NFR BLD AUTO: 57.2 % — SIGNIFICANT CHANGE UP (ref 43–77)
NITRITE UR-MCNC: NEGATIVE — SIGNIFICANT CHANGE UP
NRBC # BLD: 0 /100 WBCS — SIGNIFICANT CHANGE UP (ref 0–0)
PH UR: 7 — SIGNIFICANT CHANGE UP (ref 5–8)
PLATELET # BLD AUTO: 236 K/UL — SIGNIFICANT CHANGE UP (ref 150–400)
PROT UR-MCNC: NEGATIVE MG/DL — SIGNIFICANT CHANGE UP
RBC # BLD: 5.32 M/UL — HIGH (ref 3.8–5.2)
RBC # FLD: 13.6 % — SIGNIFICANT CHANGE UP (ref 10.3–14.5)
SARS-COV-2 RNA SPEC QL NAA+PROBE: SIGNIFICANT CHANGE UP
SP GR SPEC: 1.01 — SIGNIFICANT CHANGE UP (ref 1–1.03)
UROBILINOGEN FLD QL: 0.2 MG/DL — SIGNIFICANT CHANGE UP (ref 0.2–1)
WBC # BLD: 7.82 K/UL — SIGNIFICANT CHANGE UP (ref 3.8–10.5)
WBC # FLD AUTO: 7.82 K/UL — SIGNIFICANT CHANGE UP (ref 3.8–10.5)

## 2024-06-05 PROCEDURE — 99285 EMERGENCY DEPT VISIT HI MDM: CPT | Mod: 25

## 2024-06-05 PROCEDURE — 80307 DRUG TEST PRSMV CHEM ANLYZR: CPT

## 2024-06-05 PROCEDURE — 85025 COMPLETE CBC W/AUTO DIFF WBC: CPT

## 2024-06-05 PROCEDURE — 80048 BASIC METABOLIC PNL TOTAL CA: CPT

## 2024-06-05 PROCEDURE — 36000 PLACE NEEDLE IN VEIN: CPT

## 2024-06-05 PROCEDURE — 93005 ELECTROCARDIOGRAM TRACING: CPT

## 2024-06-05 PROCEDURE — 87635 SARS-COV-2 COVID-19 AMP PRB: CPT

## 2024-06-05 PROCEDURE — 80076 HEPATIC FUNCTION PANEL: CPT

## 2024-06-05 PROCEDURE — 81003 URINALYSIS AUTO W/O SCOPE: CPT

## 2024-06-05 RX ORDER — SODIUM CHLORIDE 9 MG/ML
1000 INJECTION INTRAMUSCULAR; INTRAVENOUS; SUBCUTANEOUS ONCE
Refills: 0 | Status: COMPLETED | OUTPATIENT
Start: 2024-06-05 | End: 2024-06-05

## 2024-06-05 RX ADMIN — SODIUM CHLORIDE 1000 MILLILITER(S): 9 INJECTION INTRAMUSCULAR; INTRAVENOUS; SUBCUTANEOUS at 01:25

## 2024-06-05 NOTE — ED ADULT NURSE NOTE - NURSING NEURO LEVEL OF CONSCIOUSNESS
I have reviewed the surgical (or preoperative) H&P that is linked to this encounter, and examined the patient. There are no significant changes  
alert and awake/follows commands

## 2024-06-05 NOTE — ED ADULT NURSE NOTE - OBJECTIVE STATEMENT
73YOF hx depression/Bipolar disorder BIBEMS c/o overdose on ambien, pt states she took a handful of between 7-10 10mg tablets in order to sleep, pt denies SI but states she has been feeling more depressed than usual after family medical issues of her son. Pt is AOx4, breathing unlabored, +pulse/motor/sensory x4 extremities, PERRL, denies chest pain/SOB/nausea/vomiting/fever/chills. VSS.

## 2024-06-05 NOTE — ED BEHAVIORAL HEALTH ASSESSMENT NOTE - SUMMARY
Pt is a 74 yo F, domiciled alone, ,  is currently rehab for PT, retied , PMH significant for hypothyroidism, CAD s/p stent placement, GERD, and breast Ca s/p lumpectomy, psych hx of MDD vs Bipolar Disorder, anxiety, and hypnotic/benzo misuse, prior psych admissions(states she was discharged from [facility] 2 wks ago), hx of ECT 20 yrs ago, per chart has 1 pSA in 2004 by OD on Ambien, denies NSSIB, no known aggression, in outpt treatment with a psychiatrist, Dr Ryan Sparrow, on Ambien 10 mg qHS and Klonopin 2 mg tid, not connected to a therapist, who presents to the ED BIBA a/b self after reportedly ingesting seven Ambien 10 mg tablets last night in the setting of ongoing depressive symptoms and psychosocial stressors. Despite adamantly denying current or recent SI and denying that she ingested the Ambien with suicidal intent, she has multiple static risk factors(prior psych hx/admissions, remote SA, age) and modifiable risk factors(depressive symptoms, impulsivity, lack of social support) that elevate her risk of harm and would potentially benefit from inpatient psychiatric hospitalization. However, she declined inpatient admission and is requesting to go home. However, given the above risk factors, will plan to hold for collateral from  and outpt psychiatrist, both of whom she spoke to yesterday, to determine whether they have any acute safety concerns to inform the appropriate dispo.
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
## 2024-06-05 NOTE — ED ADULT NURSE REASSESSMENT NOTE - DESCRIPTION
Pt received awake in bed located in [facility], pt is pleasant, said she has been having trouble sleeping. Pt is waiting to be evaluated by psychiatry
Pt  is being cleared for DC. Pt VS  recorded, pt aware of DC. SW attempting to arrange transporrtation

## 2024-06-05 NOTE — ED BEHAVIORAL HEALTH PROGRESS NOTE - COLLATERAL INFORMATION (NAME, PHONE, RELATIONSHIP):
Discussed pt with her psychiatrist Dr Ryan Sparrow who is Not recommending involuntary inpatient psychiatric care at this time, and does not have concerns that pt will attempt  to harm herself.

## 2024-06-05 NOTE — ED BEHAVIORAL HEALTH ASSESSMENT NOTE - HPI (INCLUDE ILLNESS QUALITY, SEVERITY, DURATION, TIMING, CONTEXT, MODIFYING FACTORS, ASSOCIATED SIGNS AND SYMPTOMS)
Pt is a 72 yo F, domiciled alone, ,  is currently rehab for PT, retied , PMH significant for hypothyroidism, CAD s/p stent placement, GERD, and breast Ca s/p lumpectomy, psych hx of MDD vs Bipolar Disorder, anxiety, and hypnotic/benzo misuse, prior psych admissions(states she was discharged from [facility] 2 wks ago), hx of ECT 20 yrs ago, per chart has 1 pSA in 2004 by OD on Ambien, denies NSSIB, no known aggression, in outpt treatment with a psychiatrist, Dr Ryan Sparrow, on Ambien 10 mg qHS and Klonopin 2 mg tid, not connected to a therapist, who presents to the ED BIBA a/b self after reportedly ingesting 7 Ambien 10 mg tablets and endorsed worsening depression for the last few weeks.     On assessment, the pt is an inconsistent historian, presents as dysphoric, tearful, and constricted, and states she took the above amount of Ambien last night with the intent to sleep and not to end her life. However, she states she took all the tablets one after the other and when asked why she took the above amount instead of the prescribed amount, she replies "I'm severely depressed". However, when pressed she again insists she did not ingest the pills with suicidal intent. She states she then activated EMS after the ingestion because she was "scared", but then retracts and states "not scared, but because I'm so depressed." The pt states that since leaving the hospital two weeks ago, she did not make her intake therapy appt at [facility] and she's continued to feel depressed, amotivated, lethargic, and lonely. She also states her depressive symptoms worsened after her  was medically admitted for COPD a few days ago and was then transferred to a rehab. However, she states that despite her ongoing depressive symptoms, she's continued to take her medications for her medical issues and she's continued to work with her psychiatrist, Dr. Sparrow, to whom she last spoke yesterday before the ingestion. The pt also states she has no current or recent SI with plan or intent and cites her fear of death and her new grandson as reasons she wants to live. On ROS, the pt denies current or recent HI, A/VH, or PI, denies recent SIB, aggression, and denies access to firearms.     BTCM left a VM for pt's .     Telepsych team also requested the direct number for pt's psychiatrist as he last spoke to the pt yesterday.

## 2024-06-05 NOTE — ED BEHAVIORAL HEALTH ASSESSMENT NOTE - DESCRIPTION
See hpi Pt has remained calm ands cooperative in ED    [employer] checked(Reference #: [id number]):        PDI	My Rx	Current Rx	Drug Type	Rx Written	Rx Dispensed	Drug	Quantity	Days Supply	Prescriber Name	Prescriber SHAHZAD #	Payment Method	Dispenser  A	N	Y		05/10/2024	05/10/2024	zaleplon 10 mg capsule	30	30	Ryan Sparrow MD	[id number]	[facility]  A	N	N		05/02/2024	05/02/2024	zolpidem tartrate 10 mg tablet	30	30	Ryan Sparrow MD	[id number]	[facility]  A	N	N	B	05/02/2024	05/02/2024	clonazepam 2 mg tablet	90	30	Ryan Sparrow MD	[id number]	[facility]  A	N	N	B	04/29/2024	04/30/2024	clonazepam 1 mg tablet	28	14	[facility]	[id number]	[facility]  A	N	N		04/03/2024	04/03/2024	zolpidem tartrate 10 mg tablet	30	30	Ryan Sparrow MD	[id number]	[facility]  A	N	N	B	04/03/2024	04/03/2024	clonazepam 2 mg tablet	90	30	Ryan Sparrow MD	[id number]	[facility]  A	N	N	B	02/16/2024	02/16/2024	clonazepam 2 mg tablet	90	30	Ryan Sparrow MD	[id number]	[facility]  A	N	N	S	02/16/2024	02/16/2024	dextroamp-amphetamin 20 mg tab	30	30	Ryan Sparrow MD	[id number]	[facility]  A	N	N	B	01/19/2024	01/19/2024	clonazepam 2 mg tablet	90	30	Ryan Sparrow MD	[id number]	[facility]

## 2024-06-05 NOTE — ED BEHAVIORAL HEALTH PROGRESS NOTE - SUMMARY
Despite adamantly denying current or recent SI and denying that she ingested the Ambien with suicidal intent, she has multiple static risk factors(prior psych hx/admissions, remote SA, age) and modifiable risk factors(depressive symptoms, impulsivity, lack of social support) that elevate her risk of harm and would potentially benefit from inpatient psychiatric hospitalization. However, she declined inpatient admission and is requesting to go home.  Collateral was obtained from Dr Sparrow who agrees to see her as an oupt and does not have concerns that she will harm herself if discharged today.

## 2024-06-05 NOTE — ED BEHAVIORAL HEALTH PROGRESS NOTE - CASE SUMMARY/FORMULATION (CLEARLY DOCUMENT RATIONALE FOR DISPOSITION CHANGE)
Pt is a 72 yo F, domiciled alone, ,  is currently rehab for PT, retied , PMH significant for hypothyroidism, CAD s/p stent placement, GERD, and breast Ca s/p lumpectomy, psych hx of MDD vs Bipolar Disorder, anxiety, and hypnotic/benzo misuse, prior psych admissions(states she was discharged from [facility] 2 wks ago), hx of ECT 20 yrs ago, per chart has 1 pSA in 2004 by OD on Ambien, denies NSSIB, no known aggression, in outpt treatment with a psychiatrist, Dr Ryan Sparrow, on Ambien 10 mg qHS and Klonopin 2 mg tid, not connected to a therapist, who presents to the ED BIBA a/b self after reportedly ingesting seven Ambien 10 mg tablets last night in the setting of ongoing depressive symptoms and psychosocial stressors.  Collateral was obtained from Dr Sparrow who agrees to see her as an oupt and does not have concerns that she will harm herself if discharged today.

## 2024-06-05 NOTE — BH INPATIENT PSYCHIATRY PROGRESS NOTE - NSBHMSEBEHAV_PSY_A_CORE
Cooperative
Yes - the patient is able to be screened
Cooperative
Cooperative

## 2024-07-02 ENCOUNTER — EMERGENCY (EMERGENCY)
Facility: HOSPITAL | Age: 74
LOS: 1 days | Discharge: HOME | End: 2024-07-02
Attending: STUDENT IN AN ORGANIZED HEALTH CARE EDUCATION/TRAINING PROGRAM
Payer: MEDICARE

## 2024-07-02 VITALS
SYSTOLIC BLOOD PRESSURE: 152 MMHG | RESPIRATION RATE: 20 BRPM | HEART RATE: 97 BPM | OXYGEN SATURATION: 97 % | DIASTOLIC BLOOD PRESSURE: 105 MMHG | TEMPERATURE: 98 F

## 2024-07-02 VITALS
HEIGHT: 62 IN | TEMPERATURE: 98 F | OXYGEN SATURATION: 98 % | WEIGHT: 175.05 LBS | SYSTOLIC BLOOD PRESSURE: 161 MMHG | RESPIRATION RATE: 19 BRPM | DIASTOLIC BLOOD PRESSURE: 79 MMHG | HEART RATE: 100 BPM

## 2024-07-02 DIAGNOSIS — Z98.89 OTHER SPECIFIED POSTPROCEDURAL STATES: Chronic | ICD-10-CM

## 2024-07-02 DIAGNOSIS — Z98.890 OTHER SPECIFIED POSTPROCEDURAL STATES: Chronic | ICD-10-CM

## 2024-07-02 LAB
ALBUMIN SERPL ELPH-MCNC: 4.4 G/DL — SIGNIFICANT CHANGE UP (ref 3.3–5)
ALP SERPL-CCNC: 132 U/L — HIGH (ref 40–120)
ALT FLD-CCNC: 13 U/L — SIGNIFICANT CHANGE UP (ref 10–45)
AMPHET UR-MCNC: NEGATIVE — SIGNIFICANT CHANGE UP
ANION GAP SERPL CALC-SCNC: 16 MMOL/L — SIGNIFICANT CHANGE UP (ref 5–17)
APAP SERPL-MCNC: <15 UG/ML — SIGNIFICANT CHANGE UP (ref 10–30)
APPEARANCE UR: CLEAR — SIGNIFICANT CHANGE UP
AST SERPL-CCNC: 22 U/L — SIGNIFICANT CHANGE UP (ref 10–40)
BARBITURATES UR SCN-MCNC: NEGATIVE — SIGNIFICANT CHANGE UP
BASOPHILS # BLD AUTO: 0.06 K/UL — SIGNIFICANT CHANGE UP (ref 0–0.2)
BASOPHILS NFR BLD AUTO: 0.6 % — SIGNIFICANT CHANGE UP (ref 0–2)
BENZODIAZ UR-MCNC: NEGATIVE — SIGNIFICANT CHANGE UP
BILIRUB SERPL-MCNC: 1.1 MG/DL — SIGNIFICANT CHANGE UP (ref 0.2–1.2)
BILIRUB UR-MCNC: NEGATIVE — SIGNIFICANT CHANGE UP
BUN SERPL-MCNC: 9 MG/DL — SIGNIFICANT CHANGE UP (ref 7–23)
CALCIUM SERPL-MCNC: 10.7 MG/DL — HIGH (ref 8.4–10.5)
CHLORIDE SERPL-SCNC: 103 MMOL/L — SIGNIFICANT CHANGE UP (ref 96–108)
CO2 SERPL-SCNC: 19 MMOL/L — LOW (ref 22–31)
COCAINE METAB.OTHER UR-MCNC: NEGATIVE — SIGNIFICANT CHANGE UP
COLOR SPEC: YELLOW — SIGNIFICANT CHANGE UP
CREAT SERPL-MCNC: 0.61 MG/DL — SIGNIFICANT CHANGE UP (ref 0.5–1.3)
DIFF PNL FLD: NEGATIVE — SIGNIFICANT CHANGE UP
EGFR: 93 ML/MIN/1.73M2 — SIGNIFICANT CHANGE UP
EOSINOPHIL # BLD AUTO: 0.03 K/UL — SIGNIFICANT CHANGE UP (ref 0–0.5)
EOSINOPHIL NFR BLD AUTO: 0.3 % — SIGNIFICANT CHANGE UP (ref 0–6)
ETHANOL SERPL-MCNC: <10 MG/DL — SIGNIFICANT CHANGE UP (ref 0–10)
GLUCOSE SERPL-MCNC: 117 MG/DL — HIGH (ref 70–99)
GLUCOSE UR QL: NEGATIVE MG/DL — SIGNIFICANT CHANGE UP
HCT VFR BLD CALC: 44.1 % — SIGNIFICANT CHANGE UP (ref 34.5–45)
HGB BLD-MCNC: 15.3 G/DL — SIGNIFICANT CHANGE UP (ref 11.5–15.5)
IMM GRANULOCYTES NFR BLD AUTO: 0.3 % — SIGNIFICANT CHANGE UP (ref 0–0.9)
KETONES UR-MCNC: 40 MG/DL
LEUKOCYTE ESTERASE UR-ACNC: NEGATIVE — SIGNIFICANT CHANGE UP
LYMPHOCYTES # BLD AUTO: 1.68 K/UL — SIGNIFICANT CHANGE UP (ref 1–3.3)
LYMPHOCYTES # BLD AUTO: 17.1 % — SIGNIFICANT CHANGE UP (ref 13–44)
MAGNESIUM SERPL-MCNC: 2.1 MG/DL — SIGNIFICANT CHANGE UP (ref 1.6–2.6)
MCHC RBC-ENTMCNC: 29 PG — SIGNIFICANT CHANGE UP (ref 27–34)
MCHC RBC-ENTMCNC: 34.7 GM/DL — SIGNIFICANT CHANGE UP (ref 32–36)
MCV RBC AUTO: 83.5 FL — SIGNIFICANT CHANGE UP (ref 80–100)
METHADONE UR-MCNC: NEGATIVE — SIGNIFICANT CHANGE UP
MONOCYTES # BLD AUTO: 0.81 K/UL — SIGNIFICANT CHANGE UP (ref 0–0.9)
MONOCYTES NFR BLD AUTO: 8.2 % — SIGNIFICANT CHANGE UP (ref 2–14)
NEUTROPHILS # BLD AUTO: 7.22 K/UL — SIGNIFICANT CHANGE UP (ref 1.8–7.4)
NEUTROPHILS NFR BLD AUTO: 73.5 % — SIGNIFICANT CHANGE UP (ref 43–77)
NITRITE UR-MCNC: NEGATIVE — SIGNIFICANT CHANGE UP
NRBC # BLD: 0 /100 WBCS — SIGNIFICANT CHANGE UP (ref 0–0)
OPIATES UR-MCNC: NEGATIVE — SIGNIFICANT CHANGE UP
OXYCODONE UR-MCNC: NEGATIVE — SIGNIFICANT CHANGE UP
PCP SPEC-MCNC: SIGNIFICANT CHANGE UP
PCP UR-MCNC: NEGATIVE — SIGNIFICANT CHANGE UP
PH UR: 6 — SIGNIFICANT CHANGE UP (ref 5–8)
PLATELET # BLD AUTO: 242 K/UL — SIGNIFICANT CHANGE UP (ref 150–400)
POTASSIUM SERPL-MCNC: 4.1 MMOL/L — SIGNIFICANT CHANGE UP (ref 3.5–5.3)
POTASSIUM SERPL-SCNC: 4.1 MMOL/L — SIGNIFICANT CHANGE UP (ref 3.5–5.3)
PROT SERPL-MCNC: 8.4 G/DL — HIGH (ref 6–8.3)
PROT UR-MCNC: NEGATIVE MG/DL — SIGNIFICANT CHANGE UP
RBC # BLD: 5.28 M/UL — HIGH (ref 3.8–5.2)
RBC # FLD: 13.7 % — SIGNIFICANT CHANGE UP (ref 10.3–14.5)
SALICYLATES SERPL-MCNC: <2 MG/DL — LOW (ref 15–30)
SODIUM SERPL-SCNC: 138 MMOL/L — SIGNIFICANT CHANGE UP (ref 135–145)
SP GR SPEC: 1.01 — SIGNIFICANT CHANGE UP (ref 1–1.03)
THC UR QL: NEGATIVE — SIGNIFICANT CHANGE UP
UROBILINOGEN FLD QL: 0.2 MG/DL — SIGNIFICANT CHANGE UP (ref 0.2–1)
WBC # BLD: 9.83 K/UL — SIGNIFICANT CHANGE UP (ref 3.8–10.5)
WBC # FLD AUTO: 9.83 K/UL — SIGNIFICANT CHANGE UP (ref 3.8–10.5)

## 2024-07-02 PROCEDURE — 93005 ELECTROCARDIOGRAM TRACING: CPT

## 2024-07-02 PROCEDURE — 36000 PLACE NEEDLE IN VEIN: CPT

## 2024-07-02 PROCEDURE — 90792 PSYCH DIAG EVAL W/MED SRVCS: CPT

## 2024-07-02 PROCEDURE — 99284 EMERGENCY DEPT VISIT MOD MDM: CPT | Mod: 25

## 2024-07-02 PROCEDURE — 83735 ASSAY OF MAGNESIUM: CPT

## 2024-07-02 PROCEDURE — 85025 COMPLETE CBC W/AUTO DIFF WBC: CPT

## 2024-07-02 PROCEDURE — 80053 COMPREHEN METABOLIC PANEL: CPT

## 2024-07-02 PROCEDURE — 99284 EMERGENCY DEPT VISIT MOD MDM: CPT

## 2024-07-02 PROCEDURE — 93010 ELECTROCARDIOGRAM REPORT: CPT

## 2024-07-02 PROCEDURE — 81003 URINALYSIS AUTO W/O SCOPE: CPT

## 2024-07-02 PROCEDURE — 80307 DRUG TEST PRSMV CHEM ANLYZR: CPT

## 2024-08-12 ENCOUNTER — EMERGENCY (EMERGENCY)
Facility: HOSPITAL | Age: 74
LOS: 1 days | Discharge: HOME | End: 2024-08-12
Attending: EMERGENCY MEDICINE | Admitting: EMERGENCY MEDICINE
Payer: MEDICARE

## 2024-08-12 VITALS
TEMPERATURE: 98 F | SYSTOLIC BLOOD PRESSURE: 112 MMHG | DIASTOLIC BLOOD PRESSURE: 72 MMHG | OXYGEN SATURATION: 94 % | RESPIRATION RATE: 20 BRPM | HEIGHT: 62 IN | WEIGHT: 126.1 LBS | HEART RATE: 107 BPM

## 2024-08-12 VITALS
DIASTOLIC BLOOD PRESSURE: 84 MMHG | OXYGEN SATURATION: 96 % | TEMPERATURE: 99 F | HEART RATE: 104 BPM | RESPIRATION RATE: 18 BRPM | SYSTOLIC BLOOD PRESSURE: 107 MMHG

## 2024-08-12 DIAGNOSIS — Z98.890 OTHER SPECIFIED POSTPROCEDURAL STATES: Chronic | ICD-10-CM

## 2024-08-12 DIAGNOSIS — Z98.89 OTHER SPECIFIED POSTPROCEDURAL STATES: Chronic | ICD-10-CM

## 2024-08-12 LAB
ALBUMIN SERPL ELPH-MCNC: 3.8 G/DL — SIGNIFICANT CHANGE UP (ref 3.3–5)
ALP SERPL-CCNC: 112 U/L — SIGNIFICANT CHANGE UP (ref 40–120)
ALT FLD-CCNC: 21 U/L — SIGNIFICANT CHANGE UP (ref 4–33)
AMPHET UR-MCNC: NEGATIVE — SIGNIFICANT CHANGE UP
ANION GAP SERPL CALC-SCNC: 15 MMOL/L — HIGH (ref 7–14)
APAP SERPL-MCNC: <10 UG/ML — LOW (ref 15–25)
APPEARANCE UR: CLEAR — SIGNIFICANT CHANGE UP
AST SERPL-CCNC: 29 U/L — SIGNIFICANT CHANGE UP (ref 4–32)
B-OH-BUTYR SERPL-SCNC: 1.3 MMOL/L — HIGH (ref 0–0.4)
BARBITURATES UR SCN-MCNC: NEGATIVE — SIGNIFICANT CHANGE UP
BASE EXCESS BLDV CALC-SCNC: -1.5 MMOL/L — SIGNIFICANT CHANGE UP (ref -2–3)
BASOPHILS # BLD AUTO: 0.04 K/UL — SIGNIFICANT CHANGE UP (ref 0–0.2)
BASOPHILS NFR BLD AUTO: 0.3 % — SIGNIFICANT CHANGE UP (ref 0–2)
BENZODIAZ UR-MCNC: NEGATIVE — SIGNIFICANT CHANGE UP
BILIRUB SERPL-MCNC: 0.8 MG/DL — SIGNIFICANT CHANGE UP (ref 0.2–1.2)
BILIRUB UR-MCNC: NEGATIVE — SIGNIFICANT CHANGE UP
BLOOD GAS VENOUS COMPREHENSIVE RESULT: SIGNIFICANT CHANGE UP
BUN SERPL-MCNC: 16 MG/DL — SIGNIFICANT CHANGE UP (ref 7–23)
CALCIUM SERPL-MCNC: 9.8 MG/DL — SIGNIFICANT CHANGE UP (ref 8.4–10.5)
CHLORIDE BLDV-SCNC: 102 MMOL/L — SIGNIFICANT CHANGE UP (ref 96–108)
CHLORIDE SERPL-SCNC: 102 MMOL/L — SIGNIFICANT CHANGE UP (ref 98–107)
CO2 BLDV-SCNC: 25 MMOL/L — SIGNIFICANT CHANGE UP (ref 22–26)
CO2 SERPL-SCNC: 22 MMOL/L — SIGNIFICANT CHANGE UP (ref 22–31)
COCAINE METAB.OTHER UR-MCNC: NEGATIVE — SIGNIFICANT CHANGE UP
COLOR SPEC: YELLOW — SIGNIFICANT CHANGE UP
CREAT SERPL-MCNC: 0.71 MG/DL — SIGNIFICANT CHANGE UP (ref 0.5–1.3)
CREATININE URINE RESULT, DAU: 50 MG/DL — SIGNIFICANT CHANGE UP
DIFF PNL FLD: NEGATIVE — SIGNIFICANT CHANGE UP
EGFR: 89 ML/MIN/1.73M2 — SIGNIFICANT CHANGE UP
EOSINOPHIL # BLD AUTO: 0.01 K/UL — SIGNIFICANT CHANGE UP (ref 0–0.5)
EOSINOPHIL NFR BLD AUTO: 0.1 % — SIGNIFICANT CHANGE UP (ref 0–6)
ETHANOL SERPL-MCNC: <10 MG/DL — SIGNIFICANT CHANGE UP
FENTANYL UR QL SCN: NEGATIVE — SIGNIFICANT CHANGE UP
GAS PNL BLDV: 134 MMOL/L — LOW (ref 136–145)
GLUCOSE BLDV-MCNC: 67 MG/DL — LOW (ref 70–99)
GLUCOSE SERPL-MCNC: 72 MG/DL — SIGNIFICANT CHANGE UP (ref 70–99)
GLUCOSE UR QL: NEGATIVE MG/DL — SIGNIFICANT CHANGE UP
HCO3 BLDV-SCNC: 24 MMOL/L — SIGNIFICANT CHANGE UP (ref 22–29)
HCT VFR BLD CALC: 38.6 % — SIGNIFICANT CHANGE UP (ref 34.5–45)
HCT VFR BLDA CALC: 39 % — SIGNIFICANT CHANGE UP (ref 34.5–46.5)
HGB BLD CALC-MCNC: 13.1 G/DL — SIGNIFICANT CHANGE UP (ref 11.7–16.1)
HGB BLD-MCNC: 13 G/DL — SIGNIFICANT CHANGE UP (ref 11.5–15.5)
IANC: 9.94 K/UL — HIGH (ref 1.8–7.4)
IMM GRANULOCYTES NFR BLD AUTO: 0.7 % — SIGNIFICANT CHANGE UP (ref 0–0.9)
KETONES UR-MCNC: 15 MG/DL
LACTATE BLDV-MCNC: 1.7 MMOL/L — SIGNIFICANT CHANGE UP (ref 0.5–2)
LEUKOCYTE ESTERASE UR-ACNC: NEGATIVE — SIGNIFICANT CHANGE UP
LYMPHOCYTES # BLD AUTO: 1.52 K/UL — SIGNIFICANT CHANGE UP (ref 1–3.3)
LYMPHOCYTES # BLD AUTO: 12.3 % — LOW (ref 13–44)
MAGNESIUM SERPL-MCNC: 1.8 MG/DL — SIGNIFICANT CHANGE UP (ref 1.6–2.6)
MCHC RBC-ENTMCNC: 29.1 PG — SIGNIFICANT CHANGE UP (ref 27–34)
MCHC RBC-ENTMCNC: 33.7 GM/DL — SIGNIFICANT CHANGE UP (ref 32–36)
MCV RBC AUTO: 86.4 FL — SIGNIFICANT CHANGE UP (ref 80–100)
METHADONE UR-MCNC: POSITIVE
MONOCYTES # BLD AUTO: 0.73 K/UL — SIGNIFICANT CHANGE UP (ref 0–0.9)
MONOCYTES NFR BLD AUTO: 5.9 % — SIGNIFICANT CHANGE UP (ref 2–14)
NEUTROPHILS # BLD AUTO: 9.94 K/UL — HIGH (ref 1.8–7.4)
NEUTROPHILS NFR BLD AUTO: 80.7 % — HIGH (ref 43–77)
NITRITE UR-MCNC: NEGATIVE — SIGNIFICANT CHANGE UP
NRBC # BLD: 0 /100 WBCS — SIGNIFICANT CHANGE UP (ref 0–0)
NRBC # FLD: 0 K/UL — SIGNIFICANT CHANGE UP (ref 0–0)
OPIATES UR-MCNC: NEGATIVE — SIGNIFICANT CHANGE UP
OSMOLALITY SERPL: 288 MOSM/KG — SIGNIFICANT CHANGE UP (ref 275–295)
OXYCODONE UR-MCNC: NEGATIVE — SIGNIFICANT CHANGE UP
PCO2 BLDV: 41 MMHG — SIGNIFICANT CHANGE UP (ref 39–52)
PCP SPEC-MCNC: SIGNIFICANT CHANGE UP
PCP UR-MCNC: NEGATIVE — SIGNIFICANT CHANGE UP
PH BLDV: 7.37 — SIGNIFICANT CHANGE UP (ref 7.32–7.43)
PH UR: 6.5 — SIGNIFICANT CHANGE UP (ref 5–8)
PHOSPHATE SERPL-MCNC: 2.7 MG/DL — SIGNIFICANT CHANGE UP (ref 2.5–4.5)
PLATELET # BLD AUTO: 190 K/UL — SIGNIFICANT CHANGE UP (ref 150–400)
PO2 BLDV: 46 MMHG — HIGH (ref 25–45)
POTASSIUM BLDV-SCNC: 3.5 MMOL/L — SIGNIFICANT CHANGE UP (ref 3.5–5.1)
POTASSIUM SERPL-MCNC: 4.4 MMOL/L — SIGNIFICANT CHANGE UP (ref 3.5–5.3)
POTASSIUM SERPL-SCNC: 4.4 MMOL/L — SIGNIFICANT CHANGE UP (ref 3.5–5.3)
PROT SERPL-MCNC: 6.7 G/DL — SIGNIFICANT CHANGE UP (ref 6–8.3)
PROT UR-MCNC: NEGATIVE MG/DL — SIGNIFICANT CHANGE UP
RBC # BLD: 4.47 M/UL — SIGNIFICANT CHANGE UP (ref 3.8–5.2)
RBC # FLD: 14.2 % — SIGNIFICANT CHANGE UP (ref 10.3–14.5)
SALICYLATES SERPL-MCNC: <0.3 MG/DL — LOW (ref 15–30)
SAO2 % BLDV: 72.2 % — SIGNIFICANT CHANGE UP (ref 67–88)
SARS-COV-2 RNA SPEC QL NAA+PROBE: SIGNIFICANT CHANGE UP
SODIUM SERPL-SCNC: 139 MMOL/L — SIGNIFICANT CHANGE UP (ref 135–145)
SP GR SPEC: 1.01 — SIGNIFICANT CHANGE UP (ref 1–1.03)
THC UR QL: NEGATIVE — SIGNIFICANT CHANGE UP
TOXICOLOGY SCREEN, DRUGS OF ABUSE, SERUM RESULT: SIGNIFICANT CHANGE UP
UROBILINOGEN FLD QL: 1 MG/DL — SIGNIFICANT CHANGE UP (ref 0.2–1)
WBC # BLD: 12.33 K/UL — HIGH (ref 3.8–10.5)
WBC # FLD AUTO: 12.33 K/UL — HIGH (ref 3.8–10.5)

## 2024-08-12 PROCEDURE — 93010 ELECTROCARDIOGRAM REPORT: CPT

## 2024-08-12 PROCEDURE — 90792 PSYCH DIAG EVAL W/MED SRVCS: CPT

## 2024-08-12 PROCEDURE — 99285 EMERGENCY DEPT VISIT HI MDM: CPT

## 2024-08-12 RX ORDER — ACETAMINOPHEN 500 MG
975 TABLET ORAL ONCE
Refills: 0 | Status: COMPLETED | OUTPATIENT
Start: 2024-08-12 | End: 2024-08-12

## 2024-08-12 RX ORDER — BACTERIOSTATIC SODIUM CHLORIDE 0.9 %
1000 VIAL (ML) INJECTION ONCE
Refills: 0 | Status: COMPLETED | OUTPATIENT
Start: 2024-08-12 | End: 2024-08-12

## 2024-08-12 RX ADMIN — Medication 1000 MILLILITER(S): at 19:42

## 2024-08-12 RX ADMIN — Medication 975 MILLIGRAM(S): at 21:18

## 2024-08-12 NOTE — ED BEHAVIORAL HEALTH ASSESSMENT NOTE - MEDICATIONS (PRESCRIPTIONS, DIRECTIONS)
continue home medication as indicated, recommends weaning off Ambient or Benzodiazepines to decrease psych medications dependence. pt is on Seroquel 50 mg in am and 100 mg at night, trazodone 50 mg

## 2024-08-12 NOTE — ED BEHAVIORAL HEALTH ASSESSMENT NOTE - SUMMARY
Patient is a 74 yo F, domiciled alone, , retired , psych hx of MDD vs Bipolar Disorder, anxiety, and hypnotic/benzo misuse, prior psych admissions. PMH significant for hypothyroidism, CAD s/p stent placement, GERD, and breast Ca s/p lumpectomy, hx of ECT 20 yrs ago, per chart has 1 pSA in 2004 by OD on Ambien, Patient in outpatient treatment with a psychiatrist, Dr yRan Sparrow, on Ambien 10 mg qHS, Zolpidem tartrate 10mg daily. Who presents to the ED BIBA called by .  states patient has not been sleeping for 2 days and has not been eating well. Per chart, patient last visit was 4 weeks ago due to after reportedly ingesting 7 Ambien 10 mg tablets and endorsed worsening depression. Denies NSSIB, SI/HI, no known aggression or legal.  On evaluation, patient interviewed laying in bed calm, cooperative, but inconsistent historian. Patient reports is depressed and anxious which feels is worsening and has suffered her whole life. Presents as anxious, tearful, and shivered. Patient admits has been hospitalized in every psychiatry hospital and sees her Psychiatrist Dr. Sparrow, reports her Psych meds are not working for her. Patient kept repeating her anxiety and depression is worse. The pt also states she has no current or recent SI with plan or intent. Patient denies current or recent HI, A/VH, or PI, denies recent SIB, aggression, and denies access to firearms. Collateral information  was called and restates she has been unable to sleep and eat well for the last 2 days, that patient feels anxious and depressed as she states. Denies any concerns of SI/HI, or hallucinations (visual or hearing). Patient is a 74 yo F, domiciled alone, , retired , psych hx of MDD vs Bipolar Disorder, anxiety, and hypnotic/benzo misuse, prior psych admissions. PMH significant for hypothyroidism, CAD s/p stent placement, GERD, and breast Ca s/p lumpectomy, hx of ECT 20 yrs ago, per chart has 1 SA in 2004 by OD on Ambien, Patient in outpatient treatment with a psychiatrist, Dr Ryan Cornelius. d/miranda from the . Who presents to the ED BIBA called by .  states patient has not been sleeping for 2 days and has not been eating well. Per chart, patient last visit was 4 weeks ago due to after reportedly ingesting 7 Ambien 10 mg tablets and endorsed worsening depression. Denies NSSIB, SI/HI, no known aggression or legal. psych consulted for insomnia, confusion.   patient reports that she was d/miranda from [facility] 3 days ago and she is waiting for [facility] to evaluate her in order to get an help for HHA   Patient reports that she just came home several days ago from [facility] where she was treated for depression, She states that her meds were adjusted and she is feeling OK, she denies feeling depressed, no anhedonia. although she admits that she is not doing too much at home; she states that her  used to take care of everything and she feels lost sometimes, she ordered some food yesterday with her friend, but today she had some cheese and sandwich. Patient smiles appropriately, no anhedonia; she reports that she likes watching TV . She states that she has poor energy level "sometimes", does not want to go food shopping because sometimes she feels dizzy. No hopelessness or helplessness. No active or passive SI, no intent or plan. Patient denies any psychotic symptoms, No voices, visions or delusions. No IOR, paranoia, mind reading or thought insertion. She admits that she was confused earlier today, but "can't explain" what happened, and does "not remember" and "can't " understand why her son called made her come here. She states that she was playing with rubber band, not eating, she uses them 'when I use a bag, or sometimes I use rubber for my hair" she states that her son is not that attentive and sometimes "he pretends that he cares". Patient states that she has been waiting for a call from [facility] ([facility] was supposed to call her and sent her VN)  . Patient is planning to go back to see her regular psychiatrist  Dr. Sparrow, denies feeling anxious, no insomnia, states tht her new meds are helping,  denies recent SIB, aggression, and denies access to firearms.   Collateral information son was called who stated that she can't go back home, He states that he "might find help for his mother" then says that it is too late and does not want to continue the conversation, he simply hangs up the phone    Patient is not acutely psychotic, not internally preoccupied, she is appropriate, calm and well related.     atient does not want to b admitted to psych unit, does not meet criterial for involuntary psych admission, But can't be d/miranda because for safety concerns, she is unable to s take care of herself. Patient is a 74 yo F, domiciled alone, , retired , psych hx of MDD vs Bipolar Disorder, anxiety, and hypnotic/benzo misuse, prior psych admissions. PMH significant for hypothyroidism, CAD s/p stent placement, GERD, and breast Ca s/p lumpectomy, hx of ECT 20 yrs ago, per chart has 1 SA in 2004 by OD on Ambien, Patient in outpatient treatment with a psychiatrist, Dr Ryan Cornelius. d/miranda from the . Who presents to the ED BIBA called by .  states patient has not been sleeping for 2 days and has not been eating well. Per chart, patient last visit was 4 weeks ago due to after reportedly ingesting 7 Ambien 10 mg tablets and endorsed worsening depression. Denies NSSIB, SI/HI, no known aggression or legal. psych consulted for insomnia, confusion.   patient reports that she was d/miranda from [facility] 3 days ago and she is waiting for [facility] to evaluate her in order to get an help for HHA   Patient reports that she just came home several days ago from [facility] where she was treated for depression, She states that her meds were adjusted and she is feeling OK, she denies feeling depressed, no anhedonia. although she admits that she is not doing too much at home; she states that her  used to take care of everything and she feels lost sometimes, she ordered some food yesterday with her friend, but today she had some cheese and sandwich. Patient smiles appropriately, no anhedonia; she reports that she likes watching TV . She states that she has poor energy level "sometimes", does not want to go food shopping because sometimes she feels dizzy. No hopelessness or helplessness. No active or passive SI, no intent or plan. Patient denies any psychotic symptoms, No voices, visions or delusions. No IOR, paranoia, mind reading or thought insertion. She admits that she was confused earlier today, but "can't explain" what happened, and does "not remember" and "can't " understand why her son called made her come here. She states that she was playing with rubber band, not eating, she uses them 'when I use a bag, or sometimes I use rubber for my hair" she states that her son is not that attentive and sometimes "he pretends that he cares". Patient states that she has been waiting for a call from [facility] ([facility] was supposed to call her and sent her VN)  . Patient is planning to go back to see her regular psychiatrist  Dr. Sparrow, denies feeling anxious, no insomnia, states tht her new meds are helping,  denies recent SIB, aggression, and denies access to firearms.   Collateral information son was called who stated that she can't go back home, He states that he "might find help for his mother" then says that it is too late and does not want to continue the conversation, he simply hangs up the phone    Patient is not acutely psychotic, not internally preoccupied, she is appropriate, calm and well related. Methadone +, but on seroquel  (false + ?     Patient does not want to b admitted to psych unit, does not meet criterial for involuntary psych admission, But can't be d/miranda because for safety concerns, she is unable to s take care of herself. Patient is a 72 yo F, domiciled alone, , retired , psych hx of MDD vs Bipolar Disorder, anxiety, and hypnotic/benzo misuse, prior psych admissions. PMH significant for hypothyroidism, CAD s/p stent placement, GERD, and breast Ca s/p lumpectomy, hx of ECT 20 yrs ago, per chart has 1 SA in 2004 by OD on Ambien, Patient in outpatient treatment with a psychiatrist, Dr Ryan Cornelius. d/miranda from the . Who presents to the ED BIBA called by .  states patient has not been sleeping for 2 days and has not been eating well. Per chart, patient last visit was 4 weeks ago due to after reportedly ingesting 7 Ambien 10 mg tablets and endorsed worsening depression. Denies NSSIB, SI/HI, no known aggression or legal. psych consulted for insomnia, confusion.   patient reports that she was d/miranda from [facility] 3 days ago and she is waiting for [facility] to evaluate her in order to get an help for HHA   Patient reports that she just came home several days ago from [facility] where she was treated for depression, She states that her meds were adjusted and she is feeling OK, she denies feeling depressed, no anhedonia. although she admits that she is not doing too much at home; she states that her  used to take care of everything and she feels lost sometimes, she ordered some food yesterday with her friend, but today she had some cheese and sandwich. Patient smiles appropriately, no anhedonia; she reports that she likes watching TV . She states that she has poor energy level "sometimes", does not want to go food shopping because sometimes she feels dizzy. No hopelessness or helplessness. No active or passive SI, no intent or plan. Patient denies any psychotic symptoms, No voices, visions or delusions. No IOR, paranoia, mind reading or thought insertion. She admits that she was confused earlier today, but "can't explain" what happened, and does "not remember" and "can't " understand why her son called made her come here. She states that she was playing with rubber band, not eating, she uses them 'when I use a bag, or sometimes I use rubber for my hair" she states that her son is not that attentive and sometimes "he pretends that he cares". Patient states that she has been waiting for a call from [facility] ([facility] was supposed to call her and sent her VN)  . Patient is planning to go back to see her regular psychiatrist  Dr. Sparrow, denies feeling anxious, no insomnia, states tht her new meds are helping,  denies recent SIB, aggression, and denies access to firearms.   Collateral information son was called who stated that she can't go back home, He states that he "might find help for his mother" then says that it is too late and does not want to continue the conversation, he simply hangs up the phone    Patient is not acutely psychotic, not internally preoccupied, she is appropriate, calm and well related. Methadone +, but on seroquel  (false + ?     Patient does not want to b admitted to psych unit, does not meet criterial for involuntary psych admission, But can't be d/miranda because for safety concerns, she is unable to take care of herself, she does not know how to cook and states that her  took care of everything..

## 2024-08-12 NOTE — ED PROVIDER NOTE - ATTENDING CONTRIBUTION TO CARE
Attending note:   After face to face evaluation of this patient, I concur with above noted hx, pe, and care plan for this patient.  Delisa: 73-year-old female with history of major depressive disorder.  Patient was recently in psych facility for 6 weeks and discharged 3 days ago.  Today son called EMS.  History is from social work collateral information received from the son Fer.  Patient is currently been at home alone since  is admitted as an inpatient.  Patient does not do well at home when she is alone as per report.  When no answer to multiple phone calls patient was found at home to be  chewing rubber bands with a bottle of rubbing alcohol next to her.  They were unsure if it was ingested.  Patient denies tremor bands and denies drinking rubbing alcohol.  Patient is awake alert and oriented and gives a coherent history.  However she denies these events.  Patient is borderline tachycardia her blood pressure is otherwise stable.  Patient is in no significant distress.  Patient is requesting pen and pencil and requesting to contact family.  Rest of exam is as noted above.  Given concern for possible alcohol ingestion will check labs including electrolytes and osmolality.  EKG intoxicated as well.  If no acute medical concerns patient can be evaluated by psych.  As per family they believe the patient requires inpatient psychiatric admission.

## 2024-08-12 NOTE — ED BEHAVIORAL HEALTH ASSESSMENT NOTE - REFERRAL / APPOINTMENT DETAILS
Follow up outpatient psychiatry Dr. Sparrow patient does not need psych admission, but she is not safe socially to go home, Continue current meds for now patient does not need psych admission, but she is not safe socially to go home, Continue current meds for now. Pt will see Dr Sparrow on outpatient bases.

## 2024-08-12 NOTE — ED PROVIDER NOTE - PROGRESS NOTE DETAILS
Spoke with toxicology regarding isopropyl alcohol ingestion.  No acutely concerning factors at this time.  Will send labs and test for isopropyl alcohol level however no specific workup required. Collateral from psych social worker: Patient apparently does not do well when she is not monitored.  She had not answered any of their phone calls and when patient was checked on in her living room she was found to be sitting alone in the room and had a bottle of isopropyl alcohol and so there was concern for an ingestion. Braden Hamm PGY2:  Spoke with Tox and discussed Pt's labs. The patient likely did not ingest any isopropyl alcohol. they are medically cleared from a tox point of view. Julia Romberger, MS4:  Patient reassessed and asked about son's report that she was eating rubber bands. She denies eating rubber bands or intention to hurt herself, but admits that when she got home from the facility 2 days ago she did not have food in the house and has not been able to eat much over the past 2 days. Julia Romberger, MS4:  Received call from psych provider. They stated that they called pts son to obtain additional collateral and pts son stated "why are you calling me so late?" and refused to talk to the provider.

## 2024-08-12 NOTE — ED ADULT NURSE NOTE - OBJECTIVE STATEMENT
Pt came in stating she is fine and unsure of why she is here. She states she was home and was brought her by EMS. States that she was incoherent for a little bit. Pt doesn't know what happened, all of a sudden she was here. States she normally is in a psych program. Pt requesting for rubber bands, pen and paper. Pt denies any S/I, H/I, hallucinations, anxiety, dizziness, SOB, LOC, recent trauma, fevers, chills, N/V/D, pm Hx.     Pt poor historian, ambulatory, A&Ox2, neurologically intact, equal strength in all extremities, respirations even and unlabored, equal chest rise with clear lung sounds, s1 and s2 sounds heard on auscultation with NSR and HR on monitor, no abdominal distention, hyperactive bowel sounds, non-tender, pulses in all extremities, skin intact, plan of care educated with pt, safety maintained, bed in lowest position, call bell within reach
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
## 2024-08-12 NOTE — ED ADULT TRIAGE NOTE - CHIEF COMPLAINT QUOTE
160.02
As per family, patient was incoherent around 20 minutes ago and patient reached for 70% isopropyl alochol bottle. patient denies S/I, H/I, hallucinations or anxiety, admits to feeling depressed. Patient recently returned home 3 days ago from psych program, son reports that she was eating rubber hands at home. Patient presents to ED with a third of alcohol left in bottle. pmhx: depression, prescription pill abuse

## 2024-08-12 NOTE — ED ADULT NURSE NOTE - CHIEF COMPLAINT QUOTE
As per family, patient was incoherent around 20 minutes ago and patient reached for 70% isopropyl alochol bottle. patient denies S/I, H/I, hallucinations or anxiety, admits to feeling depressed. Patient recently returned home 3 days ago from psych program, son reports that she was eating rubber hands at home. Patient presents to ED with a third of alcohol left in bottle. pmhx: depression, prescription pill abuse

## 2024-08-12 NOTE — ED PROVIDER NOTE - NSFOLLOWUPINSTRUCTIONS_ED_ALL_ED_FT
You have been given information necessary to follow up with the  [facility] ([facility]) Crisis center & other outpatient  psychiatric clinics within your community    • [facility] walk in Crisis centre  [address] [place] [address] [phone] [email or web address]  Hours of operation:  Day	                                        Hours  Sunday                                  Closed  Monday                                9am - 3pm  Tuesday                                9am - 3pm  Wednesday                          9am - 3pm  Thursday                               9am - 3pm  Friday                                    9am - 3pm  Saturday                                Closed    .....additionally if your current problem is associated with drug or alcohol abuse further information can be obtained at the Drug Abuse Evaluation Health Referral Servce (DA[facility])    • [facility] [address] [place] [address] [phone] [email or web address]/behavioral-health/programs-[email or web address]/drug-abuse-evaluation-health-referral-service    Additionally if more support and information and help is needed in the area of suicide prevention pleas3 feel free to contact :   • Su[place] [address]  Phone: 9-880-361-WCTC (5022)  Web Address: http://www.suicidepreventionlifeline.org  • Suicide Awareness Voices of Education  [address]  [place], [place]  Phone: 1-194.972.3260  Web Address: http://www.save.org    Depression    WHAT YOU NEED TO KNOW:  Depression is a medical condition that causes feelings of sadness or hopelessness that do not go away. Depression may cause you to lose interest in things you used to enjoy. These feelings may interfere with your daily life.  DISCHARGE INSTRUCTIONS:  Call your local emergency number (911 in the [place]) if:   •You think about harming yourself or someone else.  •You have done something on purpose to hurt yourself.  Call your therapist or doctor if:   •Your symptoms do not improve.  •You cannot make it to your next appointment.   •You have new symptoms.  •You have questions or concerns about your condition or care.  The following resources are available at any time to help you, if needed:   •National Suicide Prevention Lifeline: 1-920.760.1777 (5-478-021-TALK)  •Suicide Hotline: 1-775.652.3584 (3-716-CWCCSXZ)  •For a list of international numbers: https://save.org/find-help/international-resources/  Medicines:   •Antidepressants may be given to improve or balance your mood. You may need to take this medicine for several weeks before you begin to feel better.  •Take your medicine as directed. Contact your healthcare provider if you think your medicine is not helping or if you have side effects. Tell him of her if you are allergic to any medicine. Keep a list of the medicines, vitamins, and herbs you take. Include the amounts, and when and why you take them. Bring the list or the pill bottles to follow-up visits. Carry your medicine list with you in case of an emergency.  Therapy is often used together with medicine to relieve depression. Therapy is a way for you to talk about your feelings and anything that may be causing depression. Therapy can be done alone or in a group. It may also be done with family members or a significant other.    Self-care:   •Get regular physical activity. Try to be active for 30 minutes, 3 to 5 days a week. Physical activity can help relieve depression. Work with your healthcare provider to develop a plan that you enjoy. It may help to ask someone to be active with you.  •Create a regular sleep schedule. A routine can help you relax before bed. Listen to music, read, or do yoga. Try to go to bed and wake up at the same time every day. Sleep is important for emotional health.  •Eat a variety of healthy foods. Healthy foods include fruits, vegetables, whole-grain breads, low-fat dairy products, lean meats, fish, and cooked beans. A healthy meal plan is low in fat, salt, and added sugar.  •Do not drink alcohol or use drugs. Alcohol and drugs can make depression worse. Talk to your therapist or doctor if you need help quitting.  Follow up with your healthcare provider as directed: Your healthcare provider will monitor your progress at follow-up visits. He or she will also monitor your medicine if you take antidepressants. Your healthcare provider will ask if the medicine is helping. Tell him or her about any side effects or problems you may have with your medicine. The type or amount of medicine may need to be changed. Write down your questions so you remember to ask them during your visits.    Suicide Prevention  WHAT YOU NEED TO KNOW:  You may see suicide as the only way to escape emotional or physical pain and suffering. Help is available from people who care about you, and from professionals trained in suicide prevention. Prevention includes everything you and others can do to stop you from taking your life.  DISCHARGE INSTRUCTIONS:  Call your local emergency number (911 in the ), or ask someone to call if:   •You do something on purpose to hurt yourself  •You make a plan to commit suicide.  Call your doctor or therapist, or have someone close to you call if:   •You act out in anger, are reckless, or are abusing alcohol or drugs.  •You have serious thoughts of suicide, even with treatment.  •You have more thoughts of suicide when you are alone.  •You stop eating, or you begin to smoke cigarettes or drink alcohol.  •You have questions or concerns about your condition or care.  What to do if you are considering suicide:    •Contact a suicide prevention organization. The following are always available to help you: ?National Suicide Prevention Lifeline: 1-991.268.5435 (2-065-073-TALK)  ?Suicide Hotline: 1-144.127.6760 (6-568-JAWHOTR)  ?For a list of international numbers: https://save.org/find-help/international-resources/  •Contact your therapist. Your doctor can give you a list of therapists if you do not have one.  •Keep medicines, weapons, and alcohol out of your home.  •Do not spend time alone if you have thoughts of ending your life.  Warning signs of suicide: The following can help you and others recognize that you are struggling:   •Talking about your plan for committing suicide, or wanting to read or write about death or suicide  •Cutting yourself, burning your skin with cigarettes, or driving recklessly  •Drug or alcohol use, not taking your prescribed medicine, or taking too much  •Not wanting to spend time with others or doing things you enjoy, feeling bored, or not wanting anyone to praise you  •Changes in your appetite, sleep habits, energy levels, or weight  •Feeling angry, or lashing out at others  •A need to give away or throw away your belonging  •Often skipping work  •Suddenly not taking medicine for a mental illness without talking to your healthcare provider  •Suddenly not going to therapy  Treatment for suicidal thoughts:   •Medicines may be given to prevent mood swings, or to decrease anxiety or depression. You will need to take all medicines as directed. A sudden stop can be harmful. It may take 4 to 6 weeks for the medicine to help you feel better.  •Suicide risk assessment means healthcare providers will ask questions about your suicide thoughts and plans. They will ask how often you think about suicide and if you have tried it before. They will ask if you have begun to hurt yourself, such as with cutting or reckless driving. They may ask if you have access to weapons or drugs.  •A safety plan includes a list of people or groups to contact if you have suicidal feelings again. The list may include friends, family members, a spiritual leader, and others you trust. You may be asked to make a verbal agreement or sign a contract that you will not try to harm yourself.  •A therapist can help you identify and change negative feelings or beliefs about yourself. This may help change the way you feel and act. A therapist can also help you find ways to cope with things that cannot be changed.  Manage depression:   •Get help for drug or alcohol abuse. Drugs and alcohol can make suicidal feelings worse and make you more likely to act on them. Drugs and alcohol can also cause or increase depression.  •Talk to someone you trust. Be honest about your thoughts and feelings about suicide. You can call a suicide prevention center if you do not want to talk to someone you know.  •Exercise as directed. Exercise can lift your mood, give you more energy, and make it easier to sleep.   •Eat a variety of healthy foods. Healthy foods include fruits, vegetables, whole-grain breads, lean meats, fish, low-fat dairy products, and beans. Try to eat regularly even if you do not feel hungry. Depression can increase from a lack of nutrition or if you are hungry for long periods of time.  •Create a sleep routine. Try to go to bed and wake up at the same time every day. Let your healthcare provider know if you are having trouble sleeping.  •Take your medicine and go to therapy as directed. Medicine and therapy can help you manage your mental health. Do not stop taking your medicine without talking to your healthcare provider. If you do not like the way a medicine makes you feel, you may be able to try a different medicine.    **Follow up with your doctor or therapist as directed: Write down your questions so you remember to ask them during your visits.** Please follow up with your Psychiatrist  Ryan Cornelius MD  [address],   [place] [address]    You have been given information necessary to follow up with the  [facility] ([facility]) Crisis center & other outpatient  psychiatric clinics within your community    • [facility] walk in Crisis centre  [address] [place] [address] [phone] [email or web address]-health/[email or web address]-health-crisis-center  Hours of operation:  Day	                                        Hours  Sunday                                  Closed  Monday                                9am - 3pm  Tuesday                                9am - 3pm  Wednesday                          9am - 3pm  Thursday                               9am - 3pm  Friday                                    9am - 3pm  Saturday                                Closed    .....additionally if your current problem is associated with drug or alcohol abuse further information can be obtained at the Drug Abuse Evaluation Health Referral Servce (DAEHRS)    • [facility] [address] [place] [address] [phone] [email or web address]/behavioral-health/programs-services/drug-abuse-evaluation-health-referral-service    Additionally if more support and information and help is needed in the area of suicide prevention pleas3 feel free to contact :   • Su[place] [address]  Phone: 8-252-477-OIGU (4856)  Web Address: http://www.suicidepreventionlifeline.org  • Suicide Awareness Voices of Education  [address]  [place], [place]  Phone: 1-432.576.1948  Web Address: http://www.save.org    Depression    WHAT YOU NEED TO KNOW:  Depression is a medical condition that causes feelings of sadness or hopelessness that do not go away. Depression may cause you to lose interest in things you used to enjoy. These feelings may interfere with your daily life.  DISCHARGE INSTRUCTIONS:  Call your local emergency number (911 in the [place]) if:   •You think about harming yourself or someone else.  •You have done something on purpose to hurt yourself.  Call your therapist or doctor if:   •Your symptoms do not improve.  •You cannot make it to your next appointment.   •You have new symptoms.  •You have questions or concerns about your condition or care.  The following resources are available at any time to help you, if needed:   •National Suicide Prevention Lifeline: 1-487.101.2559 (1-339-095-TALK)  •Suicide Hotline: 1-462.648.5869 (5-089-AGTAPLT)  •For a list of international numbers: https://save.org/find-help/international-resources/  Medicines:   •Antidepressants may be given to improve or balance your mood. You may need to take this medicine for several weeks before you begin to feel better.  •Take your medicine as directed. Contact your healthcare provider if you think your medicine is not helping or if you have side effects. Tell him of her if you are allergic to any medicine. Keep a list of the medicines, vitamins, and herbs you take. Include the amounts, and when and why you take them. Bring the list or the pill bottles to follow-up visits. Carry your medicine list with you in case of an emergency.  Therapy is often used together with medicine to relieve depression. Therapy is a way for you to talk about your feelings and anything that may be causing depression. Therapy can be done alone or in a group. It may also be done with family members or a significant other.    Self-care:   •Get regular physical activity. Try to be active for 30 minutes, 3 to 5 days a week. Physical activity can help relieve depression. Work with your healthcare provider to develop a plan that you enjoy. It may help to ask someone to be active with you.  •Create a regular sleep schedule. A routine can help you relax before bed. Listen to music, read, or do yoga. Try to go to bed and wake up at the same time every day. Sleep is important for emotional health.  •Eat a variety of healthy foods. Healthy foods include fruits, vegetables, whole-grain breads, low-fat dairy products, lean meats, fish, and cooked beans. A healthy meal plan is low in fat, salt, and added sugar.  •Do not drink alcohol or use drugs. Alcohol and drugs can make depression worse. Talk to your therapist or doctor if you need help quitting.  Follow up with your healthcare provider as directed: Your healthcare provider will monitor your progress at follow-up visits. He or she will also monitor your medicine if you take antidepressants. Your healthcare provider will ask if the medicine is helping. Tell him or her about any side effects or problems you may have with your medicine. The type or amount of medicine may need to be changed. Write down your questions so you remember to ask them during your visits.    Suicide Prevention  WHAT YOU NEED TO KNOW:  You may see suicide as the only way to escape emotional or physical pain and suffering. Help is available from people who care about you, and from professionals trained in suicide prevention. Prevention includes everything you and others can do to stop you from taking your life.  DISCHARGE INSTRUCTIONS:  Call your local emergency number (911 in the ), or ask someone to call if:   •You do something on purpose to hurt yourself  •You make a plan to commit suicide.  Call your doctor or therapist, or have someone close to you call if:   •You act out in anger, are reckless, or are abusing alcohol or drugs.  •You have serious thoughts of suicide, even with treatment.  •You have more thoughts of suicide when you are alone.  •You stop eating, or you begin to smoke cigarettes or drink alcohol.  •You have questions or concerns about your condition or care.  What to do if you are considering suicide:    •Contact a suicide prevention organization. The following are always available to help you: ?National Suicide Prevention Lifeline: 1-459.736.1497 (7-433-468-TALK)  ?Suicide Hotline: 1-719.419.5784 (2-320-UYIGDDZ)  ?For a list of international numbers: https://save.org/find-help/international-resources/  •Contact your therapist. Your doctor can give you a list of therapists if you do not have one.  •Keep medicines, weapons, and alcohol out of your home.  •Do not spend time alone if you have thoughts of ending your life.  Warning signs of suicide: The following can help you and others recognize that you are struggling:   •Talking about your plan for committing suicide, or wanting to read or write about death or suicide  •Cutting yourself, burning your skin with cigarettes, or driving recklessly  •Drug or alcohol use, not taking your prescribed medicine, or taking too much  •Not wanting to spend time with others or doing things you enjoy, feeling bored, or not wanting anyone to praise you  •Changes in your appetite, sleep habits, energy levels, or weight  •Feeling angry, or lashing out at others  •A need to give away or throw away your belonging  •Often skipping work  •Suddenly not taking medicine for a mental illness without talking to your healthcare provider  •Suddenly not going to therapy  Treatment for suicidal thoughts:   •Medicines may be given to prevent mood swings, or to decrease anxiety or depression. You will need to take all medicines as directed. A sudden stop can be harmful. It may take 4 to 6 weeks for the medicine to help you feel better.  •Suicide risk assessment means healthcare providers will ask questions about your suicide thoughts and plans. They will ask how often you think about suicide and if you have tried it before. They will ask if you have begun to hurt yourself, such as with cutting or reckless driving. They may ask if you have access to weapons or drugs.  •A safety plan includes a list of people or groups to contact if you have suicidal feelings again. The list may include friends, family members, a spiritual leader, and others you trust. You may be asked to make a verbal agreement or sign a contract that you will not try to harm yourself.  •A therapist can help you identify and change negative feelings or beliefs about yourself. This may help change the way you feel and act. A therapist can also help you find ways to cope with things that cannot be changed.  Manage depression:   •Get help for drug or alcohol abuse. Drugs and alcohol can make suicidal feelings worse and make you more likely to act on them. Drugs and alcohol can also cause or increase depression.  •Talk to someone you trust. Be honest about your thoughts and feelings about suicide. You can call a suicide prevention center if you do not want to talk to someone you know.  •Exercise as directed. Exercise can lift your mood, give you more energy, and make it easier to sleep.   •Eat a variety of healthy foods. Healthy foods include fruits, vegetables, whole-grain breads, lean meats, fish, low-fat dairy products, and beans. Try to eat regularly even if you do not feel hungry. Depression can increase from a lack of nutrition or if you are hungry for long periods of time.  •Create a sleep routine. Try to go to bed and wake up at the same time every day. Let your healthcare provider know if you are having trouble sleeping.  •Take your medicine and go to therapy as directed. Medicine and therapy can help you manage your mental health. Do not stop taking your medicine without talking to your healthcare provider. If you do not like the way a medicine makes you feel, you may be able to try a different medicine.    **Follow up with your doctor or therapist as directed: Write down your questions so you remember to ask them during your visits.**

## 2024-08-12 NOTE — ED BEHAVIORAL HEALTH NOTE - BEHAVIORAL HEALTH NOTE
As per request of provider, writer contacted pt’s son Anirudh [phone] to obtain collateral information. the following information if per the son.    Pt is a 74 yo female domiciled w/ , hx of manic depression, bib ems.     Reason for ed visit:  he says pt discharged from [facility] 2-3 days ago. son checked on mother today and she seemed out of it. He says pt didn’t know who the son was and was eating rubber bands.  He called ems and pt was incoherent.   Symptoms/hx: no si or hi reported. Pt presented good 2 days ago.  hx of suicide attempts unsure of any recent si. No AVH or paranoia noted. He is unsure if pt was delusional but says she was eating rubber bands and could not say why. He says pt cares for herself well when in controlled environment.  He suspects pt has not been caring for herself since returning home 2-3 days ago. pt is supposed to receive home care but declined the services. He says pt has dependency issue that has not been addressed. He also suspects she was drinking rubbing alcohol .      Baseline: either up or down. He says pt doesn’t regulate well. “when she is good she is good”     Stressors: home alone for 2 days. Pt either takes too much or to little of medications.    Treatment team:  psychiatrist unknown. he says pt has multiple psych admissions and was last discharged from [facility] 2-3 days ago.    Medical problems: seizure 1 month ago due to withdrawing of one of her medication.    Medication: unsure if pt is compliant w/ medication. pt can tell us medication list.     Family hx:  unknown.     Violence/aggression:  not violent or aggressive. No access to firearms or weapons.     drug/alcohol use: hx of drinking,     Dispo: advocating for psych admission. As per request of provider, writer contacted pt’s son Anirudh [phone] to obtain collateral information. the following information if per the son.    Pt is a 74 yo female domiciled w/ , hx of manic depression, bib ems.     Reason for ed visit:  he says pt discharged from [facility] 2-3 days ago. son checked on mother today and she seemed out of it. He says pt didn’t know who the son was and was eating rubber bands.  He called ems and pt was incoherent.   Symptoms/hx: no si or hi reported. Pt presented good 2 days ago.  hx of suicide attempts unsure of any recent si. No AVH or paranoia noted. He is unsure if pt was delusional but says she was eating rubber bands and could not say why. He says pt cares for herself well when in controlled environment.  He suspects pt has not been caring for herself since returning home 2-3 days ago. pt is supposed to receive home care but declined the services. He says pt has dependency issue that has not been addressed. He also suspects she was drinking rubbing alcohol .      Baseline: either up or down. He says pt doesn’t regulate well. “when she is good she is good”     Stressors: home alone for 2 days. Pt either takes too much or to little of medications.    Treatment team:  psychiatrist unknown. he says pt has multiple psych admissions and was last discharged from [facility] 2-3 days ago.    Medical problems: seizure 1 month ago due to withdrawing of one of her medication.    Medication: unsure if pt is compliant w/ medication. pt can tell us medication list.     Family hx:  unknown.     Violence/aggression:  not violent or aggressive. No access to firearms or weapons.     drug/alcohol use: hx of drinking,     Dispo: advocating for psych admission.    no psykes found As per request of provider, writer contacted pt’s son Anirudh [phone] to obtain collateral information. the following information if per the son.    Pt is a 74 yo female domiciled w/ , hx of manic depression, bib ems.     Reason for ed visit:  he says pt discharged from [facility] 2-3 days ago. son checked on mother today and she seemed out of it. He says pt didn’t know who the son was and was eating rubber bands.  He called ems and pt was incoherent.   Symptoms/hx: no si or hi reported. Pt presented good 2 days ago.  hx of suicide attempts unsure of any recent si. No AVH or paranoia noted. He is unsure if pt was delusional but says she was eating rubber bands and could not say why. He says pt cares for herself well when in controlled environment.  He suspects pt has not been caring for herself since returning home 2-3 days ago. pt is supposed to receive home care but declined the services. He says pt has dependency issue that has not been addressed. He also suspects she was drinking rubbing alcohol .      Baseline: either up or down. He says pt doesn’t regulate well. “when she is good she is good”     Stressors: home alone for 2 days. Pt either takes too much or to little of medications.    Treatment team:  psychiatrist unknown. he says pt has multiple psych admissions and was last discharged from [facility] 2-3 days ago.    Medical problems: seizure 1 month ago due to withdrawing of one of her medication.    Medication: unsure if pt is compliant w/ medication. pt can tell us medication list.     Family hx:  unknown.     Violence/aggression:  not violent or aggressive. No access to firearms or weapons.     drug/alcohol use: hx of drinking,     Dispo: advocating for psych admission.    no psykes found    writer contacted [facility] [phone] to obtain additional collateral information. spoke w/ nurse who reports pt was discharged on 08/09/2024 with after care plan.  pt referred to [facility] private brad care manager. She read that  pt son state he would pay for care management. Pt also connected to [facility] [phone]  x [phone]  and will provide home and physical therapy,. Pt was admitted on 07/21. Upon discharge pt is supposed to follow up with self help case management program. She did not see diagnosis or outpatient psych f/u.

## 2024-08-12 NOTE — ED PROVIDER NOTE - CLINICAL SUMMARY MEDICAL DECISION MAKING FREE TEXT BOX
73-year-old female with history of hypothyroidism, hypertension, affective bipolar disorder recently discharged from inpatient psych facility 3 days ago after 6 weeks today for depression comes into the ED after family found her in her living room eating rubber band and with a bottle of isopropyl alcohol. On exam patient has no concerning findings.  She is unable to report how she came into the ED or why.  Will get additional collateral but also will send off psych clearance labs along with a blood gas and isopropyl alcohol level and tox screen.  If workup is negative patient will likely need psych consult given her psych history and this collateral information of her eating rubber bands.

## 2024-08-12 NOTE — ED BEHAVIORAL HEALTH ASSESSMENT NOTE - RISK ASSESSMENT
overall low risk for suicide attempt, denies si/hi, future oriented overall low risk for suicide attempt, denies si/hi, future oriented, wants to get help; compliant with meds

## 2024-08-12 NOTE — ED PROVIDER NOTE - PATIENT PORTAL LINK FT
You can access the FollowMyHealth Patient Portal offered by [facility] by registering at the following website: [email or web address]. By joining [facility]’s FollowMyHealth portal, you will also be able to view your health information using other applications (apps) compatible with our system.

## 2024-08-12 NOTE — ED BEHAVIORAL HEALTH ASSESSMENT NOTE - DESCRIPTION
See HPI See hpi cooperative,   Vital Signs Last 24 Hrs  T(C): 37.4 (12 Aug 2024 21:20), Max: 37.4 (12 Aug 2024 21:20)  T(F): 99.3 (12 Aug 2024 21:20), Max: 99.3 (12 Aug 2024 21:20)  HR: 104 (12 Aug 2024 21:20) (104 - 109)  BP: 107/84 (12 Aug 2024 21:20) (107/84 - 118/76)  BP(mean): --  RR: 18 (12 Aug 2024 21:20) (18 - 20)  SpO2: 96% (12 Aug 2024 21:20) (94% - 96%)    Parameters below as of 12 Aug 2024 21:20  Patient On (Oxygen Delivery Method): room air

## 2024-08-12 NOTE — ED BEHAVIORAL HEALTH ASSESSMENT NOTE - HPI (INCLUDE ILLNESS QUALITY, SEVERITY, DURATION, TIMING, CONTEXT, MODIFYING FACTORS, ASSOCIATED SIGNS AND SYMPTOMS)
Patient is a 72 yo F, domiciled alone, , retired , psych hx of MDD vs Bipolar Disorder, anxiety, and hypnotic/benzo misuse, prior psych admissions. PMH significant for hypothyroidism, CAD s/p stent placement, GERD, and breast Ca s/p lumpectomy, hx of ECT 20 yrs ago, per chart has 1 pSA in 2004 by OD on Ambien, Patient in outpatient treatment with a psychiatrist, Dr Ryan Sparrow, on Ambien 10 mg qHS, Zolpidem tartrate 10mg daily. Who presents to the ED BIBA called by .  states patient has not been sleeping for 2 days and has not been eating well. Per chart, patient last visit was 4 weeks ago due to after reportedly ingesting 7 Ambien 10 mg tablets and endorsed worsening depression. Denies NSSIB, SI/HI, no known aggression or legal. psych consulted for insomnia, confusion.   patient reports that she was d/miranda from [facility] 3 days ago and she is waiting for [facility] to evaluate her in order to get an help for HHA   Patient reports that she just came home several days ago from [facility] where she was treated for depression, She states that her meds were adjusted and she is OK, Denies feeling   On evaluation, patient interviewed laying in bed calm, cooperative, states that she is doing OK in general, denies feeling depressed or anxious. States that after she was started on Seroquel at night she has been sleeping better .   sees her Psychiatrist Dr. Sparrow, reports her Psych meds are not working for her. Patient kept repeating her anxiety and depression is worse. The pt also states she has no current or recent SI with plan or intent. Patient denies current or recent HI, A/VH, or PI, denies recent SIB, aggression, and denies access to firearms.   Collateral information  was called and restates she has been unable to sleep and eat well for the last 2 days, that patient feels anxious and depressed as she states. Denies any SI/HI concerns, or hallucinations (visual or hearing). Patient is a 72 yo F, domiciled alone, , retired , psych hx of MDD vs Bipolar Disorder, anxiety, and hypnotic/benzo misuse, prior psych admissions. PMH significant for hypothyroidism, CAD s/p stent placement, GERD, and breast Ca s/p lumpectomy, hx of ECT 20 yrs ago, per chart has 1 SA in 2004 by OD on Ambien, Patient in outpatient treatment with a psychiatrist, Dr Ryan Cornelius. d/miranda from the . Who presents to the ED BIBA called by .  states patient has not been sleeping for 2 days and has not been eating well. Per chart, patient last visit was 4 weeks ago due to after reportedly ingesting 7 Ambien 10 mg tablets and endorsed worsening depression. Denies NSSIB, SI/HI, no known aggression or legal. psych consulted for insomnia, confusion.   patient reports that she was d/miranda from [facility] 3 days ago and she is waiting for [facility] to evaluate her in order to get an help for HHA   Patient reports that she just came home several days ago from [facility] where she was treated for depression, She states that her meds were adjusted and she is feeling OK, she denies feeling depressed, no anhedonia. although she admits that she is not doing too much at home; she states that her  used to take care of everything and she feels lost sometimes, she ordered some food yesterday with her friend, but today she had some cheese and sandwich. Patient smiles appropriately, no anhedonia; she reports that she likes watching TV . She states that she has poor energy level "sometimes", does not want to go food shopping because sometimes she feels dizzy. No hopelessness or helplessness. No active or passive SI, no intent or plan. Patient denies any psychotic symptoms, No voices, visions or delusions. No IOR, paranoia, mind reading or thought insertion. She admits that she was confused earlier today, but "can't explain" what happened, and does "not remember" and "can't " understand why her son called made her come here. She states that she was playing with rubber band, not eating, she uses them 'when I use a bag, or sometimes I use rubber for my hair" she states that her son is not that attentive and sometimes "he pretends that he cares". Patient states that she has been waiting for a call from [facility] ([facility] was supposed to call her and sent her VN)  . Patient is planning to go back to see her regular psychiatrist  Dr. Sparrow, denies feeling anxious, no insomnia, states tht her new meds are helping,  denies recent SIB, aggression, and denies access to firearms.   Collateral information son was called who stated that she can't go back home, He states that he "might find help for his mother" then says that it is too late and does not want to continue the conversation, he simply hangs up the phone    Patient is not acutely psychotic, not internally preoccupied, she is appropriate, calm and well related

## 2024-08-12 NOTE — ED BEHAVIORAL HEALTH ASSESSMENT NOTE - DIFFERENTIAL
MDD, recurrent episode vs Bipolar disorder, current episode depressed and anxiety.  Substance abuse, hx of misuse

## 2024-08-12 NOTE — ED BEHAVIORAL HEALTH ASSESSMENT NOTE - SAFETY PLAN ADDT'L DETAILS
993 Safety plan discussed with... Education provided regarding environmental safety / lethal means restriction/Provision of National Suicide Prevention Lifeline 8-574-375-TALK (8950)

## 2024-08-12 NOTE — ED ADULT NURSE NOTE - BEFAST SPEECH SLURRED
Health Maintenance Due   Topic Date Due   • COVID-19 Vaccine (2 - Booster for Valdo series) 06/03/2021   • Influenza Vaccine (1) 09/01/2021   • Depression Screening  11/12/2021       Patient is due for topics listed above, he wishes to proceed with Depression Screening , but is not proceeding with Immunization(s) COVID-19 and Influenza at this time. The following has occurred: Orders placed for Depression Screening .  Declines flu and booster shots today   No

## 2024-08-12 NOTE — ED PROVIDER NOTE - OBJECTIVE STATEMENT
73-year-old female with history of hypothyroidism, hypertension, affective bipolar disorder recently discharged from inpatient psych facility 3 days ago after 6 weeks today for depression comes into the ED after family found her in her living room eating rubber band and with a bottle of isopropyl alcohol.  Patient states she does not know why she was here and she denies any SI/HI/auditory or visual hallucinations.  She denies any pain or discomfort.

## 2024-08-12 NOTE — ED ADULT NURSE NOTE - ED STAT RN HANDOFF DETAILS
verbal report given to GEOVANNY Fuentes, patient A&Ox3, breathing with ease, no signs of acute distress, patient was found eating rubber bands and still asking for rubber bands, patient has no complaints at this time.

## 2024-08-12 NOTE — ED BEHAVIORAL HEALTH ASSESSMENT NOTE - OTHER
was called regards safety concerns. resides in her 's apartment at present time alone, he is admitted to the hospital. son

## 2024-08-12 NOTE — ED BEHAVIORAL HEALTH ASSESSMENT NOTE - NSSUICRSKFACTOR_PSY_ALL_CORE
Current and Past Psychiatric Diagnoses/Presenting Symptoms/Treatment Related Factors/Activating Events/Stressors Current and Past Psychiatric Diagnoses/Treatment Related Factors/Activating Events/Stressors

## 2024-08-13 RX ORDER — DIPHENHYDRAMINE HCL 25 MG
25 CAPSULE ORAL ONCE
Refills: 0 | Status: COMPLETED | OUTPATIENT
Start: 2024-08-13 | End: 2024-08-13

## 2024-08-13 RX ADMIN — Medication 25 MILLIGRAM(S): at 03:05

## 2024-08-13 RX ADMIN — Medication 200 MILLIGRAM(S): at 05:27

## 2024-08-13 NOTE — ED BEHAVIORAL HEALTH NOTE - BEHAVIORAL HEALTH NOTE
Writer called patients peggy Oleary [phone] states patient was in hospital 4 weeks discharged on Friday.  States he did not want her discharged as he does not want her home alone.  He denies patient has dementia.  States patient has a long history of depression and passive suicidal ideation.  States his wife bought patient groceries and prepared her Lunch on Friday.  He states he didn't hear from her all weekend.  States he used to live across the street from patient, but moved without telling her.  He states he wants her in the hospital so she's not home alone.  He states patient won't open the door for a home attendant.  Writer suggested he let a home attendant into the home in the morning.  He states he won't be able to do that.  He wants her to go back to [facility].  Patient's son is adamant that patient should be in the hospital because she cannot be alone.  He requested writer be sent to his home in an ambulette that he will privately pay for.  Writer offered to give him number for Ambulette but states he wants to call [facility] first to see if they will accept her.

## 2024-08-13 NOTE — ED ADULT NURSE REASSESSMENT NOTE - NS ED NURSE REASSESS COMMENT FT1
PT is resting in stretcher, easily arousable to verbal stimuli. no apparent distress noted at this time. hand off will be given to primary nurse when return to area.
Pt is resting comfortably in bed with even unlabored respirations observed. Vitals as stated. Pending psychiatric dispo. Ongoing monitroing  for safety continues.
Received pt from RN break coverage. Pt is resting in bed with even unlabored respirations observed. Vitals as stated. Pending psychiatric reassessment in AM. Ongoing monitoring for safety continues.
Received pt from RN break coverage. Pt remains awake sitting up in bed, hyperverbal upon approach; calm with even unlabored respirations observed. Vitals as stated. Pt medicated as ordered. Ongoing monitoring for safety continues.
Received pt from main ed via wheelchair, report from [facility]. Pt arrives awake, a&ox3, calm and able to follow verbal command. Vitals as stated, respirations are even and unlabored. Medically cleared, pending psychiatric reassessment and dispo in early AM. Pt assisted to bed in front of nursing station for ongoing safe monitoring.
Patient received laying on stretcher A&Ox3 , breathing with ease, no signs of acute distress, no complaints at this time, IV removed, verbal report given BH RN, will continue to monitor and assess

## 2024-09-09 NOTE — ED PROVIDER NOTE - PHYSICAL EXAMINATION
09-Sep-2024 15:36
ATTENDING PHYSICAL EXAM DR. Gary ***GEN - NAD; well appearing; A+O x3, answers questions appropriately ***HEAD - NC/AT ***EYES/NOSE - PERRL, EOMI, mucous membranes moist, no discharge ***THROAT: Oral cavity and pharynx normal. No inflammation, swelling, exudate, or lesions.  ***NECK: Neck supple, non-tender without lymphadenopathy, no masses, no JVD.   ***PULMONARY - CTA b/l, symmetric breath sounds. ***CARDIAC -s1s2, RRR, no M,R,G  ***ABDOMEN - +BS, ND, NT, soft, no guarding, no rebound, no masses   ***BACK - no CVA tenderness, Normal  spine ***EXTREMITIES - symmetric pulses, 2+ dp, capillary refill < 2 seconds, no clubbing, no cyanosis, no edema ***SKIN - no rash or bruising   ***NEUROLOGIC - alert, CN 2-12 intact

## 2024-09-10 ENCOUNTER — INPATIENT (INPATIENT)
Facility: HOSPITAL | Age: 74
LOS: 9 days | Discharge: ANOTHER HOSPITAL | DRG: 316 | End: 2024-09-20
Attending: HOSPITALIST | Admitting: STUDENT IN AN ORGANIZED HEALTH CARE EDUCATION/TRAINING PROGRAM
Payer: MEDICARE

## 2024-09-10 VITALS
RESPIRATION RATE: 18 BRPM | WEIGHT: 149.91 LBS | TEMPERATURE: 98 F | DIASTOLIC BLOOD PRESSURE: 132 MMHG | OXYGEN SATURATION: 96 % | SYSTOLIC BLOOD PRESSURE: 162 MMHG | HEART RATE: 82 BPM

## 2024-09-10 DIAGNOSIS — Z98.890 OTHER SPECIFIED POSTPROCEDURAL STATES: Chronic | ICD-10-CM

## 2024-09-10 DIAGNOSIS — Z98.89 OTHER SPECIFIED POSTPROCEDURAL STATES: Chronic | ICD-10-CM

## 2024-09-10 LAB
APPEARANCE UR: CLEAR — SIGNIFICANT CHANGE UP
BASOPHILS # BLD AUTO: 0.04 K/UL — SIGNIFICANT CHANGE UP (ref 0–0.2)
BASOPHILS NFR BLD AUTO: 0.5 % — SIGNIFICANT CHANGE UP (ref 0–2)
BILIRUB UR-MCNC: NEGATIVE — SIGNIFICANT CHANGE UP
COLOR SPEC: YELLOW — SIGNIFICANT CHANGE UP
DIFF PNL FLD: NEGATIVE — SIGNIFICANT CHANGE UP
EOSINOPHIL # BLD AUTO: 0.07 K/UL — SIGNIFICANT CHANGE UP (ref 0–0.5)
EOSINOPHIL NFR BLD AUTO: 0.8 % — SIGNIFICANT CHANGE UP (ref 0–6)
GLUCOSE UR QL: NEGATIVE MG/DL — SIGNIFICANT CHANGE UP
HCT VFR BLD CALC: 40.9 % — SIGNIFICANT CHANGE UP (ref 34.5–45)
HGB BLD-MCNC: 13.8 G/DL — SIGNIFICANT CHANGE UP (ref 11.5–15.5)
IMM GRANULOCYTES NFR BLD AUTO: 0.3 % — SIGNIFICANT CHANGE UP (ref 0–0.9)
KETONES UR-MCNC: ABNORMAL MG/DL
LEUKOCYTE ESTERASE UR-ACNC: NEGATIVE — SIGNIFICANT CHANGE UP
LYMPHOCYTES # BLD AUTO: 2.13 K/UL — SIGNIFICANT CHANGE UP (ref 1–3.3)
LYMPHOCYTES # BLD AUTO: 24.6 % — SIGNIFICANT CHANGE UP (ref 13–44)
MCHC RBC-ENTMCNC: 29.1 PG — SIGNIFICANT CHANGE UP (ref 27–34)
MCHC RBC-ENTMCNC: 33.7 GM/DL — SIGNIFICANT CHANGE UP (ref 32–36)
MCV RBC AUTO: 86.1 FL — SIGNIFICANT CHANGE UP (ref 80–100)
MONOCYTES # BLD AUTO: 0.64 K/UL — SIGNIFICANT CHANGE UP (ref 0–0.9)
MONOCYTES NFR BLD AUTO: 7.4 % — SIGNIFICANT CHANGE UP (ref 2–14)
NEUTROPHILS # BLD AUTO: 5.74 K/UL — SIGNIFICANT CHANGE UP (ref 1.8–7.4)
NEUTROPHILS NFR BLD AUTO: 66.4 % — SIGNIFICANT CHANGE UP (ref 43–77)
NITRITE UR-MCNC: NEGATIVE — SIGNIFICANT CHANGE UP
NRBC # BLD: 0 /100 WBCS — SIGNIFICANT CHANGE UP (ref 0–0)
PCP SPEC-MCNC: SIGNIFICANT CHANGE UP
PH UR: 5.5 — SIGNIFICANT CHANGE UP (ref 5–8)
PLATELET # BLD AUTO: 234 K/UL — SIGNIFICANT CHANGE UP (ref 150–400)
PROT UR-MCNC: NEGATIVE MG/DL — SIGNIFICANT CHANGE UP
RBC # BLD: 4.75 M/UL — SIGNIFICANT CHANGE UP (ref 3.8–5.2)
RBC # FLD: 13.9 % — SIGNIFICANT CHANGE UP (ref 10.3–14.5)
SP GR SPEC: 1.01 — SIGNIFICANT CHANGE UP (ref 1–1.03)
UROBILINOGEN FLD QL: 1 MG/DL — SIGNIFICANT CHANGE UP (ref 0.2–1)
WBC # BLD: 8.65 K/UL — SIGNIFICANT CHANGE UP (ref 3.8–10.5)
WBC # FLD AUTO: 8.65 K/UL — SIGNIFICANT CHANGE UP (ref 3.8–10.5)

## 2024-09-10 PROCEDURE — 99285 EMERGENCY DEPT VISIT HI MDM: CPT

## 2024-09-10 NOTE — ED ADULT TRIAGE NOTE - CHIEF COMPLAINT QUOTE
Pt BIBA from home who states she accidentally drank liquor and took her Ambien tonight. Son called who states she appeared manic at home. PT is A&Ox4 at this time.

## 2024-09-10 NOTE — ED ADULT TRIAGE NOTE - BP NONINVASIVE SYSTOLIC (MM HG)
Encounter Date: 2019    SCRIBE #1 NOTE: IAnita, am scribing for, and in the presence of,  Garret Caldera PA-C. I have scribed the following portions of the note - Other sections scribed: HPI,ROS,PE.       History     Chief Complaint   Patient presents with    Vaginitis     Patient report being seen on  for pain in the vaginal area and diagnosed with a yeast infection. Patient reports the vaginal pain has returned and she now has an odor.  Patient rates pain 10/10. Patient denies using any medication for relief.       CC: Vaginal pain    HPI: This is a 47 y.o.female patient, with a PMHx of DM and Yeast Infection, presenting to the ED with a complaint of vaginal itching and pain, that began last night. Patient was seen in this ED x2 days ago for similar symptoms and wad diagnosed with a Yeast Infection. Patient reports associated white vaginal discharge, dysuria, vaginal odor, and lower abdominal pain. Patient denies any fever, chills, shortness of breath, chest pain, neck pain, back pain, rash, headaches, congestion, rhinorrhea, cough, sore throat, ear pain, eye pain, blurred vision, nausea, vomiting, diarrhea, or any other associated symptoms. No prior Tx. No smoking or illicit drug use. Occasional drinker. Allergic to Latex. Daily medicational use of Adderall.      The history is provided by the patient.     Review of patient's allergies indicates:   Allergen Reactions    Latex, natural rubber Itching and Rash     Past Medical History:   Diagnosis Date    Anemia     Celiac disease     Diabetes mellitus     Gall stones     Hypertension     PTSD (post-traumatic stress disorder)     Supraventricular tachycardia 2001    Yeast infection      Past Surgical History:   Procedure Laterality Date    ABDOMINAL SURGERY      Gastric sleeve    ANKLE FUSION  2011    BREAST SURGERY       SECTION      placenta previa    CHOLECYSTECTOMY  2009    EXCISIONAL BIOPSY Left 2019     Procedure: EXCISIONAL BIOPSY LEFT with SEED (CONSENT AM OF) 1.0 hr case;  Surgeon: Flor Rosas MD;  Location: [facility] OR [address] [place] [address];  Service: General;  Laterality: Left;    gastric      gastric sleeve      pt reported     HERNIA REPAIR      HYSTERECTOMY  09/19/2001    Inguinal hernia  2004     Family History   Problem Relation Age of Onset    Heart disease Father     Diabetes Mother         hypoglycemic    Breast cancer Maternal Aunt     Breast cancer Sister     Breast cancer Paternal Aunt     Ovarian cancer Neg Hx      Social History     Tobacco Use    Smoking status: Never Smoker    Smokeless tobacco: Never Used   Substance Use Topics    Alcohol use: Yes     Comment: rarely    Drug use: No     Review of Systems   Constitutional: Negative for chills and fever.   HENT: Negative for congestion, ear pain, rhinorrhea and sore throat.    Eyes: Negative for pain and visual disturbance.   Respiratory: Negative for cough and shortness of breath.    Cardiovascular: Negative for chest pain.   Gastrointestinal: Positive for abdominal pain. Negative for diarrhea, nausea and vomiting.   Genitourinary: Positive for dysuria, vaginal discharge and vaginal pain. Negative for vaginal bleeding.        +vaginal itching   Musculoskeletal: Negative for back pain and neck pain.   Skin: Negative for rash.   Neurological: Negative for headaches.       Physical Exam     Initial Vitals [11/23/19 0651]   BP Pulse Resp Temp SpO2   116/72 (!) 111 17 98.2 °F (36.8 °C) 98 %      MAP       --         Physical Exam    Nursing note and vitals reviewed.  Constitutional: She appears well-developed and well-nourished. No distress.   HENT:   Head: Normocephalic and atraumatic.   Nose: Nose normal.   Mouth/Throat: Oropharynx is clear and moist.   Eyes: EOM are normal. Pupils are equal, round, and reactive to light.   Neck: Normal range of motion. Neck supple.   Cardiovascular: Normal rate, regular rhythm and normal heart sounds. Exam reveals  no gallop and no friction rub.    No murmur heard.  Pulmonary/Chest: Effort normal and breath sounds normal. No respiratory distress. She has no wheezes. She has no rhonchi. She has no rales.   Abdominal: Soft. Bowel sounds are normal. There is no tenderness. There is no rebound and no guarding.   Genitourinary: Pelvic exam was performed with patient supine. There is no rash, tenderness or lesion on the right labia. There is no rash, tenderness or lesion on the left labia. No tenderness or bleeding in the vagina. Vaginal discharge (There is a thin malodorous white discharge in the vaginal vault) found.   Genitourinary Comments: Chanda Bartholomew LPN at bedside for pelvic examination.  There is no cervix present given patient's history of hysterectomy   Musculoskeletal: Normal range of motion.   Neurological: She is alert and oriented to person, place, and time. She has normal strength. No cranial nerve deficit or sensory deficit.   Skin: Skin is warm and dry. Capillary refill takes less than 2 seconds.   Psychiatric: She has a normal mood and affect.         ED Course   Procedures  Labs Reviewed   URINALYSIS, REFLEX TO URINE CULTURE - Abnormal; Notable for the following components:       Result Value    Leukocytes, UA Trace (*)     All other components within normal limits    Narrative:     Preferred Collection Type->Urine, Clean Catch   VAGINAL SCREEN - Abnormal; Notable for the following components:    Clue Cells Few (*)     WBC - Vaginal Screen Few (*)     Bacteria - Vaginal Screen Few (*)     All other components within normal limits   URINALYSIS MICROSCOPIC    Narrative:     Preferred Collection Type->Urine, Clean Catch   POCT URINE PREGNANCY          Imaging Results    None          Medical Decision Making:   ED Management:  This is an evaluation of a 47 y.o. female who presents to the ED for vaginal pain and malodorous discharge.  Vital signs are stable.   Afebrile.  Patient is nontoxic appearing and in no acute  distress. Abdomen is soft and nontender to palpation. Pelvic examination shows a thin malodorous white discharge in the vaginal vault.  There is no cervix present.  UA shows no signs of infection.  Wet prep shows clue cells consistent with bacterial vaginosis.  Considered but doubt STI, PID, UTI, pyelonephritis.  The chart review from visit on 11/21/2019 shows negative gonorrhea and chlamydia.  I will treat patient with Flagyl for bacterial vaginosis.  I encouraged follow-up with OBGYN.    Patient given return precautions and instructed to return to the emergency department for any new or worsening symptoms. Patient verbalized understanding and agreed with plan.             Scribe Attestation:   Scribe #1: I performed the above scribed service and the documentation accurately describes the services I performed. I attest to the accuracy of the note.                          Clinical Impression:       ICD-10-CM ICD-9-CM   1. Bacterial vaginosis N76.0 616.10    B96.89 041.9              Scribe attestation: I, Garret Caldera , personally performed the services described in this documentation. All medical record entries made by the scribe were at my direction and in my presence.  I have reviewed the chart and agree that the record reflects my personal performance and is accurate and complete.                 Garret Caldera PA-C  11/23/19 9335     162

## 2024-09-11 DIAGNOSIS — F33.1 MAJOR DEPRESSIVE DISORDER, RECURRENT, MODERATE: ICD-10-CM

## 2024-09-11 DIAGNOSIS — R94.31 ABNORMAL ELECTROCARDIOGRAM [ECG] [EKG]: ICD-10-CM

## 2024-09-11 DIAGNOSIS — Z98.890 OTHER SPECIFIED POSTPROCEDURAL STATES: Chronic | ICD-10-CM

## 2024-09-11 LAB
ALBUMIN SERPL ELPH-MCNC: 3.6 G/DL — SIGNIFICANT CHANGE UP (ref 3.3–5)
ALP SERPL-CCNC: 167 U/L — HIGH (ref 40–120)
ALT FLD-CCNC: 18 U/L — SIGNIFICANT CHANGE UP (ref 10–45)
ANION GAP SERPL CALC-SCNC: 11 MMOL/L — SIGNIFICANT CHANGE UP (ref 5–17)
APAP SERPL-MCNC: <1 UG/ML — LOW (ref 10–30)
AST SERPL-CCNC: 18 U/L — SIGNIFICANT CHANGE UP (ref 10–40)
BILIRUB SERPL-MCNC: 0.7 MG/DL — SIGNIFICANT CHANGE UP (ref 0.2–1.2)
BUN SERPL-MCNC: 17 MG/DL — SIGNIFICANT CHANGE UP (ref 7–23)
CALCIUM SERPL-MCNC: 10 MG/DL — SIGNIFICANT CHANGE UP (ref 8.4–10.5)
CHLORIDE SERPL-SCNC: 106 MMOL/L — SIGNIFICANT CHANGE UP (ref 96–108)
CO2 SERPL-SCNC: 23 MMOL/L — SIGNIFICANT CHANGE UP (ref 22–31)
CREAT SERPL-MCNC: 0.74 MG/DL — SIGNIFICANT CHANGE UP (ref 0.5–1.3)
EGFR: 84 ML/MIN/1.73M2 — SIGNIFICANT CHANGE UP
ETHANOL SERPL-MCNC: <3 MG/DL — SIGNIFICANT CHANGE UP (ref 0–3)
GLUCOSE SERPL-MCNC: 102 MG/DL — HIGH (ref 70–99)
POTASSIUM SERPL-MCNC: 3.9 MMOL/L — SIGNIFICANT CHANGE UP (ref 3.5–5.3)
POTASSIUM SERPL-SCNC: 3.9 MMOL/L — SIGNIFICANT CHANGE UP (ref 3.5–5.3)
PROT SERPL-MCNC: 7.7 G/DL — SIGNIFICANT CHANGE UP (ref 6–8.3)
SALICYLATES SERPL-MCNC: 0.5 MG/DL — LOW (ref 3–30)
SARS-COV-2 RNA SPEC QL NAA+PROBE: SIGNIFICANT CHANGE UP
SODIUM SERPL-SCNC: 140 MMOL/L — SIGNIFICANT CHANGE UP (ref 135–145)
TSH SERPL-MCNC: 0.43 UIU/ML — SIGNIFICANT CHANGE UP (ref 0.36–3.74)

## 2024-09-11 PROCEDURE — 93010 ELECTROCARDIOGRAM REPORT: CPT | Mod: 76

## 2024-09-11 PROCEDURE — 70450 CT HEAD/BRAIN W/O DYE: CPT | Mod: 26,MC

## 2024-09-11 PROCEDURE — 71045 X-RAY EXAM CHEST 1 VIEW: CPT | Mod: 26

## 2024-09-11 PROCEDURE — 90792 PSYCH DIAG EVAL W/MED SRVCS: CPT | Mod: 2W

## 2024-09-11 PROCEDURE — 99223 1ST HOSP IP/OBS HIGH 75: CPT

## 2024-09-11 RX ORDER — MAGNESIUM, ALUMINUM HYDROXIDE 200-225/5
30 SUSPENSION, ORAL (FINAL DOSE FORM) ORAL EVERY 4 HOURS
Refills: 0 | Status: DISCONTINUED | OUTPATIENT
Start: 2024-09-11 | End: 2024-09-20

## 2024-09-11 RX ORDER — TRAZODONE HCL 50 MG
50 TABLET ORAL DAILY
Refills: 0 | Status: DISCONTINUED | OUTPATIENT
Start: 2024-09-11 | End: 2024-09-12

## 2024-09-11 RX ORDER — HYDROXYZINE HCL 25 MG
25 TABLET ORAL ONCE
Refills: 0 | Status: COMPLETED | OUTPATIENT
Start: 2024-09-11 | End: 2024-09-11

## 2024-09-11 RX ORDER — LEVOTHYROXINE SODIUM 100 MCG
112 TABLET ORAL DAILY
Refills: 0 | Status: DISCONTINUED | OUTPATIENT
Start: 2024-09-11 | End: 2024-09-20

## 2024-09-11 RX ORDER — IBUPROFEN 600 MG
600 TABLET ORAL ONCE
Refills: 0 | Status: COMPLETED | OUTPATIENT
Start: 2024-09-11 | End: 2024-09-11

## 2024-09-11 RX ORDER — PANTOPRAZOLE SODIUM 40 MG
40 TABLET, DELAYED RELEASE (ENTERIC COATED) ORAL
Refills: 0 | Status: DISCONTINUED | OUTPATIENT
Start: 2024-09-11 | End: 2024-09-13

## 2024-09-11 RX ORDER — ACETAMINOPHEN 325 MG/1
650 TABLET ORAL EVERY 6 HOURS
Refills: 0 | Status: DISCONTINUED | OUTPATIENT
Start: 2024-09-11 | End: 2024-09-20

## 2024-09-11 RX ORDER — DIPHENHYDRAMINE HCL 50 MG
25 CAPSULE ORAL ONCE
Refills: 0 | Status: COMPLETED | OUTPATIENT
Start: 2024-09-11 | End: 2024-09-11

## 2024-09-11 RX ORDER — ENOXAPARIN SODIUM 100 MG/ML
40 INJECTION SUBCUTANEOUS EVERY 24 HOURS
Refills: 0 | Status: DISCONTINUED | OUTPATIENT
Start: 2024-09-11 | End: 2024-09-20

## 2024-09-11 RX ORDER — ONDANSETRON 2 MG/ML
4 INJECTION, SOLUTION INTRAMUSCULAR; INTRAVENOUS EVERY 8 HOURS
Refills: 0 | Status: DISCONTINUED | OUTPATIENT
Start: 2024-09-11 | End: 2024-09-20

## 2024-09-11 RX ORDER — ASPIRIN 81 MG
81 TABLET, DELAYED RELEASE (ENTERIC COATED) ORAL DAILY
Refills: 0 | Status: DISCONTINUED | OUTPATIENT
Start: 2024-09-11 | End: 2024-09-20

## 2024-09-11 RX ORDER — DEXAMETHASONE 0.75 MG
5 TABLET ORAL ONCE
Refills: 0 | Status: COMPLETED | OUTPATIENT
Start: 2024-09-11 | End: 2024-09-11

## 2024-09-11 RX ORDER — GABAPENTIN 100 MG
400 CAPSULE ORAL
Refills: 0 | Status: DISCONTINUED | OUTPATIENT
Start: 2024-09-11 | End: 2024-09-20

## 2024-09-11 RX ORDER — METOPROLOL TARTRATE 100 MG/1
12.5 TABLET ORAL DAILY
Refills: 0 | Status: DISCONTINUED | OUTPATIENT
Start: 2024-09-11 | End: 2024-09-12

## 2024-09-11 RX ORDER — ESCITALOPRAM OXALATE 10 MG/1
10 TABLET ORAL DAILY
Refills: 0 | Status: DISCONTINUED | OUTPATIENT
Start: 2024-09-11 | End: 2024-09-20

## 2024-09-11 RX ORDER — LORAZEPAM 4 MG/ML
1 INJECTION INTRAMUSCULAR; INTRAVENOUS ONCE
Refills: 0 | Status: DISCONTINUED | OUTPATIENT
Start: 2024-09-11 | End: 2024-09-11

## 2024-09-11 RX ORDER — ACETAMINOPHEN 325 MG/1
975 TABLET ORAL ONCE
Refills: 0 | Status: COMPLETED | OUTPATIENT
Start: 2024-09-11 | End: 2024-09-11

## 2024-09-11 RX ADMIN — LORAZEPAM 1 MILLIGRAM(S): 4 INJECTION INTRAMUSCULAR; INTRAVENOUS at 22:08

## 2024-09-11 RX ADMIN — ACETAMINOPHEN 975 MILLIGRAM(S): 325 TABLET ORAL at 08:40

## 2024-09-11 RX ADMIN — Medication 600 MILLIGRAM(S): at 08:53

## 2024-09-11 RX ADMIN — Medication 25 MILLIGRAM(S): at 08:53

## 2024-09-11 RX ADMIN — Medication 600 MILLIGRAM(S): at 11:10

## 2024-09-11 RX ADMIN — Medication 25 MILLIGRAM(S): at 11:13

## 2024-09-11 RX ADMIN — Medication 5 MILLIGRAM(S): at 17:00

## 2024-09-11 RX ADMIN — Medication 200 MILLIGRAM(S): at 11:55

## 2024-09-11 RX ADMIN — ACETAMINOPHEN 975 MILLIGRAM(S): 325 TABLET ORAL at 04:00

## 2024-09-11 RX ADMIN — Medication 25 MILLIGRAM(S): at 07:07

## 2024-09-11 NOTE — H&P ADULT - HISTORY OF PRESENT ILLNESS
Patient is a  72 y/o F, , retired , recently living with her son, with PMH significant for h/o CAD s/p PCI of OM1 (100% occlussion ) in 11/2021, MI, hypertension, with PPH significant for MDD, multiple (>10) previous psychiatric hospitalizations, h/o 2-3 past suicide attempts, has an outpatient psychiatrist Dr. Ryan Sparrow she hasn't seen in 2 months, h/o misusing Ambien and mixing it with EtOH, BIB EMS activated by son after he found her altered, having drank EtOH and taken excessive number of Ambien pills. Son has significant concerns about patient's safety, reporting she has made statements such as "kiss the kids goodbye if I don't see you again" and made reference to suicide recently. Son does not feel that patient is safe to go home at this time and advocates for psychiatric hospitalization and prefer her to be admitted to [facility]. However as per ED team she has been refused for inpatient psych admission due to history of CAD (stent) and continuous Prolonged QTC of 500.  During my encounter with the patient, patient seems stressed reported that she is just stressed because she did not meet his son and daughter in law and does not have intent to harm herself, complaints of itching on her entire body denied any other complaints.   spoke to the son and he reported that patient has been drinking alcohol along with her meds,

## 2024-09-11 NOTE — ED ADULT NURSE NOTE - OBJECTIVE STATEMENT
Pt axo4, BIBA from home who states she accidentally drank liquor and took her Ambien tonight. Son called who states she appeared manic at home. story unclear between pt and son. CO initiated at 23:10 for SI per dr. rodríguez after patient assessment/interview. safety maintained. Pt axo4, BIBA from home who states she accidentally drank liquor and took her Ambien tonight. Son called who states she appeared manic at home. story unclear between pt and son. CO initiated at 23:10 for SI per dr. rodríguez after patient assessment/interview.  yellow gown/red socks applied, sitter at bedside. safety maintained.
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
## 2024-09-11 NOTE — ED PROVIDER NOTE - NSICDXPASTMEDICALHX_GEN_ALL_CORE_FT
PAST MEDICAL HISTORY:  HTN (hypertension)     MI (myocardial infarction)     Stented coronary artery
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
## 2024-09-11 NOTE — ED BEHAVIORAL HEALTH NOTE - BEHAVIORAL HEALTH NOTE
ED COURSE      ============     SOURCE: Primary RN Aliza?     ARRIVAL: Arrived to the ED via ambulance. ?     BELONGINGS: Pt’s belongings are locked away in ED closet?     BEHAVIOR: Per RN pt has been pleasant in the ED, no concerning behavior. Pt is currently on a 1:1      TREATMENT:?None reported.      VISITORS: Pt’s son was present at triage but left shortly after.?               COLLATERAL     ========================     NAME: Anirudh     NUMBER: [phone]     RELATIONSHIP: Son     RELIABILITY: reliable     COMMENTS:           ========================     PATIENT DEMOGRAPHICS:     ========================     ========================     HPI     ========================     BASELINE FUNCTIONING: Pt is a 72 y/o female, domiciled with son and daughter-in-law. Pt has a PPHx of Depression. Pt has multiple previous IP psych admissions. Pt does not currently see a psychiatrist or therapist. Pt recently started staying with her son and daughter-in-law as they help her manage her medication and keep a routine. Son reports Pt often has behavioral outbursts when she is not getting her way.          DATE HPI STARTED: 9/8/2024     DECOMPENSATION: Pt’s son reported that she was left alone from Saturday to Sunday evening and when he returned, she seemed to be acting odd. Son reports that he normally hides pt’s medication and his wife dispenses that as prescribed. Son reports that pt found the pill bottle and was attempting to ingest all 25-30 pills. Son reports that he had to wrestle the bottle away from her. Son called 911 and EMS transported pt to the ED.      SUICIDALITY: History of passive SI     VIOLENCE: ?None reported     SUBSTANCE: Alcohol and prescription drug use                ========================     PAST PSYCHIATRIC HISTORY     ========================     DATE PAST PSYCHIATRIC HISTORY STARTED: Unknown     MAIN PSYCHIATRIC DIAGNOSIS: Depression     PSYCHIATRIC HOSPITALIZATIONS: multiple IP?admissions. Most recently admitted at [facility] for 1 month     PRIOR ILLNESS: Unknown     SUICIDALITY: ?History of passive SI     VIOLENCE: None reported?     SUBSTANCE USE: Alcohol and prescription drug use          ==============     OTHER HISTORY     ==============     CURRENT MEDICATION: Abiem?     MEDICAL HISTORY: None reported?     ALLERGIES: Unknown     LEGAL ISSUES: None reported     FIREARM ACCESS: No access to guns, firearms, weapons     SOCIAL HISTORY: None reported     FAMILY HISTORY: None reported     DEVELOPMENTAL HISTORY: None reported

## 2024-09-11 NOTE — ED BEHAVIORAL HEALTH ASSESSMENT NOTE - RISK ASSESSMENT
acute: disorganization , ambien abuse   chronic: depression, hx of overdoses   protective: connected to OP services   modifable:

## 2024-09-11 NOTE — H&P ADULT - NSICDXPASTMEDICALHX_GEN_ALL_CORE_FT
PAST MEDICAL HISTORY:  Breast cancer     HTN (hypertension)     MI (myocardial infarction)     Stented coronary artery

## 2024-09-11 NOTE — ED BEHAVIORAL HEALTH NOTE - BEHAVIORAL HEALTH NOTE
========================  FOR EACH COLLATERAL  ========================  Collateral below has requested that the information provided remain confidential: Yes [  ] No [ X ]  Collateral below has provided information that patient is/may be unaware of: Yes [  ] No [ X ]  Patient gives permission to obtain collateral from son and daughter-in-law:  ( X ) Yes  (  )  No  Rationale for overriding objection            (  ) Lack of capacity. Details: ________            (  ) Assessing risk of danger to self/others. Details: ________  Rationale for selecting specific collateral source            ( X ) Potential to impact risk of danger to self/others and no alternative equivalent. Details: _____    NAME: Anirudh and daughter-in-law   NUMBER: [phone]  RELATIONSHIP: Son and daughter-in-law  RELIABILITY: Reliable  ========================  PATIENT DEMOGRAPHICS:   ========================  HPI  BASELINE FUNCTIONING: Pt is a 74 y/o female, domiciled with son and daughter-in-law, PPHx depression, multiple previous IP psych admissions - last one at [facility], hx of Ambien misuse and mixing it with EtOH, hx of SA including leaving a note for family and found unresponsive after taking pills (medication and quantity unknown by collateral) medication list (below) confirmed by collateral,   DATE HPI STARTED: Saturday 9/7/24  DECOMPENSATION: Per collateral, pt was left alone from Saturday to Sunday evening. Collateral reported that over the weekend, pt went to old apt and took a "stash" of old medications. Collateral believes pt took these meds and mixed them with alcohol. Collateral reported that when he got home, pt appeared to be in an "altered state" and was attempting to swallow pills, collateral had to wrestle the bottle (name of medication unknown by collateral, may have been Ambien) out of her hand. Collateral also reported that pt was walking around naked and peed on the floor. He reported that pt probably consumed alcohol over the weekend as well, as pt's aide saw her grabbing [employer] from the refrigerator. Collateral called 911 and EMS transported pt to the ED.    SUICIDALITY: "If I don't see you anymore, kiss the kids..."  VIOLENCE: None  SUBSTANCE:  misusing Ambien ,"stash" of old medications, and mixing it with EtOH  ========================  PAST PSYCHIATRIC HISTORY  ========================  DATE PAST PSYCHIATRIC HISTORY STARTED: Unknown by collateral  MAIN PSYCHIATRIC DIAGNOSIS: Depression  PSYCHIATRIC HOSPITALIZATIONS: Multiple IPP admits. Last one was at [facility] - pt was found by son approx. 2 mo. ago, unresponsive, reportedly drank rubbing alcohol and tried to swallow rubber bands.   PRIOR ILLNESS: Last saw outpatient psychiatrist Dr. Ryan Sparrow 2 mo. ago. Used to see a therapist.   SUICIDALITY: previous SA, years ago left note and was found unresponsive after taking pills (details unknown by collateral). Per collateral, pt will also sporadically call him saying "If I don't see you anymore, kiss the kids..."  VIOLENCE: None  SUBSTANCE USE: h/o misusing Ambien and mixing it with EtOH  ==============  OTHER HISTORY  ==============  CURRENT MEDICATION: Brilinta 90mg 2x/day, Aspirin 81mg 1x/day, Lexapro 10mg 1x/day, Gabapentin 400 2x/day, L-thyroxine 112 mcg 1x/day, Metopolol 12.5 mg 1x/day, Pantoprazole 40mg 1x/day, Seroquel 50 mg in morning and 200mg at night, Lipitor 80mg at night, Trazodone 50mg at night. As needed basis: Seroquel 25mg (up to 1-2x daily). Confirmed by collateral.

## 2024-09-11 NOTE — ED BEHAVIORAL HEALTH ASSESSMENT NOTE - DESCRIPTION
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

## 2024-09-11 NOTE — ED PROVIDER NOTE - CLINICAL SUMMARY MEDICAL DECISION MAKING FREE TEXT BOX
73-year-old female with history of CAD, MI, cardiac stent, hypertension, depression, brought in by EMS from home, EMS activated by son who was concerned that patient was "manic" and very agitated tonight.  He also thinks patient is "high" on medications.  Patient states that patient has been living with him and his wife for the last 3 months.  Son and his wife went away this past weekend few days ago and that made patient very anxious.  He suspect patient has been drinking alcohol and also abusing medications.  Son states that patient has history of abusing Ambien.  He states that she has had multiple prior suicide attempts by overdose.  He is concerned that patient may be a harm to herself although he states that she has not explicitly expressed suicidal ideation.  Patient herself denies any complaints.  She denies being "high" or abusing medications.  She states that she takes Ambien as directed to help her sleep.  She denies any SI HI or hallucinations.  She states that she is being "Elder abused", stating that son is falsely accusing her of being high or abusing medications.  She states that he is trying to get her to approve him as a trustee so that he can take over her finances.  She denies being physically abused by him.  She denies lack of food or shelter.  She states she is capable and can provide for herself and has her own home in [place]    Patient's blood pressure noted to be high at triage, otherwise she is well-appearing in no distress.  No signs or symptoms of hypertensive emergency.  She is awake alert oriented x 3 and coherent, cooperative.  No expressed SI HI.  Patient and son expressing conflicting information.  Will check labs, EKG and medical screening/clearance, rule out serious overdose/intoxication.  Telepsych consult for concern for self-harm/overdose. reassess BP.   Son : Anirudh Sal [phone]    telepsych consulted [id number] 73-year-old female with history of CAD, MI, cardiac stent, hypertension, depression, brought in by EMS from home, EMS activated by son who was concerned that patient was "manic" and very agitated tonight.  He also thinks patient is "high" on medications.  Patient states that patient has been living with him and his wife for the last 3 months.  Son and his wife went away this past weekend few days ago and that made patient very anxious.  He suspect patient has been drinking alcohol and also abusing medications.  Son states that patient has history of abusing Ambien.  He states that she has had multiple prior suicide attempts by overdose.  He is concerned that patient may be a harm to herself although he states that she has not explicitly expressed suicidal ideation.  Patient herself denies any complaints.  She denies being "high" or abusing medications.  She states that she takes Ambien as directed to help her sleep.  She denies any SI HI or hallucinations.  She states that she is being "Elder abused", stating that son is falsely accusing her of being high or abusing medications.  She states that he is trying to get her to approve him as a trustee so that he can take over her finances.  She denies being physically abused by him.  She denies lack of food or shelter.  She states she is capable and can provide for herself and has her own home in [place]    Patient's blood pressure noted to be high at triage, otherwise she is well-appearing in no distress.  No signs or symptoms of hypertensive emergency.  She is awake alert oriented x 3 and coherent, cooperative.  No expressed SI HI.  Patient and son expressing conflicting information.  Will check labs, EKG and medical screening/clearance, rule out serious overdose/intoxication.  Telepsych consult for concern for self-harm/overdose. reassess BP.   Son : Anirudh Sal [phone]    telepsych consulted [id number]    9/11/24. 2030:  the best patient had pending bed at [facility] however [facility] attending uncomfortable with patient's QTc over 500.  Repeat EKG QTc still elevated, 491.  They are refusing psychiatric admission until cleared by cardiology. d/w psych Dr Cruz.  Will admit for further evaluation, cardiology consult, telemonitoring.  Discussed with Dr. Rodriguez, attending hospitalist. dr rodríguez

## 2024-09-11 NOTE — ED PROVIDER NOTE - OBJECTIVE STATEMENT
73-year-old female with history of CAD, MI, cardiac stent, hypertension, depression, brought in by EMS from home, EMS activated by son who was concerned that patient was "manic" and very agitated tonight.  He also thinks patient is "high" on medications.  Patient states that patient has been living with him and his wife for the last 3 months.  Son and his wife went away this past weekend few days ago and that made patient very anxious.  He suspect patient has been drinking alcohol and also abusing medications.  Son states that patient has history of abusing Ambien.  He states that she has had multiple prior suicide attempts by overdose.  He is concerned that patient may be a harm to herself although he states that she has not explicitly expressed suicidal ideation.  Patient herself denies any complaints.  She denies being "high" or abusing medications.  She states that she takes Ambien as directed to help her sleep.  She denies any SI HI or hallucinations.  She states that she is being "Elder abused", stating that son is falsely accusing her of being high or abusing medications.  She states that he is trying to get her to approve him as a trustee so that he can take over her finances.  She denies being physically abused by him.  She denies lack of food or shelter.  She states she is capable and can provide for herself and has her own home in [place]

## 2024-09-11 NOTE — ED PROVIDER NOTE - PROGRESS NOTE DETAILS
ss Patient is medically cleared.   Plan for involuntary admission to [facility].

## 2024-09-11 NOTE — ED PROVIDER NOTE - COVID-19 ORDERING FACILITY
Addended by: ZULEMA OLMSTEAD on: 11/9/2021 06:38 PM     Modules accepted: Level of Service     ANNIE/ANDRES/Lashay

## 2024-09-11 NOTE — H&P ADULT - ASSESSMENT
74 y/o F, , retired , recently living with her son, with PMH significant for h/o CAD s/p PCI of OM1 (100% occlussion ) in 11/2021, MI, hypertension, with PPH significant for MDD, multiple (>10) previous psychiatric hospitalizations, h/o 2-3 past suicide attempts, has an outpatient psychiatrist Dr. Ryan Sparrow she hasn't seen in 2 months, presented due to misuse of alcohol with ambient and her other psych medicine, patient was supposed to be dishcarge to inpatient psych, however was not accepted due to persistent elevated qtc upon ekg Inpatient psych require cardio evaluation before admission.    #Major depressive disorder  #Bipolar disorder   -History of suicide attempts/ and inpatient psych admissions for episodes of MDD  -Psych is on board   -Continue with home psych meds, Lexapro and Seroquel  -Patient was supposed to be discharge to inpatient psych at  [facility]  however was not accepted due to elevated QTC in 500s       #Prolonged QTC  -ekg showed QTC ranging above 490 to 502 and LV[facility]  -Cardio Consult  -patient is on anti psych meds that can cause elevation of QTC   -Continue tele monitoring     CAD s/p stent in 11/2021  cad 100 [place] history of om1 branch of LCX  -As per records from son patient is on both asa 81 and Brilinta 90mg BID, Although stent was more than 2 years ago  -continue with ASA 81mg for now and restart Brilinta after cardio evaluation,   -Patient reported she saw her cardiologist 2 weeks ago and he reported [id number] was fine        Hypothyroidism  -Continue with home dose of Levothyroxine 112mcg daily     Chronic GERD  -Continue with home dose of PPI 40 mg daily     DVT ppx  Lovenox 40mg subcu daily     Code status   Full code     Son was reached and given updates about the patient

## 2024-09-11 NOTE — H&P ADULT - NSVTERISKREFERASSESS_GEN_ALL_CORE
[facility]  Pediatric Orthopedic Clinic      Patient ID:   NAME:  Syndle Lejeune   MRN:  [id number]  DOS:  2/22/2024       Reason for Appointment  Chief Complaint   Patient presents with    Knee Pain     Right knee pain, pt dislocated right knee in November 2023 and pain came back. Pt states it hurts to walk and dance on right knee. Pt in dance.  Pain level-4       Chief Complaint: Knee Pain (Right knee pain, pt dislocated right knee in November 2023 and pain came back. Pt states it hurts to walk and dance on right knee. Pt in dance./Pain level-4)    History of Present Illness: Syndle Lejeune is a 12 y.o. female presenting for an initial clinic visit for a right knee injury. According to family she had a subluxation/dislocation event 11/2023 reporting that she was dancing and sustained a right knee injury. Since then she has had 0 subluxations. She was seen at a local ED/urgent care where her injury was diagnosed. She was placed into a crutches and subsequently referred to this clinic for further evaluation and treatment. Today she states that her pain is  4/10 in severity today and located medially, laterally, and posteriorly. Symptoms are exacerbated by dancing, walking, flexion and improve with activity modification, rest, ice. She does not have a previous injury to the extremity.  She is active in dance, theater. They are otherwise without complaint today.     Review Of Systems  All systems were reviewed and are negative except as noted in the HPI    The following portions of the patient's history were reviewed and updated as appropriate: allergies, past family history, past medical history, past social history, past surgical history, and problem list.    Physical Exam:   There were no vitals filed for this visit.    Constitutional: Alert. No acute distress.   Musculoskeletal:   Right Knee  Appearance:  Skin intact, no bruising or erythema  Effusion: no  Quadriceps atrophy  absent  Tenderness: over the pes tendon insertion, along the medial and lateral hamstring tendons  Knee range of motion: decreased extension  Patellofemoral joint:  Patellar tracking: normal  Patellofemoral crepitance: no  Patellar apprehension: negative with good check rein  Menisci:  Medial joint line tenderness with varus stress: negative  Lateral joint line tenderness with valgus stress: negative  Arlette test/circumduction maneuvers: negative  Anterior and posterior stability testing:  Lachman test: firm endpoint  Anterior drawer: firm endpoint  Posterior drawer firm endpoint  Medial and lateral stability:  Varus stress in 30° flexion firm endpoint  Varus stress in full extension firm endpoint  Valgus stress in 30° flexion firm endpoint  Valgus stress in full extension firm endpoint  Neurovascular:  Sensation to light touch on the dorsal foot intact to light touch  Dorsalis pedis pulse 2+, foot warm and well perfused, capillary refill < 2 seconds    Imaging  Radiographs reviewed by me in clinic today from an orthopedic perspective demonstrate no acute osseous abnormality.    Assessments/Plan  Caitlyn is a 12 y.o. 5 m.o. female with right leg hamstring tendonitis. I discussed her Xrs and physical exam today with the patient and her family member. At this time I am recommending that she participate in formal PT to work on stretching, I encouraged her to obtain a sleeve type knee brace to help keep her hamstrings warm during her activities. They endorsed understanding this. I encouraged them to obtain a clinic appointment in the future if they have any further questions or concerns otherwise we will plan to see them on an as-needed basis.    Treatment plan was developed with input from the patient/family, and they expressed understanding and agreement with the plan. All questions were answered today.    Follow Up  PRN      Total time spent was at least 20 minutes which included obtaining the history of present  "illness, face-to-face examination, image review, review of previous clinical notes, counseling, and documenting in the medical chart.    Vu Amaya MD, MSc, FAAOS  Pediatric Orthopedic Surgeon, Dept of Orthopedics  [facility]  Phone:  [place]: [phone]  [place]: [phone]     *Portions of this note may have been created with voice recognition software. Occasional "wrong-word" or "sound-a-like" substitutions may have occurred due to the inherent limitations of voice recognition software.  Please, read the note carefully and recognize, using context, where substitutions have occurred.       I, Boris Hernandez, acted as a scribe for Vu Amaya MD for the duration of this office visit.      " Refer to the Assessment tab to view/cancel completed assessment.

## 2024-09-11 NOTE — H&P ADULT - NSHPPHYSICALEXAM_GEN_ALL_CORE
T(C): 36.3 (09-11-24 @ 13:25), Max: 36.7 (09-11-24 @ 02:59)  HR: 95 (09-11-24 @ 13:25) (70 - 95)  BP: 101/70 (09-11-24 @ 13:25) (101/70 - 147/83)  RR: 18 (09-11-24 @ 11:20) (18 - 18)  SpO2: 97% (09-11-24 @ 13:25) (96% - 97%)  Wt(kg): --Vital Signs Last 24 Hrs  T(C): 36.3 (11 Sep 2024 13:25), Max: 36.7 (11 Sep 2024 02:59)  T(F): 97.4 (11 Sep 2024 13:25), Max: 98 (11 Sep 2024 02:59)  HR: 95 (11 Sep 2024 13:25) (70 - 95)  BP: 101/70 (11 Sep 2024 13:25) (101/70 - 147/83)  BP(mean): --  RR: 18 (11 Sep 2024 11:20) (18 - 18)  SpO2: 97% (11 Sep 2024 13:25) (96% - 97%)    Parameters below as of 11 Sep 2024 13:25  Patient On (Oxygen Delivery Method): room air        PHYSICAL EXAM:  EYES: EOMI, PERRLA, conjunctiva and sclera clear, no lid-lag  NECK: Supple, No JVD,   NERVOUS SYSTEM:  CN II - XII intact; Sensation intact; Motor Strength 5/5 B/L upper and lower extremities  CHEST/LUNG: Clear to ascultation bilaterally;   HEART: Regular rate and rhythm;   ABDOMEN: Soft, Nontender, Nondistended; Bowel sounds present;   PSYCH:  Alert & Oriented x 1, anxious,   skin: multiple linear  red scratch marks noted on B/L arms and neck

## 2024-09-11 NOTE — ED BEHAVIORAL HEALTH NOTE - BEHAVIORAL HEALTH NOTE
Patient seen and examined on reassessment this morning. Chart reviewed. Case discussed with EM provider. Patient reports feeling depressed, admits to using alcohol but says she "didn't know" it was alcohol and says she was taking Ambien and other pills to "help [her] sleep". She denies SI/HI/AH/VH. This morning she is alert, fully oriented. She admits she does not recall events leading to her ED presentation, not sure why son brought her to the ED.    Collateral was obtained from patient's son and daughter-in-law. Please see SW note for full details. Son also spoke to this MD. He reports that over the weekend patient went to her old apartment and got ahold of "an old stash" of old medications, which she then preceded to take over the weekend in conjunction with alcohol. He reports a bottle of tequila was missing and patient's aide observed her to bring sneaking White Claws from the refrigerator. He believes Sonata and other abusable. When he returned home, son reports he found patient to be altered and had to wrestle bottles of pills out of her hands. Son has significant safety concerns for patient, reports that patient has been psychiatrically hospitalization multiple times in the past, most recently at [facility], has had past suicide attempts, including attempts in which she has left notes. She has made statements such as "kiss the kids goodbye if I don't see you again" and made reference to suicide recently.     Assessment:  Gale is a 74 y/o F, , retired , recently living with her son, with PMH significant for h/o CAD, MI, cardiac stent, hypertension, with PPH significant for MDD, multiple (>10) previous psychiatric hospitalizations, h/o 2-3 past suicide attempts, has an outpatient psychiatrist Dr. Ryan Sparrow she hasn't seen in 2 months, h/o misusing Ambien and mixing it with EtOH, BIB EMS activated by son after he found her altered, having drank EtOH and taken excessive number of Ambien pills. Son has significant concerns about patient's safety, reporting she has made statements such as "kiss the kids goodbye if I don't see you again" and made reference to suicide recently. Son does not feel that patient is safe to go home at this time and advocates for psychiatric hospitalization. Presentation is most concerning for MDD, recurrent, severe without psychosis.    Plan:  -Admit on 9.27 status for safety stabilization and appropriate aftercare planning; family's preference is for patient to return to [facility]; ED SW to coordinate psychiatric bed acquisition  -Continue 1:1 in the ED; patient does not require 1:1 on the inpatient psych unit  -Continue the following home medications (list confirmed by son  this morning):    Brilanta 90 mg BID  ASA 81 mg daily  Lipitor 80 mg qHS  Lexapro 10 mg daily  Gabapentin 400 mg BID  L-thyroxine 112 mcg  Metoprolol XL 12.5 mg daily  Pantoprazole 40 mg daily  Seroquel 50 mg daily and 200 mg qHS  Trazodone 50 mg     missing bottle of tequila  wrestling white claw Patient seen and examined on reassessment this morning. Chart reviewed. Case discussed with EM provider. Patient reports feeling depressed, admits to using alcohol but says she "didn't know" it was alcohol and says she was taking Ambien and other pills to "help [her] sleep". She denies SI/HI/AH/VH. This morning she is alert, fully oriented. She admits she does not recall events leading to her ED presentation, not sure why son brought her to the ED.    Collateral was obtained from patient's son and daughter-in-law. Please see SW note for full details. Son also spoke to this MD. He reports that over the weekend patient went to her old apartment and got ahold of "an old stash" of old medications, which she then preceded to take over the weekend in conjunction with alcohol. He reports a bottle of tequila was missing and patient's aide observed her to bring sneaking White Claws from the refrigerator. He believes Sonata and other abusable. When he returned home, son reports he found patient to be altered and had to wrestle bottles of pills out of her hands. Son has significant safety concerns for patient, reports that patient has been psychiatrically hospitalization multiple times in the past, most recently at [facility], has had past suicide attempts, including attempts in which she has left notes. She has made statements such as "kiss the kids goodbye if I don't see you again" and made reference to suicide recently.     Assessment:  Gale is a 72 y/o F, , retired , recently living with her son, with PMH significant for h/o CAD, MI, cardiac stent, hypertension, with PPH significant for MDD, multiple (>10) previous psychiatric hospitalizations, h/o 2-3 past suicide attempts, has an outpatient psychiatrist Dr. Ryan Sparrow she hasn't seen in 2 months, h/o misusing Ambien and mixing it with EtOH, BIB EMS activated by son after he found her altered, having drank EtOH and taken excessive number of Ambien pills. Son has significant concerns about patient's safety, reporting she has made statements such as "kiss the kids goodbye if I don't see you again" and made reference to suicide recently. Son does not feel that patient is safe to go home at this time and advocates for psychiatric hospitalization. Presentation is most concerning for MDD, recurrent, severe without psychosis.    Plan:  -Admit on 9.27 status for safety stabilization and appropriate aftercare planning; family's preference is for patient to return to [facility]; ED SW to coordinate psychiatric bed acquisition  -Continue 1:1 in the ED; patient does not require 1:1 on the inpatient psych unit  -Continue the following home medications (list confirmed by son  this morning):    Brilanta 90 mg BID  ASA 81 mg daily  Lipitor 80 mg qHS  Lexapro 10 mg daily  Gabapentin 400 mg BID  L-thyroxine 112 mcg  Metoprolol XL 12.5 mg daily  Pantoprazole 40 mg daily  Seroquel 50 mg daily and 200 mg qHS  Trazodone 50 mg

## 2024-09-11 NOTE — H&P ADULT - NSHPREVIEWOFSYSTEMS_GEN_ALL_CORE
REVIEW OF SYSTEMS      General:	    Skin/Breast:  	  Ophthalmologic:  	  ENMT:	    Respiratory and Thorax:  	  Cardiovascular:	    Gastrointestinal:	    Genitourinary:	    Musculoskeletal:	    Neurological:	    Psychiatric:	    Hematology/Lymphatics:	    Endocrine:	    Allergic/Immunologic: CONSTITUTIONAL: No fever, weight loss, or fatigue  RESPIRATORY: No cough, wheezing, chills or hemoptysis; No shortness of breath  CARDIOVASCULAR: No chest pain, palpitations, dizziness, or leg swelling  GASTROINTESTINAL: No abdominal or epigastric pain.   GENITOURINARY: No dysuria, frequency, hematuria, or incontinence  NEUROLOGICAL: No headaches, memory loss, loss of strength, numbness, or tremors  SKIN: c/o itching in the body   MUSCULOSKELETAL: No joint pain or swelling; No muscle, back, or extremity pain  PSYCHIATRIC: c/o anxiety

## 2024-09-11 NOTE — ED ADULT NURSE NOTE - COVID-19 ORDERING FACILITY
"[facility] - Emergency Dept  Hospital Medicine  History & Physical    Patient Name: Yola Kauffman  MRN: [id number]  Patient Class: OP- Observation  Admission Date: 9/1/2023  Attending Physician: Dr. Mari  Primary Care Provider: Yolanda Worley FNP-C         Patient information was obtained from patient, spouse/SO and ER records.     Subjective:     Principal Problem:Symptomatic anemia    Chief Complaint:   Chief Complaint   Patient presents with    Abnormal Labs        HPI: 63 y/o female with a past medical history of HTN, HLD, history of aortic thrombus and ALL with intermittent pancytopenia. Patient states that for the last 3-4 days she has felt increasingly weak and tired. Patient states that she was feeling weak this morning when she went to complete her outpatient lab work. Patient states she was feeling much weaker after her labs today and then received a call from her doctor that her "blood counts were low" and that she needed to come to the ED to get a blood transfusion. The patient has also been experiencing intermittent central abdominal pain. Due to her abdominal pain she has not been eating as much as she usually does. Patient denies any abdominal pain today. Patient denies melena, hematuria, diarrhea, or constipation.     Patient's outpatient labs showed: WBC 1.76, RBC 1.63, Hgb 5, HCT 15.7, PLT 19.   LFT were elevated slightly to AST 58, ALT 70, and Alkphos 177      Past Medical History:   Diagnosis Date    Acute hypoxemic respiratory failure 12/23/2022    [place]'s disease     Adrenal hemorrhage 2012    Adrenal hemorrhage     Adrenal insufficiency, primary, hemorrhagic     Anticardiolipin syndrome     Cancer     Chronic anemia     DVT (deep venous thrombosis) 2011    Encounter for blood transfusion     History of coagulopathy     History of miscarriage     Hyperbilirubinemia 12/27/2022    Hyperlipidemia     Hypertension     Steroid-induced hyperglycemia     " Thrombocytopenia 10/24/2022    Vertigo        Past Surgical History:   Procedure Laterality Date     SECTION, CLASSIC  1990    curette      Endometrial ablation with Novasure and hysteroscopy  7/3/2013    Symptomatic uterine fibroids, menorrhagia       Review of patient's allergies indicates:   Allergen Reactions    Warfarin Other (See Comments)     Adrenal gland bleeding       No current facility-administered medications on file prior to encounter.     Current Outpatient Medications on File Prior to Encounter   Medication Sig    acyclovir (ZOVIRAX) 200 MG capsule Take 2 capsules (400 mg total) by mouth 2 (two) times daily.    buPROPion (WELLBUTRIN XL) 300 MG 24 hr tablet Take 1 tablet (300 mg total) by mouth once daily.    dapsone 100 MG Tab Take 1 tablet (100 mg total) by mouth once daily.    famotidine (PEPCID) 40 MG tablet Take 1 tablet (40 mg total) by mouth every evening.    gabapentin (NEURONTIN) 300 MG capsule Take 1 capsule (300 mg total) by mouth 2 (two) times daily.    hydrocortisone (CORTEF) 5 MG Tab On 3/17, change to taking 10mg (2 tablets) in the morning and 5mg (1 tablet) in the evening indefinitely per endocrine taper. (Patient taking differently: Take 10 mg by mouth once daily. Take 10 mg in the morning and 5 mg in the evening)    mirtazapine (REMERON) 7.5 MG Tab Take 1 tablet (7.5 mg total) by mouth every evening.    traMADoL (ULTRAM) 50 mg tablet Take 1 tablet (50 mg total) by mouth every 8 (eight) hours as needed for Pain.    traZODone (DESYREL) 100 MG tablet Take 1 tablet (100 mg total) by mouth every evening. TAKE ONE TABLET BY MOUTH NIGHTLY AT BEDTIME AS NEEDED FOR INSOMNIA Strength: 100 mg    acetaminophen (TYLENOL ORAL) Take 1 tablet by mouth every 4 to 6 hours as needed (pain).    [DISCONTINUED] nystatin (MYCOSTATIN) 100,000 unit/mL suspension Take 5 mLs (500,000 Units total) by mouth 4 (four) times daily. for 10 days     Family History       Problem Relation  (Age of Onset)    Diabetes Mother    Hypertension Father, Brother    No Known Problems Maternal Grandmother, Maternal Grandfather    Urolithiasis Father          Tobacco Use    Smoking status: Never    Smokeless tobacco: Never   Substance and Sexual Activity    Alcohol use: No    Drug use: Yes     Comment: THC    Sexual activity: Yes     Partners: Male     Birth control/protection: None     Review of Systems   Constitutional:  Positive for activity change, appetite change and fatigue. Negative for chills, diaphoresis and fever.   HENT:  Negative for congestion, ear discharge, ear pain, postnasal drip, rhinorrhea, sinus pressure and sore throat.    Eyes:  Negative for pain, discharge, redness and itching.   Respiratory:  Negative for cough, chest tightness and shortness of breath.    Cardiovascular:  Negative for chest pain and leg swelling.   Gastrointestinal:  Positive for abdominal pain (intermittently). Negative for constipation, diarrhea, nausea and vomiting.   Genitourinary:  Negative for dysuria, frequency, hematuria and urgency.   Musculoskeletal:  Negative for back pain.   Skin:  Negative for color change, pallor and rash.   Neurological:  Positive for weakness. Negative for dizziness, syncope, facial asymmetry and light-headedness.   Psychiatric/Behavioral:  Negative for behavioral problems, confusion and hallucinations.      Objective:     Vital Signs (Most Recent):  Temp: 98.7 °F (37.1 °C) (09/01/23 1435)  Pulse: 93 (09/01/23 1435)  Resp: 14 (09/01/23 1435)  BP: 101/66 (09/01/23 1435)  SpO2: 100 % (09/01/23 1435) Vital Signs (24h Range):  Temp:  [98.7 °F (37.1 °C)] 98.7 °F (37.1 °C)  Pulse:  [93] 93  Resp:  [14] 14  SpO2:  [100 %] 100 %  BP: (101)/(66) 101/66     Weight: 66.2 kg (146 lb)  Body mass index is 22.87 kg/m².     Physical Exam  Vitals and nursing note reviewed.   Constitutional:       General: She is not in acute distress.     Appearance: She is not diaphoretic.   HENT:      Head:  Normocephalic and atraumatic.      Nose: Nose normal.      Mouth/Throat:      Mouth: Mucous membranes are moist.   Eyes:      Extraocular Movements: Extraocular movements intact.   Cardiovascular:      Rate and Rhythm: Normal rate and regular rhythm.      Heart sounds: Normal heart sounds. No murmur heard.  Pulmonary:      Effort: Pulmonary effort is normal. No respiratory distress.      Breath sounds: Normal breath sounds. No wheezing or rhonchi.   Abdominal:      General: Abdomen is flat. Bowel sounds are normal.      Palpations: Abdomen is soft. There is no mass.      Tenderness: There is no abdominal tenderness. There is no guarding or rebound.      Hernia: No hernia is present.   Musculoskeletal:         General: No swelling, tenderness or deformity. Normal range of motion.      Cervical back: Normal range of motion. No rigidity or tenderness.      Right lower leg: No edema.      Left lower leg: No edema.   Skin:     General: Skin is warm and dry.      Coloration: Skin is not jaundiced.      Findings: No bruising or erythema.   Neurological:      General: No focal deficit present.      Mental Status: She is alert and oriented to person, place, and time. Mental status is at baseline.   Psychiatric:         Mood and Affect: Mood normal.         Behavior: Behavior normal.                Significant Labs: All pertinent labs within the past 24 hours have been reviewed.  Bilirubin:   Recent Labs   Lab 08/13/23  0721 08/14/23  1626 08/15/23  0848 08/24/23  0942 09/01/23  1100   BILIDIR  --   --   --   --  0.5*   BILITOT 2.9* 1.5* 1.2* 2.0* 1.8*     CBC:   Recent Labs   Lab 09/01/23  1100   WBC 1.76*   HGB 5.0*   HCT 15.7*   PLT 19*     CMP:   Recent Labs   Lab 09/01/23  1100   *   K 4.1      CO2 24      BUN 13   CREATININE 0.7   CALCIUM 9.1   PROT 6.6   ALBUMIN 3.3*   BILITOT 1.8*   ALKPHOS 177*   AST 58*   ALT 70*   ANIONGAP 9         Significant Imaging: I have reviewed all pertinent imaging  results/findings within the past 24 hours.    Assessment/Plan:     * Symptomatic anemia  Patient was typed and matched  Ordered 1 unit PRBC       Neutropenia  Moderate neutropenia with an ANC of approximately 850   Transfusing 2 units of PRBC  Continue to monitor       Transaminitis  Patient had mildly elevated LFTs on arrival to the ED. May be due to Acyclovir   Trend LFTs and continue to monitor      Thrombocytopenia  Patient was typed and matched  Ordered 1 unit PRBC   Continue to monitor with CBC and symptom evaluation       Pancytopenia  WBC 1.76, RBC 1.63, Hgb 5, HCT 15.7, and PLT 19  Patient was typed and matched  2 unit PRBC ordered and released      Thrombus of aorta  Patient has not been taking her anticoagulant 2/2 to low PLT counts and is complaining of intermittent central abdominal pain.   Last CT abdomen pelvis was in 10/2022.  CTA chest abdomen pelvis to evaluate aortic thrombus  Lactic Acid pending   Continue to monitor symptoms        Adrenal insufficiency  Continue home Hydrocortisone 10 mg daily and 5 mg at night         VTE Risk Mitigation (From admission, onward)    None               On 09/01/2023, patient should be placed in hospital observation services under my care in collaboration with Dr. Mari.      Beatrice Spangler PA-C  Department of Hospital Medicine  [facility]   ANNIE/ANDRES/Lashay

## 2024-09-11 NOTE — ED ADULT NURSE NOTE - NSFALLUNIVINTERV_ED_ALL_ED
Bed/Stretcher in lowest position, wheels locked, appropriate side rails in place/Call bell, personal items and telephone in reach/Instruct patient to call for assistance before getting out of bed/chair/stretcher/Non-slip footwear applied when patient is off stretcher/[place] to call system/Physically safe environment - no spills, clutter or unnecessary equipment/Purposeful proactive rounding/Room/bathroom lighting operational, light cord in reach

## 2024-09-11 NOTE — ED PROVIDER NOTE - PHYSICAL EXAMINATION
exam:   General: well appearing, NAD.   HEENT: eyes perrl, nose normal, OP no erythema/exudate/swelling.   cor: RRR, s1s2, 2+rad pulses.   lungs: ctabl, no resp distress.   abd: soft, ntnd.   neuro: a&ox3, cn2-12 intact, OLEA, 5/5 strength c nl sensation all extremities, nl coordination.   MSK: no extremity swelling.  Skin: normal, no rash  psych: no si/hi/hallucinations. awake, alert. coherent, cooperative

## 2024-09-12 DIAGNOSIS — F41.1 GENERALIZED ANXIETY DISORDER: ICD-10-CM

## 2024-09-12 LAB
ALBUMIN SERPL ELPH-MCNC: 3.6 G/DL — SIGNIFICANT CHANGE UP (ref 3.3–5)
ALP SERPL-CCNC: 164 U/L — HIGH (ref 40–120)
ALT FLD-CCNC: 17 U/L — SIGNIFICANT CHANGE UP (ref 10–45)
ANION GAP SERPL CALC-SCNC: 13 MMOL/L — SIGNIFICANT CHANGE UP (ref 5–17)
AST SERPL-CCNC: 18 U/L — SIGNIFICANT CHANGE UP (ref 10–40)
BILIRUB SERPL-MCNC: 0.8 MG/DL — SIGNIFICANT CHANGE UP (ref 0.2–1.2)
BUN SERPL-MCNC: 10 MG/DL — SIGNIFICANT CHANGE UP (ref 7–23)
CALCIUM SERPL-MCNC: 10.6 MG/DL — HIGH (ref 8.4–10.5)
CHLORIDE SERPL-SCNC: 104 MMOL/L — SIGNIFICANT CHANGE UP (ref 96–108)
CO2 SERPL-SCNC: 22 MMOL/L — SIGNIFICANT CHANGE UP (ref 22–31)
CREAT SERPL-MCNC: 0.67 MG/DL — SIGNIFICANT CHANGE UP (ref 0.5–1.3)
EGFR: 91 ML/MIN/1.73M2 — SIGNIFICANT CHANGE UP
GLUCOSE SERPL-MCNC: 106 MG/DL — HIGH (ref 70–99)
HCT VFR BLD CALC: 42.4 % — SIGNIFICANT CHANGE UP (ref 34.5–45)
HGB BLD-MCNC: 14.2 G/DL — SIGNIFICANT CHANGE UP (ref 11.5–15.5)
MAGNESIUM SERPL-MCNC: 1.7 MG/DL — SIGNIFICANT CHANGE UP (ref 1.6–2.6)
MCHC RBC-ENTMCNC: 29.2 PG — SIGNIFICANT CHANGE UP (ref 27–34)
MCHC RBC-ENTMCNC: 33.5 GM/DL — SIGNIFICANT CHANGE UP (ref 32–36)
MCV RBC AUTO: 87.1 FL — SIGNIFICANT CHANGE UP (ref 80–100)
NRBC # BLD: 0 /100 WBCS — SIGNIFICANT CHANGE UP (ref 0–0)
PLATELET # BLD AUTO: 229 K/UL — SIGNIFICANT CHANGE UP (ref 150–400)
POTASSIUM SERPL-MCNC: 4.1 MMOL/L — SIGNIFICANT CHANGE UP (ref 3.5–5.3)
POTASSIUM SERPL-SCNC: 4.1 MMOL/L — SIGNIFICANT CHANGE UP (ref 3.5–5.3)
PROT SERPL-MCNC: 7.6 G/DL — SIGNIFICANT CHANGE UP (ref 6–8.3)
RBC # BLD: 4.87 M/UL — SIGNIFICANT CHANGE UP (ref 3.8–5.2)
RBC # FLD: 13.8 % — SIGNIFICANT CHANGE UP (ref 10.3–14.5)
SODIUM SERPL-SCNC: 139 MMOL/L — SIGNIFICANT CHANGE UP (ref 135–145)
WBC # BLD: 4.72 K/UL — SIGNIFICANT CHANGE UP (ref 3.8–10.5)
WBC # FLD AUTO: 4.72 K/UL — SIGNIFICANT CHANGE UP (ref 3.8–10.5)

## 2024-09-12 PROCEDURE — 99222 1ST HOSP IP/OBS MODERATE 55: CPT | Mod: FS

## 2024-09-12 PROCEDURE — 99233 SBSQ HOSP IP/OBS HIGH 50: CPT

## 2024-09-12 RX ORDER — TRAZODONE HCL 50 MG
50 TABLET ORAL AT BEDTIME
Refills: 0 | Status: DISCONTINUED | OUTPATIENT
Start: 2024-09-12 | End: 2024-09-14

## 2024-09-12 RX ORDER — CLOTRIMAZOLE 1 %
1 CREAM (GRAM) TOPICAL AT BEDTIME
Refills: 0 | Status: DISCONTINUED | OUTPATIENT
Start: 2024-09-12 | End: 2024-09-12

## 2024-09-12 RX ORDER — FLUCONAZOLE 150 MG/1
150 TABLET ORAL ONCE
Refills: 0 | Status: COMPLETED | OUTPATIENT
Start: 2024-09-12 | End: 2024-09-12

## 2024-09-12 RX ORDER — CLOTRIMAZOLE 1 %
1 CREAM (GRAM) TOPICAL AT BEDTIME
Refills: 0 | Status: COMPLETED | OUTPATIENT
Start: 2024-09-12 | End: 2024-09-14

## 2024-09-12 RX ORDER — FLU VACCINE TS 2012-2013(5YR+) 45MCG/.5ML
0.5 VIAL (ML) INTRAMUSCULAR ONCE
Refills: 0 | Status: DISCONTINUED | OUTPATIENT
Start: 2024-09-12 | End: 2024-09-20

## 2024-09-12 RX ORDER — HYDROXYZINE HCL 25 MG
25 TABLET ORAL EVERY 8 HOURS
Refills: 0 | Status: DISCONTINUED | OUTPATIENT
Start: 2024-09-12 | End: 2024-09-20

## 2024-09-12 RX ADMIN — ACETAMINOPHEN 650 MILLIGRAM(S): 325 TABLET ORAL at 19:38

## 2024-09-12 RX ADMIN — ACETAMINOPHEN 650 MILLIGRAM(S): 325 TABLET ORAL at 19:11

## 2024-09-12 RX ADMIN — Medication 112 MICROGRAM(S): at 07:25

## 2024-09-12 RX ADMIN — ENOXAPARIN SODIUM 40 MILLIGRAM(S): 100 INJECTION SUBCUTANEOUS at 07:26

## 2024-09-12 RX ADMIN — Medication 50 MILLIGRAM(S): at 21:06

## 2024-09-12 RX ADMIN — ESCITALOPRAM OXALATE 10 MILLIGRAM(S): 10 TABLET ORAL at 13:38

## 2024-09-12 RX ADMIN — Medication 25 MILLIGRAM(S): at 21:07

## 2024-09-12 RX ADMIN — METOPROLOL TARTRATE 12.5 MILLIGRAM(S): 100 TABLET ORAL at 07:26

## 2024-09-12 RX ADMIN — ACETAMINOPHEN 650 MILLIGRAM(S): 325 TABLET ORAL at 02:11

## 2024-09-12 RX ADMIN — Medication 400 MILLIGRAM(S): at 07:25

## 2024-09-12 RX ADMIN — Medication 1 APPLICATION(S): at 03:56

## 2024-09-12 RX ADMIN — Medication 3 MILLIGRAM(S): at 21:09

## 2024-09-12 RX ADMIN — ACETAMINOPHEN 650 MILLIGRAM(S): 325 TABLET ORAL at 02:54

## 2024-09-12 RX ADMIN — Medication 3 MILLIGRAM(S): at 02:10

## 2024-09-12 RX ADMIN — Medication 40 MILLIGRAM(S): at 07:25

## 2024-09-12 RX ADMIN — Medication 81 MILLIGRAM(S): at 13:38

## 2024-09-12 RX ADMIN — Medication 400 MILLIGRAM(S): at 17:33

## 2024-09-12 RX ADMIN — Medication 80 MILLIGRAM(S): at 21:06

## 2024-09-12 RX ADMIN — Medication 50 MILLIGRAM(S): at 05:51

## 2024-09-12 RX ADMIN — Medication 25 MILLIGRAM(S): at 13:37

## 2024-09-12 NOTE — CONSULT NOTE ADULT - NS PANP COMMENT GEN_ALL_CORE FT
Seen and examined independently.  EKG was reviewed QT interval is barely half RR and therefore is only borderline.  Of note is the fact that she is on 5 medications-Brilinta Lexapro pantoprazole Seroquel and trazodone that could all prolong QT interval.    Would suggest reviewing these medications and see if any could be substituted in order to minimize further QT prolongation and potential for arrhythmia
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
## 2024-09-12 NOTE — PATIENT PROFILE ADULT - FUNCTIONAL ASSESSMENT - DAILY ACTIVITY 2.
I strongly recommend that she be seen by allergy/immunology.  The reaction that she is describing now sounds a bit different than what I saw previously on examination.  It looks like she is already sheduled with Dr. Singleton.  I believe Dr. Jernigan, allergy also with [facility], does more extended skin allergy testing, so he may be better to see.  And, it may not be necessary to see dermatology at [facility] if he is able to do the testing.  I am happy to put in the [place] [facility] [place] if she would like.  Please let me know how she'd like to proceed.  Thanks.    3 = A little assistance
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
## 2024-09-12 NOTE — CONSULT NOTE ADULT - ASSESSMENT
73-year-old female  and retired history of CAD with PCI to OM1 in 2021, MI, HTN, MDD with multiple hospitalizations and suicide attempts admitted for depression and long QT interval.  Cardiology consulted for prolonged QT/clearance prior to inpatient psych admission.  Patient offers no cardiopulmonary complaints    recommendations  EKG sinus rhythm with PVCs no acute ischemia  Upon med review patient is on Lexapro, trazodone and Seroquel that are known to prolong QTc. consider dc of offending drugs if qtc>500 or significant increase in qtc  Serial EKGs with QTc most recently noted to be 491.  Continue monitoring with serial EKGs  Patient is asymptomatic of prolonged QTc symptoms.  No hemodynamic compromise or syncopal symptoms  Monitor electrolytes goal K>=4, mg>=2 73-year-old female  and retired history of CAD with PCI to OM1 in 2021, MI, HTN, MDD with multiple hospitalizations and suicide attempts admitted for depression and long QT interval.  Cardiology consulted for prolonged QT/clearance prior to inpatient psych admission.  Patient offers no cardiopulmonary complaints    recommendations  EKG sinus rhythm with PVCs no acute ischemia  Upon med review patient is on Lexapro, trazodone and Seroquel that are known to prolong QTc. consider dc of offending drugs if qtc>500 or significant increase in qtc  Serial EKGs with QTc most recently noted to be 491.  Continue monitoring with serial EKGs  Patient is asymptomatic of prolonged QTc symptoms.  No hemodynamic compromise or syncopal symptoms  Monitor electrolytes goal K>=4, mg>=2  continue to monitor on tele, thus far sinus 60-70s, no events  pt reported l/e edema, check tte to eval ef

## 2024-09-12 NOTE — PHYSICAL THERAPY INITIAL EVALUATION ADULT - ADDITIONAL COMMENTS
Per pt she is currently staying with her son temporarily after the passing of her . Typically she lives alone in an apartment with elevator access with no HEIDI. She reports utilizing a SAC at baseline, but feeling unsteady while walking.

## 2024-09-12 NOTE — PHYSICAL THERAPY INITIAL EVALUATION ADULT - PERTINENT HX OF CURRENT PROBLEM, REHAB EVAL
74 y/o F, , retired , recently living with her son, with PMH significant for h/o CAD s/p PCI of OM1 (100% occlussion ) in 11/2021, MI, hypertension, with PPH significant for MDD, multiple (>10) previous psychiatric hospitalizations, h/o 2-3 past suicide attempts, has an outpatient psychiatrist Dr. Ryan Sparrow she hasn't seen in 2 months, presented due to misuse of alcohol with ambien and her other psych medicine, patient was supposed to be discharge to inpatient psych, however was not accepted due to persistent elevated qtc upon ekg Inpatient psych require cardio evaluation before admission.

## 2024-09-12 NOTE — CONSULT NOTE ADULT - SUBJECTIVE AND OBJECTIVE BOX
DAIJA SHEA  [id number]      HPI: 72 y/o F, , retired , recently living with her son, with PMH significant for h/o CAD s/p PCI of OM1 (100% occlussion ) in 11/2021, MI, hypertension, with PPH significant for MDD, multiple (>10) previous psychiatric hospitalizations, h/o 2-3 past suicide attempts, has an outpatient psychiatrist Dr. Ryan Sparrow she hasn't seen in 2 months, h/o misusing Ambien and mixing it with EtOH, BIB EMS activated by son after he found her altered, having drank EtOH and taken excessive number of Ambien pills. Son has significant concerns about patient's safety, reporting she has made statements such as "kiss the kids goodbye if I don't see you again" and made reference to suicide recently. Son does not feel that patient is safe to go home at this time and advocates for psychiatric hospitalization and prefer her to be admitted to [facility]. However as per ED team she has been refused for inpatient psych admission due to history of CAD (stent) and continuous Prolonged QTC of 500.      ALLERGIES:  No Known Allergies      PAST MEDICAL & SURGICAL HISTORY:  MI (myocardial infarction)      HTN (hypertension)      Stented coronary artery      Breast cancer      S/P dilation and curettage      History of lumpectomy            CURRENT MEDICATIONS:  MEDICATIONS  (STANDING):  aspirin enteric coated 81 milliGRAM(s) Oral daily  atorvastatin 80 milliGRAM(s) Oral at bedtime  clotrimazole 2% Vaginal Cream 1 Applicatorful Vaginal at bedtime  enoxaparin Injectable 40 milliGRAM(s) SubCutaneous every 24 hours  escitalopram 10 milliGRAM(s) Oral daily  gabapentin 400 milliGRAM(s) Oral two times a day  influenza  Vaccine (HIGH DOSE) 0.5 milliLiter(s) IntraMuscular once  levothyroxine 112 MICROGram(s) Oral daily  melatonin 3 milliGRAM(s) Oral at bedtime  metoprolol tartrate 12.5 milliGRAM(s) Oral daily  pantoprazole    Tablet 40 milliGRAM(s) Oral before breakfast  QUEtiapine 25 milliGRAM(s) Oral daily  traZODone 50 milliGRAM(s) Oral daily    MEDICATIONS  (PRN):  acetaminophen     Tablet .. 650 milliGRAM(s) Oral every 6 hours PRN Temp greater or equal to 38C (100.4F), Mild Pain (1 - 3)  aluminum hydroxide/magnesium hydroxide/simethicone Suspension 30 milliLiter(s) Oral every 4 hours PRN Dyspepsia  calamine/zinc oxide Lotion 1 Application(s) Topical daily PRN Itching  melatonin 3 milliGRAM(s) Oral at bedtime PRN Insomnia  ondansetron Injectable 4 milliGRAM(s) IV Push every 8 hours PRN Nausea and/or Vomiting      SOCIAL HISTORY:  denies tobacco    FAMILY HISTORY:  denies    ROS:  All 10 systems reviewed and positives noted in HPI    OBJECTIVE:    VITAL SIGNS:  Vital Signs Last 24 Hrs  T(C): 36.4 (12 Sep 2024 00:00), Max: 36.4 (12 Sep 2024 00:00)  T(F): 97.5 (12 Sep 2024 00:00), Max: 97.5 (12 Sep 2024 00:00)  HR: 72 (12 Sep 2024 01:00) (72 - 98)  BP: 107/90 (12 Sep 2024 01:00) (101/70 - 117/75)  BP(mean): 94 (12 Sep 2024 01:00) (94 - 94)  RR: 20 (12 Sep 2024 01:00) (18 - 20)  SpO2: 98% (12 Sep 2024 01:00) (97% - 98%)    Parameters below as of 12 Sep 2024 01:00  Patient On (Oxygen Delivery Method): room air        PHYSICAL EXAM:  General: well appearing, no distress  HEENT: sclera anicteric  Neck: supple, no carotid bruits b/l  CVS: JVP ~ 7 cm H20, RRR, s1, s2, no murmurs/rubs/gallops  Chest: unlabored respirations, clear to auscultation b/l  Abdomen: non-distended  Extremities: mild lower extremity edema b/l  Neuro: awake, alert & oriented x 3  Psych: normal affect      LABS:                        14.2   4.72  )-----------( 229      ( 12 Sep 2024 07:00 )             42.4     09-12    139  |  104  |  10  ----------------------------<  106<H>  4.1   |  22  |  0.67    Ca    10.6<H>      12 Sep 2024 07:00  Mg     1.7     09-12    TPro  7.6  /  Alb  3.6  /  TBili  0.8  /  DBili  x   /  AST  18  /  ALT  17  /  AlkPhos  164<H>  09-12        ECG: sinus rhythm with PVCs      TTE:

## 2024-09-12 NOTE — PHYSICAL THERAPY INITIAL EVALUATION ADULT - GAIT DISTANCE, PT EVAL
Quality 111:Pneumonia Vaccination Status For Older Adults: Patient received any pneumococcal conjugate or polysaccharide vaccine on or after their 60th birthday and before the end of the measurement period
Detail Level: Generalized
Quality 110: Preventive Care And Screening: Influenza Immunization: Influenza Immunization Administered during Influenza season
Quality 130: Documentation Of Current Medications In The Medical Record: Current Medications Documented
50 feet

## 2024-09-12 NOTE — PROGRESS NOTE ADULT - SUBJECTIVE AND OBJECTIVE BOX
Patient is a 73y old  Female who presents with a chief complaint of long QT (12 Sep 2024 07:57)      Patient seen and examined at bedside. No overnight events reported.     ALLERGIES:  No Known Allergies    MEDICATIONS  (STANDING):  aspirin enteric coated 81 milliGRAM(s) Oral daily  atorvastatin 80 milliGRAM(s) Oral at bedtime  clotrimazole 2% Vaginal Cream 1 Applicatorful Vaginal two times a day  enoxaparin Injectable 40 milliGRAM(s) SubCutaneous every 24 hours  escitalopram 10 milliGRAM(s) Oral daily  gabapentin 400 milliGRAM(s) Oral two times a day  influenza  Vaccine (HIGH DOSE) 0.5 milliLiter(s) IntraMuscular once  levothyroxine 112 MICROGram(s) Oral daily  melatonin 3 milliGRAM(s) Oral at bedtime  metoprolol tartrate 12.5 milliGRAM(s) Oral daily  pantoprazole    Tablet 40 milliGRAM(s) Oral before breakfast  QUEtiapine 25 milliGRAM(s) Oral daily  traZODone 50 milliGRAM(s) Oral daily    MEDICATIONS  (PRN):  acetaminophen     Tablet .. 650 milliGRAM(s) Oral every 6 hours PRN Temp greater or equal to 38C (100.4F), Mild Pain (1 - 3)  aluminum hydroxide/magnesium hydroxide/simethicone Suspension 30 milliLiter(s) Oral every 4 hours PRN Dyspepsia  calamine/zinc oxide Lotion 1 Application(s) Topical daily PRN Itching  melatonin 3 milliGRAM(s) Oral at bedtime PRN Insomnia  ondansetron Injectable 4 milliGRAM(s) IV Push every 8 hours PRN Nausea and/or Vomiting    Vital Signs Last 24 Hrs  T(F): 97.5 (12 Sep 2024 00:00), Max: 97.7 (11 Sep 2024 11:20)  HR: 72 (12 Sep 2024 01:00) (72 - 98)  BP: 107/90 (12 Sep 2024 01:00) (101/70 - 147/83)  RR: 20 (12 Sep 2024 01:00) (18 - 20)  SpO2: 98% (12 Sep 2024 01:00) (96% - 98%)  I&O's Summary    PHYSICAL EXAM:  General: NAD, A/O x 3  ENT: No gross hearing impairment, Moist mucous membranes, no thrush  Neck: Supple, No JVD  Lungs: Clear to auscultation bilaterally, good air entry, non-labored breathing  Cardio: RRR, S1/S2, No murmur  Abdomen: Soft, Nontender, Nondistended; Bowel sounds present  Extremities: No calf tenderness, No cyanosis, No pitting edema, patient has scratch marks that appear to be self inflicted on her arms   Psych: appropriate mood for circumstance     LABS:                        14.2   4.72  )-----------( 229      ( 12 Sep 2024 07:00 )             42.4     09-12    139  |  104  |  10  ----------------------------<  106  4.1   |  22  |  0.67    Ca    10.6      12 Sep 2024 07:00  Mg     1.7     09-12    TPro  7.6  /  Alb  3.6  /  TBili  0.8  /  DBili  x   /  AST  18  /  ALT  17  /  AlkPhos  164  09-12                      TSH 0.428   TSH with FT4 reflex --  Total T3 --                  Urinalysis Basic - ( 12 Sep 2024 07:00 )    Color: x / Appearance: x / SG: x / pH: x  Gluc: 106 mg/dL / Ketone: x  / Bili: x / Urobili: x   Blood: x / Protein: x / Nitrite: x   Leuk Esterase: x / RBC: x / WBC x   Sq Epi: x / Non Sq Epi: x / Bacteria: x        COVID-19 PCR: NotDetec (09-10-24 @ 23:30)    RADIOLOGY & ADDITIONAL TESTS:    Care Discussed with Consultants/Other Providers:    Patient is a 73y old  Female who presents with a chief complaint of long QT (12 Sep 2024 07:57)      Patient seen and examined at bedside. No overnight events reported.     ALLERGIES:  No Known Allergies    MEDICATIONS  (STANDING):  aspirin enteric coated 81 milliGRAM(s) Oral daily  atorvastatin 80 milliGRAM(s) Oral at bedtime  clotrimazole 2% Vaginal Cream 1 Applicatorful Vaginal two times a day  enoxaparin Injectable 40 milliGRAM(s) SubCutaneous every 24 hours  escitalopram 10 milliGRAM(s) Oral daily  gabapentin 400 milliGRAM(s) Oral two times a day  influenza  Vaccine (HIGH DOSE) 0.5 milliLiter(s) IntraMuscular once  levothyroxine 112 MICROGram(s) Oral daily  melatonin 3 milliGRAM(s) Oral at bedtime  metoprolol tartrate 12.5 milliGRAM(s) Oral daily  pantoprazole    Tablet 40 milliGRAM(s) Oral before breakfast  QUEtiapine 25 milliGRAM(s) Oral daily  traZODone 50 milliGRAM(s) Oral daily    MEDICATIONS  (PRN):  acetaminophen     Tablet .. 650 milliGRAM(s) Oral every 6 hours PRN Temp greater or equal to 38C (100.4F), Mild Pain (1 - 3)  aluminum hydroxide/magnesium hydroxide/simethicone Suspension 30 milliLiter(s) Oral every 4 hours PRN Dyspepsia  calamine/zinc oxide Lotion 1 Application(s) Topical daily PRN Itching  melatonin 3 milliGRAM(s) Oral at bedtime PRN Insomnia  ondansetron Injectable 4 milliGRAM(s) IV Push every 8 hours PRN Nausea and/or Vomiting    Vital Signs Last 24 Hrs  T(F): 97.5 (12 Sep 2024 00:00), Max: 97.7 (11 Sep 2024 11:20)  HR: 72 (12 Sep 2024 01:00) (72 - 98)  BP: 107/90 (12 Sep 2024 01:00) (101/70 - 147/83)  RR: 20 (12 Sep 2024 01:00) (18 - 20)  SpO2: 98% (12 Sep 2024 01:00) (96% - 98%)  I&O's Summary    PHYSICAL EXAM:  General: NAD, A/O x 3  ENT: No gross hearing impairment, Moist mucous membranes, no thrush  Neck: Supple, No JVD  Lungs: Clear to auscultation bilaterally, good air entry, non-labored breathing  Cardio: RRR, S1/S2, No murmur  Abdomen: Soft, Nontender, Nondistended; Bowel sounds present  Extremities: No calf tenderness, No cyanosis, No pitting edema, patient has scratch marks that appear to be self inflicted on her arms   Psych: appropriate mood for circumstance     LABS:                        14.2   4.72  )-----------( 229      ( 12 Sep 2024 07:00 )             42.4     09-12    139  |  104  |  10  ----------------------------<  106  4.1   |  22  |  0.67    Ca    10.6      12 Sep 2024 07:00  Mg     1.7     09-12    TPro  7.6  /  Alb  3.6  /  TBili  0.8  /  DBili  x   /  AST  18  /  ALT  17  /  AlkPhos  164  09-12    SH 0.428   TSH with FT4 reflex --  Total T3 --    Urinalysis Basic - ( 12 Sep 2024 07:00 )    Color: x / Appearance: x / SG: x / pH: x  Gluc: 106 mg/dL / Ketone: x  / Bili: x / Urobili: x   Blood: x / Protein: x / Nitrite: x   Leuk Esterase: x / RBC: x / WBC x   Sq Epi: x / Non Sq Epi: x / Bacteria: x    COVID-19 PCR: NotDetec (09-10-24 @ 23:30)

## 2024-09-12 NOTE — BH CONSULTATION LIAISON PROGRESS NOTE - NSBHCONSULTRECOMMENDOTHER_PSY_A_CORE FT
- Continue with Lexapro 10mg daily.  - Continue with Trazodone 50mg qhs.  - Continue with Seroquel 25mg qhs.  - Pending further review with psychiatric attending for disposition; weighing inpatient substance use rehab vs outpatient dual diagnosis program.

## 2024-09-12 NOTE — BH CONSULTATION LIAISON PROGRESS NOTE - NSBHCHARTREVIEWINVESTIGATE_PSY_A_CORE FT
< from: 12 Lead ECG (09.11.24 @ 19:12) >      Ventricular Rate 88 BPM    Atrial Rate 88 BPM    P-R Interval 160 ms    QRS Duration 94 ms    Q-T Interval 406 ms    QTC Calculation(Bazett) 491 ms    P Axis 67 degrees    R Axis -32 degrees    T Axis -34 degrees    Diagnosis Line Sinus rhythm withoccasional premature ventricular complexes  Left axis deviation  Left ventricular hypertrophy with repolarization abnormality ( R in aVL )  non specific st abnormalities    Confirmed by ASHKAN SCHILLING MD ([id number]) on 9/12/2024 9:24:47 AM    < end of copied text >      < from: CT Head No Cont (09.11.24 @ 03:35) >      ACC: [id number] EXAM:  CT BRAIN   ORDERED BY: PAZ LARSON     PROCEDURE DATE:  09/11/2024          INTERPRETATION:  CLINICAL INDICATION:  r/o mass    TECHNIQUE: Noncontrast CT examination of the head was performed.  Coronal and sagittal reformats were also obtained.    COMPARISON: There are no prior studies available for comparison.    FINDINGS:  INTRA-AXIAL: No intracranial mass effect, acute hemorrhage, midline shift   or acute transcortical infarct is seen. There are periventricular white   matter hypodensities that are nonspecific in nature but may reflect   chronic ischemic microvascular disease.  EXTRA-AXIAL: No extra-axial fluid collection is present.  VENTRICLES AND SULCI: Parenchymal volume is commensurate with patient   age. No hydrocephalus.  VISUALIZED SINUSES: Mild mucosal thickening.  VISUALIZED MASTOIDS: Clear.  CALVARIUM: Normal.    IMPRESSION:    No evidence of acute intracranial hemorrhage, midline shift or CT   evidence of acute territorial infarct.    If the patient's symptoms persist, consider short interval follow-up head   CT or brain MRI if there are no MRI contraindications.    --- End of Report ---            LATASHA ALMEIDA MD; Attending Radiologist  This document has been electronically signed. Sep 11 2024  4:18AM    < end of copied text >

## 2024-09-13 ENCOUNTER — TRANSCRIPTION ENCOUNTER (OUTPATIENT)
Age: 74
End: 2024-09-13

## 2024-09-13 LAB
ANION GAP SERPL CALC-SCNC: 8 MMOL/L — SIGNIFICANT CHANGE UP (ref 5–17)
BUN SERPL-MCNC: 12 MG/DL — SIGNIFICANT CHANGE UP (ref 7–23)
CALCIUM SERPL-MCNC: 9.8 MG/DL — SIGNIFICANT CHANGE UP (ref 8.4–10.5)
CHLORIDE SERPL-SCNC: 104 MMOL/L — SIGNIFICANT CHANGE UP (ref 96–108)
CHOLEST SERPL-MCNC: 141 MG/DL — SIGNIFICANT CHANGE UP
CO2 SERPL-SCNC: 25 MMOL/L — SIGNIFICANT CHANGE UP (ref 22–31)
CREAT SERPL-MCNC: 0.79 MG/DL — SIGNIFICANT CHANGE UP (ref 0.5–1.3)
EGFR: 77 ML/MIN/1.73M2 — SIGNIFICANT CHANGE UP
FOLATE SERPL-MCNC: 14.3 NG/ML — SIGNIFICANT CHANGE UP
GLUCOSE SERPL-MCNC: 100 MG/DL — HIGH (ref 70–99)
HCT VFR BLD CALC: 38.4 % — SIGNIFICANT CHANGE UP (ref 34.5–45)
HDLC SERPL-MCNC: 44 MG/DL — LOW
HGB BLD-MCNC: 12.8 G/DL — SIGNIFICANT CHANGE UP (ref 11.5–15.5)
LIPID PNL WITH DIRECT LDL SERPL: 77 MG/DL — SIGNIFICANT CHANGE UP
MAGNESIUM SERPL-MCNC: 1.7 MG/DL — SIGNIFICANT CHANGE UP (ref 1.6–2.6)
MCHC RBC-ENTMCNC: 28.8 PG — SIGNIFICANT CHANGE UP (ref 27–34)
MCHC RBC-ENTMCNC: 33.3 GM/DL — SIGNIFICANT CHANGE UP (ref 32–36)
MCV RBC AUTO: 86.5 FL — SIGNIFICANT CHANGE UP (ref 80–100)
NON HDL CHOLESTEROL: 96 MG/DL — SIGNIFICANT CHANGE UP
NRBC # BLD: 0 /100 WBCS — SIGNIFICANT CHANGE UP (ref 0–0)
PLATELET # BLD AUTO: 218 K/UL — SIGNIFICANT CHANGE UP (ref 150–400)
POTASSIUM SERPL-MCNC: 3.8 MMOL/L — SIGNIFICANT CHANGE UP (ref 3.5–5.3)
POTASSIUM SERPL-SCNC: 3.8 MMOL/L — SIGNIFICANT CHANGE UP (ref 3.5–5.3)
RBC # BLD: 4.44 M/UL — SIGNIFICANT CHANGE UP (ref 3.8–5.2)
RBC # FLD: 14 % — SIGNIFICANT CHANGE UP (ref 10.3–14.5)
SODIUM SERPL-SCNC: 137 MMOL/L — SIGNIFICANT CHANGE UP (ref 135–145)
TRIGL SERPL-MCNC: 108 MG/DL — SIGNIFICANT CHANGE UP
TSH SERPL-MCNC: 0.64 UIU/ML — SIGNIFICANT CHANGE UP (ref 0.36–3.74)
VIT B12 SERPL-MCNC: 442 PG/ML — SIGNIFICANT CHANGE UP (ref 232–1245)
WBC # BLD: 7.26 K/UL — SIGNIFICANT CHANGE UP (ref 3.8–10.5)
WBC # FLD AUTO: 7.26 K/UL — SIGNIFICANT CHANGE UP (ref 3.8–10.5)

## 2024-09-13 PROCEDURE — 93010 ELECTROCARDIOGRAM REPORT: CPT | Mod: 76

## 2024-09-13 PROCEDURE — 99232 SBSQ HOSP IP/OBS MODERATE 35: CPT | Mod: FS

## 2024-09-13 PROCEDURE — 99233 SBSQ HOSP IP/OBS HIGH 50: CPT

## 2024-09-13 RX ORDER — TIZANIDINE 4 MG/1
2 TABLET ORAL ONCE
Refills: 0 | Status: COMPLETED | OUTPATIENT
Start: 2024-09-13 | End: 2024-09-13

## 2024-09-13 RX ADMIN — TIZANIDINE 2 MILLIGRAM(S): 4 TABLET ORAL at 20:17

## 2024-09-13 RX ADMIN — ACETAMINOPHEN 650 MILLIGRAM(S): 325 TABLET ORAL at 09:37

## 2024-09-13 RX ADMIN — Medication 400 MILLIGRAM(S): at 05:20

## 2024-09-13 RX ADMIN — ACETAMINOPHEN 650 MILLIGRAM(S): 325 TABLET ORAL at 10:17

## 2024-09-13 RX ADMIN — ACETAMINOPHEN 650 MILLIGRAM(S): 325 TABLET ORAL at 18:30

## 2024-09-13 RX ADMIN — Medication 81 MILLIGRAM(S): at 14:19

## 2024-09-13 RX ADMIN — Medication 50 MILLIGRAM(S): at 22:10

## 2024-09-13 RX ADMIN — Medication 25 MILLIGRAM(S): at 16:17

## 2024-09-13 RX ADMIN — ACETAMINOPHEN 650 MILLIGRAM(S): 325 TABLET ORAL at 04:37

## 2024-09-13 RX ADMIN — ACETAMINOPHEN 650 MILLIGRAM(S): 325 TABLET ORAL at 04:07

## 2024-09-13 RX ADMIN — Medication 25 MILLIGRAM(S): at 22:09

## 2024-09-13 RX ADMIN — ENOXAPARIN SODIUM 40 MILLIGRAM(S): 100 INJECTION SUBCUTANEOUS at 09:37

## 2024-09-13 RX ADMIN — ESCITALOPRAM OXALATE 10 MILLIGRAM(S): 10 TABLET ORAL at 14:19

## 2024-09-13 RX ADMIN — Medication 112 MICROGRAM(S): at 05:20

## 2024-09-13 RX ADMIN — Medication 40 MILLIGRAM(S): at 05:20

## 2024-09-13 RX ADMIN — ACETAMINOPHEN 650 MILLIGRAM(S): 325 TABLET ORAL at 17:56

## 2024-09-13 RX ADMIN — Medication 3 MILLIGRAM(S): at 22:11

## 2024-09-13 RX ADMIN — Medication 400 MILLIGRAM(S): at 17:56

## 2024-09-13 RX ADMIN — Medication 80 MILLIGRAM(S): at 22:09

## 2024-09-13 NOTE — DISCHARGE NOTE PROVIDER - NSDCCPCAREPLAN_GEN_ALL_CORE_FT
PRINCIPAL DISCHARGE DIAGNOSIS  Diagnosis: Long QT interval  Assessment and Plan of Treatment:       SECONDARY DISCHARGE DIAGNOSES  Diagnosis: Depression  Assessment and Plan of Treatment:

## 2024-09-13 NOTE — DISCHARGE NOTE PROVIDER - NSDCMRMEDTOKEN_GEN_ALL_CORE_FT
Aspir 81 oral delayed release tablet: 1 tab(s) orally once a day  Brilinta (ticagrelor) 90 mg oral tablet: 1 tab(s) orally 2 times a day  gabapentin 400 mg oral capsule: 1 cap(s) orally 2 times a day  levothyroxine 112 mcg (0.112 mg) oral tablet: 1 tab(s) orally once a day  Lexapro 10 mg oral tablet: 1 tab(s) orally once a day  Lipitor 80 mg oral tablet: 1 tab(s) orally once a day  metoprolol: 12.5 milligram(s) orally once a day  pantoprazole 40 mg oral delayed release tablet: 1 tab(s) orally once a day  Seroquel 25 mg oral tablet: 1 tab(s) orally once a day sometimes 2 times/day  traZODone 50 mg oral tablet: 1 tab(s) orally once a day   acetaminophen 325 mg oral tablet: 2 tab(s) orally every 6 hours As needed Temp greater or equal to 38C (100.4F), Mild Pain (1 - 3)  aspirin 81 mg oral delayed release tablet: 1 tab(s) orally once a day  atorvastatin 80 mg oral tablet: 1 tab(s) orally once a day (at bedtime)  escitalopram 10 mg oral tablet: 1 tab(s) orally once a day  gabapentin 400 mg oral capsule: 1 cap(s) orally 2 times a day  hydrOXYzine hydrochloride 25 mg oral tablet: 1 tab(s) orally every 8 hours As needed Anxiety  levothyroxine 112 mcg (0.112 mg) oral tablet: 1 tab(s) orally once a day  melatonin 3 mg oral tablet: 1 tab(s) orally once a day (at bedtime) As needed Insomnia  melatonin 3 mg oral tablet: 1 tab(s) orally once a day (at bedtime)  mirtazapine 15 mg oral tablet: 1 tab(s) orally once a day (at bedtime)  QUEtiapine 50 mg oral tablet: 1 tab(s) orally once a day (at bedtime)   acetaminophen 325 mg oral tablet: 2 tab(s) orally every 6 hours As needed Temp greater or equal to 38C (100.4F), Mild Pain (1 - 3)  aspirin 81 mg oral delayed release tablet: 1 tab(s) orally once a day  atorvastatin 80 mg oral tablet: 1 tab(s) orally once a day (at bedtime)  escitalopram 10 mg oral tablet: 1 tab(s) orally once a day  gabapentin 400 mg oral capsule: 1 cap(s) orally 2 times a day  hydrOXYzine hydrochloride 25 mg oral tablet: 1 tab(s) orally every 8 hours As needed Anxiety  levothyroxine 112 mcg (0.112 mg) oral tablet: 1 tab(s) orally once a day  melatonin 3 mg oral tablet: 1 tab(s) orally once a day (at bedtime) As needed Insomnia  melatonin 3 mg oral tablet: 1 tab(s) orally once a day (at bedtime)  mirtazapine 15 mg oral tablet: 1 tab(s) orally once a day (at bedtime)  QUEtiapine 25 mg oral tablet: 1 tab(s) orally once a day

## 2024-09-13 NOTE — PROGRESS NOTE ADULT - SUBJECTIVE AND OBJECTIVE BOX
Patient is a 73y old  Female who presents with a chief complaint of Abnormal electrocardiography     (13 Sep 2024 11:03)      Patient seen and examined at bedside. No overnight events reported.     ALLERGIES:  No Known Allergies    MEDICATIONS  (STANDING):  aspirin enteric coated 81 milliGRAM(s) Oral daily  atorvastatin 80 milliGRAM(s) Oral at bedtime  clotrimazole 2% Vaginal Cream 1 Applicatorful Vaginal at bedtime  enoxaparin Injectable 40 milliGRAM(s) SubCutaneous every 24 hours  escitalopram 10 milliGRAM(s) Oral daily  gabapentin 400 milliGRAM(s) Oral two times a day  influenza  Vaccine (HIGH DOSE) 0.5 milliLiter(s) IntraMuscular once  levothyroxine 112 MICROGram(s) Oral daily  melatonin 3 milliGRAM(s) Oral at bedtime  QUEtiapine 25 milliGRAM(s) Oral at bedtime  traZODone 50 milliGRAM(s) Oral at bedtime    MEDICATIONS  (PRN):  acetaminophen     Tablet .. 650 milliGRAM(s) Oral every 6 hours PRN Temp greater or equal to 38C (100.4F), Mild Pain (1 - 3)  aluminum hydroxide/magnesium hydroxide/simethicone Suspension 30 milliLiter(s) Oral every 4 hours PRN Dyspepsia  calamine/zinc oxide Lotion 1 Application(s) Topical daily PRN Itching  hydrOXYzine hydrochloride 25 milliGRAM(s) Oral every 8 hours PRN Anxiety  melatonin 3 milliGRAM(s) Oral at bedtime PRN Insomnia  ondansetron Injectable 4 milliGRAM(s) IV Push every 8 hours PRN Nausea and/or Vomiting    Vital Signs Last 24 Hrs  T(F): 97.7 (13 Sep 2024 04:45), Max: 98 (12 Sep 2024 21:07)  HR: 71 (13 Sep 2024 04:45) (68 - 71)  BP: 106/56 (13 Sep 2024 04:45) (106/56 - 115/68)  RR: 17 (13 Sep 2024 04:45) (17 - 18)  SpO2: 98% (13 Sep 2024 04:45) (98% - 98%)  I&O's Summary    PHYSICAL EXAM:  General: NAD, A/O x 3  ENT: No gross hearing impairment, Moist mucous membranes, no thrush  Neck: Supple, No JVD  Lungs: Clear to auscultation bilaterally, good air entry, non-labored breathing  Cardio: RRR, S1/S2, No murmur  Abdomen: Soft, Nontender, Nondistended; Bowel sounds present  Extremities: No calf tenderness, No cyanosis, No pitting edema  Psych: Appropriate mood and affect     LABS:                        12.8   7.26  )-----------( 218      ( 13 Sep 2024 06:39 )             38.4     09-13    137  |  104  |  12  ----------------------------<  100  3.8   |  25  |  0.79    Ca    9.8      13 Sep 2024 06:39  Mg     1.7     09-13    TPro  7.6  /  Alb  3.6  /  TBili  0.8  /  DBili  x   /  AST  18  /  ALT  17  /  AlkPhos  164  09-12                      TSH 0.642   TSH with FT4 reflex --  Total T3 --                  Urinalysis Basic - ( 13 Sep 2024 06:39 )    Color: x / Appearance: x / SG: x / pH: x  Gluc: 100 mg/dL / Ketone: x  / Bili: x / Urobili: x   Blood: x / Protein: x / Nitrite: x   Leuk Esterase: x / RBC: x / WBC x   Sq Epi: x / Non Sq Epi: x / Bacteria: x        COVID-19 PCR: NotDetec (09-10-24 @ 23:30)    RADIOLOGY & ADDITIONAL TESTS:    Care Discussed with Consultants/Other Providers:    Patient is a 73y old  Female who presents with a chief complaint of Abnormal electrocardiography      Patient seen and examined at bedside. No overnight events reported.     ALLERGIES:  No Known Allergies    MEDICATIONS  (STANDING):  aspirin enteric coated 81 milliGRAM(s) Oral daily  atorvastatin 80 milliGRAM(s) Oral at bedtime  clotrimazole 2% Vaginal Cream 1 Applicatorful Vaginal at bedtime  enoxaparin Injectable 40 milliGRAM(s) SubCutaneous every 24 hours  escitalopram 10 milliGRAM(s) Oral daily  gabapentin 400 milliGRAM(s) Oral two times a day  influenza  Vaccine (HIGH DOSE) 0.5 milliLiter(s) IntraMuscular once  levothyroxine 112 MICROGram(s) Oral daily  melatonin 3 milliGRAM(s) Oral at bedtime  QUEtiapine 25 milliGRAM(s) Oral at bedtime  traZODone 50 milliGRAM(s) Oral at bedtime    MEDICATIONS  (PRN):  acetaminophen     Tablet .. 650 milliGRAM(s) Oral every 6 hours PRN Temp greater or equal to 38C (100.4F), Mild Pain (1 - 3)  aluminum hydroxide/magnesium hydroxide/simethicone Suspension 30 milliLiter(s) Oral every 4 hours PRN Dyspepsia  calamine/zinc oxide Lotion 1 Application(s) Topical daily PRN Itching  hydrOXYzine hydrochloride 25 milliGRAM(s) Oral every 8 hours PRN Anxiety  melatonin 3 milliGRAM(s) Oral at bedtime PRN Insomnia  ondansetron Injectable 4 milliGRAM(s) IV Push every 8 hours PRN Nausea and/or Vomiting    Vital Signs Last 24 Hrs  T(F): 97.7 (13 Sep 2024 04:45), Max: 98 (12 Sep 2024 21:07)  HR: 71 (13 Sep 2024 04:45) (68 - 71)  BP: 106/56 (13 Sep 2024 04:45) (106/56 - 115/68)  RR: 17 (13 Sep 2024 04:45) (17 - 18)  SpO2: 98% (13 Sep 2024 04:45) (98% - 98%)  I&O's Summary    PHYSICAL EXAM:  General: NAD, A/O x 3  ENT: No gross hearing impairment, Moist mucous membranes, no thrush  Neck: Supple, No JVD  Lungs: Clear to auscultation bilaterally, good air entry, non-labored breathing  Cardio: RRR, S1/S2, No murmur  Abdomen: Soft, Nontender, Nondistended; Bowel sounds present  Extremities: No calf tenderness, No cyanosis, No pitting edema  Psych: Appropriate mood and affect     LABS:                        12.8   7.26  )-----------( 218      ( 13 Sep 2024 06:39 )             38.4     09-13    137  |  104  |  12  ----------------------------<  100  3.8   |  25  |  0.79    Ca    9.8      13 Sep 2024 06:39  Mg     1.7     09-13    TPro  7.6  /  Alb  3.6  /  TBili  0.8  /  DBili  x   /  AST  18  /  ALT  17  /  AlkPhos  164  09-12    TSH 0.642   TSH with FT4 reflex --  Total T3 --    Urinalysis Basic - ( 13 Sep 2024 06:39 )    Color: x / Appearance: x / SG: x / pH: x  Gluc: 100 mg/dL / Ketone: x  / Bili: x / Urobili: x   Blood: x / Protein: x / Nitrite: x   Leuk Esterase: x / RBC: x / WBC x   Sq Epi: x / Non Sq Epi: x / Bacteria: x    COVID-19 PCR: Leeanna (09-10-24 @ 23:30)

## 2024-09-13 NOTE — DIETITIAN INITIAL EVALUATION ADULT - PERTINENT MEDS FT
MEDICATIONS  (STANDING):  aspirin enteric coated 81 milliGRAM(s) Oral daily  atorvastatin 80 milliGRAM(s) Oral at bedtime  clotrimazole 2% Vaginal Cream 1 Applicatorful Vaginal at bedtime  enoxaparin Injectable 40 milliGRAM(s) SubCutaneous every 24 hours  escitalopram 10 milliGRAM(s) Oral daily  gabapentin 400 milliGRAM(s) Oral two times a day  influenza  Vaccine (HIGH DOSE) 0.5 milliLiter(s) IntraMuscular once  levothyroxine 112 MICROGram(s) Oral daily  melatonin 3 milliGRAM(s) Oral at bedtime  QUEtiapine 25 milliGRAM(s) Oral at bedtime  traZODone 50 milliGRAM(s) Oral at bedtime    MEDICATIONS  (PRN):  acetaminophen     Tablet .. 650 milliGRAM(s) Oral every 6 hours PRN Temp greater or equal to 38C (100.4F), Mild Pain (1 - 3)  aluminum hydroxide/magnesium hydroxide/simethicone Suspension 30 milliLiter(s) Oral every 4 hours PRN Dyspepsia  calamine/zinc oxide Lotion 1 Application(s) Topical daily PRN Itching  hydrOXYzine hydrochloride 25 milliGRAM(s) Oral every 8 hours PRN Anxiety  melatonin 3 milliGRAM(s) Oral at bedtime PRN Insomnia  ondansetron Injectable 4 milliGRAM(s) IV Push every 8 hours PRN Nausea and/or Vomiting

## 2024-09-13 NOTE — PROGRESS NOTE ADULT - ASSESSMENT
74 y/o F, , retired , recently living with her son, with PMH significant for h/o CAD s/p PCI of OM1 (100% occlussion ) in 11/2021, MI, hypertension, with PPH significant for MDD, multiple (>10) previous psychiatric hospitalizations, h/o 2-3 past suicide attempts, has an outpatient psychiatrist Dr. Ryan Sparrow she hasn't seen in 2 months, presented due to misuse of alcohol with ambien and her other psych medicine, patient was supposed to be discharge to inpatient psych, however was not accepted due to persistent elevated qtc upon ekg Inpatient psych require cardio evaluation before admission.    #Major depressive disorder  #Bipolar disorder   - History of suicide attempts and inpatient psych admissions for episodes of MDD  - Continue with home psych meds, Lexapro, Seroquel, and trazadone  - maintain on constant observation, safety tray   - Patient was supposed to be discharge to inpatient psych at [facility] however was not accepted due to elevated QTC in 500s   - Psych consult - inpt substance abuse program vs outpatient dual rehab   - PT - home PT   - B12/folate/TSH pending     #Prolonged QTC 2/2 psychiatric regimen   - EKG showed QTC ranging above 490 to 502 and LVH  - patient asymptomatic  - Continue tele monitoring   - optimize electrolytes   - Cardio consult - serial daily EKGs, goal QTC <500     #CAD s/p stent in 11/2021  #HLD  #HTN  - As per records from son patient is on both asa 81 and Brilinta 90mg BID   - Per cardio, patient does not require brilinta   - continue with ASA 81mg for now  - continue statin     #Vaginal pruritis/yeast infection  - patient complaining of itching  - ordered clotrimazole vaginal cream    - given one time dose of fluconazole 150mg     #Hypothyroidism  - continue synthroid 112mcg daily  - follow up TSH     #Chronic GERD  - continue protonix     DVT ppx- lovenox   AM labs  GOC full code     Dispo: pending psychiatric disposition   Social work to follow     9/13 Son updated on plan of care                   72 y/o F, , retired , recently living with her son, with PMH significant for h/o CAD s/p PCI of OM1 (100% occlussion ) in 11/2021, MI, hypertension, with PPH significant for MDD, multiple (>10) previous psychiatric hospitalizations, h/o 2-3 past suicide attempts, has an outpatient psychiatrist Dr. Ryan Sparrow she hasn't seen in 2 months, presented due to misuse of alcohol with ambien and her other psych medicine, patient was supposed to be discharge to inpatient psych, however was not accepted due to persistent elevated qtc upon ekg Inpatient psych require cardio evaluation before admission.    #Major depressive disorder  #Bipolar disorder   - History of suicide attempts and inpatient psych admissions for episodes of MDD  - Continue with home psych meds, Lexapro, Seroquel, and trazadone  - maintain on constant observation, safety tray   - Patient was supposed to be discharge to inpatient psych at [facility] however was not accepted due to elevated QTC in 500s   - Psych consult - inpt substance abuse program vs outpatient dual rehab   - PT - home PT   - B12/folate/TSH pending     #Prolonged QTC 2/2 psychiatric regimen   - today EKG <500 qtc   - EKG previously showed QTC ranging above 490 to 502 and LVH  - patient asymptomatic  - Continue tele monitoring   - optimize electrolytes   - Cardio consult - serial daily EKGs, goal QTC <500    #CAD s/p stent in 11/2021  #HLD  #HTN  - As per records from son patient is on both asa 81 and Brilinta 90mg BID   - Per cardio, patient does not require brilinta - hold   - continue with ASA 81mg   - continue statin     #Vaginal pruritis/yeast infection  - patient complaining of itching  - ordered clotrimazole vaginal cream    - given one time dose of fluconazole 150mg     #Hypothyroidism  - continue synthroid 112mcg daily  - follow up TSH     #GERD  - discontinued protonix due to possible QTC prolongation     DVT ppx- lovenox   AM labs  GOC full code     Dispo: pending psychiatric disposition   Social work to follow     9/13 Son updated on plan of care                   72 y/o F, , retired , recently living with her son, with PMH significant for h/o CAD s/p PCI of OM1 (100% occlussion ) in 11/2021, MI, hypertension, with PPH significant for MDD, multiple (>10) previous psychiatric hospitalizations, h/o 2-3 past suicide attempts, has an outpatient psychiatrist Dr. Ryan Sparrow she hasn't seen in 2 months, presented due to misuse of alcohol with ambien and her other psych medicine, patient was supposed to be discharge to inpatient psych, however was not accepted due to persistent elevated qtc upon ekg Inpatient psych require cardio evaluation before admission.    #Major depressive disorder  #Bipolar disorder   - History of suicide attempts and inpatient psych admissions for episodes of MDD  - Continue with home psych meds, Lexapro, Seroquel, and trazadone  - maintain on constant observation, safety tray   - Patient was supposed to be discharge to inpatient psych at [facility] however was not accepted due to elevated QTC in 500s   - Psych consult - inpt substance abuse program vs outpatient dual rehab   - PT - home PT   - B12/folate/TSH pending     #Prolonged QTC 2/2 psychiatric regimen   - today EKG <500 qtc   - EKG previously showed QTC ranging above 490 to 502 and LVH  - patient asymptomatic  - Continue tele monitoring   - optimize electrolytes   - Cardio consult - serial daily EKGs, goal QTC <500    #CAD s/p stent in 11/2021  #HLD  #HTN  - As per records from son patient is on both asa 81 and Brilinta 90mg BID   - Per cardio, patient does not require brilinta - hold   - continue with ASA 81mg   - We stopped metoprolol given hypotention  - continue statin     #Vaginal pruritis/yeast infection  - patient complaining of itching  - ordered clotrimazole vaginal cream    - given one time dose of fluconazole 150mg     #Hypothyroidism  - continue synthroid 112mcg daily  - follow up TSH     #GERD  - discontinued protonix due to possible QTC prolongation     DVT ppx- lovenox     GOC full code     Dispo: pending psychiatric disposition ; MEDICALLY STABLE at this time  Social work to follow     9/13 Son updated on plan of care

## 2024-09-13 NOTE — PROGRESS NOTE ADULT - ASSESSMENT
73-year-old female  and retired history of CAD with PCI to OM1 in 2021, MI, HTN, MDD with multiple hospitalizations and suicide attempts admitted for depression and long QT interval.  Cardiology consulted for prolonged QT/clearance prior to inpatient psych admission.  Patient offers no cardiopulmonary complaints    recommendations  EKG sinus rhythm with PVCs no acute ischemia  Upon med review patient is on Lexapro, trazodone and Seroquel that are known to prolong QTc. consider dc of offending drugs if qtc>500 or significant increase in qtc  Serial EKGs with QTc most recently noted to be 491.  Continue monitoring with serial EKGs  Patient is asymptomatic of prolonged QTc symptoms.  No hemodynamic compromise or syncopal symptoms  Monitor electrolytes goal K>=4, mg>=2  continue to monitor on tele, thus far no events  pt reported l/e edema, check tte to eval ef however pt does not appear in a overloaded state 73-year-old female  and retired history of CAD with PCI to OM1 in 2021, MI, HTN, MDD with multiple hospitalizations and suicide attempts admitted for depression and long QT interval.  Cardiology consulted for prolonged QT/clearance prior to inpatient psych admission.  Patient offers no cardiopulmonary complaints    recommendations  EKG sinus rhythm with PVCs no acute ischemia  Upon med review patient is on Lexapro, trazodone and Seroquel that are known to prolong QTc. consider dc of offending drugs if qtc>500 or significant increase in qtc  Serial EKGs with QTc most recently noted to be 491.  Continue monitoring with serial EKGs> EKG today 9/13 with improved qT. pt cv stable for inpt psych  Patient is asymptomatic of prolonged QTc symptoms.  No hemodynamic compromise or syncopal symptoms  Monitor electrolytes goal K>=4, mg>=2  continue to monitor on tele, thus far no events  pt reported l/e edema, check tte to eval ef however pt does not appear in a overloaded state

## 2024-09-13 NOTE — DISCHARGE NOTE PROVIDER - HOSPITAL COURSE
Hospital Course    Patient is a  74 y/o F, , retired , recently living with her son, with PMH significant for h/o CAD s/p PCI of OM1 (100% occlussion ) in 11/2021, MI, hypertension, with PPH significant for MDD, multiple (>10) previous psychiatric hospitalizations, h/o 2-3 past suicide attempts, has an outpatient psychiatrist Dr. Ryan Sparrow she hasn't seen in 2 months, h/o misusing Ambien and mixing it with EtOH, BIB EMS activated by son after he found her altered, having drank EtOH and taken excessive number of Ambien pills. Son has significant concerns about patient's safety, reporting she has made statements such as "kiss the kids goodbye if I don't see you again" and made reference to suicide recently. Son does not feel that patient is safe to go home at this time and advocates for psychiatric hospitalization and prefer her to be admitted to [facility]. However as per ED team she has been refused for inpatient psych admission due to history of CAD (stent) and continuous Prolonged QTC of 500.    Patient has major depressive disorder, bipolar disorder. History of suicide attempts and inpatient psych admissions for episodes of MDD. Patient was seen by psych and maintained on lexapro, Seroquel, and trazadone. Patient was seen by cardiology as well for prolonged QTc. Patient had daily serial EKGs, patient had QTC <500 and deemed medically stable by cardiology. Per psych - inpt substance abuse program vs outpatient dual rehab       You will need to follow up with your primary care physician.      Discharging Provider:  Contact Info: Cell [phone] - Please call with any questions or concerns.    Outpatient Provider:    Hospital Course    Patient is a  74 y/o F, , retired , recently living with her son, with PMH significant for h/o CAD s/p PCI of OM1 (100% occlussion ) in 11/2021, MI, hypertension, with PPH significant for MDD, multiple (>10) previous psychiatric hospitalizations, h/o 2-3 past suicide attempts, has an outpatient psychiatrist Dr. Ryan Sparrow she hasn't seen in 2 months, h/o misusing Ambien and mixing it with EtOH, BIB EMS activated by son after he found her altered, having drank EtOH and taken excessive number of Ambien pills. Son has significant concerns about patient's safety, reporting she has made statements such as "kiss the kids goodbye if I don't see you again" and made reference to suicide recently. Son does not feel that patient is safe to go home at this time and advocates for psychiatric hospitalization and prefer her to be admitted to [facility]. However as per ED team she has been refused for inpatient psych admission due to history of CAD (stent) and continuous Prolonged QTC of 500.    Patient has major depressive disorder, bipolar disorder. History of suicide attempts and inpatient psych admissions for episodes of MDD. Patient was seen by psych and medications were adjusted. Patient currently taking lexapro, seroquel and mirtazepine.  Patient was seen by cardiology as well for prolonged QTc. Patient had daily serial EKGs, patient had QTC <500 and deemed medically stable by cardiology. Consider outaptient follow up with cardio and possible stress test outpatietn. Per psych - patient requires inpatient psychiatry.        You will need to follow up with your primary care physician.      Discharging Provider:  Contact Info: Cell [phone] - Please call with any questions or concerns.    Outpatient Provider:    Hospital Course    Patient is a  72 y/o F, , retired , recently living with her son, with PMH significant for h/o CAD s/p PCI of OM1 (100% occlussion ) in 11/2021, MI, hypertension, with PPH significant for MDD, multiple (>10) previous psychiatric hospitalizations, h/o 2-3 past suicide attempts, has an outpatient psychiatrist Dr. Ryan Sparrow she hasn't seen in 2 months, h/o misusing Ambien and mixing it with EtOH, BIB EMS activated by son after he found her altered, having drank EtOH and taken excessive number of Ambien pills. Son has significant concerns about patient's safety, reporting she has made statements such as "kiss the kids goodbye if I don't see you again" and made reference to suicide recently. Son does not feel that patient is safe to go home at this time and advocates for psychiatric hospitalization and prefer her to be admitted to [facility]. However as per ED team she has been refused for inpatient psych admission due to history of CAD (stent) and continuous Prolonged QTC of 500.    Patient has major depressive disorder, bipolar disorder. History of suicide attempts and inpatient psych admissions for episodes of MDD. Patient was seen by psych and medications were adjusted. Patient currently taking lexapro, seroquel and mirtazepine.  Patient was seen by cardiology as well for prolonged QTc. Patient had daily serial EKGs, patient had QTC <500 and deemed medically stable by cardiology.     While on telemetry, she was noted to have NSVT; cardiology recommended nuclear stress testing which she had done 9/17 and which was abnormal.  Cardiology has now recommended cardiac catheterization; patient agrees to this     Will transfer to [facility] for cath at this time    Per psych - patient requires inpatient drug rehab on discharge      You will need to follow up with your primary care physician.      Discharging Provider:  Contact Info: Cell [phone] - Please call with any questions or concerns.    Outpatient Provider:    Hospital Course    Patient is a  72 y/o F, , retired , recently living with her son, with PMH significant for h/o CAD s/p PCI of OM1 (100% occlussion ) in 11/2021, MI, hypertension, with PPH significant for MDD, multiple (>10) previous psychiatric hospitalizations, h/o 2-3 past suicide attempts, has an outpatient psychiatrist Dr. Ryan Sparrow she hasn't seen in 2 months, h/o misusing Ambien and mixing it with EtOH, BIB EMS activated by son after he found her altered, having drank EtOH and taken excessive number of Ambien pills. Son has significant concerns about patient's safety, reporting she has made statements such as "kiss the kids goodbye if I don't see you again" and made reference to suicide recently. Son does not feel that patient is safe to go home at this time and advocates for psychiatric hospitalization and prefer her to be admitted to [facility] [place]. However as per ED team she has been refused for inpatient psych admission due to history of CAD (stent) and continuous Prolonged QTC of 500.    Patient has major depressive disorder, bipolar disorder. History of suicide attempts and inpatient psych admissions for episodes of MDD. Patient was seen by psych and medications were adjusted. Patient currently taking lexapro, seroquel and mirtazepine.  Patient was seen by cardiology as well for prolonged QTc. Patient had daily serial EKGs, patient had QTC <500 and deemed medically stable by cardiology.     While on telemetry, she was noted to have NSVT; cardiology recommended nuclear stress testing which she had done 9/17 and which was abnormal.  Cardiology has now recommended cardiac catheterization; patient agrees to this     Will transfer to [facility] for cath at this time under Dr Nikunj Person    Per psych - patient requires inpatient drug rehab on discharge      You will need to follow up with your primary care physician.      Discharging Provider:  Contact Info: Cell [phone] - Please call with any questions or concerns.    Outpatient Provider:

## 2024-09-13 NOTE — PROGRESS NOTE ADULT - SUBJECTIVE AND OBJECTIVE BOX
DAIJA SHEA  [id number]    Chief Complaint: prolong qt  Interval events: pt seen and examined, labs and chart reviewed. denies cp, lightheadedness, dizziness, sob. sinus 60 with pvc    ALLERGIES:  No Known Allergies      PAST MEDICAL & SURGICAL HISTORY:  MI (myocardial infarction)      HTN (hypertension)      Stented coronary artery      Breast cancer      S/P dilation and curettage      History of lumpectomy            CURRENT MEDICATIONS:  MEDICATIONS  (STANDING):  aspirin enteric coated 81 milliGRAM(s) Oral daily  atorvastatin 80 milliGRAM(s) Oral at bedtime  clotrimazole 2% Vaginal Cream 1 Applicatorful Vaginal at bedtime  enoxaparin Injectable 40 milliGRAM(s) SubCutaneous every 24 hours  escitalopram 10 milliGRAM(s) Oral daily  gabapentin 400 milliGRAM(s) Oral two times a day  influenza  Vaccine (HIGH DOSE) 0.5 milliLiter(s) IntraMuscular once  levothyroxine 112 MICROGram(s) Oral daily  melatonin 3 milliGRAM(s) Oral at bedtime  metoprolol tartrate 12.5 milliGRAM(s) Oral daily  pantoprazole    Tablet 40 milliGRAM(s) Oral before breakfast  QUEtiapine 25 milliGRAM(s) Oral daily  traZODone 50 milliGRAM(s) Oral daily    MEDICATIONS  (PRN):  acetaminophen     Tablet .. 650 milliGRAM(s) Oral every 6 hours PRN Temp greater or equal to 38C (100.4F), Mild Pain (1 - 3)  aluminum hydroxide/magnesium hydroxide/simethicone Suspension 30 milliLiter(s) Oral every 4 hours PRN Dyspepsia  calamine/zinc oxide Lotion 1 Application(s) Topical daily PRN Itching  melatonin 3 milliGRAM(s) Oral at bedtime PRN Insomnia  ondansetron Injectable 4 milliGRAM(s) IV Push every 8 hours PRN Nausea and/or Vomiting      SOCIAL HISTORY:  denies tobacco    FAMILY HISTORY:  denies    ROS:  All 10 systems reviewed and positives noted in HPI    OBJECTIVE:    VITAL SIGNS:  Vital Signs Last 24 Hrs  T(C): 36.4 (12 Sep 2024 00:00), Max: 36.4 (12 Sep 2024 00:00)  T(F): 97.5 (12 Sep 2024 00:00), Max: 97.5 (12 Sep 2024 00:00)  HR: 72 (12 Sep 2024 01:00) (72 - 98)  BP: 107/90 (12 Sep 2024 01:00) (101/70 - 117/75)  BP(mean): 94 (12 Sep 2024 01:00) (94 - 94)  RR: 20 (12 Sep 2024 01:00) (18 - 20)  SpO2: 98% (12 Sep 2024 01:00) (97% - 98%)    Parameters below as of 12 Sep 2024 01:00  Patient On (Oxygen Delivery Method): room air        PHYSICAL EXAM:  General: well appearing, no distress  HEENT: sclera anicteric  Neck: supple, no carotid bruits b/l  CVS: JVP ~ 7 cm H20, RRR, s1, s2, no murmurs/rubs/gallops  Chest: unlabored respirations, clear to auscultation b/l  Abdomen: non-distended  Extremities: mild lower extremity edema b/l  Neuro: awake, alert & oriented x 3  Psych: normal affect      LABS:                        14.2   4.72  )-----------( 229      ( 12 Sep 2024 07:00 )             42.4     09-12    139  |  104  |  10  ----------------------------<  106<H>  4.1   |  22  |  0.67    Ca    10.6<H>      12 Sep 2024 07:00  Mg     1.7     09-12    TPro  7.6  /  Alb  3.6  /  TBili  0.8  /  DBili  x   /  AST  18  /  ALT  17  /  AlkPhos  164<H>  09-12        ECG: sinus rhythm with PVCs      TTE:

## 2024-09-13 NOTE — DIETITIAN INITIAL EVALUATION ADULT - PERTINENT LABORATORY DATA
09-13    137  |  104  |  12  ----------------------------<  100<H>  3.8   |  25  |  0.79    Ca    9.8      13 Sep 2024 06:39  Mg     1.7     09-13    TPro  7.6  /  Alb  3.6  /  TBili  0.8  /  DBili  x   /  AST  18  /  ALT  17  /  AlkPhos  164<H>  09-12

## 2024-09-13 NOTE — DIETITIAN INITIAL EVALUATION ADULT - ORAL INTAKE PTA/DIET HISTORY
Patient reports consuming a regular diet PTA at present c/o issues chewing , suggest soft bite size diet & patient receptive

## 2024-09-13 NOTE — DIETITIAN INITIAL EVALUATION ADULT - OTHER INFO
74 y/o F, , retired , recently living with her son, with PMH significant for h/o CAD s/p PCI of OM1 (100% occlussion ) in 11/2021, MI, hypertension, with PPH significant for MDD, multiple (>10) previous psychiatric hospitalizations, h/o 2-3 past suicide attempts, has an outpatient psychiatrist Dr. Ryan Sparrow she hasn't seen in 2 months, h/o misusing Ambien and mixing it with EtOH, BIB EMS activated by son after he found her altered, having drank EtOH and taken excessive number of Ambien pills, Patient noted with issue with QT . Cardio note reviewed possible due to psych meds . (+) BM (9/13) Skin ecchymotic , No edema  NKFA , appetite reported "ok" patient noted consuming 100% of meal as per nursing flow sheet , patient reports gradual weight loss over years ,when writer questioned patient appeared patient did not known time period . mild muscle / fat loss noted , patient at present consuming 100% , reports she will need to hire a "aide" when she goes home.  suggested add Ensure patient not receptive do to GI intolerance , Mild muscle wasting observed ,

## 2024-09-14 LAB
ANION GAP SERPL CALC-SCNC: 10 MMOL/L — SIGNIFICANT CHANGE UP (ref 5–17)
BUN SERPL-MCNC: 10 MG/DL — SIGNIFICANT CHANGE UP (ref 7–23)
CALCIUM SERPL-MCNC: 9.8 MG/DL — SIGNIFICANT CHANGE UP (ref 8.4–10.5)
CHLORIDE SERPL-SCNC: 105 MMOL/L — SIGNIFICANT CHANGE UP (ref 96–108)
CO2 SERPL-SCNC: 24 MMOL/L — SIGNIFICANT CHANGE UP (ref 22–31)
CREAT SERPL-MCNC: 0.72 MG/DL — SIGNIFICANT CHANGE UP (ref 0.5–1.3)
EGFR: 88 ML/MIN/1.73M2 — SIGNIFICANT CHANGE UP
GLUCOSE SERPL-MCNC: 129 MG/DL — HIGH (ref 70–99)
MAGNESIUM SERPL-MCNC: 1.7 MG/DL — SIGNIFICANT CHANGE UP (ref 1.6–2.6)
POTASSIUM SERPL-MCNC: 3.5 MMOL/L — SIGNIFICANT CHANGE UP (ref 3.5–5.3)
POTASSIUM SERPL-SCNC: 3.5 MMOL/L — SIGNIFICANT CHANGE UP (ref 3.5–5.3)
SODIUM SERPL-SCNC: 139 MMOL/L — SIGNIFICANT CHANGE UP (ref 135–145)

## 2024-09-14 PROCEDURE — 99231 SBSQ HOSP IP/OBS SF/LOW 25: CPT

## 2024-09-14 PROCEDURE — 99233 SBSQ HOSP IP/OBS HIGH 50: CPT

## 2024-09-14 PROCEDURE — 99232 SBSQ HOSP IP/OBS MODERATE 35: CPT

## 2024-09-14 RX ORDER — MIRTAZAPINE 30 MG
15 TABLET ORAL AT BEDTIME
Refills: 0 | Status: DISCONTINUED | OUTPATIENT
Start: 2024-09-14 | End: 2024-09-20

## 2024-09-14 RX ADMIN — ENOXAPARIN SODIUM 40 MILLIGRAM(S): 100 INJECTION SUBCUTANEOUS at 12:01

## 2024-09-14 RX ADMIN — Medication 25 MILLIGRAM(S): at 21:40

## 2024-09-14 RX ADMIN — Medication 400 MILLIGRAM(S): at 05:10

## 2024-09-14 RX ADMIN — Medication 25 MILLIGRAM(S): at 03:42

## 2024-09-14 RX ADMIN — ACETAMINOPHEN 650 MILLIGRAM(S): 325 TABLET ORAL at 09:47

## 2024-09-14 RX ADMIN — Medication 80 MILLIGRAM(S): at 21:39

## 2024-09-14 RX ADMIN — Medication 50 MILLIGRAM(S): at 21:45

## 2024-09-14 RX ADMIN — ESCITALOPRAM OXALATE 10 MILLIGRAM(S): 10 TABLET ORAL at 12:01

## 2024-09-14 RX ADMIN — ACETAMINOPHEN 650 MILLIGRAM(S): 325 TABLET ORAL at 16:31

## 2024-09-14 RX ADMIN — ACETAMINOPHEN 650 MILLIGRAM(S): 325 TABLET ORAL at 08:49

## 2024-09-14 RX ADMIN — ONDANSETRON 4 MILLIGRAM(S): 2 INJECTION, SOLUTION INTRAMUSCULAR; INTRAVENOUS at 12:33

## 2024-09-14 RX ADMIN — Medication 112 MICROGRAM(S): at 05:11

## 2024-09-14 RX ADMIN — Medication 15 MILLIGRAM(S): at 21:39

## 2024-09-14 RX ADMIN — ACETAMINOPHEN 650 MILLIGRAM(S): 325 TABLET ORAL at 01:51

## 2024-09-14 RX ADMIN — Medication 81 MILLIGRAM(S): at 12:01

## 2024-09-14 RX ADMIN — Medication 3 MILLIGRAM(S): at 21:39

## 2024-09-14 RX ADMIN — Medication 400 MILLIGRAM(S): at 17:22

## 2024-09-14 NOTE — PROGRESS NOTE ADULT - ASSESSMENT
72 y/o F, , retired , recently living with her son, with PMH significant for h/o CAD s/p PCI of OM1 (100% occlussion ) in 11/2021, MI, hypertension, with PPH significant for MDD, multiple (>10) previous psychiatric hospitalizations, h/o 2-3 past suicide attempts, has an outpatient psychiatrist Dr. Ryan Sparrow she hasn't seen in 2 months, presented due to misuse of alcohol with ambien and her other psych medicine, patient was supposed to be discharge to inpatient psych, however was not accepted due to persistent elevated qtc upon ekg Inpatient psych require cardio evaluation before admission.    #Major depressive disorder  #Bipolar disorder   - History of suicide attempts and inpatient psych admissions for episodes of MDD  - Continue with home psych meds, Lexapro, Seroquel, and trazadone  - maintain on constant observation, safety tray   - Patient was supposed to be discharge to inpatient psych at [facility] however was not accepted due to elevated QTC in 500s   - Psych consult - inpt substance abuse program vs outpatient dual rehab   - PT - home PT   - B12/folate/TSH reviewed     #Prolonged QTC 2/2 psychiatric regimen   #36 beats of SVT, asymptomatic   - recent EKG <500 qtc   - EKG previously showed QTC ranging above 490 to 502 and LVH  - patient asymptomatic  - Continue tele monitoring   - optimize electrolytes   - Cardio consult appreciated- serial daily EKGs, goal QTC <500    #CAD s/p stent in 11/2021  #HLD  #HTN  - As per records from son patient is on both asa 81 and Brilinta 90mg BID   - Per cardio, patient does not require brilinta - hold   - continue with ASA 81mg   - We stopped metoprolol given hypotension  - continue statin     #Vaginal pruritis/yeast infection-resolved  - patient complaining of itching  - ordered clotrimazole vaginal cream    - given one time dose of fluconazole 150mg     #Hypothyroidism  - continue synthroid 112mcg daily  - TSH reviewed    #GERD  - discontinued protonix due to possible QTC prolongation     DVT ppx- lovenox     GOC full code     Dispo: pending psychiatric disposition ; MEDICALLY STABLE at this time  Social work to follow     9/14 Son updated on plan of care

## 2024-09-14 NOTE — BH CONSULTATION LIAISON PROGRESS NOTE - NSBHCHARTREVIEWINVESTIGATE_PSY_A_CORE FT
< from: 12 Lead ECG (09.11.24 @ 19:12) >      Ventricular Rate 88 BPM    Atrial Rate 88 BPM    P-R Interval 160 ms    QRS Duration 94 ms    Q-T Interval 406 ms    QTC Calculation(Bazett) 491 ms    P Axis 67 degrees    R Axis -32 degrees    T Axis -34 degrees    Diagnosis Line Sinus rhythm withoccasional premature ventricular complexes  Left axis deviation  Left ventricular hypertrophy with repolarization abnormality ( R in aVL )  non specific st abnormalities    Confirmed by ASHKAN SCHILLING MD ([id number]) on 9/12/2024 9:24:47 AM    < end of copied text >      < from: CT Head No Cont (09.11.24 @ 03:35) >      ACC: [id number] EXAM:  CT BRAIN   ORDERED BY: PAZ LARSON     PROCEDURE DATE:  09/11/2024          INTERPRETATION:  CLINICAL INDICATION:  r/o mass    TECHNIQUE: Noncontrast CT examination of the head was performed.  Coronal and sagittal reformats were also obtained.    COMPARISON: There are no prior studies available for comparison.    FINDINGS:  INTRA-AXIAL: No intracranial mass effect, acute hemorrhage, midline shift   or acute transcortical infarct is seen. There are periventricular white   matter hypodensities that are nonspecific in nature but may reflect   chronic ischemic microvascular disease.  EXTRA-AXIAL: No extra-axial fluid collection is present.  VENTRICLES AND SULCI: Parenchymal volume is commensurate with patient   age. No hydrocephalus.  VISUALIZED SINUSES: Mild mucosal thickening.  VISUALIZED MASTOIDS: Clear.  CALVARIUM: Normal.    IMPRESSION:    No evidence of acute intracranial hemorrhage, midline shift or CT   evidence of acute territorial infarct.    If the patient's symptoms persist, consider short interval follow-up head   CT or brain MRI if there are no MRI contraindications.    --- End of Report ---            LATASHA ALMEIDA MD; Attending Radiologist  This document has been electronically signed. Sep 11 2024  4:18AM    < end of copied text >       < from: 12 Lead ECG (09.11.24 @ 19:12) >      Ventricular Rate 88 BPM    Atrial Rate 88 BPM    P-R Interval 160 ms    QRS Duration 94 ms    Q-T Interval 406 ms    QTC Calculation(Bazett) 491 ms    P Axis 67 degrees    R Axis -32 degrees    T Axis -34 degrees    Diagnosis Line Sinus rhythm with occasional premature ventricular complexes  Left axis deviation  Left ventricular hypertrophy with repolarization abnormality ( R in aVL )  non specific st abnormalities    Confirmed by ASHKAN SCHILLING MD ([id number]) on 9/12/2024 9:24:47 AM    < end of copied text >      < from: CT Head No Cont (09.11.24 @ 03:35) >      ACC: [id number] EXAM:  CT BRAIN   ORDERED BY: PAZ LARSON     PROCEDURE DATE:  09/11/2024          INTERPRETATION:  CLINICAL INDICATION:  r/o mass    TECHNIQUE: Noncontrast CT examination of the head was performed.  Coronal and sagittal reformats were also obtained.    COMPARISON: There are no prior studies available for comparison.    FINDINGS:  INTRA-AXIAL: No intracranial mass effect, acute hemorrhage, midline shift   or acute transcortical infarct is seen. There are periventricular white   matter hypodensities that are nonspecific in nature but may reflect   chronic ischemic microvascular disease.  EXTRA-AXIAL: No extra-axial fluid collection is present.  VENTRICLES AND SULCI: Parenchymal volume is commensurate with patient   age. No hydrocephalus.  VISUALIZED SINUSES: Mild mucosal thickening.  VISUALIZED MASTOIDS: Clear.  CALVARIUM: Normal.    IMPRESSION:    No evidence of acute intracranial hemorrhage, midline shift or CT   evidence of acute territorial infarct.    If the patient's symptoms persist, consider short interval follow-up head   CT or brain MRI if there are no MRI contraindications.    --- End of Report ---            LATASHA ALMEIDA MD; Attending Radiologist  This document has been electronically signed. Sep 11 2024  4:18AM    < end of copied text >

## 2024-09-14 NOTE — PROGRESS NOTE ADULT - SUBJECTIVE AND OBJECTIVE BOX
Chief Complaint:     compensated denies cp sob    HPI:    PMH:   MI (myocardial infarction)    HTN (hypertension)    Stented coronary artery    Breast cancer      PSH:   S/P dilation and curettage    History of lumpectomy      Family History:  FAMILY HISTORY:      Social History:  Smoking:  Alcohol:  Drugs:    Allergies:  No Known Allergies      Medications:  acetaminophen     Tablet .. 650 milliGRAM(s) Oral every 6 hours PRN  aluminum hydroxide/magnesium hydroxide/simethicone Suspension 30 milliLiter(s) Oral every 4 hours PRN  aspirin enteric coated 81 milliGRAM(s) Oral daily  atorvastatin 80 milliGRAM(s) Oral at bedtime  calamine/zinc oxide Lotion 1 Application(s) Topical daily PRN  clotrimazole 2% Vaginal Cream 1 Applicatorful Vaginal at bedtime  enoxaparin Injectable 40 milliGRAM(s) SubCutaneous every 24 hours  escitalopram 10 milliGRAM(s) Oral daily  gabapentin 400 milliGRAM(s) Oral two times a day  hydrOXYzine hydrochloride 25 milliGRAM(s) Oral every 8 hours PRN  influenza  Vaccine (HIGH DOSE) 0.5 milliLiter(s) IntraMuscular once  levothyroxine 112 MICROGram(s) Oral daily  melatonin 3 milliGRAM(s) Oral at bedtime  melatonin 3 milliGRAM(s) Oral at bedtime PRN  mirtazapine 15 milliGRAM(s) Oral at bedtime  ondansetron Injectable 4 milliGRAM(s) IV Push every 8 hours PRN  QUEtiapine 50 milliGRAM(s) Oral at bedtime      REVIEW OF SYSTEMS:  CONSTITUTIONAL: No fever, weight loss, or fatigue  EYES: No eye pain, visual disturbances, or discharge  ENMT:  No difficulty hearing, tinnitus, vertigo; No sinus or throat pain  NECK: No pain or stiffness  BREASTS: No pain, masses, or nipple discharge  RESPIRATORY: No cough, wheezing, chills or hemoptysis; No shortness of breath  CARDIOVASCULAR: No chest pain, palpitations, dizziness, or leg swelling  GASTROINTESTINAL: No abdominal or epigastric pain. No nausea, vomiting, or hematemesis; No diarrhea or constipation. No melena or hematochezia.  GENITOURINARY: No dysuria, frequency, hematuria, or incontinence  NEUROLOGICAL: No headaches, memory loss, loss of strength, numbness, or tremors  SKIN: No itching, burning, rashes, or lesions   LYMPH NODES: No enlarged glands  ENDOCRINE: No heat or cold intolerance; No hair loss  MUSCULOSKELETAL: No joint pain or swelling; No muscle, back, or extremity pain  PSYCHIATRIC: No depression, anxiety, mood swings, or difficulty sleeping  HEME/LYMPH: No easy bruising, or bleeding gums  ALLERY AND IMMUNOLOGIC: No hives or eczema    Physical Exam:  T(C): 36.7 (24 @ 12:39), Max: 36.8 (24 @ 05:00)  HR: 70 (24 @ 12:39) (63 - 76)  BP: 120/91 (24 @ 12:39) (108/68 - 123/96)  RR: 19 (24 @ 12:39) (17 - 19)  SpO2: 98% (24 @ 12:39) (98% - 98%)  Wt(kg): --    GENERAL: NAD, well-groomed, well-developed  HEAD:  Atraumatic, Normocephalic  EYES: EOMI, conjunctiva and sclera clear  ENT: Moist mucous membranes,  NECK: Supple, No JVD, no bruits  CHEST/LUNG: Clear to percussion bilaterally; No rales, rhonchi, wheezing, or rubs  HEART: Regular rate and rhythm; No murmurs, rubs, or gallops PMI non displaced.  ABDOMEN: Soft, Nontender, Nondistended; Bowel sounds present  EXTREMITIES:  2+ Peripheral Pulses, No clubbing, cyanosis, or edema  SKIN: No rashes or lesions  NERVOUS SYSTEM:  Cranial Nerves II-XII intact    Cardiovascular Diagnostic Testing:  ECG:    < from: 12 Lead ECG (24 @ 20:57) >  Diagnosis Line Sinus rhythm with occasional premature ventricular complexes  Voltage criteria for left ventricular hypertrophy  Nonspecific ST and T wave abnormality  Counterclockwise rotation  Abnormal ECG  When compared with ECG of 13-SEP-2024 09:54,  premature ventricular complexes are now present    Confirmed by OSMEL KENNEDY MD () on 2024 8:14:04 AM    < end of copied text >      ECHO:        Labs:                        12.8   7.26  )-----------( 218      ( 13 Sep 2024 06:39 )             38.4         139  |  105  |  10  ----------------------------<  129<H>  3.5   |  24  |  0.72    Ca    9.8      14 Sep 2024 14:20  Mg     1.7           monitor long run wide complex tachycardia  QTc 463/46/491/502/460 hemoglobin 13 hematocrit 38   Lexapro has a known risk of ventricular tachycardia trazodone and Seroquel have a "conditional risk."especially bradycardia potassium or magnesium deficiency or concomitant meds that may prolong QT.    interestingly the wide-complex tachycardia was noted after R-on-T phenomenon which makes one suspicious for a drug related effect versus ischemic effect. we would also consider the patient for ischemia evaluation as she has not undergone one recently after cardiac stenting suggest echo if not done recenlty    care coordinated with Dr. Rivero and Dr. Briscoe        Total Cholesterol: 141  LDL: --  HDL: 44  T      Thyroid Stimulating Hormone, Serum: 0.642 uIU/mL ( @ 06:39)  Thyroid Stimulating Hormone, Serum: 0.428 uIU/mL (09-10 @ 23:30)      Imaging:

## 2024-09-14 NOTE — PROGRESS NOTE ADULT - SUBJECTIVE AND OBJECTIVE BOX
Patient is a 73y old Female who presents with a chief complaint of long QT (13 Sep 2024 15:36)      Patient seen and examined at bedside. No overnight events reported.     ALLERGIES:  No Known Allergies    MEDICATIONS  (STANDING):  aspirin enteric coated 81 milliGRAM(s) Oral daily  atorvastatin 80 milliGRAM(s) Oral at bedtime  clotrimazole 2% Vaginal Cream 1 Applicatorful Vaginal at bedtime  enoxaparin Injectable 40 milliGRAM(s) SubCutaneous every 24 hours  escitalopram 10 milliGRAM(s) Oral daily  gabapentin 400 milliGRAM(s) Oral two times a day  influenza  Vaccine (HIGH DOSE) 0.5 milliLiter(s) IntraMuscular once  levothyroxine 112 MICROGram(s) Oral daily  melatonin 3 milliGRAM(s) Oral at bedtime  QUEtiapine 25 milliGRAM(s) Oral at bedtime  traZODone 50 milliGRAM(s) Oral at bedtime    MEDICATIONS  (PRN):  acetaminophen     Tablet .. 650 milliGRAM(s) Oral every 6 hours PRN Temp greater or equal to 38C (100.4F), Mild Pain (1 - 3)  aluminum hydroxide/magnesium hydroxide/simethicone Suspension 30 milliLiter(s) Oral every 4 hours PRN Dyspepsia  calamine/zinc oxide Lotion 1 Application(s) Topical daily PRN Itching  hydrOXYzine hydrochloride 25 milliGRAM(s) Oral every 8 hours PRN Anxiety  melatonin 3 milliGRAM(s) Oral at bedtime PRN Insomnia  ondansetron Injectable 4 milliGRAM(s) IV Push every 8 hours PRN Nausea and/or Vomiting    Vital Signs Last 24 Hrs  T(F): 98.3 (14 Sep 2024 05:00), Max: 98.3 (14 Sep 2024 05:00)  HR: 76 (14 Sep 2024 05:00) (63 - 76)  BP: 108/68 (14 Sep 2024 05:00) (108/68 - 123/96)  RR: 17 (14 Sep 2024 05:00) (17 - 18)  SpO2: 98% (14 Sep 2024 05:00) (97% - 98%)  I&O's Summary    14 Sep 2024 07:01  -  14 Sep 2024 12:10  --------------------------------------------------------  IN: 120 mL / OUT: 0 mL / NET: 120 mL      PHYSICAL EXAM:  General: NAD, Awake, alert  ENT: No gross hearing impairment, Moist mucous membranes, no thrush  Neck: Supple, No JVD  Lungs: Clear to auscultation bilaterally, good air entry, non-labored breathing  Cardio: RRR, S1/S2, No murmur  Abdomen: Soft, Nontender, Nondistended; Bowel sounds present  Extremities: No calf tenderness, No cyanosis, No pitting edema    LABS:                        12.8   7.26  )-----------( 218      ( 13 Sep 2024 06:39 )             38.4     09-13    137  |  104  |  12  ----------------------------<  100  3.8   |  25  |  0.79    Ca    9.8      13 Sep 2024 06:39  Mg     1.7     09-13    TPro  7.6  /  Alb  3.6  /  TBili  0.8  /  DBili  x   /  AST  18  /  ALT  17  /  AlkPhos  164  09-12                    09-13 Chol 141 mg/dL LDL -- HDL 44 mg/dL Trig 108 mg/dL  TSH 0.642   TSH with FT4 reflex --  Total T3 --                  Urinalysis Basic - ( 13 Sep 2024 06:39 )    Color: x / Appearance: x / SG: x / pH: x  Gluc: 100 mg/dL / Ketone: x  / Bili: x / Urobili: x   Blood: x / Protein: x / Nitrite: x   Leuk Esterase: x / RBC: x / WBC x   Sq Epi: x / Non Sq Epi: x / Bacteria: x        COVID-19 PCR: NotDetec (09-10-24 @ 23:30)    RADIOLOGY & ADDITIONAL TESTS:    Care Discussed with Consultants/Other Providers:    Patient is a 73y old Female who presents with a chief complaint of long QT (13 Sep 2024 15:36)      Patient seen and examined at bedside. No overnight events reported.     ALLERGIES:  No Known Allergies    MEDICATIONS  (STANDING):  aspirin enteric coated 81 milliGRAM(s) Oral daily  atorvastatin 80 milliGRAM(s) Oral at bedtime  clotrimazole 2% Vaginal Cream 1 Applicatorful Vaginal at bedtime  enoxaparin Injectable 40 milliGRAM(s) SubCutaneous every 24 hours  escitalopram 10 milliGRAM(s) Oral daily  gabapentin 400 milliGRAM(s) Oral two times a day  influenza  Vaccine (HIGH DOSE) 0.5 milliLiter(s) IntraMuscular once  levothyroxine 112 MICROGram(s) Oral daily  melatonin 3 milliGRAM(s) Oral at bedtime  QUEtiapine 25 milliGRAM(s) Oral at bedtime  traZODone 50 milliGRAM(s) Oral at bedtime    MEDICATIONS  (PRN):  acetaminophen     Tablet .. 650 milliGRAM(s) Oral every 6 hours PRN Temp greater or equal to 38C (100.4F), Mild Pain (1 - 3)  aluminum hydroxide/magnesium hydroxide/simethicone Suspension 30 milliLiter(s) Oral every 4 hours PRN Dyspepsia  calamine/zinc oxide Lotion 1 Application(s) Topical daily PRN Itching  hydrOXYzine hydrochloride 25 milliGRAM(s) Oral every 8 hours PRN Anxiety  melatonin 3 milliGRAM(s) Oral at bedtime PRN Insomnia  ondansetron Injectable 4 milliGRAM(s) IV Push every 8 hours PRN Nausea and/or Vomiting    Vital Signs Last 24 Hrs  T(F): 98.3 (14 Sep 2024 05:00), Max: 98.3 (14 Sep 2024 05:00)  HR: 76 (14 Sep 2024 05:00) (63 - 76)  BP: 108/68 (14 Sep 2024 05:00) (108/68 - 123/96)  RR: 17 (14 Sep 2024 05:00) (17 - 18)  SpO2: 98% (14 Sep 2024 05:00) (97% - 98%)  I&O's Summary    14 Sep 2024 07:01  -  14 Sep 2024 12:10  --------------------------------------------------------  IN: 120 mL / OUT: 0 mL / NET: 120 mL      PHYSICAL EXAM:  General: NAD, Awake, alert  ENT: No gross hearing impairment, Moist mucous membranes, no thrush  Neck: Supple, No JVD  Lungs: Clear to auscultation bilaterally, good air entry, non-labored breathing  Cardio: RRR, S1/S2, No murmur  Abdomen: Soft, Nontender, Nondistended; Bowel sounds present  Extremities: No calf tenderness, No cyanosis, No pitting edema    LABS:                        12.8   7.26  )-----------( 218      ( 13 Sep 2024 06:39 )             38.4     09-13    137  |  104  |  12  ----------------------------<  100  3.8   |  25  |  0.79    Ca    9.8      13 Sep 2024 06:39  Mg     1.7     09-13    TPro  7.6  /  Alb  3.6  /  TBili  0.8  /  DBili  x   /  AST  18  /  ALT  17  /  AlkPhos  164  09-12    09-13 Chol 141 mg/dL LDL -- HDL 44 mg/dL Trig 108 mg/dL  TSH 0.642   TSH with FT4 reflex --  Total T3 --    Urinalysis Basic - ( 13 Sep 2024 06:39 )    Color: x / Appearance: x / SG: x / pH: x  Gluc: 100 mg/dL / Ketone: x  / Bili: x / Urobili: x   Blood: x / Protein: x / Nitrite: x   Leuk Esterase: x / RBC: x / WBC x   Sq Epi: x / Non Sq Epi: x / Bacteria: x    COVID-19 PCR: Leeanna (09-10-24 @ 23:30)

## 2024-09-14 NOTE — BH CONSULTATION LIAISON PROGRESS NOTE - NSBHCONSULTRECOMMENDOTHER_PSY_A_CORE FT
- Continue with Lexapro 10mg daily.  - Continue with Trazodone 50mg qhs.  - Continue with Seroquel 25mg qhs.  - Pending further review with psychiatric attending for disposition; weighing inpatient substance use rehab vs outpatient dual diagnosis program.  - Continue with Lexapro 10mg daily.  - D/C Trazodone 50mg qhs.  - increase  Seroquel 50mg qhs. start remeron 15 mg  - inpatient dual diagnosed unit  recommended.

## 2024-09-15 LAB
ANION GAP SERPL CALC-SCNC: 10 MMOL/L — SIGNIFICANT CHANGE UP (ref 5–17)
BUN SERPL-MCNC: 9 MG/DL — SIGNIFICANT CHANGE UP (ref 7–23)
CALCIUM SERPL-MCNC: 10 MG/DL — SIGNIFICANT CHANGE UP (ref 8.4–10.5)
CHLORIDE SERPL-SCNC: 107 MMOL/L — SIGNIFICANT CHANGE UP (ref 96–108)
CO2 SERPL-SCNC: 25 MMOL/L — SIGNIFICANT CHANGE UP (ref 22–31)
CREAT SERPL-MCNC: 0.64 MG/DL — SIGNIFICANT CHANGE UP (ref 0.5–1.3)
EGFR: 92 ML/MIN/1.73M2 — SIGNIFICANT CHANGE UP
GLUCOSE SERPL-MCNC: 87 MG/DL — SIGNIFICANT CHANGE UP (ref 70–99)
HCT VFR BLD CALC: 40.8 % — SIGNIFICANT CHANGE UP (ref 34.5–45)
HGB BLD-MCNC: 13.2 G/DL — SIGNIFICANT CHANGE UP (ref 11.5–15.5)
MAGNESIUM SERPL-MCNC: 1.8 MG/DL — SIGNIFICANT CHANGE UP (ref 1.6–2.6)
MCHC RBC-ENTMCNC: 28.6 PG — SIGNIFICANT CHANGE UP (ref 27–34)
MCHC RBC-ENTMCNC: 32.4 GM/DL — SIGNIFICANT CHANGE UP (ref 32–36)
MCV RBC AUTO: 88.5 FL — SIGNIFICANT CHANGE UP (ref 80–100)
NRBC # BLD: 0 /100 WBCS — SIGNIFICANT CHANGE UP (ref 0–0)
PLATELET # BLD AUTO: 218 K/UL — SIGNIFICANT CHANGE UP (ref 150–400)
POTASSIUM SERPL-MCNC: 3.9 MMOL/L — SIGNIFICANT CHANGE UP (ref 3.5–5.3)
POTASSIUM SERPL-SCNC: 3.9 MMOL/L — SIGNIFICANT CHANGE UP (ref 3.5–5.3)
RBC # BLD: 4.61 M/UL — SIGNIFICANT CHANGE UP (ref 3.8–5.2)
RBC # FLD: 14.2 % — SIGNIFICANT CHANGE UP (ref 10.3–14.5)
SODIUM SERPL-SCNC: 142 MMOL/L — SIGNIFICANT CHANGE UP (ref 135–145)
WBC # BLD: 5.59 K/UL — SIGNIFICANT CHANGE UP (ref 3.8–10.5)
WBC # FLD AUTO: 5.59 K/UL — SIGNIFICANT CHANGE UP (ref 3.8–10.5)

## 2024-09-15 PROCEDURE — 93306 TTE W/DOPPLER COMPLETE: CPT | Mod: 26

## 2024-09-15 PROCEDURE — 99233 SBSQ HOSP IP/OBS HIGH 50: CPT

## 2024-09-15 PROCEDURE — 99231 SBSQ HOSP IP/OBS SF/LOW 25: CPT

## 2024-09-15 PROCEDURE — 99232 SBSQ HOSP IP/OBS MODERATE 35: CPT

## 2024-09-15 RX ORDER — POLYETHYLENE GLYCOL 3350 17 G/17G
17 POWDER, FOR SOLUTION ORAL DAILY
Refills: 0 | Status: DISCONTINUED | OUTPATIENT
Start: 2024-09-15 | End: 2024-09-20

## 2024-09-15 RX ADMIN — Medication 400 MILLIGRAM(S): at 18:09

## 2024-09-15 RX ADMIN — POLYETHYLENE GLYCOL 3350 17 GRAM(S): 17 POWDER, FOR SOLUTION ORAL at 17:17

## 2024-09-15 RX ADMIN — ESCITALOPRAM OXALATE 10 MILLIGRAM(S): 10 TABLET ORAL at 13:39

## 2024-09-15 RX ADMIN — Medication 112 MICROGRAM(S): at 06:00

## 2024-09-15 RX ADMIN — ENOXAPARIN SODIUM 40 MILLIGRAM(S): 100 INJECTION SUBCUTANEOUS at 13:39

## 2024-09-15 RX ADMIN — Medication 50 MILLIGRAM(S): at 22:31

## 2024-09-15 RX ADMIN — ACETAMINOPHEN 650 MILLIGRAM(S): 325 TABLET ORAL at 22:31

## 2024-09-15 RX ADMIN — Medication 81 MILLIGRAM(S): at 13:39

## 2024-09-15 RX ADMIN — Medication 400 MILLIGRAM(S): at 06:00

## 2024-09-15 RX ADMIN — ACETAMINOPHEN 650 MILLIGRAM(S): 325 TABLET ORAL at 23:41

## 2024-09-15 RX ADMIN — Medication 15 MILLIGRAM(S): at 22:31

## 2024-09-15 RX ADMIN — Medication 80 MILLIGRAM(S): at 22:31

## 2024-09-15 NOTE — PROGRESS NOTE ADULT - ASSESSMENT
72 y/o F, , retired , recently living with her son, with PMH significant for h/o CAD s/p PCI of OM1 (100% occlussion ) in 11/2021, MI, hypertension, with PPH significant for MDD, multiple (>10) previous psychiatric hospitalizations, h/o 2-3 past suicide attempts, has an outpatient psychiatrist Dr. Ryan Sparrow she hasn't seen in 2 months, presented due to misuse of alcohol with ambien and her other psych medicine, patient was supposed to be discharge to inpatient psych, however was not accepted due to persistent elevated qtc upon ekg Inpatient psych require cardio evaluation before admission.    #Major depressive disorder  #Bipolar disorder   - History of suicide attempts and inpatient psych admissions for episodes of MDD  - Continue with Lexapro 10mg daily, Seroquel increased to 50mg ghs, start mirtazepine 15mg qhs, discontinued trazodone  - maintain on constant observation, safety tray   - Patient was supposed to be discharge to inpatient psych at [facility] however was not accepted due to elevated QTC in 500s   - Psych consult - inpt psych recommended  - PT - home PT   - B12/folate/TSH reviewed     #Prolonged QTC 2/2 psychiatric regimen   #36 beats of SVT, asymptomatic   - recent EKG <500 qtc   - EKG previously showed QTC ranging above 490 to 502 and LVH  - patient asymptomatic  - Continue tele monitoring   - optimize electrolytes   - Cardio consult appreciated- serial daily EKGs, goal QTC <500    #CAD s/p stent in 11/2021  #HLD  #HTN  - As per records from son patient is on both asa 81 and Brilinta 90mg BID   - Per cardio, patient does not require brilinta - hold   - continue with ASA 81mg   - We stopped metoprolol given hypotension  - continue statin     #Vaginal pruritis/yeast infection-resolved  - patient complaining of itching  - ordered clotrimazole vaginal cream    - given one time dose of fluconazole 150mg     #Hypothyroidism  - continue synthroid 112mcg daily  - TSH reviewed    #GERD  - discontinued protonix due to possible QTC prolongation     DVT ppx- lovenox     GOC full code     Dispo: pending psychiatric disposition ; MEDICALLY STABLE at this time  Social work to follow     9/14 Son updated on plan of care                   74 y/o F, , retired , recently living with her son, with PMH significant for h/o CAD s/p PCI of OM1 (100% occlussion ) in 11/2021, MI, hypertension, with PPH significant for MDD, multiple (>10) previous psychiatric hospitalizations, h/o 2-3 past suicide attempts, has an outpatient psychiatrist Dr. Ryan Sparrow she hasn't seen in 2 months, presented due to misuse of alcohol with ambien and her other psych medicine, patient was supposed to be discharge to inpatient psych, however was not accepted due to persistent elevated qtc upon ekg Inpatient psych require cardio evaluation before admission.    #Major depressive disorder  #Bipolar disorder   - History of suicide attempts and inpatient psych admissions for episodes of MDD  - Continue with Lexapro 10mg daily, Seroquel increased to 50mg ghs, start mirtazepine 15mg qhs, discontinued trazodone  - maintain on constant observation, safety tray   - Patient was supposed to be discharge to inpatient psych at [facility] however was not accepted due to elevated QTC in 500s   - Psych consult - inpt psych recommended  - PT - home PT   - B12/folate/TSH reviewed     #Prolonged QTC 2/2 psychiatric regimen   #36 beats of SVT, asymptomatic   - recent EKG <500 qtc   - EKG previously showed QTC ranging above 490 to 502 and LVH  - patient asymptomatic  - Continue tele monitoring   - optimize electrolytes   - Cardio following-consider possible stress test  - TTE reviewed LVEF 50-55%    #CAD s/p stent in 11/2021  #HLD  #HTN  - As per records from son patient is on both asa 81 and Brilinta 90mg BID   - Per cardio, patient does not require brilinta - hold   - continue with ASA 81mg   - We stopped metoprolol given hypotension  - continue statin     #Vaginal pruritis/yeast infection-resolved  - patient complaining of itching  - ordered clotrimazole vaginal cream    - given one time dose of fluconazole 150mg     #Hypothyroidism  - continue synthroid 112mcg daily  - TSH reviewed    #GERD  - discontinued protonix due to possible QTC prolongation     DVT ppx- lovenox     C full code     Dispo: pending psychiatric disposition ; MEDICALLY STABLE at this time  Social work to follow     9/14 Son updated on plan of care                   74 y/o F, , retired , recently living with her son, with PMH significant for h/o CAD s/p PCI of OM1 (100% occlussion ) in 11/2021, MI, hypertension, with PPH significant for MDD, multiple (>10) previous psychiatric hospitalizations, h/o 2-3 past suicide attempts, has an outpatient psychiatrist Dr. Ryan Sparrow she hasn't seen in 2 months, presented due to misuse of alcohol with ambien and her other psych medicine, patient was supposed to be discharge to inpatient psych, however was not accepted due to persistent elevated qtc upon ekg Inpatient psych require cardio evaluation before admission.    #Major depressive disorder  #Bipolar disorder   - History of suicide attempts and inpatient psych admissions for episodes of MDD  - Continue with Lexapro 10mg daily, Seroquel increased to 50mg ghs, start mirtazepine 15mg qhs, discontinued trazodone  - maintain on constant observation, safety tray   - Patient was supposed to be discharge to inpatient psych at [facility] however was not accepted due to elevated QTC in 500s   - Psych consult - inpt psych recommended  - PT - home PT   - B12/folate/TSH reviewed     #Prolonged QTC 2/2 psychiatric regimen   #36 beats of SVT, asymptomatic   - recent EKG <500 qtc   - EKG previously showed QTC ranging above 490 to 502 and LVH  - patient asymptomatic  - Continue tele monitoring   - optimize electrolytes   - Cardio following-medication induced vs consider possible stress test  - TTE reviewed LVEF 50-55%    #CAD s/p stent in 11/2021  #HLD  #HTN  - As per records from son patient is on both asa 81 and Brilinta 90mg BID   - Per cardio, patient does not require brilinta - hold   - continue with ASA 81mg   - We stopped metoprolol given hypotension  - continue statin     #Vaginal pruritis/yeast infection-resolved  - patient complaining of itching  - ordered clotrimazole vaginal cream    - given one time dose of fluconazole 150mg     #Hypothyroidism  - continue synthroid 112mcg daily  - TSH reviewed    #GERD  - discontinued protonix due to possible QTC prolongation     DVT ppx- lovenox     GOC full code     Dispo: pending disposition for inpatient psych facility ; MEDICALLY STABLE at this time  Social work to follow      Son updated on plan of care

## 2024-09-15 NOTE — BH CONSULTATION LIAISON PROGRESS NOTE - NSBHCHARTREVIEWINVESTIGATE_PSY_A_CORE FT
< from: 12 Lead ECG (09.11.24 @ 19:12) >      Ventricular Rate 88 BPM    Atrial Rate 88 BPM    P-R Interval 160 ms    QRS Duration 94 ms    Q-T Interval 406 ms    QTC Calculation(Bazett) 491 ms    P Axis 67 degrees    R Axis -32 degrees    T Axis -34 degrees    Diagnosis Line Sinus rhythm with occasional premature ventricular complexes  Left axis deviation  Left ventricular hypertrophy with repolarization abnormality ( R in aVL )  non specific st abnormalities    Confirmed by ASHKAN SCHILLING MD ([id number]) on 9/12/2024 9:24:47 AM    < end of copied text >      < from: CT Head No Cont (09.11.24 @ 03:35) >      ACC: [id number] EXAM:  CT BRAIN   ORDERED BY: PAZ LRASON     PROCEDURE DATE:  09/11/2024          INTERPRETATION:  CLINICAL INDICATION:  r/o mass    TECHNIQUE: Noncontrast CT examination of the head was performed.  Coronal and sagittal reformats were also obtained.    COMPARISON: There are no prior studies available for comparison.    FINDINGS:  INTRA-AXIAL: No intracranial mass effect, acute hemorrhage, midline shift   or acute transcortical infarct is seen. There are periventricular white   matter hypodensities that are nonspecific in nature but may reflect   chronic ischemic microvascular disease.  EXTRA-AXIAL: No extra-axial fluid collection is present.  VENTRICLES AND SULCI: Parenchymal volume is commensurate with patient   age. No hydrocephalus.  VISUALIZED SINUSES: Mild mucosal thickening.  VISUALIZED MASTOIDS: Clear.  CALVARIUM: Normal.    IMPRESSION:    No evidence of acute intracranial hemorrhage, midline shift or CT   evidence of acute territorial infarct.    If the patient's symptoms persist, consider short interval follow-up head   CT or brain MRI if there are no MRI contraindications.    --- End of Report ---            LATASHA ALMEIDA MD; Attending Radiologist  This document has been electronically signed. Sep 11 2024  4:18AM    < end of copied text >

## 2024-09-15 NOTE — PROGRESS NOTE ADULT - SUBJECTIVE AND OBJECTIVE BOX
Patient is a 73y old  Female who presents with a chief complaint of long QT (14 Sep 2024 18:13)      Patient seen and examined at bedside. No overnight events reported.     ALLERGIES:  No Known Allergies    MEDICATIONS  (STANDING):  aspirin enteric coated 81 milliGRAM(s) Oral daily  atorvastatin 80 milliGRAM(s) Oral at bedtime  enoxaparin Injectable 40 milliGRAM(s) SubCutaneous every 24 hours  escitalopram 10 milliGRAM(s) Oral daily  gabapentin 400 milliGRAM(s) Oral two times a day  influenza  Vaccine (HIGH DOSE) 0.5 milliLiter(s) IntraMuscular once  levothyroxine 112 MICROGram(s) Oral daily  melatonin 3 milliGRAM(s) Oral at bedtime  mirtazapine 15 milliGRAM(s) Oral at bedtime  QUEtiapine 50 milliGRAM(s) Oral at bedtime    MEDICATIONS  (PRN):  acetaminophen     Tablet .. 650 milliGRAM(s) Oral every 6 hours PRN Temp greater or equal to 38C (100.4F), Mild Pain (1 - 3)  aluminum hydroxide/magnesium hydroxide/simethicone Suspension 30 milliLiter(s) Oral every 4 hours PRN Dyspepsia  calamine/zinc oxide Lotion 1 Application(s) Topical daily PRN Itching  hydrOXYzine hydrochloride 25 milliGRAM(s) Oral every 8 hours PRN Anxiety  melatonin 3 milliGRAM(s) Oral at bedtime PRN Insomnia  ondansetron Injectable 4 milliGRAM(s) IV Push every 8 hours PRN Nausea and/or Vomiting    Vital Signs Last 24 Hrs  T(F): 98 (15 Sep 2024 05:49), Max: 98.3 (14 Sep 2024 21:09)  HR: 64 (15 Sep 2024 05:49) (64 - 79)  BP: 113/65 (15 Sep 2024 05:49) (113/65 - 121/69)  RR: 16 (15 Sep 2024 05:49) (16 - 19)  SpO2: 97% (15 Sep 2024 05:49) (97% - 98%)  I&O's Summary    14 Sep 2024 07:01  -  15 Sep 2024 07:00  --------------------------------------------------------  IN: 120 mL / OUT: 0 mL / NET: 120 mL      PHYSICAL EXAM:  General: NAD, Awake, alert  ENT: No gross hearing impairment, Moist mucous membranes, no thrush  Neck: Supple, No JVD  Lungs: Clear to auscultation bilaterally, good air entry, non-labored breathing  Cardio: RRR, S1/S2, No murmur  Abdomen: Soft, Nontender, Nondistended; Bowel sounds present  Extremities: No calf tenderness, No cyanosis, No pitting edema  Psych: Anxious appearing, poor insight    LABS:                        13.2   5.59  )-----------( 218      ( 15 Sep 2024 05:38 )             40.8     09-15    142  |  107  |  9   ----------------------------<  87  3.9   |  25  |  0.64    Ca    10.0      15 Sep 2024 05:38  Mg     1.8     09-15                      09-13 Chol 141 mg/dL LDL -- HDL 44 mg/dL Trig 108 mg/dL  TSH 0.642   TSH with FT4 reflex --  Total T3 --                  Urinalysis Basic - ( 15 Sep 2024 05:38 )    Color: x / Appearance: x / SG: x / pH: x  Gluc: 87 mg/dL / Ketone: x  / Bili: x / Urobili: x   Blood: x / Protein: x / Nitrite: x   Leuk Esterase: x / RBC: x / WBC x   Sq Epi: x / Non Sq Epi: x / Bacteria: x        COVID-19 PCR: Leeanna (09-10-24 @ 23:30)    RADIOLOGY & ADDITIONAL TESTS:    Care Discussed with Consultants/Other Providers:

## 2024-09-15 NOTE — BH CONSULTATION LIAISON PROGRESS NOTE - NSBHINDICATION_PSY_ALL_CORE
Questionable historian regarding suicidality. 

## 2024-09-15 NOTE — BH CONSULTATION LIAISON PROGRESS NOTE - NSBHCONSULTRECOMMENDOTHER_PSY_A_CORE FT
- Continue with Lexapro 10mg daily.  - D/C Trazodone 50mg qhs.  - increase  Seroquel 50mg qhs. start remeron 15 mg  - inpatient dual diagnosed unit  recommended.

## 2024-09-15 NOTE — PROGRESS NOTE ADULT - SUBJECTIVE AND OBJECTIVE BOX
Follow up for  SUBJ:    Gale anxious about medications feels tired     PMH  MI (myocardial infarction)    HTN (hypertension)    Stented coronary artery    Breast cancer        MEDICATIONS  (STANDING):  aspirin enteric coated 81 milliGRAM(s) Oral daily  atorvastatin 80 milliGRAM(s) Oral at bedtime  enoxaparin Injectable 40 milliGRAM(s) SubCutaneous every 24 hours  escitalopram 10 milliGRAM(s) Oral daily  gabapentin 400 milliGRAM(s) Oral two times a day  influenza  Vaccine (HIGH DOSE) 0.5 milliLiter(s) IntraMuscular once  levothyroxine 112 MICROGram(s) Oral daily  melatonin 3 milliGRAM(s) Oral at bedtime  mirtazapine 15 milliGRAM(s) Oral at bedtime  polyethylene glycol 3350 17 Gram(s) Oral daily  QUEtiapine 50 milliGRAM(s) Oral at bedtime    MEDICATIONS  (PRN):  acetaminophen     Tablet .. 650 milliGRAM(s) Oral every 6 hours PRN Temp greater or equal to 38C (100.4F), Mild Pain (1 - 3)  aluminum hydroxide/magnesium hydroxide/simethicone Suspension 30 milliLiter(s) Oral every 4 hours PRN Dyspepsia  calamine/zinc oxide Lotion 1 Application(s) Topical daily PRN Itching  hydrOXYzine hydrochloride 25 milliGRAM(s) Oral every 8 hours PRN Anxiety  melatonin 3 milliGRAM(s) Oral at bedtime PRN Insomnia  ondansetron Injectable 4 milliGRAM(s) IV Push every 8 hours PRN Nausea and/or Vomiting        PHYSICAL EXAM:  Vital Signs Last 24 Hrs  T(C): 36.8 (15 Sep 2024 13:56), Max: 36.8 (14 Sep 2024 21:09)  T(F): 98.3 (15 Sep 2024 13:56), Max: 98.3 (14 Sep 2024 21:09)  HR: 63 (15 Sep 2024 13:56) (63 - 79)  BP: 106/61 (15 Sep 2024 13:56) (106/61 - 121/69)  BP(mean): --  RR: 18 (15 Sep 2024 13:56) (16 - 18)  SpO2: 96% (15 Sep 2024 13:56) (96% - 97%)    Parameters below as of 15 Sep 2024 13:56  Patient On (Oxygen Delivery Method): room air        GENERAL: NAD, well-groomed, well-developed  HEAD:  Atraumatic, Normocephalic  EYES: EOMI, PERRLA, conjunctiva and sclera clear  ENT: Moist mucous membranes,  NECK: Supple, No JVD, no bruits  CHEST/LUNG: Clear to percussion bilaterally; No rales, rhonchi, wheezing, or rubs  HEART: Regular rate and rhythm; No murmurs, rubs, or gallops PMI non displaced.  ABDOMEN: Soft, Nontender, Nondistended; Bowel sounds present  EXTREMITIES:  2+ Peripheral Pulses, No clubbing, cyanosis, or edema  SKIN: No rashes or lesions  NERVOUS SYSTEM:  Cranial Nerves II-XII intact      TELEMETRY:    ECG:      ECHO:    LABS:                        13.2   5.59  )-----------( 218      ( 15 Sep 2024 05:38 )             40.8     09-15    142  |  107  |  9   ----------------------------<  87  3.9   |  25  |  0.64    Ca    10.0      15 Sep 2024 05:38  Mg     1.8     09-15        I&O's Summary    14 Sep 2024 07:01  -  15 Sep 2024 07:00  --------------------------------------------------------  IN: 120 mL / OUT: 0 mL / NET: 120 mL      BNP    RADIOLOGY & ADDITIONAL STUDIES:    ECHO:    impression  VT   currently on adjusted dose of psyche meds as per dr [place]. would continue to monitor qtc although a recent value 463 was noted. she does not feel "ready to undergo stress testing. would however consider as inpatient given h/o stenting and cardiovascular risk and VT with reasonable QTc

## 2024-09-16 ENCOUNTER — APPOINTMENT (OUTPATIENT)
Dept: SURGERY | Facility: CLINIC | Age: 74
End: 2024-09-16

## 2024-09-16 LAB
ANION GAP SERPL CALC-SCNC: 10 MMOL/L — SIGNIFICANT CHANGE UP (ref 5–17)
BUN SERPL-MCNC: 11 MG/DL — SIGNIFICANT CHANGE UP (ref 7–23)
CALCIUM SERPL-MCNC: 9.5 MG/DL — SIGNIFICANT CHANGE UP (ref 8.4–10.5)
CHLORIDE SERPL-SCNC: 106 MMOL/L — SIGNIFICANT CHANGE UP (ref 96–108)
CO2 SERPL-SCNC: 25 MMOL/L — SIGNIFICANT CHANGE UP (ref 22–31)
CREAT SERPL-MCNC: 0.64 MG/DL — SIGNIFICANT CHANGE UP (ref 0.5–1.3)
EGFR: 92 ML/MIN/1.73M2 — SIGNIFICANT CHANGE UP
GLUCOSE SERPL-MCNC: 96 MG/DL — SIGNIFICANT CHANGE UP (ref 70–99)
MAGNESIUM SERPL-MCNC: 1.7 MG/DL — SIGNIFICANT CHANGE UP (ref 1.6–2.6)
POTASSIUM SERPL-MCNC: 3.7 MMOL/L — SIGNIFICANT CHANGE UP (ref 3.5–5.3)
POTASSIUM SERPL-SCNC: 3.7 MMOL/L — SIGNIFICANT CHANGE UP (ref 3.5–5.3)
SODIUM SERPL-SCNC: 141 MMOL/L — SIGNIFICANT CHANGE UP (ref 135–145)

## 2024-09-16 PROCEDURE — 99232 SBSQ HOSP IP/OBS MODERATE 35: CPT

## 2024-09-16 PROCEDURE — 99233 SBSQ HOSP IP/OBS HIGH 50: CPT

## 2024-09-16 RX ORDER — REGADENOSON 0.08 MG/ML
0.4 INJECTION, SOLUTION INTRAVENOUS ONCE
Refills: 0 | Status: DISCONTINUED | OUTPATIENT
Start: 2024-09-16 | End: 2024-09-20

## 2024-09-16 RX ADMIN — Medication 80 MILLIGRAM(S): at 22:12

## 2024-09-16 RX ADMIN — Medication 400 MILLIGRAM(S): at 17:51

## 2024-09-16 RX ADMIN — ENOXAPARIN SODIUM 40 MILLIGRAM(S): 100 INJECTION SUBCUTANEOUS at 07:21

## 2024-09-16 RX ADMIN — Medication 400 MILLIGRAM(S): at 05:58

## 2024-09-16 RX ADMIN — Medication 50 MILLIGRAM(S): at 22:12

## 2024-09-16 RX ADMIN — Medication 112 MICROGRAM(S): at 05:58

## 2024-09-16 RX ADMIN — Medication 3 MILLIGRAM(S): at 22:12

## 2024-09-16 RX ADMIN — Medication 15 MILLIGRAM(S): at 22:12

## 2024-09-16 RX ADMIN — ESCITALOPRAM OXALATE 10 MILLIGRAM(S): 10 TABLET ORAL at 11:56

## 2024-09-16 RX ADMIN — Medication 81 MILLIGRAM(S): at 11:56

## 2024-09-16 RX ADMIN — POLYETHYLENE GLYCOL 3350 17 GRAM(S): 17 POWDER, FOR SOLUTION ORAL at 11:56

## 2024-09-16 RX ADMIN — ACETAMINOPHEN 650 MILLIGRAM(S): 325 TABLET ORAL at 07:22

## 2024-09-16 NOTE — PROGRESS NOTE ADULT - ASSESSMENT
Assessment:  Gale Garcia is a 73 year old woman with past medical history of Coronary artery disease (s/p PCI of OM1 in 2021), Hypertension and Major Depressive disorder with multiple previous psychiatric hospitalizations and prior suicide attempts was brought in by EMS due to son's concerns of patient being altered, having alcohol intoxication and misuse of Ambien.    Cardiology initially consulted due to history of prolonged QTc. Most recent ECG consistent with sinus rhythm and PVC with a normal QTc of 463. Longest QTc on ECGs this admission of 502 which is borderline. Echo report with normal LVEF 50-55%.     Recommendations:  [] Prolonged QTc: Resolved, likely secondary to multiple psychiatric medications; Trazodone, Seroquel and Lexapro which are being adjusted. Further management of psychiatric disorders per Psychiatry.  [] History of CAD: Patient denies angina or dyspnea, no signs of an acute coronary syndrome. Dr. Griffin is requesting inpatient nuclear stress testing due to concern for ventricular tachycardia. Continue home CV meds.     We will continue to follow along.     Shayy Ledezma MD  Cardiology

## 2024-09-16 NOTE — PROGRESS NOTE ADULT - ASSESSMENT
74 y/o F, , retired , recently living with her son, with PMH significant for h/o CAD s/p PCI of OM1 (100% occlussion ) in 11/2021, MI, hypertension, with PPH significant for MDD, multiple (>10) previous psychiatric hospitalizations, h/o 2-3 past suicide attempts, has an outpatient psychiatrist Dr. Ryan Sparrow she hasn't seen in 2 months, presented due to misuse of alcohol with ambien and her other psych medicine, patient was supposed to be discharge to inpatient psych, however was not accepted due to persistent elevated qtc upon ekg Inpatient psych require cardio evaluation before admission.    #Major depressive disorder  #Bipolar disorder   - History of suicide attempts and inpatient psych admissions for episodes of MDD  - Continue with Lexapro 10mg daily, Seroquel increased to 50mg ghs, start mirtazepine 15mg qhs, discontinued trazodone  - maintain on constant observation, safety tray   - Patient was supposed to be discharge to inpatient psych at [facility] however was not accepted due to elevated QTC in 500s   - Psych consult - inpt psych recommended vs inpatient substance abuse rehab program   - PT - home PT   - B12/folate/TSH reviewed     #Prolonged QTC 2/2 psychiatric regimen   #36 beats of SVT, asymptomatic 9/15  - recent EKG <500 qtc   - EKG previously showed QTC ranging above 490 to 502 and LVH  - patient asymptomatic  - Continue tele monitoring   - optimize electrolytes   - Cardio following-stress test for tomorrow 9/17, NPO after midnight   - TTE reviewed LVEF 50-55%    #CAD s/p stent in 11/2021  #HLD  #HTN  - As per records from son patient is on both asa 81 and Brilinta 90mg BID   - Per cardio, patient does not require brilinta - hold   - continue with ASA 81mg   - We stopped metoprolol given hypotension  - continue statin     #Vaginal pruritis/yeast infection-resolved  - patient complaining of itching  - ordered clotrimazole vaginal cream    - given one time dose of fluconazole 150mg     #Hypothyroidism  - continue synthroid 112mcg daily  - TSH reviewed    #GERD  - discontinued protonix due to possible QTC prolongation     DVT ppx- lovenox     GOC full code     Dispo: pending disposition for inpatient psych facility vs inpatient substance abuse program  Social work to follow      Son updated on plan of care

## 2024-09-16 NOTE — PROGRESS NOTE ADULT - SUBJECTIVE AND OBJECTIVE BOX
Patient is a 73y old  Female who presents with a chief complaint of long QT (16 Sep 2024 08:49)      Patient seen and examined at bedside. No overnight events reported.     ALLERGIES:  No Known Allergies    MEDICATIONS  (STANDING):  aspirin enteric coated 81 milliGRAM(s) Oral daily  atorvastatin 80 milliGRAM(s) Oral at bedtime  enoxaparin Injectable 40 milliGRAM(s) SubCutaneous every 24 hours  escitalopram 10 milliGRAM(s) Oral daily  gabapentin 400 milliGRAM(s) Oral two times a day  influenza  Vaccine (HIGH DOSE) 0.5 milliLiter(s) IntraMuscular once  levothyroxine 112 MICROGram(s) Oral daily  melatonin 3 milliGRAM(s) Oral at bedtime  mirtazapine 15 milliGRAM(s) Oral at bedtime  polyethylene glycol 3350 17 Gram(s) Oral daily  QUEtiapine 50 milliGRAM(s) Oral at bedtime    MEDICATIONS  (PRN):  acetaminophen     Tablet .. 650 milliGRAM(s) Oral every 6 hours PRN Temp greater or equal to 38C (100.4F), Mild Pain (1 - 3)  aluminum hydroxide/magnesium hydroxide/simethicone Suspension 30 milliLiter(s) Oral every 4 hours PRN Dyspepsia  calamine/zinc oxide Lotion 1 Application(s) Topical daily PRN Itching  hydrOXYzine hydrochloride 25 milliGRAM(s) Oral every 8 hours PRN Anxiety  melatonin 3 milliGRAM(s) Oral at bedtime PRN Insomnia  ondansetron Injectable 4 milliGRAM(s) IV Push every 8 hours PRN Nausea and/or Vomiting    Vital Signs Last 24 Hrs  T(F): 98.1 (16 Sep 2024 12:17), Max: 98.7 (16 Sep 2024 05:14)  HR: 66 (16 Sep 2024 12:17) (65 - 66)  BP: 129/77 (16 Sep 2024 12:17) (124/79 - 129/77)  RR: 17 (16 Sep 2024 12:17) (16 - 17)  SpO2: 97% (16 Sep 2024 12:17) (97% - 98%)  I&O's Summary    PHYSICAL EXAM:  General: NAD, Awake, alert  ENT: No gross hearing impairment, Moist mucous membranes, no thrush  Neck: Supple, No JVD  Lungs: Clear to auscultation bilaterally, good air entry, non-labored breathing  Cardio: RRR, S1/S2, No murmur  Abdomen: Soft, Nontender, Nondistended; Bowel sounds present  Extremities: No calf tenderness, No cyanosis, No pitting edema  Psych: Anxious    LABS:                        13.2   5.59  )-----------( 218      ( 15 Sep 2024 05:38 )             40.8     09-16    141  |  106  |  11  ----------------------------<  96  3.7   |  25  |  0.64    Ca    9.5      16 Sep 2024 06:45  Mg     1.7     09-16                      09-13 Chol 141 mg/dL LDL -- HDL 44 mg/dL Trig 108 mg/dL                  Urinalysis Basic - ( 16 Sep 2024 06:45 )    Color: x / Appearance: x / SG: x / pH: x  Gluc: 96 mg/dL / Ketone: x  / Bili: x / Urobili: x   Blood: x / Protein: x / Nitrite: x   Leuk Esterase: x / RBC: x / WBC x   Sq Epi: x / Non Sq Epi: x / Bacteria: x        COVID-19 PCR: NotDetec (09-10-24 @ 23:30)    RADIOLOGY & ADDITIONAL TESTS:    Care Discussed with Consultants/Other Providers:    Patient is a 73y old  Female who presents with a chief complaint of long QT (16 Sep 2024 08:49)      Patient seen and examined at bedside. No overnight events reported.     ALLERGIES:  No Known Allergies    MEDICATIONS  (STANDING):  aspirin enteric coated 81 milliGRAM(s) Oral daily  atorvastatin 80 milliGRAM(s) Oral at bedtime  enoxaparin Injectable 40 milliGRAM(s) SubCutaneous every 24 hours  escitalopram 10 milliGRAM(s) Oral daily  gabapentin 400 milliGRAM(s) Oral two times a day  influenza  Vaccine (HIGH DOSE) 0.5 milliLiter(s) IntraMuscular once  levothyroxine 112 MICROGram(s) Oral daily  melatonin 3 milliGRAM(s) Oral at bedtime  mirtazapine 15 milliGRAM(s) Oral at bedtime  polyethylene glycol 3350 17 Gram(s) Oral daily  QUEtiapine 50 milliGRAM(s) Oral at bedtime    MEDICATIONS  (PRN):  acetaminophen     Tablet .. 650 milliGRAM(s) Oral every 6 hours PRN Temp greater or equal to 38C (100.4F), Mild Pain (1 - 3)  aluminum hydroxide/magnesium hydroxide/simethicone Suspension 30 milliLiter(s) Oral every 4 hours PRN Dyspepsia  calamine/zinc oxide Lotion 1 Application(s) Topical daily PRN Itching  hydrOXYzine hydrochloride 25 milliGRAM(s) Oral every 8 hours PRN Anxiety  melatonin 3 milliGRAM(s) Oral at bedtime PRN Insomnia  ondansetron Injectable 4 milliGRAM(s) IV Push every 8 hours PRN Nausea and/or Vomiting    Vital Signs Last 24 Hrs  T(F): 98.1 (16 Sep 2024 12:17), Max: 98.7 (16 Sep 2024 05:14)  HR: 66 (16 Sep 2024 12:17) (65 - 66)  BP: 129/77 (16 Sep 2024 12:17) (124/79 - 129/77)  RR: 17 (16 Sep 2024 12:17) (16 - 17)  SpO2: 97% (16 Sep 2024 12:17) (97% - 98%)  I&O's Summary    PHYSICAL EXAM:  General: NAD, Awake, alert  ENT: No gross hearing impairment, Moist mucous membranes, no thrush  Neck: Supple, No JVD  Lungs: Clear to auscultation bilaterally, good air entry, non-labored breathing  Cardio: RRR, S1/S2, No murmur  Abdomen: Soft, Nontender, Nondistended; Bowel sounds present  Extremities: No calf tenderness, No cyanosis, No pitting edema  Psych: Anxious    LABS:                        13.2   5.59  )-----------( 218      ( 15 Sep 2024 05:38 )             40.8     09-16    141  |  106  |  11  ----------------------------<  96  3.7   |  25  |  0.64    Ca    9.5      16 Sep 2024 06:45  Mg     1.7     09-16    09-13 Chol 141 mg/dL LDL -- HDL 44 mg/dL Trig 108 mg/dL    Urinalysis Basic - ( 16 Sep 2024 06:45 )    Color: x / Appearance: x / SG: x / pH: x  Gluc: 96 mg/dL / Ketone: x  / Bili: x / Urobili: x   Blood: x / Protein: x / Nitrite: x   Leuk Esterase: x / RBC: x / WBC x   Sq Epi: x / Non Sq Epi: x / Bacteria: x    COVID-19 PCR: Maria Etec (09-10-24 @ 23:30)

## 2024-09-16 NOTE — BH CONSULTATION LIAISON PROGRESS NOTE - NSBHCONSULTRECOMMENDOTHER_PSY_A_CORE FT
- Continue with Lexapro 10mg daily  - Continue with Seroquel 50mg qhs  - Continue with Remeron 15mg qhs  - Patient and her son are both amenable to inpatient substance use rehab. Patient's son Anirudh Garcia ([phone]) would like to be involved regarding placement.

## 2024-09-16 NOTE — PROGRESS NOTE ADULT - SUBJECTIVE AND OBJECTIVE BOX
DAIJA SHEA  [id number]      Chief Complaint: Altered mental status/Alcohol intoxication/Depression/Prolonged QTc    Interval History: The patient denies palpitations, chest pain or shortness of breath.     Tele: sinus 60s BPM, brief NSVT on 9/15      Current meds:   acetaminophen     Tablet .. 650 milliGRAM(s) Oral every 6 hours PRN  aluminum hydroxide/magnesium hydroxide/simethicone Suspension 30 milliLiter(s) Oral every 4 hours PRN  aspirin enteric coated 81 milliGRAM(s) Oral daily  atorvastatin 80 milliGRAM(s) Oral at bedtime  calamine/zinc oxide Lotion 1 Application(s) Topical daily PRN  enoxaparin Injectable 40 milliGRAM(s) SubCutaneous every 24 hours  escitalopram 10 milliGRAM(s) Oral daily  gabapentin 400 milliGRAM(s) Oral two times a day  hydrOXYzine hydrochloride 25 milliGRAM(s) Oral every 8 hours PRN  influenza  Vaccine (HIGH DOSE) 0.5 milliLiter(s) IntraMuscular once  levothyroxine 112 MICROGram(s) Oral daily  melatonin 3 milliGRAM(s) Oral at bedtime  melatonin 3 milliGRAM(s) Oral at bedtime PRN  mirtazapine 15 milliGRAM(s) Oral at bedtime  ondansetron Injectable 4 milliGRAM(s) IV Push every 8 hours PRN  polyethylene glycol 3350 17 Gram(s) Oral daily  QUEtiapine 50 milliGRAM(s) Oral at bedtime      Objective:     Vital Signs:   T(C): 37.1 (09-16-24 @ 05:14), Max: 37.1 (09-16-24 @ 05:14)  HR: 65 (09-16-24 @ 05:14) (63 - 65)  BP: 124/79 (09-16-24 @ 05:14) (106/61 - 124/79)  RR: 16 (09-16-24 @ 05:14) (16 - 18)  SpO2: 98% (09-16-24 @ 05:14) (96% - 98%)  Wt(kg): --    Physical Exam:   General: no acute distress  Neck: supple   CVS: JVP ~ 7 cm H20, RRR, s1, s2  Pulm: unlabored respirations, clear to ausculation   Ext: no lower extremity edema b/l   Neuro: awake, alert and oriented      Labs:   15 Sep 2024 05:38    142    |  107    |  9      ----------------------------<  87     3.9     |  25     |  0.64     Ca    10.0       15 Sep 2024 05:38  Mg     1.8       15 Sep 2024 05:38                            13.2   5.59  )-----------( 218      ( 15 Sep 2024 05:38 )             40.8           ECG (9/13/24): sinus rhythm, PVC, QTc 463    TTE (9/15/24):  LVEF 50-55%

## 2024-09-17 LAB
ANION GAP SERPL CALC-SCNC: 9 MMOL/L — SIGNIFICANT CHANGE UP (ref 5–17)
BUN SERPL-MCNC: 10 MG/DL — SIGNIFICANT CHANGE UP (ref 7–23)
CALCIUM SERPL-MCNC: 9.7 MG/DL — SIGNIFICANT CHANGE UP (ref 8.4–10.5)
CHLORIDE SERPL-SCNC: 106 MMOL/L — SIGNIFICANT CHANGE UP (ref 96–108)
CO2 SERPL-SCNC: 26 MMOL/L — SIGNIFICANT CHANGE UP (ref 22–31)
CREAT SERPL-MCNC: 0.64 MG/DL — SIGNIFICANT CHANGE UP (ref 0.5–1.3)
EGFR: 92 ML/MIN/1.73M2 — SIGNIFICANT CHANGE UP
GLUCOSE SERPL-MCNC: 102 MG/DL — HIGH (ref 70–99)
HCT VFR BLD CALC: 40.2 % — SIGNIFICANT CHANGE UP (ref 34.5–45)
HGB BLD-MCNC: 13.1 G/DL — SIGNIFICANT CHANGE UP (ref 11.5–15.5)
MAGNESIUM SERPL-MCNC: 1.7 MG/DL — SIGNIFICANT CHANGE UP (ref 1.6–2.6)
MCHC RBC-ENTMCNC: 28.7 PG — SIGNIFICANT CHANGE UP (ref 27–34)
MCHC RBC-ENTMCNC: 32.6 GM/DL — SIGNIFICANT CHANGE UP (ref 32–36)
MCV RBC AUTO: 88.2 FL — SIGNIFICANT CHANGE UP (ref 80–100)
NRBC # BLD: 0 /100 WBCS — SIGNIFICANT CHANGE UP (ref 0–0)
PLATELET # BLD AUTO: 220 K/UL — SIGNIFICANT CHANGE UP (ref 150–400)
POTASSIUM SERPL-MCNC: 3.6 MMOL/L — SIGNIFICANT CHANGE UP (ref 3.5–5.3)
POTASSIUM SERPL-SCNC: 3.6 MMOL/L — SIGNIFICANT CHANGE UP (ref 3.5–5.3)
RBC # BLD: 4.56 M/UL — SIGNIFICANT CHANGE UP (ref 3.8–5.2)
RBC # FLD: 13.8 % — SIGNIFICANT CHANGE UP (ref 10.3–14.5)
SODIUM SERPL-SCNC: 141 MMOL/L — SIGNIFICANT CHANGE UP (ref 135–145)
WBC # BLD: 7.48 K/UL — SIGNIFICANT CHANGE UP (ref 3.8–10.5)
WBC # FLD AUTO: 7.48 K/UL — SIGNIFICANT CHANGE UP (ref 3.8–10.5)

## 2024-09-17 PROCEDURE — 93016 CV STRESS TEST SUPVJ ONLY: CPT

## 2024-09-17 PROCEDURE — 93018 CV STRESS TEST I&R ONLY: CPT

## 2024-09-17 PROCEDURE — 99233 SBSQ HOSP IP/OBS HIGH 50: CPT

## 2024-09-17 PROCEDURE — 99232 SBSQ HOSP IP/OBS MODERATE 35: CPT

## 2024-09-17 PROCEDURE — 78452 HT MUSCLE IMAGE SPECT MULT: CPT | Mod: 26

## 2024-09-17 PROCEDURE — 99232 SBSQ HOSP IP/OBS MODERATE 35: CPT | Mod: 25

## 2024-09-17 RX ORDER — GABAPENTIN 100 MG
1 CAPSULE ORAL
Qty: 0 | Refills: 0 | DISCHARGE
Start: 2024-09-17

## 2024-09-17 RX ORDER — TICAGRELOR 90 MG/1
1 TABLET ORAL
Refills: 0 | DISCHARGE

## 2024-09-17 RX ORDER — HYDROXYZINE HCL 25 MG
1 TABLET ORAL
Qty: 0 | Refills: 0 | DISCHARGE
Start: 2024-09-17

## 2024-09-17 RX ORDER — ASPIRIN 81 MG
1 TABLET, DELAYED RELEASE (ENTERIC COATED) ORAL
Qty: 0 | Refills: 0 | DISCHARGE
Start: 2024-09-17

## 2024-09-17 RX ORDER — ACETAMINOPHEN 325 MG/1
2 TABLET ORAL
Qty: 0 | Refills: 0 | DISCHARGE
Start: 2024-09-17

## 2024-09-17 RX ORDER — TRAZODONE HCL 50 MG
1 TABLET ORAL
Refills: 0 | DISCHARGE

## 2024-09-17 RX ORDER — LEVOTHYROXINE SODIUM 100 MCG
1 TABLET ORAL
Qty: 0 | Refills: 0 | DISCHARGE
Start: 2024-09-17

## 2024-09-17 RX ORDER — ESCITALOPRAM OXALATE 10 MG/1
1 TABLET ORAL
Refills: 0 | DISCHARGE

## 2024-09-17 RX ORDER — ASPIRIN 81 MG
1 TABLET, DELAYED RELEASE (ENTERIC COATED) ORAL
Refills: 0 | DISCHARGE

## 2024-09-17 RX ORDER — LEVOTHYROXINE SODIUM 100 MCG
1 TABLET ORAL
Refills: 0 | DISCHARGE

## 2024-09-17 RX ORDER — ESCITALOPRAM OXALATE 10 MG/1
1 TABLET ORAL
Qty: 0 | Refills: 0 | DISCHARGE
Start: 2024-09-17

## 2024-09-17 RX ORDER — PANTOPRAZOLE SODIUM 40 MG
1 TABLET, DELAYED RELEASE (ENTERIC COATED) ORAL
Refills: 0 | DISCHARGE

## 2024-09-17 RX ORDER — GABAPENTIN 100 MG
1 CAPSULE ORAL
Refills: 0 | DISCHARGE

## 2024-09-17 RX ORDER — METOPROLOL TARTRATE 100 MG/1
12.5 TABLET ORAL
Refills: 0 | DISCHARGE

## 2024-09-17 RX ORDER — MIRTAZAPINE 30 MG
1 TABLET ORAL
Qty: 0 | Refills: 0 | DISCHARGE
Start: 2024-09-17

## 2024-09-17 RX ADMIN — Medication 30 MILLILITER(S): at 22:12

## 2024-09-17 RX ADMIN — ESCITALOPRAM OXALATE 10 MILLIGRAM(S): 10 TABLET ORAL at 12:29

## 2024-09-17 RX ADMIN — Medication 25 MILLIGRAM(S): at 19:28

## 2024-09-17 RX ADMIN — Medication 3 MILLIGRAM(S): at 21:42

## 2024-09-17 RX ADMIN — Medication 80 MILLIGRAM(S): at 21:42

## 2024-09-17 RX ADMIN — Medication 25 MILLIGRAM(S): at 18:18

## 2024-09-17 RX ADMIN — ONDANSETRON 4 MILLIGRAM(S): 2 INJECTION, SOLUTION INTRAMUSCULAR; INTRAVENOUS at 05:52

## 2024-09-17 RX ADMIN — Medication 81 MILLIGRAM(S): at 12:28

## 2024-09-17 RX ADMIN — Medication 15 MILLIGRAM(S): at 21:42

## 2024-09-17 RX ADMIN — Medication 400 MILLIGRAM(S): at 18:17

## 2024-09-17 NOTE — BH CONSULTATION LIAISON PROGRESS NOTE - NSBHCHARTREVIEWVS_PSY_A_CORE FT
Vital Signs Last 24 Hrs  T(C): 36.7 (15 Sep 2024 05:49), Max: 36.8 (14 Sep 2024 21:09)  T(F): 98 (15 Sep 2024 05:49), Max: 98.3 (14 Sep 2024 21:09)  HR: 64 (15 Sep 2024 05:49) (64 - 79)  BP: 113/65 (15 Sep 2024 05:49) (113/65 - 121/69)  BP(mean): --  RR: 16 (15 Sep 2024 05:49) (16 - 19)  SpO2: 97% (15 Sep 2024 05:49) (97% - 98%)    Parameters below as of 15 Sep 2024 05:49  Patient On (Oxygen Delivery Method): room air    
Vital Signs Last 24 Hrs  T(C): 36.7 (16 Sep 2024 12:17), Max: 37.1 (16 Sep 2024 05:14)  T(F): 98.1 (16 Sep 2024 12:17), Max: 98.7 (16 Sep 2024 05:14)  HR: 66 (16 Sep 2024 12:17) (65 - 66)  BP: 129/77 (16 Sep 2024 12:17) (124/79 - 129/77)  BP(mean): --  RR: 17 (16 Sep 2024 12:17) (16 - 17)  SpO2: 97% (16 Sep 2024 12:17) (97% - 98%)    Parameters below as of 16 Sep 2024 12:17  Patient On (Oxygen Delivery Method): room air    
Vital Signs Last 24 Hrs  T(C): 36.5 (17 Sep 2024 12:44), Max: 36.8 (16 Sep 2024 21:58)  T(F): 97.7 (17 Sep 2024 12:44), Max: 98.3 (16 Sep 2024 21:58)  HR: 65 (17 Sep 2024 12:44) (65 - 84)  BP: 134/76 (17 Sep 2024 12:44) (119/74 - 134/76)  BP(mean): --  RR: 17 (17 Sep 2024 12:44) (16 - 17)  SpO2: 98% (17 Sep 2024 12:44) (96% - 98%)    Parameters below as of 17 Sep 2024 12:44  Patient On (Oxygen Delivery Method): room air    
Vital Signs Last 24 Hrs  T(C): 36.7 (14 Sep 2024 12:39), Max: 36.8 (14 Sep 2024 05:00)  T(F): 98 (14 Sep 2024 12:39), Max: 98.3 (14 Sep 2024 05:00)  HR: 70 (14 Sep 2024 12:39) (63 - 76)  BP: 120/91 (14 Sep 2024 12:39) (108/68 - 123/96)  BP(mean): --  RR: 19 (14 Sep 2024 12:39) (17 - 19)  SpO2: 98% (14 Sep 2024 12:39) (98% - 98%)    Parameters below as of 14 Sep 2024 12:39  Patient On (Oxygen Delivery Method): room air    
Vital Signs Last 24 Hrs  T(C): 36.4 (12 Sep 2024 00:00), Max: 36.4 (12 Sep 2024 00:00)  T(F): 97.5 (12 Sep 2024 00:00), Max: 97.5 (12 Sep 2024 00:00)  HR: 72 (12 Sep 2024 01:00) (72 - 98)  BP: 107/90 (12 Sep 2024 01:00) (107/90 - 117/75)  BP(mean): 94 (12 Sep 2024 01:00) (94 - 94)  RR: 20 (12 Sep 2024 01:00) (18 - 20)  SpO2: 98% (12 Sep 2024 01:00) (97% - 98%)    Parameters below as of 12 Sep 2024 01:00  Patient On (Oxygen Delivery Method): room air

## 2024-09-17 NOTE — PROVIDER CONTACT NOTE (OTHER) - BACKGROUND
Patient is AOx4 history of suicide ideation and suicide attempt. Patient has a history of anxiety and depression and was D/C off 1:1 today (9/17/24).

## 2024-09-17 NOTE — BH CONSULTATION LIAISON PROGRESS NOTE - NSBHCHARTREVIEWINVESTIGATE_PSY_A_CORE FT
< from: NM Nuclear Stress Pharmacologic Multiple (09.17.24 @ 11:41) >      ACC: [id number] EXAM:  NM NUCLEAR STRESS MULTI PHARM   ORDERED BY: CRIS HENDRICKSON     PROCEDURE DATE:  09/17/2024          INTERPRETATION:  INTERPRETATION: RADIOPHARMACEUTICAL: 9.8 mCi Tc-99m ?   Sestamibi IV at Rest and   29.7 mCi Tc99m- Sestamibi (Regadenoson) IV at Stress    CLINICAL INFORMATION: The patient is a 73 year old woman with ventricular   tachycardia, referred for nuclear stress test.      PATIENT PREPARATION: NPO after midnight, no caffeine.    STRESS PROTOCOL: Intravenous Regadenoson 5cc (0.4mg) was given rapidly   over 10 seconds. Immediately thereafter Tc99m- Sestamibi was injected.    A 12 lead EKG was recorded every minute and blood pressure was also   recorded during the test. The study was stopped when the protocol was   completed.    Electrocardiograph was reported as baseline sinus bradycardia, left   ventricular hypertrophy, premature ventricular complex and nonspecific ST   abnormalities.    There were no cardiac symptoms during the test.    SYMPTOMS TO INTERVENTION: none      TECHNIQUE:  At rest, 9.8 mCi Tc99m- sestamibi was injected intravenously   and SPECT myocardial perfusion imaging was performed approximately 1 hour   later. Immediately following the intravenous injection of Regadenoson,   29.7 mCi of Tc99m- Sestamibi was injected intravenously and gated SPECT   myocardial perfusion imaging was performed approximately 1 hour later.   Data were reconstructed in the cardiac standard short axis, horizontal   long axis and vertical long axis projections.    FINDINGS: The technical quality of the resting and post stress perfusion   images was adequate.    Review of resting and post stress myocardial scintigrams revealed   abnormal left ventricular perfusion.    There is a moderate sized, moderateintensity fixed perfusion defect of   the apical inferior, basal inferior and basal inferolateral wall   suggestive of infarct. No ischemia visualized.    Gated wall motion study revealed abnormal left ventricular wall motion.   There is akinesis of the apical inferior, basal inferior and basal   inferolateral wall. Transient ischemic dilation (TID) was absent.    Left ventricular ejection fraction was 53 %.      IMPRESSION:  Abnormal SPECT Myocardial Perfusion Imaging post rest and   post vasodilator.       There is a moderate sized, moderate intensity fixed perfusion defect   of the apical inferior, basal inferior and basal inferolateral wall   suggestive of infarct. No ischemia visualized.       Normal left ventricle ejection fraction of 53 % (normal: 50% or   greater).       There is akinesis of the apical inferior, basal inferior and basal   inferolateral wall.       Recommend clinical correlation with the above findings.      Please refer to cardiac stress test report.    Comparison: No prior study available.    --- End of Report ---            OCTAVIO PAT   This document has been electronically signed. Sep 17 2024 11:58AM    < end of copied text >

## 2024-09-17 NOTE — BH CONSULTATION LIAISON PROGRESS NOTE - CURRENT MEDICATION
MEDICATIONS  (STANDING):  aspirin enteric coated 81 milliGRAM(s) Oral daily  atorvastatin 80 milliGRAM(s) Oral at bedtime  enoxaparin Injectable 40 milliGRAM(s) SubCutaneous every 24 hours  escitalopram 10 milliGRAM(s) Oral daily  gabapentin 400 milliGRAM(s) Oral two times a day  influenza  Vaccine (HIGH DOSE) 0.5 milliLiter(s) IntraMuscular once  levothyroxine 112 MICROGram(s) Oral daily  melatonin 3 milliGRAM(s) Oral at bedtime  mirtazapine 15 milliGRAM(s) Oral at bedtime  polyethylene glycol 3350 17 Gram(s) Oral daily  QUEtiapine 50 milliGRAM(s) Oral at bedtime  regadenoson Injectable 0.4 milliGRAM(s) IV Push once    MEDICATIONS  (PRN):  acetaminophen     Tablet .. 650 milliGRAM(s) Oral every 6 hours PRN Temp greater or equal to 38C (100.4F), Mild Pain (1 - 3)  aluminum hydroxide/magnesium hydroxide/simethicone Suspension 30 milliLiter(s) Oral every 4 hours PRN Dyspepsia  calamine/zinc oxide Lotion 1 Application(s) Topical daily PRN Itching  hydrOXYzine hydrochloride 25 milliGRAM(s) Oral every 8 hours PRN Anxiety  melatonin 3 milliGRAM(s) Oral at bedtime PRN Insomnia  ondansetron Injectable 4 milliGRAM(s) IV Push every 8 hours PRN Nausea and/or Vomiting  
MEDICATIONS  (STANDING):  aspirin enteric coated 81 milliGRAM(s) Oral daily  atorvastatin 80 milliGRAM(s) Oral at bedtime  clotrimazole 2% Vaginal Cream 1 Applicatorful Vaginal at bedtime  enoxaparin Injectable 40 milliGRAM(s) SubCutaneous every 24 hours  escitalopram 10 milliGRAM(s) Oral daily  gabapentin 400 milliGRAM(s) Oral two times a day  influenza  Vaccine (HIGH DOSE) 0.5 milliLiter(s) IntraMuscular once  levothyroxine 112 MICROGram(s) Oral daily  melatonin 3 milliGRAM(s) Oral at bedtime  pantoprazole    Tablet 40 milliGRAM(s) Oral before breakfast  QUEtiapine 25 milliGRAM(s) Oral daily  traZODone 50 milliGRAM(s) Oral daily    MEDICATIONS  (PRN):  acetaminophen     Tablet .. 650 milliGRAM(s) Oral every 6 hours PRN Temp greater or equal to 38C (100.4F), Mild Pain (1 - 3)  aluminum hydroxide/magnesium hydroxide/simethicone Suspension 30 milliLiter(s) Oral every 4 hours PRN Dyspepsia  calamine/zinc oxide Lotion 1 Application(s) Topical daily PRN Itching  melatonin 3 milliGRAM(s) Oral at bedtime PRN Insomnia  ondansetron Injectable 4 milliGRAM(s) IV Push every 8 hours PRN Nausea and/or Vomiting  
MEDICATIONS  (STANDING):  aspirin enteric coated 81 milliGRAM(s) Oral daily  atorvastatin 80 milliGRAM(s) Oral at bedtime  enoxaparin Injectable 40 milliGRAM(s) SubCutaneous every 24 hours  escitalopram 10 milliGRAM(s) Oral daily  gabapentin 400 milliGRAM(s) Oral two times a day  influenza  Vaccine (HIGH DOSE) 0.5 milliLiter(s) IntraMuscular once  levothyroxine 112 MICROGram(s) Oral daily  melatonin 3 milliGRAM(s) Oral at bedtime  mirtazapine 15 milliGRAM(s) Oral at bedtime  QUEtiapine 50 milliGRAM(s) Oral at bedtime    MEDICATIONS  (PRN):  acetaminophen     Tablet .. 650 milliGRAM(s) Oral every 6 hours PRN Temp greater or equal to 38C (100.4F), Mild Pain (1 - 3)  aluminum hydroxide/magnesium hydroxide/simethicone Suspension 30 milliLiter(s) Oral every 4 hours PRN Dyspepsia  calamine/zinc oxide Lotion 1 Application(s) Topical daily PRN Itching  hydrOXYzine hydrochloride 25 milliGRAM(s) Oral every 8 hours PRN Anxiety  melatonin 3 milliGRAM(s) Oral at bedtime PRN Insomnia  ondansetron Injectable 4 milliGRAM(s) IV Push every 8 hours PRN Nausea and/or Vomiting  
MEDICATIONS  (STANDING):  aspirin enteric coated 81 milliGRAM(s) Oral daily  atorvastatin 80 milliGRAM(s) Oral at bedtime  clotrimazole 2% Vaginal Cream 1 Applicatorful Vaginal at bedtime  enoxaparin Injectable 40 milliGRAM(s) SubCutaneous every 24 hours  escitalopram 10 milliGRAM(s) Oral daily  gabapentin 400 milliGRAM(s) Oral two times a day  influenza  Vaccine (HIGH DOSE) 0.5 milliLiter(s) IntraMuscular once  levothyroxine 112 MICROGram(s) Oral daily  melatonin 3 milliGRAM(s) Oral at bedtime  mirtazapine 15 milliGRAM(s) Oral at bedtime  QUEtiapine 50 milliGRAM(s) Oral at bedtime    MEDICATIONS  (PRN):  acetaminophen     Tablet .. 650 milliGRAM(s) Oral every 6 hours PRN Temp greater or equal to 38C (100.4F), Mild Pain (1 - 3)  aluminum hydroxide/magnesium hydroxide/simethicone Suspension 30 milliLiter(s) Oral every 4 hours PRN Dyspepsia  calamine/zinc oxide Lotion 1 Application(s) Topical daily PRN Itching  hydrOXYzine hydrochloride 25 milliGRAM(s) Oral every 8 hours PRN Anxiety  melatonin 3 milliGRAM(s) Oral at bedtime PRN Insomnia  ondansetron Injectable 4 milliGRAM(s) IV Push every 8 hours PRN Nausea and/or Vomiting  
MEDICATIONS  (STANDING):  aspirin enteric coated 81 milliGRAM(s) Oral daily  atorvastatin 80 milliGRAM(s) Oral at bedtime  enoxaparin Injectable 40 milliGRAM(s) SubCutaneous every 24 hours  escitalopram 10 milliGRAM(s) Oral daily  gabapentin 400 milliGRAM(s) Oral two times a day  influenza  Vaccine (HIGH DOSE) 0.5 milliLiter(s) IntraMuscular once  levothyroxine 112 MICROGram(s) Oral daily  melatonin 3 milliGRAM(s) Oral at bedtime  mirtazapine 15 milliGRAM(s) Oral at bedtime  polyethylene glycol 3350 17 Gram(s) Oral daily  QUEtiapine 50 milliGRAM(s) Oral at bedtime  regadenoson Injectable 0.4 milliGRAM(s) IV Push once    MEDICATIONS  (PRN):  acetaminophen     Tablet .. 650 milliGRAM(s) Oral every 6 hours PRN Temp greater or equal to 38C (100.4F), Mild Pain (1 - 3)  aluminum hydroxide/magnesium hydroxide/simethicone Suspension 30 milliLiter(s) Oral every 4 hours PRN Dyspepsia  calamine/zinc oxide Lotion 1 Application(s) Topical daily PRN Itching  hydrOXYzine hydrochloride 25 milliGRAM(s) Oral every 8 hours PRN Anxiety  melatonin 3 milliGRAM(s) Oral at bedtime PRN Insomnia  ondansetron Injectable 4 milliGRAM(s) IV Push every 8 hours PRN Nausea and/or Vomiting

## 2024-09-17 NOTE — PROGRESS NOTE ADULT - SUBJECTIVE AND OBJECTIVE BOX
Patient is a 73y old  Female who presents with a chief complaint of long QT     No events overnight  Reports has not slept  Denies chest pain, SOB  Patient seen and examined at bedside.    ALLERGIES:  No Known Allergies    MEDICATIONS  (STANDING):  aspirin enteric coated 81 milliGRAM(s) Oral daily  atorvastatin 80 milliGRAM(s) Oral at bedtime  enoxaparin Injectable 40 milliGRAM(s) SubCutaneous every 24 hours  escitalopram 10 milliGRAM(s) Oral daily  gabapentin 400 milliGRAM(s) Oral two times a day  influenza  Vaccine (HIGH DOSE) 0.5 milliLiter(s) IntraMuscular once  levothyroxine 112 MICROGram(s) Oral daily  melatonin 3 milliGRAM(s) Oral at bedtime  mirtazapine 15 milliGRAM(s) Oral at bedtime  polyethylene glycol 3350 17 Gram(s) Oral daily  QUEtiapine 50 milliGRAM(s) Oral at bedtime  regadenoson Injectable 0.4 milliGRAM(s) IV Push once    MEDICATIONS  (PRN):  acetaminophen     Tablet .. 650 milliGRAM(s) Oral every 6 hours PRN Temp greater or equal to 38C (100.4F), Mild Pain (1 - 3)  aluminum hydroxide/magnesium hydroxide/simethicone Suspension 30 milliLiter(s) Oral every 4 hours PRN Dyspepsia  calamine/zinc oxide Lotion 1 Application(s) Topical daily PRN Itching  hydrOXYzine hydrochloride 25 milliGRAM(s) Oral every 8 hours PRN Anxiety  melatonin 3 milliGRAM(s) Oral at bedtime PRN Insomnia  ondansetron Injectable 4 milliGRAM(s) IV Push every 8 hours PRN Nausea and/or Vomiting    Vital Signs Last 24 Hrs  T(F): 97.6 (17 Sep 2024 05:20), Max: 98.3 (16 Sep 2024 21:58)  HR: 84 (17 Sep 2024 05:20) (65 - 84)  BP: 120/73 (17 Sep 2024 05:20) (119/74 - 129/77)  RR: 17 (17 Sep 2024 05:20) (16 - 17)  SpO2: 97% (17 Sep 2024 05:20) (96% - 97%)  I&O's Summary      PHYSICAL EXAM:  General: NAD, A/O x 3  ENT: MMM, no scleral icterus  Neck: Supple, No JVD, no thyroidomegaly  Lungs: Clear to auscultation bilaterally, no wheezes, no rales, no rhonchi, good inspiratory effort  Cardio: RRR, S1/S2, No murmurs  Abdomen: Soft, Nontender, Nondistended; Bowel sounds present  Extremities: No calf tenderness, No pitting edema, no skin changes    LABS:                        13.1   7.48  )-----------( 220      ( 17 Sep 2024 07:13 )             40.2       09-17    141  |  106  |  10  ----------------------------<  102  3.6   |  26  |  0.64    Ca    9.7      17 Sep 2024 07:13  Mg     1.7     09-17 -13 Chol 141 mg/dL LDL -- HDL 44 mg/dL Trig 108 mg/dL    Urinalysis Basic - ( 17 Sep 2024 07:13 )    Color: x / Appearance: x / SG: x / pH: x  Gluc: 102 mg/dL / Ketone: x  / Bili: x / Urobili: x   Blood: x / Protein: x / Nitrite: x   Leuk Esterase: x / RBC: x / WBC x   Sq Epi: x / Non Sq Epi: x / Bacteria: x    COVID-19 PCR: NotDetec (09-10-24 @ 23:30)  COVID-19 PCR: NotDetec (09-10-24 @ 23:30)

## 2024-09-17 NOTE — BH CONSULTATION LIAISON PROGRESS NOTE - NSBHCONSULTPRIMARYDISCUSSYES_PSY_A_CORE FT
See above. 
See above. 
See above.  Discussed case w/ social work. 
See above. 
See above.  Discussed w/ Dr. Reno

## 2024-09-17 NOTE — BH CONSULTATION LIAISON PROGRESS NOTE - NSBHCONSULTRECOMMENDOTHER_PSY_A_CORE FT
- Continue with Lexapro 10mg daily  - Decrease Seroquel to 25mg qhs  - Continue with Remeron 15mg qhs  - Patient is currently awaiting a cardiac catheterization, and their disposition remains uncertain at this time. Following the procedure and subsequent medical clearance, it is recommended that referrals be made for inpatient substance use rehab.

## 2024-09-17 NOTE — PROGRESS NOTE ADULT - SUBJECTIVE AND OBJECTIVE BOX
[facility]  [id number]      Chief Complaint: Altered mental status/Alcohol intoxication/Depression/Prolonged QTc    Interval History: The patient denies palpitations, chest pain or shortness of breath.     Tele: sinus 70s BPM, brief NSVT on 9/15, recurrent NSVT      Current meds:   acetaminophen     Tablet .. 650 milliGRAM(s) Oral every 6 hours PRN  aluminum hydroxide/magnesium hydroxide/simethicone Suspension 30 milliLiter(s) Oral every 4 hours PRN  aspirin enteric coated 81 milliGRAM(s) Oral daily  atorvastatin 80 milliGRAM(s) Oral at bedtime  calamine/zinc oxide Lotion 1 Application(s) Topical daily PRN  enoxaparin Injectable 40 milliGRAM(s) SubCutaneous every 24 hours  escitalopram 10 milliGRAM(s) Oral daily  gabapentin 400 milliGRAM(s) Oral two times a day  hydrOXYzine hydrochloride 25 milliGRAM(s) Oral every 8 hours PRN  influenza  Vaccine (HIGH DOSE) 0.5 milliLiter(s) IntraMuscular once  levothyroxine 112 MICROGram(s) Oral daily  melatonin 3 milliGRAM(s) Oral at bedtime PRN  melatonin 3 milliGRAM(s) Oral at bedtime  mirtazapine 15 milliGRAM(s) Oral at bedtime  ondansetron Injectable 4 milliGRAM(s) IV Push every 8 hours PRN  polyethylene glycol 3350 17 Gram(s) Oral daily  QUEtiapine 50 milliGRAM(s) Oral at bedtime  regadenoson Injectable 0.4 milliGRAM(s) IV Push once      Objective:     Vital Signs:   T(C): 36.4 (09-17-24 @ 05:20), Max: 36.8 (09-16-24 @ 21:58)  HR: 84 (09-17-24 @ 05:20) (65 - 84)  BP: 120/73 (09-17-24 @ 05:20) (119/74 - 129/77)  RR: 17 (09-17-24 @ 05:20) (16 - 17)  SpO2: 97% (09-17-24 @ 05:20) (96% - 97%)      Physical Exam:   General: no acute distress  Neck: supple   CVS: JVP ~ 7 cm H20, RRR, s1, s2  Pulm: unlabored respirations, clear to ausculation   Ext: no lower extremity edema b/l   Neuro: awake, alert and oriented    Labs:   17 Sep 2024 07:13    141    |  106    |  10     ----------------------------<  102    3.6     |  26     |  0.64     Ca    9.7        17 Sep 2024 07:13  Mg     1.7       17 Sep 2024 07:13                            13.1   7.48  )-----------( 220      ( 17 Sep 2024 07:13 )             40.2               ECG (9/13/24): sinus rhythm, PVC, QTc 463    TTE (9/15/24):  LVEF 50-55%
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
## 2024-09-17 NOTE — BH CONSULTATION LIAISON PROGRESS NOTE - OTHER
31wks; patient wants a letter for work excusing her for today and forward because work is too hard during pregnancy  
not tested 

## 2024-09-17 NOTE — BH CONSULTATION LIAISON PROGRESS NOTE - NSBHFUPINTERVALCCFT_PSY_A_CORE
"I'm so scared." 
"I just got off an emergency phone call with my bank." 
I slept 3 h" , documented that slept from 1am till 6 am 
"I'm not really great." 
"I do not sleep here"

## 2024-09-17 NOTE — BH CONSULTATION LIAISON PROGRESS NOTE - NSICDXBHPRIMARYDX_PSY_ALL_CORE
Sedative, hypnotic or anxiolytic abuse with withdrawal, uncomplicated   F13.130  
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
## 2024-09-17 NOTE — BH CONSULTATION LIAISON PROGRESS NOTE - NSICDXBHSECONDARYDX_PSY_ALL_CORE
Moderate episode of recurrent major depressive disorder   F33.1  Generalized anxiety disorder   F41.1  

## 2024-09-17 NOTE — PROVIDER CONTACT NOTE (OTHER) - ASSESSMENT
No complaints of pain or discomfort. Patient is anxious and requesting someone to stay with her overnight.

## 2024-09-17 NOTE — BH CONSULTATION LIAISON PROGRESS NOTE - NSBHFUPREASONCONS_PSY_A_CORE
depression
depression
suicidality/depression/sleep disturbance
anxiety/sleep disturbance
depression/sleep disturbance

## 2024-09-17 NOTE — BH CONSULTATION LIAISON PROGRESS NOTE - NSBHADMITIPOBS_PSY_A_CORE
Routine observation
Routine observation
Constant observation

## 2024-09-17 NOTE — PROGRESS NOTE ADULT - ASSESSMENT
Assessment:  Gale Garcia is a 73 year old woman with past medical history of Coronary artery disease (s/p PCI of OM1 in 2021), Hypertension and Major Depressive disorder with multiple previous psychiatric hospitalizations and prior suicide attempts was brought in by EMS due to son's concerns of patient being altered, having alcohol intoxication and misuse of Ambien.    Cardiology initially consulted due to history of prolonged QTc. Most recent ECG consistent with sinus rhythm and PVC with a normal QTc of 463. Longest QTc on ECGs this admission of 502 which is borderline. Echo report with normal LVEF 50-55%.     Recommendations:  [] Prolonged QTc: Resolved, likely secondary to multiple psychiatric medications; Trazodone, Seroquel and Lexapro which are being adjusted. Further management of psychiatric disorders per Psychiatry.  [] History of CAD: Patient denies angina or dyspnea, no signs of an acute coronary syndrome. Dr. Griffin is requesting inpatient nuclear stress testing due to concern for ventricular tachycardia, discussed with patient's son. Continue home CV meds.     We will continue to follow along.     Shayy Ledezma MD  Cardiology

## 2024-09-17 NOTE — BH CONSULTATION LIAISON PROGRESS NOTE - NSBHCHARTREVIEWLAB_PSY_A_CORE FT
Complete Blood Count (09.12.24 @ 07:00)   Nucleated RBC: 0 /100 WBCs  WBC Count: 4.72 K/uL  RBC Count: 4.87 M/uL  Hemoglobin: 14.2 g/dL  Hematocrit: 42.4 %  Mean Cell Volume: 87.1 fl  Mean Cell Hemoglobin: 29.2 pg  Mean Cell Hemoglobin Conc: 33.5 gm/dL  Red Cell Distrib Width: 13.8 %  Platelet Count - Automated: 229 K/uL    Comprehensive Metabolic Panel (09.12.24 @ 07:00)   Sodium: 139 mmol/L  Potassium: 4.1 mmol/L  Chloride: 104 mmol/L  Carbon Dioxide: 22 mmol/L  Anion Gap: 13 mmol/L  Blood Urea Nitrogen: 10 mg/dL  Creatinine: 0.67 mg/dL  Glucose: 106 mg/dL  Calcium: 10.6 mg/dL  Protein Total: 7.6 g/dL  Albumin: 3.6 g/dL  Bilirubin Total: 0.8 mg/dL  Alkaline Phosphatase: 164 U/L  Aspartate Aminotransferase (AST/SGOT): 18 U/L  Alanine Aminotransferase (ALT/SGPT): 17 U/L  eGFR: 91: 
Basic Metabolic Panel in AM (09.16.24 @ 06:45)   Sodium: 141 mmol/L  Potassium: 3.7 mmol/L  Chloride: 106 mmol/L  Carbon Dioxide: 25 mmol/L  Anion Gap: 10 mmol/L  Blood Urea Nitrogen: 11 mg/dL  Creatinine: 0.64 mg/dL  Glucose: 96 mg/dL  Calcium: 9.5 mg/dL  eGFR: 92: 
Complete Blood Count (09.12.24 @ 07:00)   Nucleated RBC: 0 /100 WBCs  WBC Count: 4.72 K/uL  RBC Count: 4.87 M/uL  Hemoglobin: 14.2 g/dL  Hematocrit: 42.4 %  Mean Cell Volume: 87.1 fl  Mean Cell Hemoglobin: 29.2 pg  Mean Cell Hemoglobin Conc: 33.5 gm/dL  Red Cell Distrib Width: 13.8 %  Platelet Count - Automated: 229 K/uL    Comprehensive Metabolic Panel (09.12.24 @ 07:00)   Sodium: 139 mmol/L  Potassium: 4.1 mmol/L  Chloride: 104 mmol/L  Carbon Dioxide: 22 mmol/L  Anion Gap: 13 mmol/L  Blood Urea Nitrogen: 10 mg/dL  Creatinine: 0.67 mg/dL  Glucose: 106 mg/dL  Calcium: 10.6 mg/dL  Protein Total: 7.6 g/dL  Albumin: 3.6 g/dL  Bilirubin Total: 0.8 mg/dL  Alkaline Phosphatase: 164 U/L  Aspartate Aminotransferase (AST/SGOT): 18 U/L  Alanine Aminotransferase (ALT/SGPT): 17 U/L  eGFR: 91: 
Complete Blood Count (09.12.24 @ 07:00)   Nucleated RBC: 0 /100 WBCs  WBC Count: 4.72 K/uL  RBC Count: 4.87 M/uL  Hemoglobin: 14.2 g/dL  Hematocrit: 42.4 %  Mean Cell Volume: 87.1 fl  Mean Cell Hemoglobin: 29.2 pg  Mean Cell Hemoglobin Conc: 33.5 gm/dL  Red Cell Distrib Width: 13.8 %  Platelet Count - Automated: 229 K/uL    Comprehensive Metabolic Panel (09.12.24 @ 07:00)   Sodium: 139 mmol/L  Potassium: 4.1 mmol/L  Chloride: 104 mmol/L  Carbon Dioxide: 22 mmol/L  Anion Gap: 13 mmol/L  Blood Urea Nitrogen: 10 mg/dL  Creatinine: 0.67 mg/dL  Glucose: 106 mg/dL  Calcium: 10.6 mg/dL  Protein Total: 7.6 g/dL  Albumin: 3.6 g/dL  Bilirubin Total: 0.8 mg/dL  Alkaline Phosphatase: 164 U/L  Aspartate Aminotransferase (AST/SGOT): 18 U/L  Alanine Aminotransferase (ALT/SGPT): 17 U/L  eGFR: 91: 
Complete Blood Count in AM (09.17.24 @ 07:13)   Nucleated RBC: 0 /100 WBCs  WBC Count: 7.48 K/uL  RBC Count: 4.56 M/uL  Hemoglobin: 13.1 g/dL  Hematocrit: 40.2 %  Mean Cell Volume: 88.2 fl  Mean Cell Hemoglobin: 28.7 pg  Mean Cell Hemoglobin Conc: 32.6 gm/dL  Red Cell Distrib Width: 13.8 %  Platelet Count - Automated: 220 K/uL    Basic Metabolic Panel in AM (09.17.24 @ 07:13)   Sodium: 141 mmol/L  Potassium: 3.6 mmol/L  Chloride: 106 mmol/L  Carbon Dioxide: 26 mmol/L  Anion Gap: 9 mmol/L  Blood Urea Nitrogen: 10 mg/dL  Creatinine: 0.64 mg/dL  Glucose: 102 mg/dL  Calcium: 9.7 mg/dL  eGFR: 92:

## 2024-09-17 NOTE — BH CONSULTATION LIAISON PROGRESS NOTE - NSBHATTESTBILLING_PSY_A_CORE
83774-Kgjwemehgo OBS or IP - high complexity OR 50-79 mins
88217-Aostxmskji OBS or IP - moderate complexity OR 35-49 mins
50907-Cmovldwhcc OBS or IP - high complexity OR 50-79 mins
64617-Ippkigjxta OBS or IP - high complexity OR 50-79 mins
73026-Kxphqfkybd OBS or IP - moderate complexity OR 35-49 mins

## 2024-09-17 NOTE — PROGRESS NOTE ADULT - ASSESSMENT
72 y/o F, , retired , recently living with her son, with PMH significant for h/o CAD s/p PCI of OM1 (100% occlussion ) in 11/2021, MI, hypertension, with PPH significant for MDD, multiple (>10) previous psychiatric hospitalizations, h/o 2-3 past suicide attempts, has an outpatient psychiatrist Dr. Ryan Sparrow she hasn't seen in 2 months, presented due to misuse of alcohol with ambien and her other psych medicine, patient was supposed to be discharge to inpatient psych, however was not accepted due to persistent elevated qtc upon ekg Inpatient psych require cardio evaluation before admission.    #Major depressive disorder  #Bipolar disorder   - History of suicide attempts and inpatient psych admissions for episodes of MDD  - Continue with Lexapro 10mg daily, Seroquel increased to 50mg ghs, mirtazepine 15mg qhs, discontinued trazodone  - We discontinued constant observation today  - Patient was supposed to be discharge to inpatient psych at [facility] however was not accepted due to elevated QTC in 500s   - Psych consult - inpt psych recommended vs inpatient substance abuse rehab program   - PT - home PT   - B12/folate/TSH reviewed     #Prolonged QTC 2/2 psychiatric regimen   #36 beats of SVT, asymptomatic 9/15  - recent EKG <500 qtc   - EKG previously showed QTC ranging above 490 to 502 and LVH  - patient asymptomatic  - Continue tele monitoring   - optimize electrolytes   - Cardio following-stress test for today  - TTE reviewed LVEF 50-55%    #CAD s/p stent in 11/2021  #HLD  #HTN  - As per records from son patient is on both asa 81 and Brilinta 90mg BID   - Per cardio, patient does not require brilinta - hold   - continue with ASA 81mg   - We stopped metoprolol given hypotension  - continue statin     #Vaginal pruritis/yeast infection-resolved  - patient complaining of itching  - ordered clotrimazole vaginal cream    - given one time dose of fluconazole 150mg     #Hypothyroidism  - continue synthroid 112mcg daily  - TSH reviewed    #GERD  - discontinued protonix due to possible QTC prolongation     DVT ppx- lovenox     GOC full code     Dispo: pending disposition for inpatient substance abuse program  CM to follow     Contreras Wall updated on plan of care, answered all questions, in agreement with care plan

## 2024-09-18 ENCOUNTER — TRANSCRIPTION ENCOUNTER (OUTPATIENT)
Age: 74
End: 2024-09-18

## 2024-09-18 PROCEDURE — 99232 SBSQ HOSP IP/OBS MODERATE 35: CPT

## 2024-09-18 PROCEDURE — 99233 SBSQ HOSP IP/OBS HIGH 50: CPT

## 2024-09-18 RX ADMIN — Medication 3 MILLIGRAM(S): at 21:17

## 2024-09-18 RX ADMIN — Medication 81 MILLIGRAM(S): at 12:10

## 2024-09-18 RX ADMIN — Medication 80 MILLIGRAM(S): at 21:18

## 2024-09-18 RX ADMIN — Medication 400 MILLIGRAM(S): at 17:39

## 2024-09-18 RX ADMIN — Medication 400 MILLIGRAM(S): at 05:26

## 2024-09-18 RX ADMIN — ESCITALOPRAM OXALATE 10 MILLIGRAM(S): 10 TABLET ORAL at 12:10

## 2024-09-18 RX ADMIN — Medication 112 MICROGRAM(S): at 05:26

## 2024-09-18 RX ADMIN — Medication 25 MILLIGRAM(S): at 23:19

## 2024-09-18 RX ADMIN — Medication 15 MILLIGRAM(S): at 21:18

## 2024-09-18 RX ADMIN — Medication 25 MILLIGRAM(S): at 19:46

## 2024-09-18 RX ADMIN — ENOXAPARIN SODIUM 40 MILLIGRAM(S): 100 INJECTION SUBCUTANEOUS at 06:33

## 2024-09-18 RX ADMIN — Medication 25 MILLIGRAM(S): at 05:26

## 2024-09-18 NOTE — DISCHARGE NOTE NURSING/CASE MANAGEMENT/SOCIAL WORK - PATIENT PORTAL LINK FT
Uric acid stone  Check uric acid with next set of labs  F/U with nephrology and urology
You can access the FollowMyHealth Patient Portal offered by [facility] by registering at the following website: [email or web address]. By joining [employer]’s FollowMyHealth portal, you will also be able to view your health information using other applications (apps) compatible with our system.

## 2024-09-18 NOTE — PROGRESS NOTE ADULT - ASSESSMENT
Assessment:  Gale Garcia is a 73 year old woman with past medical history of Coronary artery disease (s/p PCI of OM1 in 2021), Hypertension and Major Depressive disorder with multiple previous psychiatric hospitalizations and prior suicide attempts was brought in by EMS due to son's concerns of patient being altered, having alcohol intoxication and misuse of Ambien, with episodes of ventricular tachycardia, now with abnormal nuclear stress test.    Cardiology initially consulted due to history of prolonged QTc. Most recent ECG consistent with sinus rhythm and PVC with a normal QTc of 463. Longest QTc on ECGs this admission of 502 which is borderline. Echo report with normal LVEF 50-55%.     Recommendations:  [] Non sustained monomorphic VT: Nuclear stress test is abnormal and consistent with moderate sized infarct of the apical inferior, basal inferior and basal inferolateral wall, no ischemia visualized. Given these findings and history of CAD recommend coronary angiogram to evaluate if PCI needed, risks/benefits/alternatives were discussed with patient and son, Anirudh ([phone]) and they agree to proceed. Patient understands she has to take DAPT if PCI placed or there is risk for stent thrombosis.   [] Prolonged QTc: Resolved, likely secondary to multiple psychiatric medications; Trazodone, Seroquel and Lexapro which are being adjusted. Further management of psychiatric disorders per Psychiatry.     Will sign out this case to cardiologist to follow along tomorrow.    Shayy Ledezma MD  Cardiology           Assessment:  Gale Garcia is a 73 year old woman with past medical history of Coronary artery disease (s/p PCI of OM1 in 2021), Hypertension and Major Depressive disorder with multiple previous psychiatric hospitalizations and prior suicide attempts was brought in by EMS due to son's concerns of patient being altered, having alcohol intoxication and misuse of Ambien, with episodes of ventricular tachycardia, now with abnormal nuclear stress test.    Cardiology initially consulted due to history of prolonged QTc. Most recent ECG consistent with sinus rhythm and PVC with a normal QTc of 463. Longest QTc on ECGs this admission of 502 which is borderline. Echo report with normal LVEF 50-55%.     Recommendations:  [] Non sustained monomorphic VT: Nuclear stress test is abnormal and consistent with moderate sized infarct of the apical inferior, basal inferior and basal inferolateral wall, no ischemia visualized. Given these findings and history of CAD with RCA disease that may have progressed, recommend coronary angiogram to evaluate if PCI needed, risks/benefits/alternatives were discussed with patient and son, Anirudh ([phone]) and they agree to proceed. Confirmed with Psychiatry and Anirudh is HCP to give consent. Patient understands she has to take DAPT if PCI placed or there is risk for stent thrombosis.   [] Prolonged QTc: Resolved, likely secondary to multiple psychiatric medications; Trazodone, Seroquel and Lexapro which are being adjusted. Further management of psychiatric disorders per Psychiatry.     Discussed case with [facility] interventional cardiologist, Dr. Nikunj Person and patient is accepted for coronary angiogram for tomorrow. Primary team to arrange for transfer to hospitalist team at [facility], updated Dr. Reno.     Will sign out this case to cardiologist to follow along tomorrow.    Shayy Ledezma MD  Cardiology           Assessment:  Gale Garcia is a 73 year old woman with past medical history of Coronary artery disease (s/p PCI of OM1 in 2021), Hypertension and Major Depressive disorder with multiple previous psychiatric hospitalizations and prior suicide attempts was brought in by EMS due to son's concerns of patient being altered, having alcohol intoxication and misuse of Ambien, with episodes of ventricular tachycardia, now with abnormal nuclear stress test.    Cardiology initially consulted due to history of prolonged QTc. Most recent ECG consistent with sinus rhythm and PVC with a normal QTc of 463. Longest QTc on ECGs this admission of 502 which is borderline. Echo report with normal LVEF 50-55%.     Recommendations:  [] Non sustained monomorphic VT: Nuclear stress test is abnormal and consistent with moderate sized infarct of the apical inferior, basal inferior and basal inferolateral wall, no ischemia visualized. Given these findings and history of CAD with RCA disease that may have progressed, recommend coronary angiogram to evaluate if PCI needed, risks/benefits/alternatives were discussed with patient and son, Anirudh ([phone]) and they agree to proceed. Confirmed with Psychiatry and Anirudh is HCP to give consent. Patient understands she has to take DAPT if PCI placed or there is risk for stent thrombosis. Continue Aspirin 81 mg daily and Atorvastatin 80 mg daily, beta blocker held due to bradycardia. Will also benefit from Cardiac EP evaluation at [facility].  [] Prolonged QTc: Resolved, likely secondary to multiple psychiatric medications; Trazodone, Seroquel and Lexapro which are being adjusted. Further management of psychiatric disorders per Psychiatry.     Discussed case with [facility] interventional cardiologist, Dr. Nikunj Person and patient is accepted for coronary angiogram for tomorrow. Primary team to arrange for transfer to hospitalist team at [facility], updated Dr. Reno.     Will sign out this case to cardiologist to follow along tomorrow.    Shayy Ledezma MD  Cardiology

## 2024-09-18 NOTE — PROGRESS NOTE ADULT - SUBJECTIVE AND OBJECTIVE BOX
Patient is a 73y old  Female who presents with a chief complaint of long QT (18 Sep 2024 09:14)    Reports feeling well  Denies chest pain, SOB  Tolerating diet  Patient seen and examined at bedside.    ALLERGIES:  adhesives (Rash)  Gadavist (Rash; Urticaria; Hives)    MEDICATIONS  (STANDING):  aspirin enteric coated 81 milliGRAM(s) Oral daily  atorvastatin 80 milliGRAM(s) Oral at bedtime  enoxaparin Injectable 40 milliGRAM(s) SubCutaneous every 24 hours  escitalopram 10 milliGRAM(s) Oral daily  gabapentin 400 milliGRAM(s) Oral two times a day  influenza  Vaccine (HIGH DOSE) 0.5 milliLiter(s) IntraMuscular once  levothyroxine 112 MICROGram(s) Oral daily  melatonin 3 milliGRAM(s) Oral at bedtime  mirtazapine 15 milliGRAM(s) Oral at bedtime  polyethylene glycol 3350 17 Gram(s) Oral daily  QUEtiapine 25 milliGRAM(s) Oral <User Schedule>  regadenoson Injectable 0.4 milliGRAM(s) IV Push once    MEDICATIONS  (PRN):  acetaminophen     Tablet .. 650 milliGRAM(s) Oral every 6 hours PRN Temp greater or equal to 38C (100.4F), Mild Pain (1 - 3)  aluminum hydroxide/magnesium hydroxide/simethicone Suspension 30 milliLiter(s) Oral every 4 hours PRN Dyspepsia  calamine/zinc oxide Lotion 1 Application(s) Topical daily PRN Itching  hydrOXYzine hydrochloride 25 milliGRAM(s) Oral every 8 hours PRN Anxiety  melatonin 3 milliGRAM(s) Oral at bedtime PRN Insomnia  ondansetron Injectable 4 milliGRAM(s) IV Push every 8 hours PRN Nausea and/or Vomiting    Vital Signs Last 24 Hrs  T(F): 97.9 (17 Sep 2024 19:02), Max: 97.9 (17 Sep 2024 19:02)  HR: 71 (17 Sep 2024 19:02) (65 - 71)  BP: 161/85 (17 Sep 2024 19:02) (134/76 - 161/85)  RR: 18 (17 Sep 2024 19:02) (17 - 18)  SpO2: 98% (17 Sep 2024 19:02) (98% - 98%)  I&O's Summary      PHYSICAL EXAM:  General: NAD, A/O x 3  ENT: MMM, no scleral icterus  Neck: Supple, No JVD, no thyroidomegaly  Lungs: Clear to auscultation bilaterally, no wheezes, no rales, no rhonchi, good inspiratory effort  Cardio: RRR, S1/S2, No murmurs  Abdomen: Soft, Nontender, Nondistended; Bowel sounds present  Extremities: No calf tenderness, No pitting edema, no skin changes    LABS:                        13.1   7.48  )-----------( 220      ( 17 Sep 2024 07:13 )             40.2       09-17    141  |  106  |  10  ----------------------------<  102  3.6   |  26  |  0.64    Ca    9.7      17 Sep 2024 07:13  Mg     1.7     09-17    09-13 Chol 141 mg/dL LDL -- HDL 44 mg/dL Trig 108 mg/dL    Urinalysis Basic - ( 17 Sep 2024 07:13 )    Color: x / Appearance: x / SG: x / pH: x  Gluc: 102 mg/dL / Ketone: x  / Bili: x / Urobili: x   Blood: x / Protein: x / Nitrite: x   Leuk Esterase: x / RBC: x / WBC x   Sq Epi: x / Non Sq Epi: x / Bacteria: x    OVID-19 PCR: NotDetec (09-10-24 @ 23:30)  COVID-19 PCR: NotDetec (09-10-24 @ 23:30)  COVID-19 PCR: NotDetec (08-12-24 @ 21:47)  COVID-19 PCR: NotDetec (06-04-24 @ 23:31)  COVID-19 PCR: NotDetec (07-08-23 @ 12:46)  COVID-19 PCR: NotDetec (07-03-23 @ 18:52)

## 2024-09-18 NOTE — PROGRESS NOTE ADULT - ASSESSMENT
74 y/o F, , retired , recently living with her son, with PMH significant for h/o CAD s/p PCI of OM1 (100% occlussion ) in 11/2021, MI, hypertension, with PPH significant for MDD, multiple (>10) previous psychiatric hospitalizations, h/o 2-3 past suicide attempts, has an outpatient psychiatrist Dr. Ryan Sparrow she hasn't seen in 2 months, presented due to misuse of alcohol with ambien and her other psych medicine, patient was supposed to be discharge to inpatient psych, however was not accepted due to persistent elevated qtc upon ekg Inpatient psych require cardio evaluation before admission.    #Prolonged QTC 2/2 psychiatric regimen   #36 beats of SVT, asymptomatic 9/15  - Stress test positive; patient to be transferred to [facility] under Dr Nikunj Person for cath  - C/w telemetry for now  - C/w ASA, statin  - TTE reviewed LVEF 50-55%    #CAD s/p stent in 11/2021  #HLD  #HTN  - As per records from son patient is on both asa 81 and Brilinta 90mg BID   - Per cardio, patient does not require brilinta - hold   - continue with ASA 81mg   - We stopped metoprolol given hypotension  - continue statin     #Major depressive disorder  #Bipolar disorder   - History of suicide attempts and inpatient psych admissions for episodes of MDD  - Continue with Lexapro 10mg daily, Seroquel 25mg ghs, mirtazepine 15mg qhs, discontinued trazodone  - Psych consult - inpt psych recommended vs inpatient substance abuse rehab program   - PT - home PT   - B12/folate/TSH reviewed     #Vaginal pruritis/yeast infection-resolved  - patient complaining of itching  - ordered clotrimazole vaginal cream    - given one time dose of fluconazole 150mg     #Hypothyroidism  - continue synthroid 112mcg daily  - TSH reviewed    #GERD  - discontinued protonix due to possible QTC prolongation     DVT ppx- lovenox   GOC full code     Dispo: transfer to [facility] for angiogram; CM to follow to inpatient rehab program on discharge     Contreras Wall updated on plan of care, answered all questions, in agreement with care plan

## 2024-09-18 NOTE — PROGRESS NOTE ADULT - SUBJECTIVE AND OBJECTIVE BOX
DAIJA ESTEVEZ  [id number]        Chief Complant: Altered mental status/Alcohol intoxication/Depression/Prolonged QTc/Abnormal nuclear stress test    Interval History: The patient denies palpitations, chest pain or shortness of breath.     Tele: sinus 50s BPM      Current meds:   acetaminophen     Tablet .. 650 milliGRAM(s) Oral every 6 hours PRN  aluminum hydroxide/magnesium hydroxide/simethicone Suspension 30 milliLiter(s) Oral every 4 hours PRN  aspirin enteric coated 81 milliGRAM(s) Oral daily  atorvastatin 80 milliGRAM(s) Oral at bedtime  calamine/zinc oxide Lotion 1 Application(s) Topical daily PRN  enoxaparin Injectable 40 milliGRAM(s) SubCutaneous every 24 hours  escitalopram 10 milliGRAM(s) Oral daily  gabapentin 400 milliGRAM(s) Oral two times a day  hydrOXYzine hydrochloride 25 milliGRAM(s) Oral every 8 hours PRN  influenza  Vaccine (HIGH DOSE) 0.5 milliLiter(s) IntraMuscular once  levothyroxine 112 MICROGram(s) Oral daily  melatonin 3 milliGRAM(s) Oral at bedtime PRN  melatonin 3 milliGRAM(s) Oral at bedtime  mirtazapine 15 milliGRAM(s) Oral at bedtime  ondansetron Injectable 4 milliGRAM(s) IV Push every 8 hours PRN  polyethylene glycol 3350 17 Gram(s) Oral daily  QUEtiapine 25 milliGRAM(s) Oral <User Schedule>  regadenoson Injectable 0.4 milliGRAM(s) IV Push once      Objective:     Vital Signs:   T(C): 36.6 (09-17-24 @ 19:02), Max: 36.6 (09-17-24 @ 19:02)  HR: 71 (09-17-24 @ 19:02) (65 - 71)  BP: 161/85 (09-17-24 @ 19:02) (134/76 - 161/85)  RR: 18 (09-17-24 @ 19:02) (17 - 18)  SpO2: 98% (09-17-24 @ 19:02) (98% - 98%)  Wt(kg): --      Physical Exam:   General: no acute distress  Neck: supple   CVS: JVP ~ 7 cm H20, bradycardic, s1, s2  Pulm: unlabored respirations, clear to ausculation   Ext: no lower extremity edema b/l   Neuro: awake, alert and oriented      Labs:   17 Sep 2024 07:13    141    |  106    |  10     ----------------------------<  102    3.6     |  26     |  0.64     Ca    9.7        17 Sep 2024 07:13  Mg     1.7       17 Sep 2024 07:13                            13.1   7.48  )-----------( 220      ( 17 Sep 2024 07:13 )             40.2           Coronary angiogram (11/2021):  LM: mild atherosclerosis  LAD: mild atherosclerosis   LCx/OM1: tubular 100 % stenosis with thrombus s/p PCI   RCA: mid 40% stenosis   RPDA: discrete 50% stenosis       ECG (9/13/24): sinus rhythm, PVC, QTc 463    TTE (9/15/24):  LVEF 50-55%    Nuclear stress test (9/17/24):  IMPRESSION:  Abnormal SPECT Myocardial Perfusion Imaging post rest and post vasodilator.  There is a moderate sized, moderate intensity fixed perfusion defect of the apical inferior, basal inferior and basal inferolateral wall suggestive of infarct. No ischemia visualized.  Normal left ventricle ejection fraction of 53 % (normal: 50% or   greater).  There is akinesis of the apical inferior, basal inferior and basal   inferolateral wall.  Recommend clinical correlation with the above findings.

## 2024-09-19 PROCEDURE — 99239 HOSP IP/OBS DSCHRG MGMT >30: CPT

## 2024-09-19 PROCEDURE — 99232 SBSQ HOSP IP/OBS MODERATE 35: CPT | Mod: FS

## 2024-09-19 RX ADMIN — ENOXAPARIN SODIUM 40 MILLIGRAM(S): 100 INJECTION SUBCUTANEOUS at 06:29

## 2024-09-19 RX ADMIN — Medication 30 MILLILITER(S): at 08:16

## 2024-09-19 RX ADMIN — Medication 81 MILLIGRAM(S): at 11:56

## 2024-09-19 RX ADMIN — Medication 3 MILLIGRAM(S): at 21:46

## 2024-09-19 RX ADMIN — Medication 15 MILLIGRAM(S): at 21:46

## 2024-09-19 RX ADMIN — ESCITALOPRAM OXALATE 10 MILLIGRAM(S): 10 TABLET ORAL at 11:56

## 2024-09-19 RX ADMIN — Medication 400 MILLIGRAM(S): at 17:32

## 2024-09-19 RX ADMIN — Medication 112 MICROGRAM(S): at 05:16

## 2024-09-19 RX ADMIN — Medication 400 MILLIGRAM(S): at 05:16

## 2024-09-19 RX ADMIN — Medication 25 MILLIGRAM(S): at 21:46

## 2024-09-19 RX ADMIN — Medication 80 MILLIGRAM(S): at 21:45

## 2024-09-19 RX ADMIN — Medication 25 MILLIGRAM(S): at 19:30

## 2024-09-19 NOTE — PROGRESS NOTE ADULT - REASON FOR ADMISSION
long QT

## 2024-09-19 NOTE — PROGRESS NOTE ADULT - SUBJECTIVE AND OBJECTIVE BOX
DAIJA ESTEVEZ  [id number]        Chief Complant: Altered mental status/Alcohol intoxication/Depression/Prolonged QTc/Abnormal nuclear stress test    Interval History: The patient denies palpitations, chest pain or shortness of breath.     Tele: sinus 60s BPM with PVCs      Current meds:   acetaminophen     Tablet .. 650 milliGRAM(s) Oral every 6 hours PRN  aluminum hydroxide/magnesium hydroxide/simethicone Suspension 30 milliLiter(s) Oral every 4 hours PRN  aspirin enteric coated 81 milliGRAM(s) Oral daily  atorvastatin 80 milliGRAM(s) Oral at bedtime  calamine/zinc oxide Lotion 1 Application(s) Topical daily PRN  enoxaparin Injectable 40 milliGRAM(s) SubCutaneous every 24 hours  escitalopram 10 milliGRAM(s) Oral daily  gabapentin 400 milliGRAM(s) Oral two times a day  hydrOXYzine hydrochloride 25 milliGRAM(s) Oral every 8 hours PRN  influenza  Vaccine (HIGH DOSE) 0.5 milliLiter(s) IntraMuscular once  levothyroxine 112 MICROGram(s) Oral daily  melatonin 3 milliGRAM(s) Oral at bedtime PRN  melatonin 3 milliGRAM(s) Oral at bedtime  mirtazapine 15 milliGRAM(s) Oral at bedtime  ondansetron Injectable 4 milliGRAM(s) IV Push every 8 hours PRN  polyethylene glycol 3350 17 Gram(s) Oral daily  QUEtiapine 25 milliGRAM(s) Oral <User Schedule>  regadenoson Injectable 0.4 milliGRAM(s) IV Push once      Objective:     Vital Signs:   T(C): 36.6 (09-17-24 @ 19:02), Max: 36.6 (09-17-24 @ 19:02)  HR: 71 (09-17-24 @ 19:02) (65 - 71)  BP: 161/85 (09-17-24 @ 19:02) (134/76 - 161/85)  RR: 18 (09-17-24 @ 19:02) (17 - 18)  SpO2: 98% (09-17-24 @ 19:02) (98% - 98%)  Wt(kg): --      Physical Exam:   General: no acute distress  Neck: supple   CVS: JVP ~ 7 cm H20, bradycardic, s1, s2  Pulm: unlabored respirations, clear to ausculation   Ext: no lower extremity edema b/l   Neuro: awake, alert and oriented      Labs:   17 Sep 2024 07:13    141    |  106    |  10     ----------------------------<  102    3.6     |  26     |  0.64     Ca    9.7        17 Sep 2024 07:13  Mg     1.7       17 Sep 2024 07:13                            13.1   7.48  )-----------( 220      ( 17 Sep 2024 07:13 )             40.2           Coronary angiogram (11/2021):  LM: mild atherosclerosis  LAD: mild atherosclerosis   LCx/OM1: tubular 100 % stenosis with thrombus s/p PCI   RCA: mid 40% stenosis   RPDA: discrete 50% stenosis       ECG (9/13/24): sinus rhythm, PVC, QTc 463    TTE (9/15/24):  LVEF 50-55%    Nuclear stress test (9/17/24):  IMPRESSION:  Abnormal SPECT Myocardial Perfusion Imaging post rest and post vasodilator.  There is a moderate sized, moderate intensity fixed perfusion defect of the apical inferior, basal inferior and basal inferolateral wall suggestive of infarct. No ischemia visualized.  Normal left ventricle ejection fraction of 53 % (normal: 50% or   greater).  There is akinesis of the apical inferior, basal inferior and basal   inferolateral wall.  Recommend clinical correlation with the above findings.

## 2024-09-19 NOTE — PROGRESS NOTE ADULT - TIME BILLING
- Ordering, reviewing, and interpreting labs, testing, and imaging.  - Independently obtaining a review of systems and performing a physical exam  - Reviewing consultant documentation/recommendations.  - Counselling and educating patient regarding interpretation of aforementioned items and plan of care.

## 2024-09-19 NOTE — PROGRESS NOTE ADULT - NS ATTEND OPT1 GEN_ALL_CORE

## 2024-09-19 NOTE — PROGRESS NOTE ADULT - PROVIDER SPECIALTY LIST ADULT
Cardiology
Hospitalist
Cardiology
Hospitalist

## 2024-09-19 NOTE — PROGRESS NOTE ADULT - SUBJECTIVE AND OBJECTIVE BOX
Patient is a 73y old  Female who presents with a chief complaint of long QT (18 Sep 2024 09:14)    Slept well. Denies any chest pain or SOB. Understands that she is awaiting transfer to [facility]. Reports some abdominal cramping due to gas  ALLERGIES:  adhesives (Rash)  Gadavist (Rash; Urticaria; Hives)    MEDICATIONS  (STANDING):  aspirin enteric coated 81 milliGRAM(s) Oral daily  atorvastatin 80 milliGRAM(s) Oral at bedtime  enoxaparin Injectable 40 milliGRAM(s) SubCutaneous every 24 hours  escitalopram 10 milliGRAM(s) Oral daily  gabapentin 400 milliGRAM(s) Oral two times a day  influenza  Vaccine (HIGH DOSE) 0.5 milliLiter(s) IntraMuscular once  levothyroxine 112 MICROGram(s) Oral daily  melatonin 3 milliGRAM(s) Oral at bedtime  mirtazapine 15 milliGRAM(s) Oral at bedtime  polyethylene glycol 3350 17 Gram(s) Oral daily  QUEtiapine 25 milliGRAM(s) Oral <User Schedule>  regadenoson Injectable 0.4 milliGRAM(s) IV Push once    MEDICATIONS  (PRN):  acetaminophen     Tablet .. 650 milliGRAM(s) Oral every 6 hours PRN Temp greater or equal to 38C (100.4F), Mild Pain (1 - 3)  aluminum hydroxide/magnesium hydroxide/simethicone Suspension 30 milliLiter(s) Oral every 4 hours PRN Dyspepsia  calamine/zinc oxide Lotion 1 Application(s) Topical daily PRN Itching  hydrOXYzine hydrochloride 25 milliGRAM(s) Oral every 8 hours PRN Anxiety  melatonin 3 milliGRAM(s) Oral at bedtime PRN Insomnia  ondansetron Injectable 4 milliGRAM(s) IV Push every 8 hours PRN Nausea and/or Vomiting    Vital Signs Last 24 Hrs  T(F): 97.9 (17 Sep 2024 19:02), Max: 97.9 (17 Sep 2024 19:02)  HR: 71 (17 Sep 2024 19:02) (65 - 71)  BP: 161/85 (17 Sep 2024 19:02) (134/76 - 161/85)  RR: 18 (17 Sep 2024 19:02) (17 - 18)  SpO2: 98% (17 Sep 2024 19:02) (98% - 98%)  I&O's Summary      PHYSICAL EXAM:  General: NAD, A/O x 3  ENT: MMM, no scleral icterus  Neck: Supple, No JVD, no thyroidomegaly  Lungs: Clear to auscultation bilaterally, no wheezes, no rales, no rhonchi, good inspiratory effort  Cardio: RRR, S1/S2, No murmurs  Abdomen: Soft, Nontender, Nondistended; Bowel sounds present  Extremities: No calf tenderness, No pitting edema, no skin changes    LABS:                        13.1   7.48  )-----------( 220      ( 17 Sep 2024 07:13 )             40.2       09-17    141  |  106  |  10  ----------------------------<  102  3.6   |  26  |  0.64    Ca    9.7      17 Sep 2024 07:13  Mg     1.7     09-17    09-13 Chol 141 mg/dL LDL -- HDL 44 mg/dL Trig 108 mg/dL    Urinalysis Basic - ( 17 Sep 2024 07:13 )    Color: x / Appearance: x / SG: x / pH: x  Gluc: 102 mg/dL / Ketone: x  / Bili: x / Urobili: x   Blood: x / Protein: x / Nitrite: x   Leuk Esterase: x / RBC: x / WBC x   Sq Epi: x / Non Sq Epi: x / Bacteria: x    OVID-19 PCR: NotDetec (09-10-24 @ 23:30)  COVID-19 PCR: NotDetec (09-10-24 @ 23:30)  COVID-19 PCR: NotDetec (08-12-24 @ 21:47)  COVID-19 PCR: NotDetec (06-04-24 @ 23:31)  COVID-19 PCR: NotDetec (07-08-23 @ 12:46)  COVID-19 PCR: NotDetec (07-03-23 @ 18:52)         Patient is a 73y old  Female who presents with a chief complaint of long QT (18 Sep 2024 09:14)    Slept well. Denies any chest pain or SOB. Understands that she is awaiting transfer to [facility]. Reports some abdominal cramping due to gas and requested simethicone.     ALLERGIES:  adhesives (Rash)  Gadavist (Rash; Urticaria; Hives)    MEDICATIONS  (STANDING):  aspirin enteric coated 81 milliGRAM(s) Oral daily  atorvastatin 80 milliGRAM(s) Oral at bedtime  enoxaparin Injectable 40 milliGRAM(s) SubCutaneous every 24 hours  escitalopram 10 milliGRAM(s) Oral daily  gabapentin 400 milliGRAM(s) Oral two times a day  influenza  Vaccine (HIGH DOSE) 0.5 milliLiter(s) IntraMuscular once  levothyroxine 112 MICROGram(s) Oral daily  melatonin 3 milliGRAM(s) Oral at bedtime  mirtazapine 15 milliGRAM(s) Oral at bedtime  polyethylene glycol 3350 17 Gram(s) Oral daily  QUEtiapine 25 milliGRAM(s) Oral <User Schedule>  regadenoson Injectable 0.4 milliGRAM(s) IV Push once    MEDICATIONS  (PRN):  acetaminophen     Tablet .. 650 milliGRAM(s) Oral every 6 hours PRN Temp greater or equal to 38C (100.4F), Mild Pain (1 - 3)  aluminum hydroxide/magnesium hydroxide/simethicone Suspension 30 milliLiter(s) Oral every 4 hours PRN Dyspepsia  calamine/zinc oxide Lotion 1 Application(s) Topical daily PRN Itching  hydrOXYzine hydrochloride 25 milliGRAM(s) Oral every 8 hours PRN Anxiety  melatonin 3 milliGRAM(s) Oral at bedtime PRN Insomnia  ondansetron Injectable 4 milliGRAM(s) IV Push every 8 hours PRN Nausea and/or Vomiting    Vital Signs Last 24 Hrs  T(C): 36.6 (19 Sep 2024 05:15), Max: 36.8 (18 Sep 2024 19:10)  T(F): 97.8 (19 Sep 2024 05:15), Max: 98.2 (18 Sep 2024 19:10)  HR: 69 (19 Sep 2024 05:15) (63 - 76)  BP: 113/69 (19 Sep 2024 05:15) (113/69 - 159/100)  BP(mean): --  RR: 16 (19 Sep 2024 05:15) (16 - 17)  SpO2: 94% (19 Sep 2024 05:15) (94% - 99%)    Parameters below as of 19 Sep 2024 05:15  Patient On (Oxygen Delivery Method): room air    PHYSICAL EXAM:  General: NAD, A/O x 3  ENT: MMM, no scleral icterus  Neck: Supple, No JVD, no thyroidomegaly  Lungs: Clear to auscultation bilaterally, no wheezes, no rales, no rhonchi, good inspiratory effort  Cardio: RRR, S1/S2, No murmurs  Abdomen: Soft, Nontender, Nondistended; Bowel sounds present  Extremities: No calf tenderness, No pitting edema, no skin changes    LABS:                        13.1   7.48  )-----------( 220      ( 17 Sep 2024 07:13 )             40.2       09-17    141  |  106  |  10  ----------------------------<  102  3.6   |  26  |  0.64    Ca    9.7      17 Sep 2024 07:13  Mg     1.7     09-17    09-13 Chol 141 mg/dL LDL -- HDL 44 mg/dL Trig 108 mg/dL    Urinalysis Basic - ( 17 Sep 2024 07:13 )    Color: x / Appearance: x / SG: x / pH: x  Gluc: 102 mg/dL / Ketone: x  / Bili: x / Urobili: x   Blood: x / Protein: x / Nitrite: x   Leuk Esterase: x / RBC: x / WBC x   Sq Epi: x / Non Sq Epi: x / Bacteria: x    OVID-19 PCR: NotDetec (09-10-24 @ 23:30)  COVID-19 PCR: NotDetec (09-10-24 @ 23:30)  COVID-19 PCR: NotDetec (08-12-24 @ 21:47)  COVID-19 PCR: NotDetec (06-04-24 @ 23:31)  COVID-19 PCR: NotDetec (07-08-23 @ 12:46)  COVID-19 PCR: NotDetec (07-03-23 @ 18:52)         Patient is a 73y old  Female who presents with a chief complaint of long QT (18 Sep 2024 09:14)    Slept well. Denies any chest pain or SOB. Understands that she is awaiting transfer to [facility]. Reports some abdominal cramping due to gas and requested simethicone.     ALLERGIES:  adhesives (Rash)  Gadavist (Rash; Urticaria; Hives)    MEDICATIONS  (STANDING):  aspirin enteric coated 81 milliGRAM(s) Oral daily  atorvastatin 80 milliGRAM(s) Oral at bedtime  enoxaparin Injectable 40 milliGRAM(s) SubCutaneous every 24 hours  escitalopram 10 milliGRAM(s) Oral daily  gabapentin 400 milliGRAM(s) Oral two times a day  influenza  Vaccine (HIGH DOSE) 0.5 milliLiter(s) IntraMuscular once  levothyroxine 112 MICROGram(s) Oral daily  melatonin 3 milliGRAM(s) Oral at bedtime  mirtazapine 15 milliGRAM(s) Oral at bedtime  polyethylene glycol 3350 17 Gram(s) Oral daily  QUEtiapine 25 milliGRAM(s) Oral <User Schedule>  regadenoson Injectable 0.4 milliGRAM(s) IV Push once    MEDICATIONS  (PRN):  acetaminophen     Tablet .. 650 milliGRAM(s) Oral every 6 hours PRN Temp greater or equal to 38C (100.4F), Mild Pain (1 - 3)  aluminum hydroxide/magnesium hydroxide/simethicone Suspension 30 milliLiter(s) Oral every 4 hours PRN Dyspepsia  calamine/zinc oxide Lotion 1 Application(s) Topical daily PRN Itching  hydrOXYzine hydrochloride 25 milliGRAM(s) Oral every 8 hours PRN Anxiety  melatonin 3 milliGRAM(s) Oral at bedtime PRN Insomnia  ondansetron Injectable 4 milliGRAM(s) IV Push every 8 hours PRN Nausea and/or Vomiting    Vital Signs Last 24 Hrs  T(C): 36.6 (19 Sep 2024 05:15), Max: 36.8 (18 Sep 2024 19:10)  T(F): 97.8 (19 Sep 2024 05:15), Max: 98.2 (18 Sep 2024 19:10)  HR: 69 (19 Sep 2024 05:15) (63 - 76)  BP: 113/69 (19 Sep 2024 05:15) (113/69 - 159/100)  BP(mean): --  RR: 16 (19 Sep 2024 05:15) (16 - 17)  SpO2: 94% (19 Sep 2024 05:15) (94% - 99%)    Parameters below as of 19 Sep 2024 05:15  Patient On (Oxygen Delivery Method): room air      PHYSICAL EXAM:  General: NAD, A/O x 3  ENT: MMM, no scleral icterus  Neck: Supple, No JVD, no thyroidomegaly  Lungs: Clear to auscultation bilaterally, no wheezes, no rales, no rhonchi, good inspiratory effort  Cardio: RRR, S1/S2, No murmurs  Abdomen: Soft, Nontender, Nondistended; Bowel sounds present  Extremities: No calf tenderness, No pitting edema, no skin changes    LABS:               09-13 Chol 141 mg/dL LDL -- HDL 44 mg/dL Trig 108 mg/dL    Urinalysis Basic - ( 17 Sep 2024 07:13 )    Color: x / Appearance: x / SG: x / pH: x  Gluc: 102 mg/dL / Ketone: x  / Bili: x / Urobili: x   Blood: x / Protein: x / Nitrite: x   Leuk Esterase: x / RBC: x / WBC x   Sq Epi: x / Non Sq Epi: x / Bacteria: x    OVID-19 PCR: NotDetec (09-10-24 @ 23:30)  COVID-19 PCR: NotDetec (09-10-24 @ 23:30)  COVID-19 PCR: NotDetec (08-12-24 @ 21:47)  COVID-19 PCR: NotDetec (06-04-24 @ 23:31)  COVID-19 PCR: NotDetec (07-08-23 @ 12:46)  COVID-19 PCR: NotDetec (07-03-23 @ 18:52)

## 2024-09-19 NOTE — PROGRESS NOTE ADULT - ASSESSMENT
Assessment: 73 year old woman with past medical history of Coronary artery disease (s/p PCI of OM1 in 2021), Hypertension and Major Depressive disorder with multiple previous psychiatric hospitalizations and prior suicide attempts was brought in by EMS due to son's concerns of patient being altered, having alcohol intoxication and misuse of Ambien, with episodes of ventricular tachycardia, now with abnormal nuclear stress test.    Cardiology initially consulted due to history of prolonged QTc. Most recent ECG consistent with sinus rhythm and PVC with a normal QTc of 463. Longest QTc on ECGs this admission of 502 which is borderline. Echo report with normal LVEF 50-55%.     Recommendations:  - pt with abn nuclear stress test with moderate sized infarct of the apical inferior, basal inferior and basal inferolateral wall, no ischemia visualized> pt with hx CAD with RCA disease, recommend coronary angiogram to evaluate if PCI> awaiting tx to [facility]  - Patient understands she has to take DAPT if PCI placed or there is risk for stent thrombosis  - Continue Aspirin 81 mg daily and Atorvastatin 80 mg daily, beta blocker held due to bradycardia.   - Prolonged QTc improved, likely secondary to multiple psychiatric medications. followup psych for med adjustments

## 2024-09-19 NOTE — PROGRESS NOTE ADULT - ASSESSMENT
74 y/o F, , retired , recently living with her son, with PMH significant for h/o CAD s/p PCI of OM1 (100% occlussion ) in 11/2021, MI, hypertension, with PPH significant for MDD, multiple (>10) previous psychiatric hospitalizations, h/o 2-3 past suicide attempts, has an outpatient psychiatrist Dr. Ryan Sparrow she hasn't seen in 2 months, presented due to misuse of alcohol with ambien and her other psych medicine, patient was supposed to be discharge to inpatient psych, however was not accepted due to persistent elevated qtc upon ekg Inpatient psych require cardio evaluation before admission.    #Prolonged QTC 2/2 psychiatric regimen   #36 beats of SVT, asymptomatic 9/15  - Stress test positive; patient to be transferred to [facility] under Dr Nikunj Person for cath  - C/w telemetry for now  - C/w ASA, statin  - TTE reviewed LVEF 50-55%    #CAD s/p stent in 11/2021  #HLD  #HTN  - As per records from son patient is on both asa 81 and Brilinta 90mg BID   - Per cardio, patient does not require brilinta - hold   - continue with ASA 81mg   - We stopped metoprolol given hypotension  - continue statin     #Major depressive disorder  #Bipolar disorder   - History of suicide attempts and inpatient psych admissions for episodes of MDD  - Continue with Lexapro 10mg daily, Seroquel 25mg ghs, mirtazepine 15mg qhs, discontinued trazodone  - Psych consult - inpt psych recommended vs inpatient substance abuse rehab program   - PT - home PT   - B12/folate/TSH reviewed     #Vaginal pruritis/yeast infection-resolved  - patient complaining of itching  - ordered clotrimazole vaginal cream    - given one time dose of fluconazole 150mg     #Hypothyroidism  - continue synthroid 112mcg daily  - TSH reviewed    #GERD  - discontinued protonix due to possible QTC prolongation     DVT ppx- lovenox   GOC full code     Dispo: transfer to [facility] for angiogram; CM to follow to inpatient rehab program on discharge     Contreras Wall updated on plan of care, answered all questions, in agreement with care plan                72 y/o F, , retired , recently living with her son, with PMH significant for h/o CAD s/p PCI of OM1 (100% occlussion ) in 11/2021, MI, hypertension, with PPH significant for MDD, multiple (>10) previous psychiatric hospitalizations, h/o 2-3 past suicide attempts, has an outpatient psychiatrist Dr. Ryan Sparrow she hasn't seen in 2 months, presented due to misuse of alcohol with ambien and her other psych medicine, patient was supposed to be discharge to inpatient psych, however was not accepted due to persistent elevated qtc upon ekg Inpatient psych require cardio evaluation before admission.    #Prolonged QTC 2/2 psychiatric regimen   #36 beats of SVT, asymptomatic 9/15  - Stress test positive; patient to be transferred to [facility] under Dr Nikunj Person for cath  - C/w telemetry for now  - C/w ASA, statin  - TTE reviewed LVEF 50-55%    #CAD s/p stent in 11/2021  #HLD  #HTN  - As per records from son patient is on both asa 81 and Brilinta 90mg BID   - Per cardio, patient does not require brilinta - hold   - continue with ASA 81mg   - We stopped metoprolol given hypotension  - continue statin     #Major depressive disorder  #Bipolar disorder   - History of suicide attempts and inpatient psych admissions for episodes of MDD  - Continue with Lexapro 10mg daily, Seroquel 25mg ghs, mirtazepine 15mg qhs, discontinued trazodone  - Psych consult - inpatient substance abuse rehab program recommended. Needs psych eval prior to DC  - PT - home PT     #Vaginal pruritis/yeast infection-resolved  - patient complaining of itching  - ordered clotrimazole vaginal cream    - given one time dose of fluconazole 150mg     #Hypothyroidism  - continue synthroid 112mcg daily  - TSH reviewed    #GERD  - discontinued protonix due to possible QTC prolongation     DVT ppx- lovenox   GOC full code     Dispo: transfer to [facility] for angiogram; CM to follow to inpatient substance abuse program on discharge     Contreras Wall updated on plan of care, answered all questions, in agreement with care plan

## 2024-09-19 NOTE — PROGRESS NOTE ADULT - NS ATTEND AMEND GEN_ALL_CORE FT
Major depressive disorder/Bipolar disorder   Prolonged QT  Hx of CAD  Hypothyroidism  HTN  Vaginal candidiasis    Appreciate consult teams  TTE, EKG in AM  Metoprolol discontinued due to hypotension  Cardiology feels she does not need brillinta; will stop it  lipid panel pending; c/w lipitor for now  tSH/B12/folate pending  s/p fluconazole 150mg one time dose  C/w constant observation, safety tray    Medically stable for discharge to inpatient psych  Will follow up with psychiatry team    DVT PPX; AM labs  Spoke to son about above care plan, answered all questions, in agreement with care plan.
NSVT  MDD    Will plan for stress testing in AM; NPO after midnight  Psychtriary team to follow up; c/w constant obsersvation  C/w current medications    DVT PPX.
Medically stable for discharge  We are waiting on psychiatry team to schedule inpatient psych facility  C/w current medications  C/w constant observation    DVT PPX.
agree with above.
Medically stable for discharge  We are waiting on psychiatry team for discharge planning  C/w current medications  C/w constant observation    DVT PPX
Patient seen and examined independently.  Case discussed with patient's regular cardiologist Dr. Mejia.  he would prefer if possible that she remain on Brilinta 60 mg twice daily  for long-term post MI protection.    To my eye QT interval has not been significantly prolonged and  benefit of continuing Brilinta may outweigh risk.    Might consider looking at other QT prolonging drugs as mentioned in my note yesterday  specifically would look at pantoprazole    In any event at this time QT is not significantly prolonged,  nor has it been and patient may go to inpatient psych facility if clinically indicated
Major depressive disorder/Bipolar disorder   Prolonged QT  Hx of CAD  Hypothyroidism  HTN  Vaginal candidiasis    Appreciate consult teams  TTE, EKG in AM  Will d/c metoprolol given hypotension  Will check lipid panel in AM; c/w lipitor for now  Will check TSH/B12/folate in AM  Will give fluconazole 150mg one time dose  C/w constant observation, safety tray    DVT PPX; AM labs  Spoke to son about above care plan, answered all questions, in agreement with care plan

## 2024-09-20 ENCOUNTER — INPATIENT (INPATIENT)
Facility: HOSPITAL | Age: 74
LOS: 4 days | Discharge: PSYCHIATRIC OR SUBSTANCE USE FACILITY | DRG: 316 | End: 2024-09-25
Attending: STUDENT IN AN ORGANIZED HEALTH CARE EDUCATION/TRAINING PROGRAM | Admitting: STUDENT IN AN ORGANIZED HEALTH CARE EDUCATION/TRAINING PROGRAM
Payer: MEDICARE

## 2024-09-20 VITALS
HEART RATE: 55 BPM | TEMPERATURE: 97 F | OXYGEN SATURATION: 95 % | RESPIRATION RATE: 17 BRPM | SYSTOLIC BLOOD PRESSURE: 122 MMHG | DIASTOLIC BLOOD PRESSURE: 75 MMHG

## 2024-09-20 VITALS
SYSTOLIC BLOOD PRESSURE: 132 MMHG | OXYGEN SATURATION: 95 % | TEMPERATURE: 98 F | RESPIRATION RATE: 18 BRPM | HEART RATE: 75 BPM | DIASTOLIC BLOOD PRESSURE: 68 MMHG

## 2024-09-20 DIAGNOSIS — I47.29 OTHER VENTRICULAR TACHYCARDIA: ICD-10-CM

## 2024-09-20 DIAGNOSIS — F32.9 MAJOR DEPRESSIVE DISORDER, SINGLE EPISODE, UNSPECIFIED: ICD-10-CM

## 2024-09-20 DIAGNOSIS — Z98.890 OTHER SPECIFIED POSTPROCEDURAL STATES: Chronic | ICD-10-CM

## 2024-09-20 DIAGNOSIS — I25.10 ATHEROSCLEROTIC HEART DISEASE OF NATIVE CORONARY ARTERY WITHOUT ANGINA PECTORIS: ICD-10-CM

## 2024-09-20 DIAGNOSIS — Z98.89 OTHER SPECIFIED POSTPROCEDURAL STATES: Chronic | ICD-10-CM

## 2024-09-20 DIAGNOSIS — Z29.9 ENCOUNTER FOR PROPHYLACTIC MEASURES, UNSPECIFIED: ICD-10-CM

## 2024-09-20 DIAGNOSIS — R94.39 ABNORMAL RESULT OF OTHER CARDIOVASCULAR FUNCTION STUDY: ICD-10-CM

## 2024-09-20 DIAGNOSIS — E03.9 HYPOTHYROIDISM, UNSPECIFIED: ICD-10-CM

## 2024-09-20 DIAGNOSIS — K21.9 GASTRO-ESOPHAGEAL REFLUX DISEASE WITHOUT ESOPHAGITIS: ICD-10-CM

## 2024-09-20 DIAGNOSIS — F13.20 SEDATIVE, HYPNOTIC OR ANXIOLYTIC DEPENDENCE, UNCOMPLICATED: ICD-10-CM

## 2024-09-20 PROCEDURE — 99223 1ST HOSP IP/OBS HIGH 75: CPT | Mod: GC

## 2024-09-20 PROCEDURE — 99223 1ST HOSP IP/OBS HIGH 75: CPT

## 2024-09-20 PROCEDURE — 93010 ELECTROCARDIOGRAM REPORT: CPT

## 2024-09-20 RX ORDER — ESCITALOPRAM OXALATE 10 MG
10 TABLET ORAL DAILY
Refills: 0 | Status: DISCONTINUED | OUTPATIENT
Start: 2024-09-20 | End: 2024-09-25

## 2024-09-20 RX ORDER — MIRTAZAPINE 30 MG/1
15 TABLET, FILM COATED ORAL AT BEDTIME
Refills: 0 | Status: DISCONTINUED | OUTPATIENT
Start: 2024-09-20 | End: 2024-09-25

## 2024-09-20 RX ORDER — ACETAMINOPHEN 325 MG
650 TABLET ORAL EVERY 6 HOURS
Refills: 0 | Status: DISCONTINUED | OUTPATIENT
Start: 2024-09-20 | End: 2024-09-25

## 2024-09-20 RX ORDER — FAMOTIDINE 40 MG
20 TABLET ORAL DAILY
Refills: 0 | Status: DISCONTINUED | OUTPATIENT
Start: 2024-09-20 | End: 2024-09-25

## 2024-09-20 RX ORDER — ENOXAPARIN SODIUM 150 MG/ML
40 INJECTION SUBCUTANEOUS EVERY 24 HOURS
Refills: 0 | Status: DISCONTINUED | OUTPATIENT
Start: 2024-09-20 | End: 2024-09-25

## 2024-09-20 RX ORDER — METOPROLOL TARTRATE 50 MG
1 TABLET ORAL
Refills: 0 | DISCHARGE

## 2024-09-20 RX ORDER — QUETIAPINE FUMARATE 50 MG/1
25 TABLET, FILM COATED ORAL DAILY
Refills: 0 | Status: DISCONTINUED | OUTPATIENT
Start: 2024-09-20 | End: 2024-09-25

## 2024-09-20 RX ORDER — ASPIRIN 325 MG
81 TABLET ORAL DAILY
Refills: 0 | Status: DISCONTINUED | OUTPATIENT
Start: 2024-09-20 | End: 2024-09-25

## 2024-09-20 RX ORDER — ATORVASTATIN CALCIUM 10 MG/1
80 TABLET, FILM COATED ORAL AT BEDTIME
Refills: 0 | Status: DISCONTINUED | OUTPATIENT
Start: 2024-09-20 | End: 2024-09-25

## 2024-09-20 RX ORDER — GABAPENTIN 800 MG/1
400 TABLET, FILM COATED ORAL
Refills: 0 | Status: DISCONTINUED | OUTPATIENT
Start: 2024-09-20 | End: 2024-09-25

## 2024-09-20 RX ADMIN — Medication 10 MILLIGRAM(S): at 21:24

## 2024-09-20 RX ADMIN — GABAPENTIN 400 MILLIGRAM(S): 800 TABLET, FILM COATED ORAL at 18:30

## 2024-09-20 RX ADMIN — ENOXAPARIN SODIUM 40 MILLIGRAM(S): 150 INJECTION SUBCUTANEOUS at 12:25

## 2024-09-20 RX ADMIN — Medication 81 MILLIGRAM(S): at 12:25

## 2024-09-20 RX ADMIN — MIRTAZAPINE 15 MILLIGRAM(S): 30 TABLET, FILM COATED ORAL at 21:24

## 2024-09-20 RX ADMIN — Medication 400 MILLIGRAM(S): at 05:09

## 2024-09-20 RX ADMIN — Medication 112 MICROGRAM(S): at 05:09

## 2024-09-20 RX ADMIN — QUETIAPINE FUMARATE 25 MILLIGRAM(S): 50 TABLET, FILM COATED ORAL at 21:24

## 2024-09-20 RX ADMIN — Medication 20 MILLIGRAM(S): at 20:06

## 2024-09-20 RX ADMIN — ATORVASTATIN CALCIUM 80 MILLIGRAM(S): 10 TABLET, FILM COATED ORAL at 21:24

## 2024-09-20 NOTE — PATIENT PROFILE ADULT - LEGAL HELP
[Subsequent Evaluation] : a subsequent evaluation for [Family Member] : family member [FreeTextEntry2] : epistaxis. no

## 2024-09-20 NOTE — DIETITIAN INITIAL EVALUATION ADULT - PHYSCIAL ASSESSMENT
No dosing Ht and Wt since admission    Ht per pt 65"     Daily weight (standing or bed scale): none documented thus far    Weight obtained by RD: unable to assess bed scale weight upon visit     Weight history:   Per pt: 150lb, unsure weight loss/wt gain, unsure PO intake, states might be off for the past 3/4 months   Previous RD notes: 67.6kg (9/13/24)   [facility] HIE: no history      ** pt stated weight of 150lb similar to weight history from recent RD note 9/13/24. RD will continue to monitor wt trends as available/able.     IBW: 125lb

## 2024-09-20 NOTE — DIETITIAN NUTRITION RISK NOTIFICATION - TREATMENT: THE FOLLOWING DIET HAS BEEN RECOMMENDED
Diet, Soft and Bite Sized:   DASH/TLC {Sodium & Cholesterol Restricted} (DASH) (09-20-24 @ 09:45) [Active]

## 2024-09-20 NOTE — BH CONSULTATION LIAISON ASSESSMENT NOTE - NSBHATTESTCOMMENTATTENDFT_PSY_A_CORE
This is a 73-y.o. CF patient, , retired , recently living with her son, with PMH significant for h/o CAD s/p PCI of OM1 (100% occlussion ) in 11/2021, MI, hypertension, with PPH significant for MDD, multiple (>10) previous psychiatric hospitalizations, h/o 2-3 past suicide attempts, has an outpatient psychiatrist Dr. Ryan Sparrow. Patient was transferred from [facility] where she was taken to for possible 'overdose', and transferred to [facility] for a cardiac cath. Consult requested to evaluate patient for depression and suicidality.    I have seen and evaluated this patient myself. Chart, labs, meds reviewed. I agree with trainee's assessment and plan. Patient presents as perennially depressed, with a tendency to overutilize sleeping meds, resulting in unintended overdoses. Patient, however, does require planning for a safe discharge. Will contact Psychiatrist (Dr. Sparrow) and patient's son to develop an appropriate plan.

## 2024-09-20 NOTE — DIETITIAN INITIAL EVALUATION ADULT - ORAL NUTRITION SUPPLEMENTS
Recommend Ensure plus high protein 1x daily (350kcal, 20g proteins) to provide additional calories and nutrients to encourage PO intake while in house

## 2024-09-20 NOTE — PHARMACOTHERAPY INTERVENTION NOTE - COMMENTS
Performed medication reconciliation and home medication list updated in prescription writer/ outpatient medication review. Medications verified with patient and pharmacy.     Home medications:  acetaminophen 325 mg oral tablet: 2 tab(s) orally every 6 hours As needed Temp greater or equal to 38C (100.4F), Mild Pain (1 - 3)  amLODIPine 2.5 mg oral tablet: 1 tab(s) orally once a day  aspirin 81 mg oral delayed release tablet: 1 tab(s) orally once a day  atorvastatin 80 mg oral tablet: 1 tab(s) orally once a day (at bedtime)  Brilinta (ticagrelor) 90 mg oral tablet: 1 tab(s) orally 2 times a day  escitalopram 10 mg oral tablet: 1 tab(s) orally once a day at bedtime  gabapentin 400 mg oral capsule: 1 cap(s) orally 2 times a day  hydrOXYzine hydrochloride 25 mg oral tablet: 1 tab(s) orally every 8 hours As needed Anxiety  levothyroxine 112 mcg (0.112 mg) oral tablet: 1 tab(s) orally once a day  melatonin 3 mg oral tablet: 1 tab(s) orally once a day (at bedtime)  metoprolol succinate 25 mg oral tablet, extended release: 1 tab(s) orally once a day  mirtazapine 15 mg oral tablet: 1 tab(s) orally once a day (at bedtime)  pantoprazole 40 mg oral delayed release tablet: 1 tab(s) orally once a day  QUEtiapine 25 mg oral tablet: 1 tab(s) orally once a day at bedtime    Altagracia RosadoD, BCPS  Clinical Pharmacy Specialist  Available on [employer] Teams (preferred)  Cell: [phone]

## 2024-09-20 NOTE — DIETITIAN INITIAL EVALUATION ADULT - ORAL INTAKE PTA/DIET HISTORY
Pt with ETOH use per H&P however denies drinking history when asked. Unsure PO intake prior to admission, states she might be eating less with stress/depressed however unable to elaborate when asked. Unsure if pt on any therapeutic restriction prior to admission. Confirms no known food allergies. Pt with history of dental implant, generally prefers softer foods at home. Reports nausea sometimes. Denies micronutrient supplement use, denies protein supplement use.
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
## 2024-09-20 NOTE — BH CONSULTATION LIAISON ASSESSMENT NOTE - NSBHCHARTREVIEWINVESTIGATE_PSY_A_CORE FT
< from: 12 Lead ECG (09.13.24 @ 20:57) >      Ventricular Rate 68 BPM    Atrial Rate 68 BPM    P-R Interval 150 ms    QRS Duration 94 ms    Q-T Interval 436 ms    QTC Calculation(Bazett) 463 ms      < end of copied text >    DB - 2:    Day of the Week: Correct [X] Incorrect [ ]  Months of the Year Backwards: Correct [X] Incorrect [ ]    PHQ-2:    Little interest or pleasure in doing things  0 [ ]  1 [ ]  2 [X]  3 [ ]  Feeling down, depressed, or hopeless  0 [ ]  1 [X]  2 [ ]  3 [ ]  Total: 3
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
## 2024-09-20 NOTE — DIETITIAN INITIAL EVALUATION ADULT - ADD RECOMMEND
-- Recommend MVI, pending no medical contraindications, for micronutrient support.   -- Monitor PO intake, GI tolerance, skin integrity, labs, weight, and bowel movement regularity.   -- Encourage use of daily menus. Honor dietary preferences as expressed as able.   -- Malnutrition alert placed in chart.

## 2024-09-20 NOTE — BH CONSULTATION LIAISON ASSESSMENT NOTE - CURRENT MEDICATION
QUEtiapine 25 mg oral tablet: Last Dose Taken:  , 1 tab(s) orally once a day  · 	levothyroxine 112 mcg (0.112 mg) oral tablet: Last Dose Taken:  , 1 tab(s) orally once a day  · 	melatonin 3 mg oral tablet: Last Dose Taken:  , 1 tab(s) orally once a day (at bedtime)  · 	hydrOXYzine hydrochloride 25 mg oral tablet: Last Dose Taken:  , 1 tab(s) orally every 8 hours As needed Anxiety  · 	aspirin 81 mg oral delayed release tablet: Last Dose Taken:  , 1 tab(s) orally once a day  · 	atorvastatin 80 mg oral tablet: Last Dose Taken:  , 1 tab(s) orally once a day (at bedtime)  · 	mirtazapine 15 mg oral tablet: Last Dose Taken:  , 1 tab(s) orally once a day (at bedtime)  · 	escitalopram 10 mg oral tablet: Last Dose Taken:  , 1 tab(s) orally once a day  · 	gabapentin 400 mg oral capsule: Last Dose Taken:  , 1 cap(s) orally 2 times a day  · 	acetaminophen 325 mg oral tablet: Last Dose Taken:  , 2 tab(s) orally every 6 hours As needed Temp greater or equal to 38C (100.4F), Mild Pain (1 - 3)  · 	metoprolol succinate 25 mg oral tablet, extended release: Last Dose Taken:  , 1 tab(s) orally once a day  · 	amLODIPine 2.5 mg oral tablet: Last Dose Taken:  , 1 tab(s) orally once a day  · 	Brilinta (ticagrelor) 90 mg oral tablet: Last Dose Taken:  , 1 tab(s) orally 2 times a day  · 	pantoprazole 40 mg oral delayed release tablet: Last Dose Taken:  , 1 tab(s) orally once a day

## 2024-09-20 NOTE — BH CONSULTATION LIAISON ASSESSMENT NOTE - NSBHCHARTREVIEWVS_PSY_A_CORE FT
Vital Signs Last 24 Hrs  T(C): 36.3 (20 Sep 2024 09:23), Max: 36.8 (19 Sep 2024 19:27)  T(F): 97.4 (20 Sep 2024 09:23), Max: 98.3 (19 Sep 2024 19:27)  HR: 55 (20 Sep 2024 09:23) (55 - 75)  BP: 122/75 (20 Sep 2024 09:23) (122/75 - 134/77)  BP(mean): --  RR: 17 (20 Sep 2024 09:23) (16 - 18)  SpO2: 95% (20 Sep 2024 09:23) (95% - 95%)    Parameters below as of 20 Sep 2024 09:23  Patient On (Oxygen Delivery Method): room air

## 2024-09-20 NOTE — PATIENT PROFILE ADULT - FALL HARM RISK - HARM RISK INTERVENTIONS

## 2024-09-20 NOTE — H&P ADULT - NSICDXPASTSURGICALHX_GEN_ALL_CORE_FT
PAST SURGICAL HISTORY:  History of lumpectomy left 2002  & right 2008  left 2013  treated with chemotherapy & radiation therapy    History of lumpectomy     S/P colonoscopy 3 years ago negative    S/P D&C (status post dilation and curettage)     S/P dilation and curettage     S/P endoscopy 3 years ago negative

## 2024-09-20 NOTE — DIETITIAN INITIAL EVALUATION ADULT - PERTINENT MEDS FT
MEDICATIONS  (STANDING):  aspirin  chewable 81 milliGRAM(s) Oral daily  enoxaparin Injectable 40 milliGRAM(s) SubCutaneous every 24 hours    MEDICATIONS  (PRN):
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
## 2024-09-20 NOTE — H&P ADULT - NSICDXPASTMEDICALHX_GEN_ALL_CORE_FT
PAST MEDICAL HISTORY:  Acid reflux     Affective Bipolar Disorder     Benzodiazepine Dependence     Breast cancer     Calculus of gallbladder without cholecystitis     Carcinoma in situ Breast Cancer  bilateral lumpectomies s/p chemo  had treatment 2008 and 2015   current on Arimidex    Chronic GERD     Depression last admission 1- 2 year ago has been admitted several times   ECT last 20 years ago   currently on medication "antidepressants do not really work well"    HTN (hypertension)     Hypertension     Hypothyroidism     Insomnia     MI (myocardial infarction)     Stented coronary artery      Carac Counseling:  I discussed with the patient the risks of Carac including but not limited to erythema, scaling, itching, weeping, crusting, and pain.

## 2024-09-20 NOTE — H&P ADULT - PROBLEM SELECTOR PLAN 2
- History of suicide attempts and inpatient psych admissions for episodes of MDD  - Continue with Lexapro 10mg daily, Seroquel 25mg, Mirtazepine 15mg, discontinued trazodone  - Psych consult  - PT

## 2024-09-20 NOTE — DIETITIAN INITIAL EVALUATION ADULT - ENERGY INTAKE
Pt reports good appetite since admission, flowsheets document PO intake ~% per flowsheet, prefer to get chopped foods in house, also prefers to get Ensure when offered. Food preferences obtained, will honor as able.  Adequate (%)
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
## 2024-09-20 NOTE — BH CONSULTATION LIAISON ASSESSMENT NOTE - RISK ASSESSMENT
acute: disorganization , ambien abuse   chronic: depression, hx of overdoses   protective: connected to OP services   modifable: acute: disorganization (resolved), ambien abuse   chronic: depression, hx of overdoses   protective: connected to OP services   modifiable: social support

## 2024-09-20 NOTE — H&P ADULT - PROBLEM SELECTOR PLAN 1
- Positive stress test, patient transferred here for possible CATH  - Cards following  - C/w ASA, Statin  - SAMMI LVEF 50-55%

## 2024-09-20 NOTE — DIETITIAN INITIAL EVALUATION ADULT - NSFNSGIASSESSMENTFT_GEN_A_CORE
Denies nausea/vomiting/constipation/diarrhea at present, last BM on 9/19 per flowsheet, not on any bowel regimen

## 2024-09-20 NOTE — PATIENT PROFILE ADULT - NSPROPOAURINARYCATHETER_GEN_A_NUR
OPERATIVE NOTE    Name: Elly Sanchez  MRN: [id number]  Age: 84 year old    YOB: 1940    Date of Procedure: 5/22/Assistant:  Jon Cardoza PA-C    A skilled first Adeline [place] [id number]   Adeline Trident II Tritanium: 52mm    Adeline Greg Briones MD  [facility]  Phone: [phone]  Fax: [phone]    no

## 2024-09-20 NOTE — PATIENT PROFILE ADULT - NSPROIMPLANTSMEDDEV_GEN_A_NUR
Patient Name: Igor Hays  MRN: [id number]    CC: Follow up for intractable epilepsy    HPI: Igor Hays is a 11 y.o. female with PMH of Doose syndrome and intractable epilepsy who presents for a follow up with her parents. She was last seen 11/27/19.    In early November, igor had a great increase in seizure frequency. She was admitted to [facility] for increased seizures. She was given vimpat once and briviact was increased and seizures improved somewhat.  She is still having frequent seizures. She is having multiple seizures. At least 2 grand mal a day.  And multiple staring spells.    She was also admitted in [place] on 8/16/19). Was noted to be seizing at her school. Did not abort with diastat. Was given 8mg of Versed by EMS and brought to OSH. She was intubated and placed on propofol and transferred to [facility]. EEG continued to show her in [place]. Onfi was increased followed by lamotrigine after which the EEG improved. However, on attempting tp wean her off the propofol, she was in status again. At this point, she was loaded with Fycompa and started on 4mg daily. Weaned off the propofol successfully and extubated. At discharge, her lamotrigine and Onfi was decreased back to her pre-admission dosages.   After her discharge, she was unsteady on her feet. Father reports that she seemed 'drunk'. This improved with coming off of fycompa    Behavior became worse and worse. We decreased onfi with no improvement. Mom also decreased epidiolex  Then we decided to stop fycompa. He behavior improved greatly    At last visit, daily seizures were happening and still having multuiple weekly night time seizure.  We weaned off of onfi and increased lamictal to 250mg BID.  She is continuing to have frequent seizures. 1-2 GTC seizures and multiple absence seizures    She is currently on  Lamotrigine 250 BID, Briviact 150 BID,  and Epidiolex 4 ml BID.  Mother states that her mood has been irritable since starting Epildiolex  although her seizures have been better controlled. She is on [facility]'s Web which did not control her seizures but improved her mood.        Past Medical History  Past Medical History:   Diagnosis Date    Allergy     SPT positive to mulitple aero allergens    Cough     Myoclonic astatic epilepsy     Otitis media     Seizures     Doose Syndrome (Epilepsy w/ Recessive Genetic Occurrence and Atypical SZ's w/ Multiple SZ Types worsened w/ Seizure Medicine)     VNS setting- interrogated   Output Current mA 2  Signal Freq Hz 25  Pulse width uSec 250  Signal on time Sec 30  Signal off time Min 1.8 to 3  Mag Current mA 2.25  Mag on time Sec 60  Pulse width uSec 500  Near EOS No   autostim 2 mA         Medications    Melatonin 3mg qhs  epidiolex 4 ml BID  Charlottes web 11mg in am and 23mg at night  lamictal 250mg BID  briviact 150mg BID   Vit B6    Failed meds- fycompa, depakote, keppra, topamax, phenobarb, onfi, Vimpat (??)    Not tried- banzel, vimpat (only short trial?), zonegran, sabril    Allergies  Review of patient's allergies indicates:   Allergen Reactions    Keppra [levetiracetam]      Behavioral disturbance     Klonopin [clonazepam]      Aggressive behavior, sleeplessness, irritability    Phenobarbital Other (See Comments)     Anxiety , behavior changes, restless, hyperactivity , impaired attention    Topiramate      Nervousness, hyperactivity    Antihistamines - alkylamine      seizures    Ativan [lorazepam]      Dad states she has an  intolerance to it with Doose Syndrome    Benadryl [diphenhydramine hcl]      Causes seizures       Social History  Social History     Socioeconomic History    Marital status: Single     Spouse name: Not on file    Number of children: Not on file    Years of education: Not on file    Highest education level: Not on file   Occupational History    Not on file   Social Needs    Financial resource strain: Not on file    Food insecurity:     Worry: Not on file      Inability: Not on file    Transportation needs:     Medical: Not on file     Non-medical: Not on file   Tobacco Use    Smoking status: Never Smoker    Smokeless tobacco: Never Used   Substance and Sexual Activity    Alcohol use: No    Drug use: No    Sexual activity: Never   Lifestyle    Physical activity:     Days per week: Not on file     Minutes per session: Not on file    Stress: Not on file   Relationships    Social connections:     Talks on phone: Not on file     Gets together: Not on file     Attends Hoahaoism service: Not on file     Active member of club or organization: Not on file     Attends meetings of clubs or organizations: Not on file     Relationship status: Not on file   Other Topics Concern    Not on file   Social History Narrative    Lives w/ Mom, Dad, and younger Sister. NO Smokers. + Pet -- 1 dog (outside). 6th Grader at [employer]       Family History  Family History   Problem Relation Age of Onset    Seizures Mother         Doose Syndrome    Cancer Maternal Grandmother     Heart disease Paternal Grandmother     Diabetes Paternal Grandmother      Review of Systems   Constitutional: Positive for malaise/fatigue.   HENT: Negative for hearing loss.    Eyes: Negative for blurred vision.   Respiratory: Negative for cough and shortness of breath.    Gastrointestinal: Negative for diarrhea and vomiting.   Neurological: Positive for seizures.        Lethargy         Physical Exam        Physical Exam   Constitutional: No distress.   Eyes: Pupils are equal, round, and reactive to light. EOM are normal.   Cardiovascular: Normal rate.   Pulmonary/Chest: Effort normal and breath sounds normal.   Neurological: She is alert.   Nursing note and vitals reviewed.        Constitutional  Well-developed, well-nourished, appears stated age   Neurological    * Mental status  Appears lethargic and irritable but cooperates with my exam and follows simple commands.    * Cranial nerves        - CN II  PERRL, visual fields full to confrontation     - CN III, IV, VI  Extraocular movements full.     - CN V  Sensation V1 - V3 intact     - CN VII  Face strong and symmetric bilaterally     - CN VIII  Hearing intact bilaterally     - CN IX, X  Palate raises midline and symmetric     - CN XI  SCM and trapezius 5/5 bilaterally     - CN XII  Tongue midline   * Motor  Muscle bulk normal, strength 5/5 throughout   * Coordination  No dysmetria with finger-to-nose.   * Gait  Deferred   * Deep tendon reflexes  2+ and symmetric throughout       Lab and Test Results      9/27/19  Lamictal 4.7  onfi 499  CBC/CMP wnl  Iron studies normal  Normal retic      MRI Brain (2/25/2015):    There are no diffusion abnormalities.  No abnormal T2/flair is appreciated of the brain.  The midline structures appear within normal limits.  The major T2 vascular flow voids are present.  T2/flair signal noted in the left sphenoid may reflect   a mucus retention cyst or mucous membrane thickening.      Assessment and Plan    Elizabeth Hays is a 11 y.o. female with Doose syndrome and intractable seizures currently maintained on 4 AEDs.        Continue epidiolex, lamictal and briviac  Continue lamictal 250mg BID.  Decrease the briviact down to 100mg BID since no improvement  Will start vimpat up to 100mg BID. SEs reviewed  Nayzilam  VNS settings as above  Check labs today. SEs reviewed  EEG to be done today  Seizure precautions and seizure first aid were discussed with the patient and family.  Family was instructed to contact either the primary care physician office or our office by telephone if there is any deterioration in his neurologic status, change in presenting symptoms, lack of beneficial response to treatment plan, or signs of adverse effects of current therapies, all of which were reviewed.    Letter sent to PCP  Follow up in march/april     Vascular stents/Clips
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
## 2024-09-20 NOTE — BH CONSULTATION LIAISON ASSESSMENT NOTE - NSICDXPASTMEDICALHX_GEN_ALL_CORE_FT
PAST MEDICAL HISTORY:  Acid reflux     Affective Bipolar Disorder     Benzodiazepine Dependence     Breast cancer     Calculus of gallbladder without cholecystitis     Carcinoma in situ Breast Cancer  bilateral lumpectomies s/p chemo  had treatment 2008 and 2015   current on Arimidex    Chronic GERD     Depression last admission 1- 2 year ago has been admitted several times   ECT last 20 years ago   currently on medication "antidepressants do not really work well"    HTN (hypertension)     Hypertension     Hypothyroidism     Insomnia     MI (myocardial infarction)     Stented coronary artery

## 2024-09-20 NOTE — BH CONSULTATION LIAISON ASSESSMENT NOTE - HPI (INCLUDE ILLNESS QUALITY, SEVERITY, DURATION, TIMING, CONTEXT, MODIFYING FACTORS, ASSOCIATED SIGNS AND SYMPTOMS)
Patient is a 72 y/o F, , retired , recently living with her son, with PMH significant for h/o CAD s/p PCI of OM1 (100% occlussion ) in 2021, MI, hypertension, with PPH significant for MDD, multiple (>10) previous psychiatric hospitalizations, h/o 2-3 past suicide attempts, has an outpatient psychiatrist Dr. Ryan Sparrow. Patient presented due to misuse of alcohol with Ambien. She says she was brought to the ER because her son said she was "out of it and not thinking properly." She says she feels completely lucid.  Pt is oriented to situation, person, place.  She is partially oriented to time. Her concerns involve her son as she reports that he is controlling and makes her follow a lot of rules. She says she moved into his home 2 weeks ago after her  .  Pt reports feeling stressed, anxious and depressed. She reports struggling with chronic insomnia and has been using ambien to help her sleep ( 20 mg nightly). Pt said she "drank the liquid in the refrigerator" last night but was unaware that it was an alcoholic beverage until her son told her and she did not intentionally drink alcohol.  Patient finds herself ruminating about her blockage and need for cardiac cath - leading to anxiety. Patient perseverates, stating, "I'm so scared" and "I don't know what to do."  Denies any suicidal ideation, intent, or plan.  She denies these were prior suicide attempts. She denies hopelessness, irritability. She denies SI, prior suicide attempts , SIB. She denies HI, AVH and paranoia.    Patient is a 72 y/o F, , retired , recently living with her son, with PMH significant for h/o CAD s/p PCI of OM1 (100% occlussion ) in 2021, MI, hypertension, with PPH significant for MDD, multiple (>10) previous psychiatric hospitalizations, h/o 2-3 past suicide attempts, has an outpatient psychiatrist Dr. Ryan Sparrow. Patient was transferred from [facility] where she was taken to for possible 'overdose', and transferred to [facility] for a cardiac cath. Consult requested to evaluate patient for depression and suicidality.    Patient on evaluation calm, cooperative, friendly, anxious and preoccupied with her upcoming procedure. She initially presented due to misuse of alcohol with Ambien. She says she was brought to the ER because her son said she was "out of it and not thinking properly." She says she feels completely lucid. Pt is oriented to situation, person, place.  She is partially oriented to time.  Denies any suicidal ideation, intent, or plan. States - I have always been 'clinically depressed'. She denies there were prior suicide attempts. She denies hopelessness, irritability. She denies SI, prior suicide attempts , SIB. She denies HI, AVH and paranoia.    Her concerns involve her son as she reports that he is controlling and makes her follow a lot of rules. She says she moved into his home 2 weeks ago after her  .  Pt reports feeling stressed, anxious and depressed. She reports struggling with chronic insomnia and has been using ambien to help her sleep ( 20 mg nightly). Pt said she "drank the liquid in the refrigerator" last night but was unaware that it was an alcoholic beverage until her son told her and she did not intentionally drink alcohol.  Patient finds herself ruminating about her blockage and need for cardiac cath - leading to anxiety. Patient perseverates, stating, "I'm so scared" and "I don't know what to do."

## 2024-09-21 LAB
A1C WITH ESTIMATED AVERAGE GLUCOSE RESULT: 5.6 % — SIGNIFICANT CHANGE UP (ref 4–5.6)
BASOPHILS # BLD AUTO: 0.04 K/UL — SIGNIFICANT CHANGE UP (ref 0–0.2)
BASOPHILS NFR BLD AUTO: 0.7 % — SIGNIFICANT CHANGE UP (ref 0–2)
CHOLEST SERPL-MCNC: 95 MG/DL — SIGNIFICANT CHANGE UP
EOSINOPHIL # BLD AUTO: 0.09 K/UL — SIGNIFICANT CHANGE UP (ref 0–0.5)
EOSINOPHIL NFR BLD AUTO: 1.5 % — SIGNIFICANT CHANGE UP (ref 0–6)
ESTIMATED AVERAGE GLUCOSE: 114 MG/DL — SIGNIFICANT CHANGE UP (ref 68–114)
HCT VFR BLD CALC: 38.9 % — SIGNIFICANT CHANGE UP (ref 34.5–45)
HDLC SERPL-MCNC: 44 MG/DL — LOW
HGB BLD-MCNC: 12.8 G/DL — SIGNIFICANT CHANGE UP (ref 11.5–15.5)
IMM GRANULOCYTES NFR BLD AUTO: 1 % — HIGH (ref 0–0.9)
INR BLD: 1.05 RATIO — SIGNIFICANT CHANGE UP (ref 0.85–1.18)
LIPID PNL WITH DIRECT LDL SERPL: 34 MG/DL — SIGNIFICANT CHANGE UP
LYMPHOCYTES # BLD AUTO: 1.95 K/UL — SIGNIFICANT CHANGE UP (ref 1–3.3)
LYMPHOCYTES # BLD AUTO: 32.6 % — SIGNIFICANT CHANGE UP (ref 13–44)
MAGNESIUM SERPL-MCNC: 2 MG/DL — SIGNIFICANT CHANGE UP (ref 1.6–2.6)
MCHC RBC-ENTMCNC: 28.6 PG — SIGNIFICANT CHANGE UP (ref 27–34)
MCHC RBC-ENTMCNC: 32.9 GM/DL — SIGNIFICANT CHANGE UP (ref 32–36)
MCV RBC AUTO: 87 FL — SIGNIFICANT CHANGE UP (ref 80–100)
MONOCYTES # BLD AUTO: 0.55 K/UL — SIGNIFICANT CHANGE UP (ref 0–0.9)
MONOCYTES NFR BLD AUTO: 9.2 % — SIGNIFICANT CHANGE UP (ref 2–14)
NEUTROPHILS # BLD AUTO: 3.3 K/UL — SIGNIFICANT CHANGE UP (ref 1.8–7.4)
NEUTROPHILS NFR BLD AUTO: 55 % — SIGNIFICANT CHANGE UP (ref 43–77)
NON HDL CHOLESTEROL: 51 MG/DL — SIGNIFICANT CHANGE UP
NRBC # BLD: 0 /100 WBCS — SIGNIFICANT CHANGE UP (ref 0–0)
PHOSPHATE SERPL-MCNC: 3.3 MG/DL — SIGNIFICANT CHANGE UP (ref 2.5–4.5)
PLATELET # BLD AUTO: 235 K/UL — SIGNIFICANT CHANGE UP (ref 150–400)
PROTHROM AB SERPL-ACNC: 11 SEC — SIGNIFICANT CHANGE UP (ref 9.5–13)
RBC # BLD: 4.47 M/UL — SIGNIFICANT CHANGE UP (ref 3.8–5.2)
RBC # FLD: 13.4 % — SIGNIFICANT CHANGE UP (ref 10.3–14.5)
TRIGL SERPL-MCNC: 82 MG/DL — SIGNIFICANT CHANGE UP
WBC # BLD: 5.99 K/UL — SIGNIFICANT CHANGE UP (ref 3.8–10.5)
WBC # FLD AUTO: 5.99 K/UL — SIGNIFICANT CHANGE UP (ref 3.8–10.5)

## 2024-09-21 PROCEDURE — 99232 SBSQ HOSP IP/OBS MODERATE 35: CPT

## 2024-09-21 RX ORDER — FAMOTIDINE 40 MG
20 TABLET ORAL ONCE
Refills: 0 | Status: COMPLETED | OUTPATIENT
Start: 2024-09-21 | End: 2024-09-21

## 2024-09-21 RX ADMIN — QUETIAPINE FUMARATE 25 MILLIGRAM(S): 50 TABLET, FILM COATED ORAL at 12:01

## 2024-09-21 RX ADMIN — MIRTAZAPINE 15 MILLIGRAM(S): 30 TABLET, FILM COATED ORAL at 22:03

## 2024-09-21 RX ADMIN — GABAPENTIN 400 MILLIGRAM(S): 800 TABLET, FILM COATED ORAL at 17:27

## 2024-09-21 RX ADMIN — Medication 20 MILLIGRAM(S): at 22:55

## 2024-09-21 RX ADMIN — ATORVASTATIN CALCIUM 80 MILLIGRAM(S): 10 TABLET, FILM COATED ORAL at 22:03

## 2024-09-21 RX ADMIN — Medication 20 MILLIGRAM(S): at 12:02

## 2024-09-21 RX ADMIN — GABAPENTIN 400 MILLIGRAM(S): 800 TABLET, FILM COATED ORAL at 05:01

## 2024-09-21 RX ADMIN — Medication 10 MILLIGRAM(S): at 12:01

## 2024-09-21 RX ADMIN — Medication 112 MICROGRAM(S): at 05:01

## 2024-09-21 RX ADMIN — ENOXAPARIN SODIUM 40 MILLIGRAM(S): 150 INJECTION SUBCUTANEOUS at 12:01

## 2024-09-21 RX ADMIN — Medication 81 MILLIGRAM(S): at 12:01

## 2024-09-21 NOTE — PROGRESS NOTE ADULT - PROBLEM SELECTOR PLAN 5
Diet: Soft   DVT prophylaxis: Lovenox  Dispo: Pending conversation with son to discuss whether back home or inpatient psych
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
## 2024-09-21 NOTE — PROGRESS NOTE ADULT - SUBJECTIVE AND OBJECTIVE BOX
Subjective: No acute events overnight. Tylenol PRN was added for pain as well as pepcid for heartburn. AAOx3, patient denies nausea, vomiting, diarrhea, constipation, fever, and urinary symptoms.      Vital Signs Last 24 Hrs  T(C): 36.6 (21 Sep 2024 04:54), Max: 36.6 (21 Sep 2024 04:54)  T(F): 97.8 (21 Sep 2024 04:54), Max: 97.8 (21 Sep 2024 04:54)  HR: 76 (21 Sep 2024 04:54) (55 - 76)  BP: 105/72 (21 Sep 2024 04:54) (105/72 - 136/74)  BP(mean): --  RR: 18 (21 Sep 2024 04:54) (17 - 18)  SpO2: 96% (21 Sep 2024 04:54) (95% - 96%)    Parameters below as of 21 Sep 2024 04:54  Patient On (Oxygen Delivery Method): room air    Physical Exam:  GENERAL: NAD, well-groomed, well-developed  HEAD:  Atraumatic, Normocephalic  EYES: EOMI, PERRLA, conjunctiva and sclera clear  ENMT: No tonsillar erythema, exudates, or enlargement; Moist mucous membranes  NECK: Supple, No JVD, Normal thyroid  HEART: Regular rate and rhythm; No murmurs, rubs, or gallops  RESPIRATORY: CTA B/L, No W/R/R  ABDOMEN: Soft, Nontender, Nondistended; Bowel sounds present  NEUROLOGY: A&Ox3, nonfocal, moving all extremities  EXTREMITIES:  2+ Peripheral Pulses, No clubbing, cyanosis, or edema  SKIN: warm, dry, normal color, no rash or abnormal lesions    Labs:

## 2024-09-21 NOTE — PROGRESS NOTE ADULT - PROBLEM SELECTOR PLAN 1
Positive stress test, patient transferred here for possible CATH  - Cards following  - C/w ASA, Statin  - SAMMI LVEF 50-55% Positive stress test, patient transferred here for possible CATH  - Cards following  - C/w ASA, Statin  - SAMMI LVEF 50-55%  - per cardiology team, fixed defect with no ischemia, will treat medically

## 2024-09-22 LAB
ALBUMIN SERPL ELPH-MCNC: 3.3 G/DL — SIGNIFICANT CHANGE UP (ref 3.3–5)
ALP SERPL-CCNC: 108 U/L — SIGNIFICANT CHANGE UP (ref 40–120)
ALT FLD-CCNC: 17 U/L — SIGNIFICANT CHANGE UP (ref 10–45)
ANION GAP SERPL CALC-SCNC: 13 MMOL/L — SIGNIFICANT CHANGE UP (ref 5–17)
AST SERPL-CCNC: 11 U/L — SIGNIFICANT CHANGE UP (ref 10–40)
BILIRUB SERPL-MCNC: 0.4 MG/DL — SIGNIFICANT CHANGE UP (ref 0.2–1.2)
BUN SERPL-MCNC: 14 MG/DL — SIGNIFICANT CHANGE UP (ref 7–23)
CALCIUM SERPL-MCNC: 9.7 MG/DL — SIGNIFICANT CHANGE UP (ref 8.4–10.5)
CHLORIDE SERPL-SCNC: 108 MMOL/L — SIGNIFICANT CHANGE UP (ref 96–108)
CO2 SERPL-SCNC: 22 MMOL/L — SIGNIFICANT CHANGE UP (ref 22–31)
CREAT SERPL-MCNC: 0.72 MG/DL — SIGNIFICANT CHANGE UP (ref 0.5–1.3)
EGFR: 87 ML/MIN/1.73M2 — SIGNIFICANT CHANGE UP
GLUCOSE SERPL-MCNC: 97 MG/DL — SIGNIFICANT CHANGE UP (ref 70–99)
HCT VFR BLD CALC: 39.4 % — SIGNIFICANT CHANGE UP (ref 34.5–45)
HGB BLD-MCNC: 12.6 G/DL — SIGNIFICANT CHANGE UP (ref 11.5–15.5)
MAGNESIUM SERPL-MCNC: 1.9 MG/DL — SIGNIFICANT CHANGE UP (ref 1.6–2.6)
MCHC RBC-ENTMCNC: 28.4 PG — SIGNIFICANT CHANGE UP (ref 27–34)
MCHC RBC-ENTMCNC: 32 GM/DL — SIGNIFICANT CHANGE UP (ref 32–36)
MCV RBC AUTO: 88.9 FL — SIGNIFICANT CHANGE UP (ref 80–100)
NRBC # BLD: 0 /100 WBCS — SIGNIFICANT CHANGE UP (ref 0–0)
PHOSPHATE SERPL-MCNC: 3.4 MG/DL — SIGNIFICANT CHANGE UP (ref 2.5–4.5)
PLATELET # BLD AUTO: 220 K/UL — SIGNIFICANT CHANGE UP (ref 150–400)
POTASSIUM SERPL-MCNC: 4 MMOL/L — SIGNIFICANT CHANGE UP (ref 3.5–5.3)
POTASSIUM SERPL-SCNC: 4 MMOL/L — SIGNIFICANT CHANGE UP (ref 3.5–5.3)
PROT SERPL-MCNC: 6.3 G/DL — SIGNIFICANT CHANGE UP (ref 6–8.3)
RBC # BLD: 4.43 M/UL — SIGNIFICANT CHANGE UP (ref 3.8–5.2)
RBC # FLD: 13.2 % — SIGNIFICANT CHANGE UP (ref 10.3–14.5)
SODIUM SERPL-SCNC: 143 MMOL/L — SIGNIFICANT CHANGE UP (ref 135–145)
WBC # BLD: 6.7 K/UL — SIGNIFICANT CHANGE UP (ref 3.8–10.5)
WBC # FLD AUTO: 6.7 K/UL — SIGNIFICANT CHANGE UP (ref 3.8–10.5)

## 2024-09-22 PROCEDURE — 99232 SBSQ HOSP IP/OBS MODERATE 35: CPT

## 2024-09-22 RX ORDER — 5-HYDROXYTRYPTOPHAN (5-HTP) 100 MG
3 TABLET,DISINTEGRATING ORAL AT BEDTIME
Refills: 0 | Status: DISCONTINUED | OUTPATIENT
Start: 2024-09-22 | End: 2024-09-25

## 2024-09-22 RX ORDER — PANTOPRAZOLE SODIUM 40 MG/1
40 TABLET, DELAYED RELEASE ORAL
Refills: 0 | Status: DISCONTINUED | OUTPATIENT
Start: 2024-09-22 | End: 2024-09-22

## 2024-09-22 RX ORDER — MAGNESIUM SULFATE 500 MG/ML
1 VIAL (ML) INJECTION ONCE
Refills: 0 | Status: COMPLETED | OUTPATIENT
Start: 2024-09-22 | End: 2024-09-22

## 2024-09-22 RX ADMIN — Medication 112 MICROGRAM(S): at 06:07

## 2024-09-22 RX ADMIN — ATORVASTATIN CALCIUM 80 MILLIGRAM(S): 10 TABLET, FILM COATED ORAL at 21:12

## 2024-09-22 RX ADMIN — QUETIAPINE FUMARATE 25 MILLIGRAM(S): 50 TABLET, FILM COATED ORAL at 21:12

## 2024-09-22 RX ADMIN — Medication 100 GRAM(S): at 17:52

## 2024-09-22 RX ADMIN — GABAPENTIN 400 MILLIGRAM(S): 800 TABLET, FILM COATED ORAL at 06:07

## 2024-09-22 RX ADMIN — Medication 10 MILLIGRAM(S): at 21:11

## 2024-09-22 RX ADMIN — PANTOPRAZOLE SODIUM 40 MILLIGRAM(S): 40 TABLET, DELAYED RELEASE ORAL at 08:44

## 2024-09-22 RX ADMIN — Medication 3 MILLIGRAM(S): at 22:28

## 2024-09-22 RX ADMIN — Medication 81 MILLIGRAM(S): at 12:18

## 2024-09-22 RX ADMIN — GABAPENTIN 400 MILLIGRAM(S): 800 TABLET, FILM COATED ORAL at 17:06

## 2024-09-22 RX ADMIN — Medication 20 MILLIGRAM(S): at 12:17

## 2024-09-22 RX ADMIN — MIRTAZAPINE 15 MILLIGRAM(S): 30 TABLET, FILM COATED ORAL at 21:12

## 2024-09-22 NOTE — PROGRESS NOTE ADULT - SUBJECTIVE AND OBJECTIVE BOX
DATE OF SERVICE: 09-22-24 @ 12:14    Patient is a 73y old  Female who presents with a chief complaint of Prolonged QTC, pending CATH (22 Sep 2024 07:25)      INTERVAL HISTORY: no acute events    REVIEW OF SYSTEMS:  CONSTITUTIONAL: No weakness  EYES/ENT: No visual changes;  No throat pain   NECK: No pain or stiffness  RESPIRATORY: No cough, wheezing; No shortness of breath  CARDIOVASCULAR: No chest pain or palpitations  GASTROINTESTINAL: No abdominal  pain. No nausea, vomiting, or hematemesis  GENITOURINARY: No dysuria, frequency or hematuria  NEUROLOGICAL: No stroke like symptoms  SKIN: No rashes    TELEMETRY Personally reviewed: SR 60s-70s  	  MEDICATIONS:        PHYSICAL EXAM:  T(C): 36.7 (09-22-24 @ 12:05), Max: 36.7 (09-22-24 @ 12:05)  HR: 66 (09-22-24 @ 12:05) (61 - 71)  BP: 138/67 (09-22-24 @ 12:05) (117/69 - 138/67)  RR: 18 (09-22-24 @ 12:05) (18 - 18)  SpO2: 95% (09-22-24 @ 12:05) (95% - 95%)  Wt(kg): --  I&O's Summary    22 Sep 2024 07:01  -  22 Sep 2024 12:14  --------------------------------------------------------  IN: 240 mL / OUT: 0 mL / NET: 240 mL          Appearance: In no distress	  HEENT:    PERRL, EOMI	  Cardiovascular:  S1 S2, No JVD  Respiratory: Lungs clear to auscultation	  Gastrointestinal:  Soft, Non-tender, + BS	  Vascularature:  No edema of LE  Psychiatric: Appropriate affect   Neuro: no acute focal deficits                               12.6   6.70  )-----------( 220      ( 22 Sep 2024 06:41 )             39.4     09-22    143  |  108  |  14  ----------------------------<  97  4.0   |  22  |  0.72    Ca    9.7      22 Sep 2024 06:41  Phos  3.4     09-22  Mg     1.9     09-22    TPro  6.3  /  Alb  3.3  /  TBili  0.4  /  DBili  x   /  AST  11  /  ALT  17  /  AlkPhos  108  09-22        Labs personally reviewed      ASSESSMENT/PLAN: 	    The patient is a  74 y/o F, , retired , recently living with her son, with PMH significant for h/o CAD s/p PCI of OM1 (100% occlussion ) in 11/2021, MI, hypertension, with PPH significant for MDD, multiple (>10) previous psychiatric hospitalizations, h/o 2-3 past suicide attempts. Tx from [facility] for positive stress test and possible cath    1. Abnormal Stress test  - denies cp, palpiations, sob, dizziness  - NM stress test 9/17 moderate sized, moderate intensity fixed perfusion defect of the apical inferior, basal inferior and basal inferolateral wall suggestive of infarct. No ischemia visualized.  - fixed defect consistent with infarct and no ischemia, will treat medically with ASA 81mg, Lipitor 80mg    2. CAD s/p PCI (2021)  - TTE 9/15~ LV systolic function normal, EF 50-55%   - c/w ASA 81mg qd  - c/w atorvastatin 80mg qd  - c/w metoprolol 25mg qd    3. HTN  - bp ok  - c/w amlodipine 2.5mg qd    4. DVT ppx  - c/w lovenox              Iolani Behrbom, AG-NP   Garrett Boyer DO [facility]  Cardiovascular Medicine  [address] [place] [address], [address]  Available through call or text on [employer] TE[employer]  Office: [phone]   DATE OF SERVICE: 09-22-24 @ 12:14    Patient is a 73y old  Female who presents with a chief complaint of Prolonged QTC, pending CATH (22 Sep 2024 07:25)      INTERVAL HISTORY: no acute events    REVIEW OF SYSTEMS:  CONSTITUTIONAL: No weakness  EYES/ENT: No visual changes;  No throat pain   NECK: No pain or stiffness  RESPIRATORY: No cough, wheezing; No shortness of breath  CARDIOVASCULAR: No chest pain or palpitations  GASTROINTESTINAL: No abdominal  pain. No nausea, vomiting, or hematemesis  GENITOURINARY: No dysuria, frequency or hematuria  NEUROLOGICAL: No stroke like symptoms  SKIN: No rashes    TELEMETRY Personally reviewed: SR 60s-70s  	  MEDICATIONS:        PHYSICAL EXAM:  T(C): 36.7 (09-22-24 @ 12:05), Max: 36.7 (09-22-24 @ 12:05)  HR: 66 (09-22-24 @ 12:05) (61 - 71)  BP: 138/67 (09-22-24 @ 12:05) (117/69 - 138/67)  RR: 18 (09-22-24 @ 12:05) (18 - 18)  SpO2: 95% (09-22-24 @ 12:05) (95% - 95%)  Wt(kg): --  I&O's Summary    22 Sep 2024 07:01  -  22 Sep 2024 12:14  --------------------------------------------------------  IN: 240 mL / OUT: 0 mL / NET: 240 mL          Appearance: In no distress	  HEENT:    PERRL, EOMI	  Cardiovascular:  S1 S2, No JVD  Respiratory: Lungs clear to auscultation	  Gastrointestinal:  Soft, Non-tender, + BS	  Vascularature:  No edema of LE  Psychiatric: Appropriate affect   Neuro: no acute focal deficits                               12.6   6.70  )-----------( 220      ( 22 Sep 2024 06:41 )             39.4     09-22    143  |  108  |  14  ----------------------------<  97  4.0   |  22  |  0.72    Ca    9.7      22 Sep 2024 06:41  Phos  3.4     09-22  Mg     1.9     09-22    TPro  6.3  /  Alb  3.3  /  TBili  0.4  /  DBili  x   /  AST  11  /  ALT  17  /  AlkPhos  108  09-22        Labs personally reviewed      ASSESSMENT/PLAN: 	    The patient is a  74 y/o F, , retired , recently living with her son, with PMH significant for h/o CAD s/p PCI of OM1 (100% occlussion ) in 11/2021, MI, hypertension, with PPH significant for MDD, multiple (>10) previous psychiatric hospitalizations, h/o 2-3 past suicide attempts. Tx from [facility] for positive stress test and possible cath    1. Abnormal Stress test  - denies cp, palpiations, sob, dizziness  - NM stress test 9/17 moderate sized, moderate intensity fixed perfusion defect of the apical inferior, basal inferior and basal inferolateral wall suggestive of infarct. No ischemia visualized.  - fixed defect consistent with infarct and no ischemia, will treat medically with ASA 81mg, Lipitor 80mg    2. CAD s/p PCI (2021)  - TTE 9/15~ LV systolic function normal, EF 50-55%   - c/w ASA 81mg qd  - c/w atorvastatin 80mg qd  - d/c metoprolol 25mg qd as bradycardic     3. HTN  - bp ok  - c/w amlodipine 2.5mg qd    4. DVT ppx  - c/w lovenox              Iolani Behrbom, AG-NP   Garrett Boyer DO [facility]  Cardiovascular Medicine  [address] [place] [address], [address]  Available through call or text on [employer] TE[employer]  Office: [phone]

## 2024-09-22 NOTE — PROGRESS NOTE ADULT - PROBLEM SELECTOR PLAN 2
History of suicide attempts and inpatient psych admissions for episodes of MDD  - Continue with Lexapro 10mg daily, Seroquel 25mg, Mirtazepine 15mg, discontinued trazodone  - Psych consulted, plan to arrange meeting with son to further discuss about placement -->f/u [facility] History of suicide attempts and inpatient psych admissions for episodes of MDD  - Continue with Lexapro 10mg daily, Seroquel 25mg, Mirtazepine 15mg, discontinued trazodone  - Psych consulted, plan to arrange meeting with son to further discuss about placement -->f/u psych

## 2024-09-22 NOTE — PROGRESS NOTE ADULT - PROBLEM SELECTOR PLAN 5
Diet: Soft   DVT prophylaxis: Lovenox  Dispo: Pending conversation with son to discuss whether back home or inpatient psych Diet: Soft   DVT prophylaxis: Lovenox  Dispo: Pending conversation with son and psychiatry team to discuss whether back home or inpatient psych

## 2024-09-22 NOTE — PROGRESS NOTE ADULT - SUBJECTIVE AND OBJECTIVE BOX
INTERVAL HPI/OVERNIGHT EVENTS:  Pt seen and examined at bedside. No acute overnight events or complaints.    VITAL SIGNS:  T(F): 97.8 (09-22-24 @ 04:40)  HR: 61 (09-22-24 @ 04:40)  BP: 117/69 (09-22-24 @ 04:40)  RR: 18 (09-22-24 @ 04:40)  SpO2: 95% (09-22-24 @ 04:40)  Wt(kg): --    PHYSICAL EXAM:    GENERAL: NAD, well-groomed, well-developed  HEAD:  Atraumatic, Normocephalic  EYES: EOMI, PERRLA, conjunctiva and sclera clear  ENMT: No tonsillar erythema, exudates, or enlargement; Moist mucous membranes  NECK: Supple, No JVD, Normal thyroid  HEART: Regular rate and rhythm; No murmurs, rubs, or gallops  RESPIRATORY: CTA B/L, No W/R/R  ABDOMEN: Soft, Nontender, Nondistended; Bowel sounds present  NEUROLOGY: A&Ox3, nonfocal, moving all extremities  EXTREMITIES:  2+ Peripheral Pulses, No clubbing, cyanosis, or edema  SKIN: warm, dry, normal color, no rash or abnormal lesions    MEDICATIONS  (STANDING):  aspirin  chewable 81 milliGRAM(s) Oral daily  atorvastatin 80 milliGRAM(s) Oral at bedtime  enoxaparin Injectable 40 milliGRAM(s) SubCutaneous every 24 hours  escitalopram 10 milliGRAM(s) Oral daily  famotidine    Tablet 20 milliGRAM(s) Oral daily  gabapentin 400 milliGRAM(s) Oral two times a day  levothyroxine 112 MICROGram(s) Oral daily  mirtazapine 15 milliGRAM(s) Oral at bedtime  QUEtiapine 25 milliGRAM(s) Oral daily    MEDICATIONS  (PRN):  acetaminophen     Tablet .. 650 milliGRAM(s) Oral every 6 hours PRN Moderate Pain (4 - 6)      Allergies    adhesives (Rash)  Gadavist (Rash; Urticaria; Hives)    Intolerances        LABS:                        12.6   6.70  )-----------( 220      ( 22 Sep 2024 06:41 )             39.4     09-22    143  |  108  |  14  ----------------------------<  97  4.0   |  22  |  0.72    Ca    9.7      22 Sep 2024 06:41  Phos  3.4     09-22  Mg     1.9     09-22    TPro  6.3  /  Alb  3.3  /  TBili  0.4  /  DBili  x   /  AST  11  /  ALT  17  /  AlkPhos  108  09-22    PT/INR - ( 21 Sep 2024 06:39 )   PT: 11.0 sec;   INR: 1.05 ratio           Urinalysis Basic - ( 22 Sep 2024 06:41 )    Color: x / Appearance: x / SG: x / pH: x  Gluc: 97 mg/dL / Ketone: x  / Bili: x / Urobili: x   Blood: x / Protein: x / Nitrite: x   Leuk Esterase: x / RBC: x / WBC x   Sq Epi: x / Non Sq Epi: x / Bacteria: x        RADIOLOGY & ADDITIONAL TESTS:  Reviewed      ******************  Authored By: Halley Todd MD PGY3  Internal Medicine  Pager: [phone] [facility]// [address] [facility]  MS Teams Preferred  ******************

## 2024-09-22 NOTE — PROGRESS NOTE ADULT - PROBLEM SELECTOR PLAN 1
Positive stress test, patient transferred here for possible CATH  - Cards following  - C/w ASA, Statin  - SAMMI LVEF 50-55%  - per cardiology team, fixed defect with no ischemia, will treat medically

## 2024-09-22 NOTE — PROVIDER CONTACT NOTE (OTHER) - RECOMMENDATIONS
Provider aware, will discuss w/ day team GOC. Elevate LE for now. Provider aware, will discuss w/ day team GOC.

## 2024-09-23 LAB
ALBUMIN SERPL ELPH-MCNC: 3.5 G/DL — SIGNIFICANT CHANGE UP (ref 3.3–5)
ALP SERPL-CCNC: 116 U/L — SIGNIFICANT CHANGE UP (ref 40–120)
ALT FLD-CCNC: 19 U/L — SIGNIFICANT CHANGE UP (ref 10–45)
ANION GAP SERPL CALC-SCNC: 13 MMOL/L — SIGNIFICANT CHANGE UP (ref 5–17)
AST SERPL-CCNC: 16 U/L — SIGNIFICANT CHANGE UP (ref 10–40)
BILIRUB SERPL-MCNC: 0.4 MG/DL — SIGNIFICANT CHANGE UP (ref 0.2–1.2)
BUN SERPL-MCNC: 14 MG/DL — SIGNIFICANT CHANGE UP (ref 7–23)
CALCIUM SERPL-MCNC: 9.7 MG/DL — SIGNIFICANT CHANGE UP (ref 8.4–10.5)
CHLORIDE SERPL-SCNC: 107 MMOL/L — SIGNIFICANT CHANGE UP (ref 96–108)
CO2 SERPL-SCNC: 21 MMOL/L — LOW (ref 22–31)
CREAT SERPL-MCNC: 0.61 MG/DL — SIGNIFICANT CHANGE UP (ref 0.5–1.3)
EGFR: 93 ML/MIN/1.73M2 — SIGNIFICANT CHANGE UP
GLUCOSE SERPL-MCNC: 86 MG/DL — SIGNIFICANT CHANGE UP (ref 70–99)
HCT VFR BLD CALC: 39.2 % — SIGNIFICANT CHANGE UP (ref 34.5–45)
HGB BLD-MCNC: 12.7 G/DL — SIGNIFICANT CHANGE UP (ref 11.5–15.5)
MAGNESIUM SERPL-MCNC: 2.2 MG/DL — SIGNIFICANT CHANGE UP (ref 1.6–2.6)
MCHC RBC-ENTMCNC: 28.6 PG — SIGNIFICANT CHANGE UP (ref 27–34)
MCHC RBC-ENTMCNC: 32.4 GM/DL — SIGNIFICANT CHANGE UP (ref 32–36)
MCV RBC AUTO: 88.3 FL — SIGNIFICANT CHANGE UP (ref 80–100)
NRBC # BLD: 0 /100 WBCS — SIGNIFICANT CHANGE UP (ref 0–0)
PHOSPHATE SERPL-MCNC: 3.4 MG/DL — SIGNIFICANT CHANGE UP (ref 2.5–4.5)
PLATELET # BLD AUTO: 245 K/UL — SIGNIFICANT CHANGE UP (ref 150–400)
POTASSIUM SERPL-MCNC: 4 MMOL/L — SIGNIFICANT CHANGE UP (ref 3.5–5.3)
POTASSIUM SERPL-SCNC: 4 MMOL/L — SIGNIFICANT CHANGE UP (ref 3.5–5.3)
PROT SERPL-MCNC: 6.6 G/DL — SIGNIFICANT CHANGE UP (ref 6–8.3)
RBC # BLD: 4.44 M/UL — SIGNIFICANT CHANGE UP (ref 3.8–5.2)
RBC # FLD: 13.2 % — SIGNIFICANT CHANGE UP (ref 10.3–14.5)
SODIUM SERPL-SCNC: 141 MMOL/L — SIGNIFICANT CHANGE UP (ref 135–145)
WBC # BLD: 7.62 K/UL — SIGNIFICANT CHANGE UP (ref 3.8–10.5)
WBC # FLD AUTO: 7.62 K/UL — SIGNIFICANT CHANGE UP (ref 3.8–10.5)

## 2024-09-23 PROCEDURE — 99232 SBSQ HOSP IP/OBS MODERATE 35: CPT

## 2024-09-23 PROCEDURE — 99232 SBSQ HOSP IP/OBS MODERATE 35: CPT | Mod: GC

## 2024-09-23 RX ORDER — METOPROLOL TARTRATE 50 MG
25 TABLET ORAL DAILY
Refills: 0 | Status: DISCONTINUED | OUTPATIENT
Start: 2024-09-23 | End: 2024-09-25

## 2024-09-23 RX ADMIN — QUETIAPINE FUMARATE 25 MILLIGRAM(S): 50 TABLET, FILM COATED ORAL at 21:10

## 2024-09-23 RX ADMIN — ATORVASTATIN CALCIUM 80 MILLIGRAM(S): 10 TABLET, FILM COATED ORAL at 21:10

## 2024-09-23 RX ADMIN — Medication 112 MICROGRAM(S): at 05:09

## 2024-09-23 RX ADMIN — GABAPENTIN 400 MILLIGRAM(S): 800 TABLET, FILM COATED ORAL at 17:18

## 2024-09-23 RX ADMIN — GABAPENTIN 400 MILLIGRAM(S): 800 TABLET, FILM COATED ORAL at 05:09

## 2024-09-23 RX ADMIN — Medication 10 MILLIGRAM(S): at 21:10

## 2024-09-23 RX ADMIN — MIRTAZAPINE 15 MILLIGRAM(S): 30 TABLET, FILM COATED ORAL at 21:10

## 2024-09-23 RX ADMIN — Medication 25 MILLIGRAM(S): at 12:10

## 2024-09-23 RX ADMIN — Medication 20 MILLIGRAM(S): at 12:10

## 2024-09-23 RX ADMIN — Medication 3 MILLIGRAM(S): at 21:10

## 2024-09-23 RX ADMIN — Medication 81 MILLIGRAM(S): at 12:11

## 2024-09-23 NOTE — PROGRESS NOTE ADULT - PROBLEM SELECTOR PLAN 2
History of suicide attempts and inpatient psych admissions for episodes of MDD  - Continue with Lexapro 10mg daily, Seroquel 25mg, Mirtazepine 15mg, discontinued trazodone  - Psych consulted, plan to arrange meeting with son to further discuss about placement -->f/u psych  - Maintain 1:1 observation

## 2024-09-23 NOTE — PROVIDER CONTACT NOTE (OTHER) - BACKGROUND
Progress Note    Admit Date: 11/8/2021  Day: 1  Diet: Diet NPO    CC: Left Face Abscess    Interval history:  - Abscess is not very painful, more tightness with a raw-type feeling (from rubbing so much)   - On ROS: no SOB, CP, ab pain, N/V; also denied dysphagia, hoarseness, difficulty breathing  - difficulty in opening his mouth  - going to OR today for I&D - pre-op being completed    HPI:  Aparna Huynh is a 35 y.o., male with PMH of poorly controlled diabetes, hypertension, depression presenting for large left facial abscess. Abscess began on 10/27; he went to the dentist and asked to have his tooth pulled. Due to this abscess dentist recommended 10 days of Augmentin. Patient finished course of Augmentin, return to dentist who conferred with oral maxillofacial surgery at [facility]. They recommended he continue with tooth extraction which occurred on 11/4. After extraction patient reports he had drainage from the wound in his mouth and then drainage from external wound beginning on Saturday. He says it is not painful but he does have a burning pain from wiping with paper towels and cleaning solution. He has had cellulitis/infections in the past.      Last A1c 10.4 on 9/24/2021.     Patient denies fevers, chills, pain. No stiff neck. \"    Medications:     Scheduled Meds:   vancomycin  1,500 mg IntraVENous Q12H    insulin lispro  0-12 Units SubCUTAneous Q4H    insulin lispro  0-6 Units SubCUTAneous Q4H    levofloxacin  500 mg IntraVENous Q24H    sodium chloride  1,000 mL IntraVENous Once    atorvastatin  40 mg Oral Daily    buprenorphine-naloxone  4 tablet SubLINGual Daily    citalopram  10 mg Oral Daily    gabapentin  600 mg Oral TID    [Held by provider] insulin lispro (1 Unit Dial)  30 Units SubCUTAneous TID AC    lisinopril  40 mg Oral Daily    senna  2 tablet Oral Daily    VORTIoxetine HBr  20 mg Oral Daily    sodium chloride flush  5-40 mL IntraVENous 2 times per day    enoxaparin  40
Admitted for abnormal cardiovascular study.
mg SubCUTAneous Daily    insulin glargine  20 Units SubCUTAneous BID     Continuous Infusions:   sodium chloride 25 mL (11/09/21 0915)    dextrose       PRN Meds:sodium chloride flush, sodium chloride, ondansetron **OR** ondansetron, polyethylene glycol, acetaminophen **OR** acetaminophen, glucose, dextrose, glucagon (rDNA), dextrose    Objective:   Vitals:   T-max:  Patient Vitals for the past 8 hrs:   BP Temp Temp src Pulse Resp SpO2   11/09/21 0911 135/85 98.6 °F (37 °C) Oral 82 18 --   11/09/21 0437 107/66 98.8 °F (37.1 °C) Oral 85 18 97 %       Intake/Output Summary (Last 24 hours) at 11/9/2021 1048  Last data filed at 11/9/2021 0549  Gross per 24 hour   Intake 295 ml   Output 1825 ml   Net -1530 ml       Review of Systems  As per  Interval hx    Physical Exam  Constitutional:       General: He is not in acute distress. Appearance: He is obese. HENT:      Head: Normocephalic. Comments: L side facial abscess, below jaw line extending to neck; draining purulent discharge in 3 locations; erythematous, edematous, warm, non-tender to palpation  Eyes:      Extraocular Movements: Extraocular movements intact. Conjunctiva/sclera: Conjunctivae normal.   Neck:      Comments: Trouble turning to the L 2/2 to abscess  Cardiovascular:      Rate and Rhythm: Normal rate and regular rhythm. Heart sounds: Normal heart sounds. Pulmonary:      Effort: Pulmonary effort is normal.      Breath sounds: Normal breath sounds. Abdominal:      Palpations: Abdomen is soft. Musculoskeletal:         General: Deformity present. Right lower leg: No edema. Left lower leg: No edema. Comments: R foot: amputation of all digits; clean based ulcer on sole     L foot: amputation of first and second digits; some ulcerative lesions on last 3 digits    Skin:     Comments: R foot: clean based ulcer on sole    Neurological:      General: No focal deficit present.       Mental Status: He is alert and oriented to
person, place, and time. Sensory: Sensory deficit present. Comments: LLE - mild sensory deficit   Psychiatric:         Mood and Affect: Mood normal.         Behavior: Behavior normal.         Thought Content: Thought content normal.         Judgment: Judgment normal.         LABS:    CBC:   Recent Labs     11/08/21 1756 11/09/21  0521   WBC 11.8* 10.1   HGB 11.6* 10.6*   HCT 36.0* 32.4*   * 407   MCV 81.8 80.5     Renal:    Recent Labs     11/08/21 1756 11/08/21 2159 11/09/21  0521   * 131* 133*   K 5.7* 4.6 4.1   CL 90* 96* 99   CO2 24 23 21   BUN 37* 36* 26*   CREATININE 1.0 0.9 0.7*   GLUCOSE 699* 494* 296*   CALCIUM 9.7 8.9 8.7   ANIONGAP 12 12 13     Hepatic:   Recent Labs     11/08/21 1756   AST 8*   ALT 10   BILITOT 0.3   PROT 7.9   LABALBU 3.8   ALKPHOS 96     Troponin: No results for input(s): TROPONINI in the last 72 hours. BNP: No results for input(s): BNP in the last 72 hours. Lipids: No results for input(s): CHOL, HDL in the last 72 hours. Invalid input(s): LDLCALCU, TRIGLYCERIDE  ABGs:  No results for input(s): PHART, BRJ1PST, PO2ART, RMP8KLB, BEART, THGBART, U7VVDRSC, TIS7NQJ in the last 72 hours. INR: No results for input(s): INR in the last 72 hours. Lactate: No results for input(s): LACTATE in the last 72 hours. Cultures:  -----------------------------------------------------------------  RAD:   CT SOFT TISSUE NECK W CONTRAST   Final Result      Large ill-defined fluid collection centered along the inferior aspect of the left mandible measuring approximately 8.9 x 3.9 x 4.3 cm. No evidence of airway compromise. XR CHEST (2 VW)   Final Result      No evidence of acute cardiopulmonary disease.                 Assessment/Plan:     #sepsis 2/2 L facial abscess  - SIRS + on admission: tachycardic and WBC 11.8 => RESOLVED  - ENT consult placed: Plan for I&D today 11.9.21  - NPO   - work on BG control: discussed that we will address this in detail post-op  -
Admitted for abnormal cardiac test.
abx: vanc, levoquin, flagyl     #DM2-last A1c 10.4 on 9/24   at [facility], down to 494 with 10 Regular Insulin. Need to get BG under control in event of OR/procedure tomorrow. - Lantus 20 units BID  - q4h medium dose sliding scale => patient is currently NPO, will consider HDSSI post-op after reviewing blood glucose levels; Lispro corrective dose also in place  - Repeat A1c: 11.6  - Will re-assess insulin doses/amts post-op     #Major depressive disorder  Patient on 10 of Celexa and recently started on Trintellix.   Reports taking both at home.  -Continue home meds      Code Status: Full Code  FEN: NPO  PPX: Lovenox  DISPO: Mary Shea MD, PGY-1  11/09/21  10:48 AM    This patient has been staffed and discussed with Cole Hyde MD.

## 2024-09-23 NOTE — PROVIDER CONTACT NOTE (OTHER) - ASSESSMENT
Patient is A&Ox4, no distress, no dizziness, VSS, see in flow sheets.
Patient is A&Ox4, no distress, no dizziness, VSS, see in flow sheets.

## 2024-09-23 NOTE — PROGRESS NOTE ADULT - SUBJECTIVE AND OBJECTIVE BOX
DATE OF SERVICE: 09-23-24 @ 16:12    Patient is a 73y old  Female who presents with a chief complaint of Prolonged QTC, pending CATH (23 Sep 2024 07:12)      INTERVAL HISTORY: Feels well, no acute events     REVIEW OF SYSTEMS:  CONSTITUTIONAL: No weakness  EYES/ENT: No visual changes;  No throat pain   NECK: No pain or stiffness  RESPIRATORY: No cough, wheezing; No shortness of breath  CARDIOVASCULAR: No chest pain or palpitations  GASTROINTESTINAL: No abdominal  pain. No nausea, vomiting, or hematemesis  GENITOURINARY: No dysuria, frequency or hematuria  NEUROLOGICAL: No stroke like symptoms  SKIN: No rashes    TELEMETRY Personally reviewed: SR 60-70s, 18 beat WCT  	  MEDICATIONS:  metoprolol succinate ER 25 milliGRAM(s) Oral daily        PHYSICAL EXAM:  T(C): 36.3 (09-23-24 @ 11:47), Max: 36.5 (09-23-24 @ 04:32)  HR: 60 (09-23-24 @ 11:47) (60 - 67)  BP: 116/62 (09-23-24 @ 11:47) (108/60 - 124/66)  RR: 18 (09-23-24 @ 11:47) (16 - 18)  SpO2: 95% (09-23-24 @ 11:47) (93% - 97%)  Wt(kg): --  I&O's Summary    22 Sep 2024 07:01  -  23 Sep 2024 07:00  --------------------------------------------------------  IN: 660 mL / OUT: 0 mL / NET: 660 mL        Weight (kg): 70.5 (09-22 @ 19:00)    Appearance: In no distress	  HEENT:    PERRL, EOMI	  Cardiovascular:  S1 S2, No JVD  Respiratory: Lungs clear to auscultation	  Gastrointestinal:  Soft, Non-tender, + BS	  Vascularature:  No edema of LE  Psychiatric: Appropriate affect   Neuro: no acute focal deficits                               12.7   7.62  )-----------( 245      ( 23 Sep 2024 06:07 )             39.2     09-23    141  |  107  |  14  ----------------------------<  86  4.0   |  21[L]  |  0.61    Ca    9.7      23 Sep 2024 06:09  Phos  3.4     09-23  Mg     2.2     09-23    TPro  6.6  /  Alb  3.5  /  TBili  0.4  /  DBili  x   /  AST  16  /  ALT  19  /  AlkPhos  116  09-23        Labs personally reviewed      ASSESSMENT/PLAN: 	  The patient is a  72 y/o F, , retired , recently living with her son, with PMH significant for h/o CAD s/p PCI of OM1 (100% occlussion ) in 11/2021, MI, hypertension, with PPH significant for MDD, multiple (>10) previous psychiatric hospitalizations, h/o 2-3 past suicide attempts. Tx from [facility] for positive stress test and possible cath    1. Abnormal Stress test  - denies cp, palpiations, sob, dizziness  - NM stress test 9/17 moderate sized, moderate intensity fixed perfusion defect of the apical inferior, basal inferior and basal inferolateral wall suggestive of infarct. No ischemia visualized.  - fixed defect consistent with infarct and no ischemia, will treat medically with ASA 81mg, Lipitor 80mg    2. CAD s/p PCI (2021)  - TTE 9/15~ LV systolic function normal, EF 50-55%   - c/w ASA 81mg qd  - c/w atorvastatin 80mg qd  - d/c metoprolol 25mg qd as bradycardic     3. HTN  - bp ok  - c/w amlodipine 2.5mg qd    4. DVT ppx  - c/w lovenox        RAFAEL Alonzo DO [facility]  Cardiovascular Medicine  [address] [place] [address], [address]  Available via call or text on [employer] TEAM[employer]  Office: [phone]   DATE OF SERVICE: 09-23-24 @ 16:12    Patient is a 73y old  Female who presents with a chief complaint of Prolonged QTC, pending CATH (23 Sep 2024 07:12)      INTERVAL HISTORY: Feels well, no acute events     REVIEW OF SYSTEMS:  CONSTITUTIONAL: No weakness  EYES/ENT: No visual changes;  No throat pain   NECK: No pain or stiffness  RESPIRATORY: No cough, wheezing; No shortness of breath  CARDIOVASCULAR: No chest pain or palpitations  GASTROINTESTINAL: No abdominal  pain. No nausea, vomiting, or hematemesis  GENITOURINARY: No dysuria, frequency or hematuria  NEUROLOGICAL: No stroke like symptoms  SKIN: No rashes    TELEMETRY Personally reviewed: SR 60-70s, 18 beat WCT  	  MEDICATIONS:  metoprolol succinate ER 25 milliGRAM(s) Oral daily        PHYSICAL EXAM:  T(C): 36.3 (09-23-24 @ 11:47), Max: 36.5 (09-23-24 @ 04:32)  HR: 60 (09-23-24 @ 11:47) (60 - 67)  BP: 116/62 (09-23-24 @ 11:47) (108/60 - 124/66)  RR: 18 (09-23-24 @ 11:47) (16 - 18)  SpO2: 95% (09-23-24 @ 11:47) (93% - 97%)  Wt(kg): --  I&O's Summary    22 Sep 2024 07:01  -  23 Sep 2024 07:00  --------------------------------------------------------  IN: 660 mL / OUT: 0 mL / NET: 660 mL        Weight (kg): 70.5 (09-22 @ 19:00)    Appearance: In no distress	  HEENT:    PERRL, EOMI	  Cardiovascular:  S1 S2, No JVD  Respiratory: Lungs clear to auscultation	  Gastrointestinal:  Soft, Non-tender, + BS	  Vascularature:  No edema of LE  Psychiatric: Appropriate affect   Neuro: no acute focal deficits                               12.7   7.62  )-----------( 245      ( 23 Sep 2024 06:07 )             39.2     09-23    141  |  107  |  14  ----------------------------<  86  4.0   |  21[L]  |  0.61    Ca    9.7      23 Sep 2024 06:09  Phos  3.4     09-23  Mg     2.2     09-23    TPro  6.6  /  Alb  3.5  /  TBili  0.4  /  DBili  x   /  AST  16  /  ALT  19  /  AlkPhos  116  09-23        Labs personally reviewed      ASSESSMENT/PLAN: 	  The patient is a  72 y/o F, , retired , recently living with her son, with PMH significant for h/o CAD s/p PCI of OM1 (100% occlussion ) in 11/2021, MI, hypertension, with PPH significant for MDD, multiple (>10) previous psychiatric hospitalizations, h/o 2-3 past suicide attempts. Tx from [facility] for positive stress test and possible cath    1. Abnormal Stress test  - denies cp, palpiations, sob, dizziness  - NM stress test 9/17 moderate sized, moderate intensity fixed perfusion defect of the apical inferior, basal inferior and basal inferolateral wall suggestive of infarct. No ischemia visualized.  - fixed defect consistent with infarct and no ischemia, will treat medically with ASA 81mg, Lipitor 80mg    2. CAD s/p PCI (2021)  - TTE 9/15~ LV systolic function normal, EF 50-55%   - c/w ASA 81mg qd  - c/w atorvastatin 80mg qd  - metoprolol 25mg qd resumed 2/2 NSVT    3. HTN  - bp ok  - c/w amlodipine 2.5mg qd    4. DVT ppx  - c/w lovenox        RAFAEL Alonzo DO [facility]  Cardiovascular Medicine  [address] [facility], [address]  Available via call or text on [employer] TEAM[employer]  Office: [phone]

## 2024-09-23 NOTE — PROGRESS NOTE ADULT - PROBLEM SELECTOR PLAN 5
Diet: Soft   DVT prophylaxis: Lovenox  Dispo: Pending conversation with son and psychiatry team to discuss whether back home or inpatient psych

## 2024-09-23 NOTE — PROGRESS NOTE ADULT - SUBJECTIVE AND OBJECTIVE BOX
Patient is a 73y old  Female who presents with a chief complaint of Prolonged QTC, pending CATH (22 Sep 2024 12:13)      SUBJECTIVE / OVERNIGHT EVENTS: Patient seen and examined at bedside. No acute events overnight. Pt denies fevers, chills, chest pain, abdominal pain, n/v/d.    MEDICATIONS  (STANDING):  aspirin  chewable 81 milliGRAM(s) Oral daily  atorvastatin 80 milliGRAM(s) Oral at bedtime  enoxaparin Injectable 40 milliGRAM(s) SubCutaneous every 24 hours  escitalopram 10 milliGRAM(s) Oral daily  famotidine    Tablet 20 milliGRAM(s) Oral daily  gabapentin 400 milliGRAM(s) Oral two times a day  levothyroxine 112 MICROGram(s) Oral daily  melatonin 3 milliGRAM(s) Oral at bedtime  mirtazapine 15 milliGRAM(s) Oral at bedtime  QUEtiapine 25 milliGRAM(s) Oral daily    MEDICATIONS  (PRN):  acetaminophen     Tablet .. 650 milliGRAM(s) Oral every 6 hours PRN Moderate Pain (4 - 6)      Vital Signs Last 24 Hrs  T(C): 36.4 (23 Sep 2024 06:31), Max: 36.7 (22 Sep 2024 12:05)  T(F): 97.6 (23 Sep 2024 06:31), Max: 98 (22 Sep 2024 12:05)  HR: 60 (23 Sep 2024 04:32) (60 - 67)  BP: 108/60 (23 Sep 2024 06:31) (108/60 - 138/67)  BP(mean): --  RR: 18 (23 Sep 2024 06:31) (16 - 18)  SpO2: 93% (23 Sep 2024 06:31) (93% - 97%)    Parameters below as of 23 Sep 2024 06:31  Patient On (Oxygen Delivery Method): room air      CAPILLARY BLOOD GLUCOSE        I&O's Summary    22 Sep 2024 07:01  -  23 Sep 2024 07:00  --------------------------------------------------------  IN: 660 mL / OUT: 0 mL / NET: 660 mL        PHYSICAL EXAM:  GENERAL: NAD, well-groomed, well-developed  HEAD:  Atraumatic, Normocephalic  EYES: EOMI, PERRLA, conjunctiva and sclera clear  ENMT: No tonsillar erythema, exudates, or enlargement; Moist mucous membranes  NECK: Supple, No JVD, Normal thyroid  HEART: Regular rate and rhythm; No murmurs, rubs, or gallops  RESPIRATORY: CTA B/L, No W/R/R  ABDOMEN: Soft, Nontender, Nondistended; Bowel sounds present  NEUROLOGY: A&Ox3, nonfocal, moving all extremities  EXTREMITIES:  2+ Peripheral Pulses, No clubbing, cyanosis, or edema  SKIN: warm, dry, normal color, no rash or abnormal lesions    LABS:                        12.7   7.62  )-----------( 245      ( 23 Sep 2024 06:07 )             39.2     09-23    141  |  107  |  14  ----------------------------<  86  4.0   |  21[L]  |  0.61    Ca    9.7      23 Sep 2024 06:09  Phos  3.4     09-23  Mg     2.2     09-23    TPro  6.6  /  Alb  3.5  /  TBili  0.4  /  DBili  x   /  AST  16  /  ALT  19  /  AlkPhos  116  09-23          Urinalysis Basic - ( 23 Sep 2024 06:09 )    Color: x / Appearance: x / SG: x / pH: x  Gluc: 86 mg/dL / Ketone: x  / Bili: x / Urobili: x   Blood: x / Protein: x / Nitrite: x   Leuk Esterase: x / RBC: x / WBC x   Sq Epi: x / Non Sq Epi: x / Bacteria: x        RADIOLOGY & ADDITIONAL TESTS:    Imaging Personally Reviewed:    Consultant(s) Notes Reviewed:      Care Discussed with Consultants/Other Providers:    Linda Hicks MD, Internal Medicine Resident

## 2024-09-23 NOTE — BH CONSULTATION LIAISON PROGRESS NOTE - NSBHFUPINTERVALHXFT_PSY_A_CORE
Patient seen and evaluated, staff consulted, chart, labs, meds reviewed. Reports no issues overnight. Meds adjusted resulted in improved sleep. Patient states there are mild improvements in mood, energy, appetite, and sleep. No SI/HI, no hopelessness, helplessness, guilt, or other major mood sx. noted or reported. Patient also mentions that she is concerned about what will happen with her as her son insists on having her go someplace for her substance use.    Direction of care discussed with the patient. Counseling and support provided.  Patient did not represent a management problem overnight.  Spoke to patient's son, who expressed concern about patient's unmitigated use of sleep meds. Will meet with him and SW tomorrow at 11.

## 2024-09-23 NOTE — PROGRESS NOTE ADULT - PROBLEM SELECTOR PLAN 1
Positive stress test, patient transferred here for possible CATH  - C/w ASA, Statin  - TTE LVEF 50-55%  - per cardiology team, fixed defect with no ischemia, will manage medically

## 2024-09-24 PROCEDURE — 99233 SBSQ HOSP IP/OBS HIGH 50: CPT

## 2024-09-24 PROCEDURE — 99232 SBSQ HOSP IP/OBS MODERATE 35: CPT | Mod: GC

## 2024-09-24 RX ADMIN — Medication 81 MILLIGRAM(S): at 12:00

## 2024-09-24 RX ADMIN — Medication 112 MICROGRAM(S): at 05:52

## 2024-09-24 RX ADMIN — QUETIAPINE FUMARATE 25 MILLIGRAM(S): 50 TABLET, FILM COATED ORAL at 21:55

## 2024-09-24 RX ADMIN — Medication 10 MILLIGRAM(S): at 22:27

## 2024-09-24 RX ADMIN — GABAPENTIN 400 MILLIGRAM(S): 800 TABLET, FILM COATED ORAL at 05:52

## 2024-09-24 RX ADMIN — Medication 25 MILLIGRAM(S): at 05:53

## 2024-09-24 RX ADMIN — ATORVASTATIN CALCIUM 80 MILLIGRAM(S): 10 TABLET, FILM COATED ORAL at 21:54

## 2024-09-24 RX ADMIN — GABAPENTIN 400 MILLIGRAM(S): 800 TABLET, FILM COATED ORAL at 18:12

## 2024-09-24 RX ADMIN — MIRTAZAPINE 15 MILLIGRAM(S): 30 TABLET, FILM COATED ORAL at 21:55

## 2024-09-24 RX ADMIN — Medication 3 MILLIGRAM(S): at 21:55

## 2024-09-24 RX ADMIN — Medication 20 MILLIGRAM(S): at 12:00

## 2024-09-24 NOTE — BH CONSULTATION LIAISON PROGRESS NOTE - NSBHFUPINTERVALHXFT_PSY_A_CORE
Patient seen and evaluated, staff consulted, chart, labs, meds reviewed. Reports no issues overnight. Meds adjusted resulted in improved sleep. Patient states there are no changes in mood, energy, appetite, and sleep. No SI/HI, no hopelessness, but helplessness and guilt still pervasive. Patient also mentions that she is concerned about what will happen with her as her son insists on having her go someplace for her substance use.    Direction of care discussed with the patient and with her son. Son has considerable safety concerns as he believes that patient, once unsupervised, will get a hold of 'pills of any kind' and 'the next time we see you is at the '. This pertains to patient's discharge home. He also has concerns about having the patient discharged to his house as she 'got high and tried to hold her 5-month-old grandchild'. Counseling and support provided.    Called patient's Psychiatrist, Dr. Ryan Sparrow [phone], left a message. Patient seen and evaluated, staff consulted, chart, labs, meds reviewed. Reports no issues overnight. Meds adjusted resulted in improved sleep. Patient states there are no changes in mood, energy, appetite, and sleep. No SI/HI, no hopelessness, but helplessness and guilt still pervasive. Patient also mentions that she is concerned about what will happen with her as her son insists on having her go someplace for her substance use.    Direction of care discussed with the patient and with her son. Son has considerable safety concerns as he believes that patient, once unsupervised, will get a hold of 'pills of any kind' and 'the next time we see you is at the '. This pertains to patient's discharge home. He also has concerns about having the patient discharged to his house as she 'got high and tried to hold her 5-month-old grandchild'. Counseling and support provided.    Called patient's Psychiatrist, Dr. Ryan Sparrow [phone], appraised him of my plan to admit the patient.

## 2024-09-24 NOTE — BH CONSULTATION LIAISON PROGRESS NOTE - NSBHCHARTREVIEWLAB_PSY_A_CORE FT
12.7   7.62  )-----------( 245      ( 23 Sep 2024 06:07 )             39.2     09-23    141  |  107  |  14  ----------------------------<  86  4.0   |  21[L]  |  0.61    Ca    9.7      23 Sep 2024 06:09  Phos  3.4     09-23  Mg     2.2     09-23    TPro  6.6  /  Alb  3.5  /  TBili  0.4  /  DBili  x   /  AST  16  /  ALT  19  /  AlkPhos  116  09-23    Urinalysis Basic - ( 23 Sep 2024 06:09 )    Color: x / Appearance: x / SG: x / pH: x  Gluc: 86 mg/dL / Ketone: x  / Bili: x / Urobili: x   Blood: x / Protein: x / Nitrite: x   Leuk Esterase: x / RBC: x / WBC x   Sq Epi: x / Non Sq Epi: x / Bacteria: x    
                           12.7   7.62  )-----------( 245      ( 23 Sep 2024 06:07 )             39.2     09-23    141  |  107  |  14  ----------------------------<  86  4.0   |  21[L]  |  0.61    Ca    9.7      23 Sep 2024 06:09  Phos  3.4     09-23  Mg     2.2     09-23    TPro  6.6  /  Alb  3.5  /  TBili  0.4  /  DBili  x   /  AST  16  /  ALT  19  /  AlkPhos  116  09-23    Urinalysis Basic - ( 23 Sep 2024 06:09 )    Color: x / Appearance: x / SG: x / pH: x  Gluc: 86 mg/dL / Ketone: x  / Bili: x / Urobili: x   Blood: x / Protein: x / Nitrite: x   Leuk Esterase: x / RBC: x / WBC x   Sq Epi: x / Non Sq Epi: x / Bacteria: x

## 2024-09-24 NOTE — PROGRESS NOTE ADULT - SUBJECTIVE AND OBJECTIVE BOX
Patient is a 73y old  Female who presents with a chief complaint of Prolonged QTC, pending CATH (22 Sep 2024 12:13)      SUBJECTIVE / OVERNIGHT EVENTS: Patient seen and examined at bedside. No acute events overnight. Pt denies fevers, chills, chest pain, abdominal pain, n/v/d.    MEDICATIONS  (STANDING):  aspirin  chewable 81 milliGRAM(s) Oral daily  atorvastatin 80 milliGRAM(s) Oral at bedtime  enoxaparin Injectable 40 milliGRAM(s) SubCutaneous every 24 hours  escitalopram 10 milliGRAM(s) Oral daily  famotidine    Tablet 20 milliGRAM(s) Oral daily  gabapentin 400 milliGRAM(s) Oral two times a day  levothyroxine 112 MICROGram(s) Oral daily  melatonin 3 milliGRAM(s) Oral at bedtime  mirtazapine 15 milliGRAM(s) Oral at bedtime  QUEtiapine 25 milliGRAM(s) Oral daily    MEDICATIONS  (PRN):  acetaminophen     Tablet .. 650 milliGRAM(s) Oral every 6 hours PRN Moderate Pain (4 - 6)    Vital Signs Last 24 Hrs  T(C): 36.3 (24 Sep 2024 05:32), Max: 36.7 (23 Sep 2024 20:24)  T(F): 97.4 (24 Sep 2024 05:32), Max: 98.1 (23 Sep 2024 20:24)  HR: 57 (24 Sep 2024 05:32) (57 - 60)  BP: 106/62 (24 Sep 2024 05:32) (106/62 - 116/62)  BP(mean): 71 (23 Sep 2024 20:24) (71 - 71)  RR: 18 (24 Sep 2024 05:32) (18 - 18)  SpO2: 94% (24 Sep 2024 05:32) (92% - 95%)    Parameters below as of 24 Sep 2024 05:32  Patient On (Oxygen Delivery Method): room air      CAPILLARY BLOOD GLUCOSE        I&O's Summary    22 Sep 2024 07:01  -  23 Sep 2024 07:00  --------------------------------------------------------  IN: 660 mL / OUT: 0 mL / NET: 660 mL        PHYSICAL EXAM:  GENERAL: NAD, well-groomed, well-developed  HEAD:  Atraumatic, Normocephalic  EYES: EOMI, PERRLA, conjunctiva and sclera clear  ENMT: No tonsillar erythema, exudates, or enlargement; Moist mucous membranes  NECK: Supple, No JVD, Normal thyroid  HEART: Regular rate and rhythm; No murmurs, rubs, or gallops  RESPIRATORY: CTA B/L, No W/R/R  ABDOMEN: Soft, Nontender, Nondistended; Bowel sounds present  NEUROLOGY: A&Ox3, nonfocal, moving all extremities  EXTREMITIES:  2+ Peripheral Pulses, No clubbing, cyanosis, or edema  SKIN: warm, dry, normal color, no rash or abnormal lesions    LABS:                        12.7   7.62  )-----------( 245      ( 23 Sep 2024 06:07 )             39.2     09-23    141  |  107  |  14  ----------------------------<  86  4.0   |  21[L]  |  0.61    Ca    9.7      23 Sep 2024 06:09  Phos  3.4     09-23  Mg     2.2     09-23    TPro  6.6  /  Alb  3.5  /  TBili  0.4  /  DBili  x   /  AST  16  /  ALT  19  /  AlkPhos  116  09-23          Urinalysis Basic - ( 23 Sep 2024 06:09 )    Color: x / Appearance: x / SG: x / pH: x  Gluc: 86 mg/dL / Ketone: x  / Bili: x / Urobili: x   Blood: x / Protein: x / Nitrite: x   Leuk Esterase: x / RBC: x / WBC x   Sq Epi: x / Non Sq Epi: x / Bacteria: x        RADIOLOGY & ADDITIONAL TESTS:    Imaging Personally Reviewed:    Consultant(s) Notes Reviewed:           12:01

## 2024-09-24 NOTE — PROGRESS NOTE ADULT - SUBJECTIVE AND OBJECTIVE BOX
DATE OF SERVICE: 09-24-24 @ 10:41    Patient is a 73y old  Female who presents with a chief complaint of Prolonged QTC, pending CATH (24 Sep 2024 06:49)      INTERVAL HISTORY: Feels ok.     REVIEW OF SYSTEMS:  CONSTITUTIONAL: No weakness  EYES/ENT: No visual changes;  No throat pain   NECK: No pain or stiffness  RESPIRATORY: No cough, wheezing; No shortness of breath  CARDIOVASCULAR: No chest pain or palpitations  GASTROINTESTINAL: No abdominal  pain. No nausea, vomiting, or hematemesis  GENITOURINARY: No dysuria, frequency or hematuria  NEUROLOGICAL: No stroke like symptoms  SKIN: No rashes    TELEMETRY Personally reviewed:  SB/SR with occ PVCs 50-60s  	  MEDICATIONS:  metoprolol succinate ER 25 milliGRAM(s) Oral daily        PHYSICAL EXAM:  T(C): 36.3 (09-24-24 @ 05:32), Max: 36.7 (09-23-24 @ 20:24)  HR: 57 (09-24-24 @ 05:32) (57 - 60)  BP: 106/62 (09-24-24 @ 05:32) (106/62 - 116/62)  RR: 18 (09-24-24 @ 05:32) (18 - 18)  SpO2: 94% (09-24-24 @ 05:32) (92% - 95%)  Wt(kg): --  I&O's Summary    24 Sep 2024 07:01  -  24 Sep 2024 10:41  --------------------------------------------------------  IN: 420 mL / OUT: 0 mL / NET: 420 mL          Appearance: In no distress	  HEENT:    PERRL, EOMI	  Cardiovascular:  S1 S2, No JVD  Respiratory: Lungs clear to auscultation	  Gastrointestinal:  Soft, Non-tender, + BS	  Vascularature:  No edema of LE  Psychiatric: Appropriate affect   Neuro: no acute focal deficits                               12.7   7.62  )-----------( 245      ( 23 Sep 2024 06:07 )             39.2     09-23    141  |  107  |  14  ----------------------------<  86  4.0   |  21[L]  |  0.61    Ca    9.7      23 Sep 2024 06:09  Phos  3.4     09-23  Mg     2.2     09-23    TPro  6.6  /  Alb  3.5  /  TBili  0.4  /  DBili  x   /  AST  16  /  ALT  19  /  AlkPhos  116  09-23        Labs personally reviewed      ASSESSMENT/PLAN: 	    The patient is a  72 y/o F, , retired , recently living with her son, with PMH significant for h/o CAD s/p PCI of OM1 (100% occlussion ) in 11/2021, MI, hypertension, with PPH significant for MDD, multiple (>10) previous psychiatric hospitalizations, h/o 2-3 past suicide attempts. Tx from [facility] for positive stress test and possible cath    1. Abnormal Stress test  - denies cp, palpiations, sob, dizziness  - NM stress test 9/17 moderate sized, moderate intensity fixed perfusion defect of the apical inferior, basal inferior and basal inferolateral wall suggestive of infarct. No ischemia visualized.  - fixed defect consistent with infarct and no ischemia, will treat medically with ASA 81mg, Lipitor 80mg    2. CAD s/p PCI (2021)  - TTE 9/15~ LV systolic function normal, EF 50-55%   - c/w ASA 81mg qd  - c/w atorvastatin 80mg qd  - metoprolol 25mg qd resumed 2/2 NSVT    3. HTN  - bp ok  - amlodipine DC'd   - c/w Toprol as noted above    4. DVT ppx  - c/w lovenox        Magaly Mercedes, AG-NP   Garrett Boyer DO [facility]  Cardiovascular Medicine  [address] [place] [address], [address]  Available through call or text on [employer]  Office: [phone]

## 2024-09-25 ENCOUNTER — INPATIENT (INPATIENT)
Facility: HOSPITAL | Age: 74
LOS: 27 days | Discharge: HOME | End: 2024-10-23
Attending: PSYCHIATRY & NEUROLOGY | Admitting: PSYCHIATRY & NEUROLOGY
Payer: MEDICARE

## 2024-09-25 VITALS
HEART RATE: 64 BPM | RESPIRATION RATE: 16 BRPM | DIASTOLIC BLOOD PRESSURE: 70 MMHG | OXYGEN SATURATION: 96 % | SYSTOLIC BLOOD PRESSURE: 101 MMHG | TEMPERATURE: 98 F

## 2024-09-25 VITALS — TEMPERATURE: 98 F

## 2024-09-25 DIAGNOSIS — Z98.89 OTHER SPECIFIED POSTPROCEDURAL STATES: Chronic | ICD-10-CM

## 2024-09-25 DIAGNOSIS — Z98.890 OTHER SPECIFIED POSTPROCEDURAL STATES: Chronic | ICD-10-CM

## 2024-09-25 DIAGNOSIS — F32.9 MAJOR DEPRESSIVE DISORDER, SINGLE EPISODE, UNSPECIFIED: ICD-10-CM

## 2024-09-25 DIAGNOSIS — F13.20 SEDATIVE, HYPNOTIC OR ANXIOLYTIC DEPENDENCE, UNCOMPLICATED: ICD-10-CM

## 2024-09-25 DIAGNOSIS — T14.91XA SUICIDE ATTEMPT, INITIAL ENCOUNTER: ICD-10-CM

## 2024-09-25 LAB — SARS-COV-2 RNA SPEC QL NAA+PROBE: SIGNIFICANT CHANGE UP

## 2024-09-25 PROCEDURE — 85027 COMPLETE CBC AUTOMATED: CPT

## 2024-09-25 PROCEDURE — 85610 PROTHROMBIN TIME: CPT

## 2024-09-25 PROCEDURE — 36415 COLL VENOUS BLD VENIPUNCTURE: CPT

## 2024-09-25 PROCEDURE — 84100 ASSAY OF PHOSPHORUS: CPT

## 2024-09-25 PROCEDURE — 93005 ELECTROCARDIOGRAM TRACING: CPT

## 2024-09-25 PROCEDURE — 83735 ASSAY OF MAGNESIUM: CPT

## 2024-09-25 PROCEDURE — 93010 ELECTROCARDIOGRAM REPORT: CPT

## 2024-09-25 PROCEDURE — 80053 COMPREHEN METABOLIC PANEL: CPT

## 2024-09-25 PROCEDURE — 99232 SBSQ HOSP IP/OBS MODERATE 35: CPT

## 2024-09-25 PROCEDURE — 97161 PT EVAL LOW COMPLEX 20 MIN: CPT

## 2024-09-25 PROCEDURE — 99232 SBSQ HOSP IP/OBS MODERATE 35: CPT | Mod: GC

## 2024-09-25 PROCEDURE — 83036 HEMOGLOBIN GLYCOSYLATED A1C: CPT

## 2024-09-25 PROCEDURE — 85025 COMPLETE CBC W/AUTO DIFF WBC: CPT

## 2024-09-25 PROCEDURE — 80061 LIPID PANEL: CPT

## 2024-09-25 PROCEDURE — 87635 SARS-COV-2 COVID-19 AMP PRB: CPT

## 2024-09-25 RX ORDER — HYDROXYZINE HCL 25 MG
25 TABLET ORAL THREE TIMES A DAY
Refills: 0 | Status: DISCONTINUED | OUTPATIENT
Start: 2024-09-25 | End: 2024-09-26

## 2024-09-25 RX ORDER — CALCIUM CARBONATE 400(1000)
2 TABLET,CHEWABLE ORAL ONCE
Refills: 0 | Status: COMPLETED | OUTPATIENT
Start: 2024-09-25 | End: 2024-09-25

## 2024-09-25 RX ORDER — PANTOPRAZOLE SODIUM 40 MG/1
40 TABLET, DELAYED RELEASE ORAL
Refills: 0 | Status: DISCONTINUED | OUTPATIENT
Start: 2024-09-25 | End: 2024-09-25

## 2024-09-25 RX ORDER — ASPIRIN/MAG CARB/ALUMINUM AMIN 325 MG
81 TABLET ORAL DAILY
Refills: 0 | Status: DISCONTINUED | OUTPATIENT
Start: 2024-09-25 | End: 2024-10-23

## 2024-09-25 RX ORDER — ACETAMINOPHEN 500 MG
650 TABLET ORAL EVERY 6 HOURS
Refills: 0 | Status: DISCONTINUED | OUTPATIENT
Start: 2024-09-25 | End: 2024-10-23

## 2024-09-25 RX ORDER — LEVOTHYROXINE SODIUM 88 MCG
112 TABLET ORAL DAILY
Refills: 0 | Status: DISCONTINUED | OUTPATIENT
Start: 2024-09-25 | End: 2024-10-23

## 2024-09-25 RX ORDER — MELATONIN 5 MG
3 TABLET ORAL AT BEDTIME
Refills: 0 | Status: DISCONTINUED | OUTPATIENT
Start: 2024-09-25 | End: 2024-10-11

## 2024-09-25 RX ORDER — AMLODIPINE BESYLATE 5 MG
1 TABLET ORAL
Refills: 0 | DISCHARGE

## 2024-09-25 RX ORDER — QUETIAPINE FUMARATE 200 MG/1
25 TABLET ORAL DAILY
Refills: 0 | Status: DISCONTINUED | OUTPATIENT
Start: 2024-09-25 | End: 2024-09-26

## 2024-09-25 RX ORDER — ESCITALOPRAM 10 MG/1
10 TABLET, FILM COATED ORAL DAILY
Refills: 0 | Status: DISCONTINUED | OUTPATIENT
Start: 2024-09-25 | End: 2024-09-26

## 2024-09-25 RX ORDER — TICAGRELOR 60 MG/1
1 TABLET ORAL
Refills: 0 | DISCHARGE

## 2024-09-25 RX ORDER — PANTOPRAZOLE SODIUM 40 MG/1
1 TABLET, DELAYED RELEASE ORAL
Refills: 0 | DISCHARGE

## 2024-09-25 RX ORDER — MIRTAZAPINE 30 MG/1
15 TABLET ORAL AT BEDTIME
Refills: 0 | Status: DISCONTINUED | OUTPATIENT
Start: 2024-09-25 | End: 2024-09-26

## 2024-09-25 RX ORDER — AMLODIPINE BESYLATE 10 MG
2.5 TABLET ORAL DAILY
Refills: 0 | Status: DISCONTINUED | OUTPATIENT
Start: 2024-09-25 | End: 2024-09-25

## 2024-09-25 RX ORDER — TICAGRELOR 90 MG/1
80 TABLET ORAL
Refills: 0 | Status: DISCONTINUED | OUTPATIENT
Start: 2024-09-25 | End: 2024-09-25

## 2024-09-25 RX ORDER — METOPROLOL TARTRATE 50 MG
25 TABLET ORAL DAILY
Refills: 0 | Status: DISCONTINUED | OUTPATIENT
Start: 2024-09-25 | End: 2024-10-23

## 2024-09-25 RX ORDER — FAMOTIDINE 10 MG/ML
20 INJECTION INTRAVENOUS DAILY
Refills: 0 | Status: DISCONTINUED | OUTPATIENT
Start: 2024-09-25 | End: 2024-10-01

## 2024-09-25 RX ORDER — GABAPENTIN 300 MG/1
400 CAPSULE ORAL
Refills: 0 | Status: DISCONTINUED | OUTPATIENT
Start: 2024-09-25 | End: 2024-09-28

## 2024-09-25 RX ADMIN — Medication 3 MILLIGRAM(S): at 22:04

## 2024-09-25 RX ADMIN — Medication 2 TABLET(S): at 20:08

## 2024-09-25 RX ADMIN — Medication 112 MICROGRAM(S): at 05:28

## 2024-09-25 RX ADMIN — GABAPENTIN 400 MILLIGRAM(S): 800 TABLET, FILM COATED ORAL at 17:36

## 2024-09-25 RX ADMIN — Medication 20 MILLIGRAM(S): at 11:31

## 2024-09-25 RX ADMIN — GABAPENTIN 400 MILLIGRAM(S): 300 CAPSULE ORAL at 22:04

## 2024-09-25 RX ADMIN — Medication 80 MILLIGRAM(S): at 22:05

## 2024-09-25 RX ADMIN — Medication 25 MILLIGRAM(S): at 05:28

## 2024-09-25 RX ADMIN — Medication 81 MILLIGRAM(S): at 11:30

## 2024-09-25 RX ADMIN — Medication 650 MILLIGRAM(S): at 15:30

## 2024-09-25 RX ADMIN — MIRTAZAPINE 15 MILLIGRAM(S): 30 TABLET ORAL at 22:05

## 2024-09-25 RX ADMIN — GABAPENTIN 400 MILLIGRAM(S): 800 TABLET, FILM COATED ORAL at 05:28

## 2024-09-25 NOTE — BH PATIENT PROFILE - FALL HARM RISK - HARM RISK INTERVENTIONS
Assistance with ambulation/Assistance OOB with selected safe patient handling equipment/Communicate Risk of Fall with Harm to all staff/Discuss with provider need for PT consult/Monitor gait and stability/Reinforce activity limits and safety measures with patient and family/Tailored Fall Risk Interventions/Visual Cue: Yellow wristband and red socks/Call bell, personal items and telephone in reach/Instruct patient to call for assistance before getting out of bed or chair/Non-slip footwear when patient is out of bed/[place] to call system/Physically safe environment - no spills, clutter or unnecessary equipment/Purposeful Proactive Rounding/Room/bathroom lighting operational, light cord in reach

## 2024-09-25 NOTE — PROGRESS NOTE ADULT - PROBLEM SELECTOR PLAN 5
Diet: Soft   DVT prophylaxis: Lovenox  Dispo: Involuntary psych admission, 2PC forms to be completed

## 2024-09-25 NOTE — BH PATIENT PROFILE - HOME MEDICATIONS
amLODIPine 2.5 mg oral tablet , 1 tab(s) orally once a day  Brilinta (ticagrelor) 90 mg oral tablet , 1 tab(s) orally 2 times a day  pantoprazole 40 mg oral delayed release tablet , 1 tab(s) orally once a day  metoprolol succinate 25 mg oral tablet, extended release , 1 tab(s) orally once a day  QUEtiapine 25 mg oral tablet , 1 tab(s) orally once a day  levothyroxine 112 mcg (0.112 mg) oral tablet , 1 tab(s) orally once a day  melatonin 3 mg oral tablet , 1 tab(s) orally once a day (at bedtime)  hydrOXYzine hydrochloride 25 mg oral tablet , 1 tab(s) orally every 8 hours As needed Anxiety  aspirin 81 mg oral delayed release tablet , 1 tab(s) orally once a day  atorvastatin 80 mg oral tablet , 1 tab(s) orally once a day (at bedtime)  mirtazapine 15 mg oral tablet , 1 tab(s) orally once a day (at bedtime)  escitalopram 10 mg oral tablet , 1 tab(s) orally once a day  gabapentin 400 mg oral capsule , 1 cap(s) orally 2 times a day  acetaminophen 325 mg oral tablet , 2 tab(s) orally every 6 hours As needed Temp greater or equal to 38C (100.4F), Mild Pain (1 - 3)

## 2024-09-25 NOTE — BH INPATIENT PSYCHIATRY ASSESSMENT NOTE - NSBHCHARTREVIEWVS_PSY_A_CORE FT
Vital Signs Last 24 Hrs  T(C): 36.5 (09-25-24 @ 13:29), Max: 36.6 (09-24-24 @ 21:17)  T(F): 97.7 (09-25-24 @ 13:29), Max: 97.8 (09-24-24 @ 21:17)  HR: 64 (09-25-24 @ 13:29) (61 - 66)  BP: 101/70 (09-25-24 @ 13:29) (98/60 - 124/76)  BP(mean): --  RR: 16 (09-25-24 @ 13:29) (16 - 18)  SpO2: 96% (09-25-24 @ 13:29) (96% - 96%)     Vital Signs Last 24 Hrs  T(C): 36.6 (09-25-24 @ 19:03), Max: 36.6 (09-24-24 @ 21:17)  T(F): 97.9 (09-25-24 @ 19:03), Max: 97.9 (09-25-24 @ 19:03)  HR: 64 (09-25-24 @ 13:29) (61 - 66)  BP: 101/70 (09-25-24 @ 13:29) (98/60 - 124/76)  BP(mean): --  RR: 16 (09-25-24 @ 13:29) (16 - 18)  SpO2: 96% (09-25-24 @ 13:29) (96% - 96%)    Orthostatic VS  09-25-24 @ 19:03  Lying BP: --/-- HR: --  Sitting BP: 143/76 HR: 60  Standing BP: 129/76 HR: 76  Site: upper right arm  Mode: electronic

## 2024-09-25 NOTE — BH INPATIENT PSYCHIATRY ASSESSMENT NOTE - OTHER
appropriate  soft volume but audible recently impaired  low end of fair  serious, solemn  deferred  'Fine"  higher end of fair

## 2024-09-25 NOTE — BH CONSULTATION LIAISON PROGRESS NOTE - NSICDXBHSECONDARYDX_PSY_ALL_CORE
Severe zolpidem use disorder   F13.20  
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
## 2024-09-25 NOTE — BH INPATIENT PSYCHIATRY ASSESSMENT NOTE - NSBHASSESSSUMMFT_PSY_ALL_CORE
his is a 73-y.o. CF patient, , retired , recently living with her son, with PMH significant for h/o CAD s/p PCI of OM1 (100% occlussion ) in 11/2021, MI, hypertension, with PPH significant for MDD, multiple (>10) previous psychiatric hospitalizations, h/o 2-3 past suicide attempts, has an outpatient psychiatrist Dr. Ryan Sparrow. Patient was transferred from [facility] where she was taken to for possible 'overdose', and transferred to [facility] for a cardiac cath. Consult requested to evaluate patient for depression and suicidality.    Patient presents as perennially depressed, with a tendency to overutilize sleeping meds, resulting in unintended overdoses. Patient, however, does require planning for a safe discharge. Patient's son verbalizes considerable safety concerns. Considering patient's recent overdose on the heels of her recent inpatient Psych. admission, patient at this time requires inpatient care. - no PT skilled needs as per [facility] PT eval note on 9/25/24 - no PT skilled needs as per [facility] PT eval note on 9/25/24  - off of Brillinta, amlodipine and Protonix changed out to Pepcid. No anticoagulation recommended  - sleeping well on Seroquel, Remeron combination  - as per chart review, disinhibited behavior was in the context of hypnotic/alcohol mixed intox state and not due to cognitive decline  - no PT skilled needs as per [facility] PT eval note on 9/25/24  - off of Brillinta, amlodipine and Protonix changed out to Pepcid. No anticoagulation recommended  - sleeping well on Seroquel, Remeron combination  - as per chart review, disinhibited behavior was in the context of hypnotic/alcohol mixed intox state and not due to cognitive decline   - consider a Bereavement component

## 2024-09-25 NOTE — PROGRESS NOTE ADULT - PROBLEM SELECTOR PLAN 1
Positive stress test, patient transferred here for possible CATH  - C/w ASA, Statin  - TTE LVEF 50-55%  - per cardiology team, fixed defect with no ischemia, will manage medically Niacinamide Pregnancy And Lactation Text: These medications are considered safe during pregnancy.

## 2024-09-25 NOTE — BH CONSULTATION LIAISON PROGRESS NOTE - CURRENT MEDICATION
MEDICATIONS  (STANDING):  aspirin  chewable 81 milliGRAM(s) Oral daily  atorvastatin 80 milliGRAM(s) Oral at bedtime  enoxaparin Injectable 40 milliGRAM(s) SubCutaneous every 24 hours  escitalopram 10 milliGRAM(s) Oral daily  famotidine    Tablet 20 milliGRAM(s) Oral daily  gabapentin 400 milliGRAM(s) Oral two times a day  levothyroxine 112 MICROGram(s) Oral daily  melatonin 3 milliGRAM(s) Oral at bedtime  metoprolol succinate ER 25 milliGRAM(s) Oral daily  mirtazapine 15 milliGRAM(s) Oral at bedtime  QUEtiapine 25 milliGRAM(s) Oral daily    MEDICATIONS  (PRN):  acetaminophen     Tablet .. 650 milliGRAM(s) Oral every 6 hours PRN Moderate Pain (4 - 6)  

## 2024-09-25 NOTE — BH INPATIENT PSYCHIATRY ASSESSMENT NOTE - NSICDXBHSECONDARYDX_PSY_ALL_CORE
Severe zolpidem use disorder   F13.20  Suicide attempt   T14.91XA   Severe zolpidem use disorder   F13.20

## 2024-09-25 NOTE — BH CONSULTATION LIAISON PROGRESS NOTE - NSBHASSESSMENTFT_PSY_ALL_CORE
his is a 73-y.o. CF patient, , retired , recently living with her son, with PMH significant for h/o CAD s/p PCI of OM1 (100% occlussion ) in 11/2021, MI, hypertension, with PPH significant for MDD, multiple (>10) previous psychiatric hospitalizations, h/o 2-3 past suicide attempts, has an outpatient psychiatrist Dr. Ryan Sparrow. Patient was transferred from [facility] where she was taken to for possible 'overdose', and transferred to [facility] for a cardiac cath. Consult requested to evaluate patient for depression and suicidality.    Patient presents as perennially depressed, with a tendency to overutilize sleeping meds, resulting in unintended overdoses. Patient, however, does require planning for a safe discharge. Patient's son verbalizes considerable safety concerns. Considering patient's recent overdose on the heels of her recent inpatient Psych. admission, patient at this time requires inpatient care. Legals (2PC) furnished.  
his is a 73-y.o. CF patient, , retired , recently living with her son, with PMH significant for h/o CAD s/p PCI of OM1 (100% occlussion ) in 11/2021, MI, hypertension, with PPH significant for MDD, multiple (>10) previous psychiatric hospitalizations, h/o 2-3 past suicide attempts, has an outpatient psychiatrist Dr. Ryan Sparrow. Patient was transferred from [facility] where she was taken to for possible 'overdose', and transferred to [facility] for a cardiac cath. Consult requested to evaluate patient for depression and suicidality.    Patient presents as perennially depressed, with a tendency to overutilize sleeping meds, resulting in unintended overdoses. Patient, however, does require planning for a safe discharge. Spoke to patient's son, will contact patient's Psychiatrist (Dr. Sparrow) to develop an appropriate plan.  
his is a 73-y.o. CF patient, , retired , recently living with her son, with PMH significant for h/o CAD s/p PCI of OM1 (100% occlussion ) in 11/2021, MI, hypertension, with PPH significant for MDD, multiple (>10) previous psychiatric hospitalizations, h/o 2-3 past suicide attempts, has an outpatient psychiatrist Dr. Ryan Sparrow. Patient was transferred from [facility] where she was taken to for possible 'overdose', and transferred to [facility] for a cardiac cath. Consult requested to evaluate patient for depression and suicidality.    Patient presents as perennially depressed, with a tendency to overutilize sleeping meds, resulting in unintended overdoses. Patient, however, does require planning for a safe discharge. Patient's son verbalizes considerable safety concerns. Considering patient's recent overdose on the heels of her recent inpatient Psych. admission, patient at this time requires inpatient care.

## 2024-09-25 NOTE — BH INPATIENT PSYCHIATRY ASSESSMENT NOTE - PAST PSYCHOTROPIC MEDICATION
Seroquel, Lithium, Effexor, Zoloft, MAOI   Seroquel, Lithium, Effexor, Zoloft, MAOI, Paxil, Wellbutrin,, Abilify, Remeron,

## 2024-09-25 NOTE — BH INPATIENT PSYCHIATRY ASSESSMENT NOTE - OTHER PAST PSYCHIATRIC HISTORY (INCLUDE DETAILS REGARDING ONSET, COURSE OF ILLNESS, INPATIENT/OUTPATIENT TREATMENT)
hx of depression and anxiety; other diagnosis: bipolar disorder  past psych admissions including most recent [facility] admission in 11/2022   Prior collateral indicates that Pt used to doctor shop for prescriptions.  Currently seeing Dr. Sparrow  hx of OD on ambien in 2004 hx of depression and anxiety; other diagnosis: bipolar disorder; past psych admissions including most recent [facility] admission in 11/2022   Prior collateral indicates that Pt used to doctor shop for prescriptions. Currently seeing Dr. Sparrow; hx of OD on ambien in 2004  - hx of ECT > 20 yrs ago

## 2024-09-25 NOTE — PROGRESS NOTE ADULT - NUTRITIONAL ASSESSMENT
This patient has been assessed with a concern for Malnutrition and has been determined to have a diagnosis/diagnoses of Moderate protein-calorie malnutrition.    This patient is being managed with:   Diet DASH/TLC-  Sodium & Cholesterol Restricted  Entered: Sep 24 2024  9:23AM    The following pending diet order is being considered for treatment of Moderate protein-calorie malnutrition:  Diet Soft and Bite Sized-  DASH/TLC {Sodium & Cholesterol Restricted} (DASH)  Supplement Feeding Modality:  Oral  Ensure Plus High Protein Cans or Servings Per Day:  1       Frequency:  Daily  Entered: Sep 20 2024  5:01PM  
This patient has been assessed with a concern for Malnutrition and has been determined to have a diagnosis/diagnoses of Moderate protein-calorie malnutrition.    This patient is being managed with:   Diet Soft and Bite Sized-  DASH/TLC {Sodium & Cholesterol Restricted} (DASH)  Entered: Sep 20 2024  9:45AM    The following pending diet order is being considered for treatment of Moderate protein-calorie malnutrition:  Diet Soft and Bite Sized-  DASH/TLC {Sodium & Cholesterol Restricted} (DASH)  Supplement Feeding Modality:  Oral  Ensure Plus High Protein Cans or Servings Per Day:  1       Frequency:  Daily  Entered: Sep 20 2024  5:01PM  

## 2024-09-25 NOTE — PROGRESS NOTE ADULT - REASON FOR ADMISSION
Prolonged QTC, pending CATH

## 2024-09-25 NOTE — PROGRESS NOTE ADULT - SUBJECTIVE AND OBJECTIVE BOX
Patient is a 73y old  Female who presents with a chief complaint of Prolonged QTC. CATH not required.      SUBJECTIVE / OVERNIGHT EVENTS: Patient seen and examined at bedside. No acute events overnight. Pt denies fevers, chills, chest pain, abdominal pain, n/v/d.    MEDICATIONS  (STANDING):  aspirin  chewable 81 milliGRAM(s) Oral daily  atorvastatin 80 milliGRAM(s) Oral at bedtime  enoxaparin Injectable 40 milliGRAM(s) SubCutaneous every 24 hours  escitalopram 10 milliGRAM(s) Oral daily  famotidine    Tablet 20 milliGRAM(s) Oral daily  gabapentin 400 milliGRAM(s) Oral two times a day  levothyroxine 112 MICROGram(s) Oral daily  melatonin 3 milliGRAM(s) Oral at bedtime  mirtazapine 15 milliGRAM(s) Oral at bedtime  QUEtiapine 25 milliGRAM(s) Oral daily    MEDICATIONS  (PRN):  acetaminophen     Tablet .. 650 milliGRAM(s) Oral every 6 hours PRN Moderate Pain (4 - 6)      Vital Signs Last 24 Hrs  T(C): 36.5 (25 Sep 2024 05:22), Max: 36.6 (24 Sep 2024 21:17)  T(F): 97.7 (25 Sep 2024 05:22), Max: 97.8 (24 Sep 2024 21:17)  HR: 61 (25 Sep 2024 05:22) (61 - 66)  BP: 100/70 (25 Sep 2024 05:24) (98/60 - 124/76)  BP(mean): --  RR: 18 (25 Sep 2024 05:22) (18 - 18)  SpO2: 96% (25 Sep 2024 05:22) (96% - 96%)    Parameters below as of 24 Sep 2024 21:17  Patient On (Oxygen Delivery Method): room air      PHYSICAL EXAM:  GENERAL: NAD, well-groomed, well-developed  HEAD:  Atraumatic, Normocephalic  EYES: EOMI, PERRLA, conjunctiva and sclera clear  ENMT: No tonsillar erythema, exudates, or enlargement; Moist mucous membranes  NECK: Supple, No JVD, Normal thyroid  HEART: Regular rate and rhythm; No murmurs, rubs, or gallops  RESPIRATORY: CTA B/L, No W/R/R  ABDOMEN: Soft, Nontender, Nondistended; Bowel sounds present  NEUROLOGY: A&Ox3, nonfocal, moving all extremities  EXTREMITIES:  2+ Peripheral Pulses, No clubbing, cyanosis, or edema  SKIN: warm, dry, normal color, no rash or abnormal lesions    LABS:                        12.7   7.62  )-----------( 245      ( 23 Sep 2024 06:07 )             39.2     09-23    141  |  107  |  14  ----------------------------<  86  4.0   |  21[L]  |  0.61    Ca    9.7      23 Sep 2024 06:09  Phos  3.4     09-23  Mg     2.2     09-23    TPro  6.6  /  Alb  3.5  /  TBili  0.4  /  DBili  x   /  AST  16  /  ALT  19  /  AlkPhos  116  09-23          Urinalysis Basic - ( 23 Sep 2024 06:09 )    Color: x / Appearance: x / SG: x / pH: x  Gluc: 86 mg/dL / Ketone: x  / Bili: x / Urobili: x   Blood: x / Protein: x / Nitrite: x   Leuk Esterase: x / RBC: x / WBC x   Sq Epi: x / Non Sq Epi: x / Bacteria: x        RADIOLOGY & ADDITIONAL TESTS:    Imaging Personally Reviewed:    Consultant(s) Notes Reviewed:

## 2024-09-25 NOTE — PROGRESS NOTE ADULT - PROBLEM SELECTOR PLAN 4
-On pepcid   -Discontinued protonix due to possible QTC prolongation.

## 2024-09-25 NOTE — PROGRESS NOTE ADULT - ASSESSMENT
The patient is a  74 y/o F, , retired , recently living with her son, with PMH significant for h/o CAD s/p PCI of OM1 (100% occlussion ) in 11/2021, MI, hypertension, with PPH significant for MDD, multiple (>10) previous psychiatric hospitalizations, who was transferred to [facility] for heart CATH and positive stress test.  
The patient is a  72 y/o F, , retired , recently living with her son, with PMH significant for h/o CAD s/p PCI of OM1 (100% occlussion ) in 11/2021, MI, hypertension, with PPH significant for MDD, multiple (>10) previous psychiatric hospitalizations, who was transferred to [facility] for heart CATH and positive stress test. CATH not required, patient to be sent to involuntary psych admission.
The patient is a  72 y/o F, , retired , recently living with her son, with PMH significant for h/o CAD s/p PCI of OM1 (100% occlussion ) in 11/2021, MI, hypertension, with PPH significant for MDD, multiple (>10) previous psychiatric hospitalizations, who was transferred to [facility] for heart CATH and positive stress test.  
The patient is a  72 y/o F, , retired , recently living with her son, with PMH significant for h/o CAD s/p PCI of OM1 (100% occlussion ) in 11/2021, MI, hypertension, with PPH significant for MDD, multiple (>10) previous psychiatric hospitalizations, who was transferred to [facility] for heart CATH and positive stress test.  
The patient is a  74 y/o F, , retired , recently living with her son, with PMH significant for h/o CAD s/p PCI of OM1 (100% occlussion ) in 11/2021, MI, hypertension, with PPH significant for MDD, multiple (>10) previous psychiatric hospitalizations, who was transferred to [facility] for heart CATH and positive stress test.

## 2024-09-25 NOTE — BH INPATIENT PSYCHIATRY ASSESSMENT NOTE - CURRENT MEDICATION
MEDICATIONS  (STANDING):    MEDICATIONS  (PRN):   MEDICATIONS  (STANDING):  amLODIPine   Tablet 2.5 milliGRAM(s) Oral daily  aspirin enteric coated 81 milliGRAM(s) Oral daily  atorvastatin 80 milliGRAM(s) Oral at bedtime  escitalopram 10 milliGRAM(s) Oral daily  gabapentin 400 milliGRAM(s) Oral two times a day  levothyroxine 112 MICROGram(s) Oral daily  melatonin. 3 milliGRAM(s) Oral at bedtime  metoprolol succinate ER 25 milliGRAM(s) Oral daily  mirtazapine 15 milliGRAM(s) Oral at bedtime  pantoprazole    Tablet 40 milliGRAM(s) Oral before breakfast  QUEtiapine 25 milliGRAM(s) Oral daily  ticagrelor 80 milliGRAM(s) Oral two times a day    MEDICATIONS  (PRN):  acetaminophen     Tablet .. 650 milliGRAM(s) Oral every 6 hours PRN Temp greater or equal to 38C (100.4F), Mild Pain (1 - 3)  hydrOXYzine hydrochloride 25 milliGRAM(s) Oral three times a day PRN Anxiety   MEDICATIONS  (STANDING):  aspirin enteric coated 81 milliGRAM(s) Oral daily  atorvastatin 80 milliGRAM(s) Oral at bedtime  escitalopram 10 milliGRAM(s) Oral daily  famotidine    Tablet 20 milliGRAM(s) Oral daily  gabapentin 400 milliGRAM(s) Oral two times a day  levothyroxine 112 MICROGram(s) Oral daily  melatonin. 3 milliGRAM(s) Oral at bedtime  metoprolol succinate ER 25 milliGRAM(s) Oral daily  mirtazapine 15 milliGRAM(s) Oral at bedtime  QUEtiapine 25 milliGRAM(s) Oral daily    MEDICATIONS  (PRN):  acetaminophen     Tablet .. 650 milliGRAM(s) Oral every 6 hours PRN Temp greater or equal to 38C (100.4F), Mild Pain (1 - 3)  hydrOXYzine hydrochloride 25 milliGRAM(s) Oral three times a day PRN Anxiety

## 2024-09-25 NOTE — BH CONSULTATION LIAISON PROGRESS NOTE - NSBHCONSULTPSYCHPLAN_PSY_A_CORE FT
Lexapro 10mg p.o. daily  Remeron 15mg p.o. at bedtime  patient on Seroquel 25mg p.o. (I recommended doxepin 25mg p.o. at bedtime)

## 2024-09-25 NOTE — BH INPATIENT PSYCHIATRY ASSESSMENT NOTE - NSBHMEDSOTHERFT_PSY_A_CORE
HOME MEDICATIONS: Brilinta 90mg 2x/day, Aspirin 81mg 1x/day, Lexapro 10mg 1x/day, Gabapentin 400 2x/day, L-thyroxine 112 mcg 1x/day, Metopolol 12.5 mg 1x/day, Pantoprazole 40mg 1x/day, Seroquel 50 mg in morning and 200mg at night, Lipitor 80mg at night, Trazodone 50mg at night. As needed basis: Seroquel 25mg (up to 1-2x daily).

## 2024-09-25 NOTE — BH CONSULTATION LIAISON PROGRESS NOTE - NSBHCONSULTRECOMMENDOTHER_PSY_A_CORE FT
Lexapro 10mg p.o. daily  Remeron 15mg p.o. at bedtime

## 2024-09-25 NOTE — BH INPATIENT PSYCHIATRY ASSESSMENT NOTE - NSBHMETABOLIC_PSY_ALL_CORE_FT
BMI: BMI (kg/m2): 28.4 (09-22-24 @ 19:00)  HbA1c: A1C with Estimated Average Glucose Result: 5.6 % (09-21-24 @ 06:39)    Glucose: POCT Blood Glucose.: 80 mg/dL (08-12-24 @ 19:41)    BP: --Vital Signs Last 24 Hrs  T(C): 36.5 (09-25-24 @ 13:29), Max: 36.6 (09-24-24 @ 21:17)  T(F): 97.7 (09-25-24 @ 13:29), Max: 97.8 (09-24-24 @ 21:17)  HR: 64 (09-25-24 @ 13:29) (61 - 66)  BP: 101/70 (09-25-24 @ 13:29) (98/60 - 124/76)  BP(mean): --  RR: 16 (09-25-24 @ 13:29) (16 - 18)  SpO2: 96% (09-25-24 @ 13:29) (96% - 96%)      Lipid Panel: Date/Time: 09-21-24 @ 06:32  Cholesterol, Serum: 95  LDL Cholesterol Calculated: 34  HDL Cholesterol, Serum: 44  Total Cholesterol/HDL Ration Measurement: --  Triglycerides, Serum: 82   BMI: BMI (kg/m2): 28.4 (09-22-24 @ 19:00)  HbA1c: A1C with Estimated Average Glucose Result: 5.6 % (09-21-24 @ 06:39)    Glucose: POCT Blood Glucose.: 80 mg/dL (08-12-24 @ 19:41)    BP: --Vital Signs Last 24 Hrs  T(C): 36.6 (09-25-24 @ 19:03), Max: 36.6 (09-24-24 @ 21:17)  T(F): 97.9 (09-25-24 @ 19:03), Max: 97.9 (09-25-24 @ 19:03)  HR: 64 (09-25-24 @ 13:29) (61 - 66)  BP: 101/70 (09-25-24 @ 13:29) (98/60 - 124/76)  BP(mean): --  RR: 16 (09-25-24 @ 13:29) (16 - 18)  SpO2: 96% (09-25-24 @ 13:29) (96% - 96%)    Orthostatic VS  09-25-24 @ 19:03  Lying BP: --/-- HR: --  Sitting BP: 143/76 HR: 60  Standing BP: 129/76 HR: 76  Site: upper right arm  Mode: electronic    Lipid Panel: Date/Time: 09-21-24 @ 06:32  Cholesterol, Serum: 95  LDL Cholesterol Calculated: 34  HDL Cholesterol, Serum: 44  Total Cholesterol/HDL Ration Measurement: --  Triglycerides, Serum: 82

## 2024-09-25 NOTE — BH CONSULTATION LIAISON PROGRESS NOTE - NSBHMSEGROOM_PSY_A_CORE
no shirt on, rapped in a blanket/Poor

## 2024-09-25 NOTE — BH CONSULTATION LIAISON PROGRESS NOTE - NSBHCHARTREVIEWVS_PSY_A_CORE FT
Vital Signs Last 24 Hrs  T(C): 36.3 (23 Sep 2024 11:47), Max: 36.5 (23 Sep 2024 04:32)  T(F): 97.4 (23 Sep 2024 11:47), Max: 97.7 (23 Sep 2024 04:32)  HR: 60 (23 Sep 2024 11:47) (60 - 67)  BP: 116/62 (23 Sep 2024 11:47) (108/60 - 124/66)  BP(mean): --  RR: 18 (23 Sep 2024 11:47) (16 - 18)  SpO2: 95% (23 Sep 2024 11:47) (93% - 97%)    Parameters below as of 23 Sep 2024 11:47  Patient On (Oxygen Delivery Method): room air    
Vital Signs Last 24 Hrs  T(C): 36.3 (24 Sep 2024 05:32), Max: 36.7 (23 Sep 2024 20:24)  T(F): 97.4 (24 Sep 2024 05:32), Max: 98.1 (23 Sep 2024 20:24)  HR: 57 (24 Sep 2024 05:32) (57 - 60)  BP: 106/62 (24 Sep 2024 05:32) (106/62 - 107/68)  BP(mean): 71 (23 Sep 2024 20:24) (71 - 71)  RR: 18 (24 Sep 2024 05:32) (18 - 18)  SpO2: 94% (24 Sep 2024 05:32) (92% - 94%)    Parameters below as of 24 Sep 2024 05:32  Patient On (Oxygen Delivery Method): room air    
Vital Signs Last 24 Hrs  T(C): 36.5 (25 Sep 2024 13:29), Max: 36.6 (24 Sep 2024 21:17)  T(F): 97.7 (25 Sep 2024 13:29), Max: 97.8 (24 Sep 2024 21:17)  HR: 64 (25 Sep 2024 13:29) (61 - 66)  BP: 101/70 (25 Sep 2024 13:29) (98/60 - 124/76)  BP(mean): --  RR: 16 (25 Sep 2024 13:29) (16 - 18)  SpO2: 96% (25 Sep 2024 13:29) (96% - 96%)    Parameters below as of 25 Sep 2024 13:29  Patient On (Oxygen Delivery Method): room air
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
## 2024-09-25 NOTE — BH INPATIENT PSYCHIATRY ASSESSMENT NOTE - NSBHCHARTREVIEWLAB_PSY_A_CORE FT
12.7   7.62  )-----------( 245      ( 23 Sep 2024 06:07 )             39.2     09-23    141  |  107  |  14  ----------------------------<  86  4.0   |  21[L]  |  0.61    Ca    9.7      23 Sep 2024 06:09  Phos  3.4     09-23  Mg     2.2     09-23    TPro  6.6  /  Alb  3.5  /  TBili  0.4  /  DBili  x   /  AST  16  /  ALT  19  /  AlkPhos  116  09-23
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
## 2024-09-25 NOTE — CHART NOTE - NSCHARTNOTEFT_GEN_A_CORE
Screening Medical Evaluation    Patient Admitted from: [facility] ED    [facility] admitting diagnosis: Major depressive disorder with single episode      PAST MEDICAL & SURGICAL HISTORY:  Benzodiazepine Dependence      Affective Bipolar Disorder      Hypertension      Carcinoma in situ  Breast Cancer  bilateral lumpectomies s/p chemo  had treatment 2008 and 2015   current on Arimidex      Acid reflux      Hypothyroidism      Depression  last admission 1- 2 year ago has been admitted several times   ECT last 20 years ago   currently on medication "antidepressants do not really work well"      Insomnia      Calculus of gallbladder without cholecystitis      Chronic GERD      MI (myocardial infarction)      HTN (hypertension)      Stented coronary artery      Breast cancer      History of lumpectomy  left 2002  & right 2008  left 2013  treated with chemotherapy & radiation therapy      S/P D&C (status post dilation and curettage)      S/P colonoscopy  3 years ago negative      S/P endoscopy  3 years ago negative      S/P dilation and curettage      History of lumpectomy            Allergies    adhesives (Rash)  Gadavist (Rash; Urticaria; Hives)    Intolerances          Social History:       FAMILY HISTORY:  Family history of Alzheimer's disease (Mother)    Family history of heart failure (Father)          MEDICATIONS  (STANDING):  aspirin enteric coated 81 milliGRAM(s) Oral daily  atorvastatin 80 milliGRAM(s) Oral at bedtime  calcium carbonate    500 mG (Tums) Chewable 2 Tablet(s) Chew once  escitalopram 10 milliGRAM(s) Oral daily  famotidine    Tablet 20 milliGRAM(s) Oral daily  gabapentin 400 milliGRAM(s) Oral two times a day  levothyroxine 112 MICROGram(s) Oral daily  melatonin. 3 milliGRAM(s) Oral at bedtime  metoprolol succinate ER 25 milliGRAM(s) Oral daily  mirtazapine 15 milliGRAM(s) Oral at bedtime  QUEtiapine 25 milliGRAM(s) Oral daily    MEDICATIONS  (PRN):  acetaminophen     Tablet .. 650 milliGRAM(s) Oral every 6 hours PRN Temp greater or equal to 38C (100.4F), Mild Pain (1 - 3)  hydrOXYzine hydrochloride 25 milliGRAM(s) Oral three times a day PRN Anxiety        Vital Signs Last 24 Hrs  T(C): 36.6 (25 Sep 2024 19:03), Max: 36.6 (24 Sep 2024 21:17)  T(F): 97.9 (25 Sep 2024 19:03), Max: 97.9 (25 Sep 2024 19:03)  HR: 64 (25 Sep 2024 13:29) (61 - 66)  BP: 101/70 (25 Sep 2024 13:29) (98/60 - 124/76)  BP(mean): --  RR: 16 (25 Sep 2024 13:29) (16 - 18)  SpO2: 96% (25 Sep 2024 13:29) (96% - 96%)      CAPILLARY BLOOD GLUCOSE            PHYSICAL EXAM:  GENERAL: NAD  HEAD:  Atraumatic, Normocephalic  EYES: Wears glasses, EOMI, PERRLA, conjunctiva and sclera clear  NECK: Supple, No JVD  CHEST/LUNG: Clear to auscultation bilaterally; No wheeze  HEART: Regular rate and rhythm; No murmurs, rubs, or gallops  ABDOMEN: Soft, Nontender, Nondistended; Bowel sounds present  EXTREMITIES:  2+ Peripheral Pulses, No clubbing, cyanosis, or edema  PSYCH: CAlm and cooperative   NEUROLOGY: non-focal  SKIN: No rashes or lesions seen on exposed skin    LABS:                    RADIOLOGY & ADDITIONAL TESTS:      Assessment and Plan:  73F admitted to [facility] for Major depressive disorder with single episode. PMHx of HTN, Hypothyroid, CAD.  Pt seen for medical screening evaluation. Patient has no acute complaints at this time. Patient denies fever, chills, headache, dizziness, lightheadedness, N/V, SOB, cough, congestion, chest pain, abdominal pain, dysuria, hematuria, diarrhea, constipation. Physical exam unremarkable, VSS. Labs pending. 9/20 EKG SR @ 64/ min, PVCs, QT/QTC= 426/ 439.     1.) HTN: / 70, Continue BB, Low salt diet  2.) Hypothyroid: Continue Levothyroxine  3.) CAD: Continue ASA, BB, Statin.   4.) Major depressive disorder with single episode: Plan per primary team.

## 2024-09-25 NOTE — PHYSICAL THERAPY INITIAL EVALUATION ADULT - PERTINENT HX OF CURRENT PROBLEM, REHAB EVAL
2 y/o F, , retired , recently living with her son, with PMH significant for h/o CAD s/p PCI of OM1 (100% occlussion ) in 11/2021, MI, hypertension, with PPH significant for MDD, multiple (>10) previous psychiatric hospitalizations, who was transferred to [facility] for heart CATH and positive stress test. CATH not required, patient to be sent to involuntary psych admission.

## 2024-09-25 NOTE — PROGRESS NOTE ADULT - PROVIDER SPECIALTY LIST ADULT
Cardiology
Cardiology
Internal Medicine
Cardiology
Internal Medicine
Cardiology
Internal Medicine

## 2024-09-25 NOTE — PROGRESS NOTE ADULT - SUBJECTIVE AND OBJECTIVE BOX
DATE OF SERVICE: 09-25-24 @ 15:15    Patient is a 73y old  Female who presents with a chief complaint of Prolonged QTC, pending CATH (25 Sep 2024 07:10)      INTERVAL HISTORY:  no events    REVIEW OF SYSTEMS:  CONSTITUTIONAL: No weakness  EYES/ENT: No visual changes;  No throat pain   NECK: No pain or stiffness  RESPIRATORY: No cough, wheezing; No shortness of breath  CARDIOVASCULAR: No chest pain or palpitations  GASTROINTESTINAL: No abdominal  pain. No nausea, vomiting, or hematemesis  GENITOURINARY: No dysuria, frequency or hematuria  NEUROLOGICAL: No stroke like symptoms  SKIN: No rashes    TELEMETRY Personally reviewed:  	  MEDICATIONS:  metoprolol succinate ER 25 milliGRAM(s) Oral daily        PHYSICAL EXAM:  T(C): 36.5 (09-25-24 @ 13:29), Max: 36.6 (09-24-24 @ 21:17)  HR: 64 (09-25-24 @ 13:29) (61 - 66)  BP: 101/70 (09-25-24 @ 13:29) (98/60 - 124/76)  RR: 16 (09-25-24 @ 13:29) (16 - 18)  SpO2: 96% (09-25-24 @ 13:29) (96% - 96%)  Wt(kg): --  I&O's Summary    24 Sep 2024 07:01  -  25 Sep 2024 07:00  --------------------------------------------------------  IN: 420 mL / OUT: 0 mL / NET: 420 mL    25 Sep 2024 07:01  -  25 Sep 2024 15:15  --------------------------------------------------------  IN: 360 mL / OUT: 0 mL / NET: 360 mL          Appearance: In no distress	  HEENT:    PERRL, EOMI	  Cardiovascular:  S1 S2, No JVD  Respiratory: Lungs clear to auscultation	  Gastrointestinal:  Soft, Non-tender, + BS	  Vascularature:  No edema of LE  Psychiatric: Appropriate affect   Neuro: no acute focal deficits                     Labs personally reviewed        ASSESSMENT/PLAN: 	    The patient is a  74 y/o F, , retired , recently living with her son, with PMH significant for h/o CAD s/p PCI of OM1 (100% occlussion ) in 11/2021, MI, hypertension, with PPH significant for MDD, multiple (>10) previous psychiatric hospitalizations, h/o 2-3 past suicide attempts. Tx from [facility] for positive stress test and possible cath    1. Abnormal Stress test  - denies cp, palpitations, sob, dizziness  - NM stress test 9/17 moderate sized, moderate intensity fixed perfusion defect of the apical inferior, basal inferior and basal inferolateral wall suggestive of infarct. No ischemia visualized.  - fixed defect consistent with infarct and no ischemia, will treat medically with ASA 81mg, Lipitor 80mg    2. CAD s/p PCI (2021)  - TTE 9/15~ LV systolic function normal, EF 50-55%   - c/w ASA 81mg qd  - c/w atorvastatin 80mg qd  - metoprolol 25mg qd resumed 2/2 NSVT    3. HTN  - bp ok  - amlodipine DC'd   - c/w Toprol as noted above    4. DVT ppx  - c/w lovenox                NOHEMI Fishman DO [facility]  Cardiovascular Medicine  [address] [place] [address], [address]  Available through call or text on [employer] TEAM[employer]  Office: [phone]     DATE OF SERVICE: 09-25-24 @ 15:15    Patient is a 73y old  Female who presents with a chief complaint of Prolonged QTC, pending CATH (25 Sep 2024 07:10)      INTERVAL HISTORY:  no events    REVIEW OF SYSTEMS:  CONSTITUTIONAL: No weakness  EYES/ENT: No visual changes;  No throat pain   NECK: No pain or stiffness  RESPIRATORY: No cough, wheezing; No shortness of breath  CARDIOVASCULAR: No chest pain or palpitations  GASTROINTESTINAL: No abdominal  pain. No nausea, vomiting, or hematemesis  GENITOURINARY: No dysuria, frequency or hematuria  NEUROLOGICAL: No stroke like symptoms  SKIN: No rashes    TELEMETRY Personally reviewed:  	  MEDICATIONS:  metoprolol succinate ER 25 milliGRAM(s) Oral daily        PHYSICAL EXAM:  T(C): 36.5 (09-25-24 @ 13:29), Max: 36.6 (09-24-24 @ 21:17)  HR: 64 (09-25-24 @ 13:29) (61 - 66)  BP: 101/70 (09-25-24 @ 13:29) (98/60 - 124/76)  RR: 16 (09-25-24 @ 13:29) (16 - 18)  SpO2: 96% (09-25-24 @ 13:29) (96% - 96%)  Wt(kg): --  I&O's Summary    24 Sep 2024 07:01  -  25 Sep 2024 07:00  --------------------------------------------------------  IN: 420 mL / OUT: 0 mL / NET: 420 mL    25 Sep 2024 07:01  -  25 Sep 2024 15:15  --------------------------------------------------------  IN: 360 mL / OUT: 0 mL / NET: 360 mL          Appearance: In no distress	  HEENT:    PERRL, EOMI	  Cardiovascular:  S1 S2, No JVD  Respiratory: Lungs clear to auscultation	  Gastrointestinal:  Soft, Non-tender, + BS	  Vascularature:  No edema of LE  Psychiatric: Appropriate affect   Neuro: no acute focal deficits                     Labs personally reviewed        ASSESSMENT/PLAN: 	    The patient is a  72 y/o F, , retired , recently living with her son, with PMH significant for h/o CAD s/p PCI of OM1 (100% occlussion ) in 11/2021, MI, hypertension, with PPH significant for MDD, multiple (>10) previous psychiatric hospitalizations, h/o 2-3 past suicide attempts. Tx from [facility] for positive stress test and possible cath    1. Abnormal Stress test  - denies cp, palpitations, sob, dizziness  - NM stress test 9/17 moderate sized, moderate intensity fixed perfusion defect of the apical inferior, basal inferior and basal inferolateral wall suggestive of infarct. No ischemia visualized.  - fixed defect consistent with infarct and no ischemia, will treat medically with ASA 81mg, Lipitor 80mg    2. CAD s/p PCI (2021)  - TTE 9/15~ LV systolic function normal, EF 50-55%   - c/w ASA 81mg qd  - c/w atorvastatin 80mg qd  - metoprolol 25mg qd resumed 2/2 NSVT    3. HTN  - bp ok  - amlodipine DC'd   - c/w Toprol as noted above    4. DVT ppx  - c/w lovenox        No objection to discharge from hospital from cardiac standpoint           NOHEMI Fishman DO [facility]  Cardiovascular Medicine  [address], [address]  Available through call or text on [employer] TEAM[employer]  Office: [phone]

## 2024-09-25 NOTE — PROGRESS NOTE ADULT - PROBLEM SELECTOR PLAN 3
- Continue with synthroid 112 mcg daily.

## 2024-09-25 NOTE — BH CONSULTATION LIAISON PROGRESS NOTE - NSBHMSEMOVE_PSY_A_CORE
restless, fidgety/No abnormal movements

## 2024-09-26 DIAGNOSIS — Z85.3 PERSONAL HISTORY OF MALIGNANT NEOPLASM OF BREAST: ICD-10-CM

## 2024-09-26 DIAGNOSIS — F32.9 MAJOR DEPRESSIVE DISORDER, SINGLE EPISODE, UNSPECIFIED: ICD-10-CM

## 2024-09-26 DIAGNOSIS — E03.9 HYPOTHYROIDISM, UNSPECIFIED: ICD-10-CM

## 2024-09-26 DIAGNOSIS — I25.10 ATHEROSCLEROTIC HEART DISEASE OF NATIVE CORONARY ARTERY WITHOUT ANGINA PECTORIS: ICD-10-CM

## 2024-09-26 DIAGNOSIS — M79.89 OTHER SPECIFIED SOFT TISSUE DISORDERS: ICD-10-CM

## 2024-09-26 DIAGNOSIS — I10 ESSENTIAL (PRIMARY) HYPERTENSION: ICD-10-CM

## 2024-09-26 PROBLEM — Z95.5 PRESENCE OF CORONARY ANGIOPLASTY IMPLANT AND GRAFT: Chronic | Status: ACTIVE | Noted: 2024-09-11

## 2024-09-26 PROBLEM — I21.9 ACUTE MYOCARDIAL INFARCTION, UNSPECIFIED: Chronic | Status: ACTIVE | Noted: 2024-09-11

## 2024-09-26 PROBLEM — C50.919 MALIGNANT NEOPLASM OF UNSPECIFIED SITE OF UNSPECIFIED FEMALE BREAST: Chronic | Status: ACTIVE | Noted: 2024-09-11

## 2024-09-26 PROCEDURE — 99232 SBSQ HOSP IP/OBS MODERATE 35: CPT

## 2024-09-26 PROCEDURE — 99222 1ST HOSP IP/OBS MODERATE 55: CPT

## 2024-09-26 RX ORDER — MIRTAZAPINE 30 MG/1
7.5 TABLET ORAL AT BEDTIME
Refills: 0 | Status: DISCONTINUED | OUTPATIENT
Start: 2024-09-26 | End: 2024-10-01

## 2024-09-26 RX ORDER — TICAGRELOR 90 MG/1
60 TABLET ORAL EVERY 12 HOURS
Refills: 0 | Status: DISCONTINUED | OUTPATIENT
Start: 2024-09-26 | End: 2024-10-23

## 2024-09-26 RX ORDER — QUETIAPINE FUMARATE 200 MG/1
25 TABLET ORAL ONCE
Refills: 0 | Status: COMPLETED | OUTPATIENT
Start: 2024-09-26 | End: 2024-09-26

## 2024-09-26 RX ORDER — ESCITALOPRAM 10 MG/1
5 TABLET, FILM COATED ORAL DAILY
Refills: 0 | Status: DISCONTINUED | OUTPATIENT
Start: 2024-09-26 | End: 2024-09-30

## 2024-09-26 RX ORDER — GABAPENTIN 300 MG/1
100 CAPSULE ORAL EVERY 6 HOURS
Refills: 0 | Status: DISCONTINUED | OUTPATIENT
Start: 2024-09-26 | End: 2024-09-28

## 2024-09-26 RX ORDER — QUETIAPINE FUMARATE 200 MG/1
25 TABLET ORAL AT BEDTIME
Refills: 0 | Status: DISCONTINUED | OUTPATIENT
Start: 2024-09-26 | End: 2024-10-02

## 2024-09-26 RX ADMIN — Medication 3 MILLIGRAM(S): at 21:37

## 2024-09-26 RX ADMIN — FAMOTIDINE 20 MILLIGRAM(S): 10 INJECTION INTRAVENOUS at 11:31

## 2024-09-26 RX ADMIN — GABAPENTIN 400 MILLIGRAM(S): 300 CAPSULE ORAL at 11:31

## 2024-09-26 RX ADMIN — GABAPENTIN 400 MILLIGRAM(S): 300 CAPSULE ORAL at 21:35

## 2024-09-26 RX ADMIN — MIRTAZAPINE 7.5 MILLIGRAM(S): 30 TABLET ORAL at 21:36

## 2024-09-26 RX ADMIN — TICAGRELOR 60 MILLIGRAM(S): 90 TABLET ORAL at 21:36

## 2024-09-26 RX ADMIN — ESCITALOPRAM 10 MILLIGRAM(S): 10 TABLET, FILM COATED ORAL at 11:30

## 2024-09-26 RX ADMIN — QUETIAPINE FUMARATE 25 MILLIGRAM(S): 200 TABLET ORAL at 01:02

## 2024-09-26 RX ADMIN — Medication 80 MILLIGRAM(S): at 21:37

## 2024-09-26 RX ADMIN — QUETIAPINE FUMARATE 25 MILLIGRAM(S): 200 TABLET ORAL at 21:36

## 2024-09-26 RX ADMIN — Medication 81 MILLIGRAM(S): at 11:31

## 2024-09-26 RX ADMIN — Medication 112 MICROGRAM(S): at 06:33

## 2024-09-26 RX ADMIN — Medication 25 MILLIGRAM(S): at 11:30

## 2024-09-26 NOTE — CONSULT NOTE ADULT - ASSESSMENT
74 y/o F with CAD s/p PCI, MI, HTN, MDD, multiple psychiatric hospitalizations now admitted to [facility] for further psychiatric stabilization after recent suicide attempt with ambien and EtOH. Medicine consulted for follow up of medical issues.    #LE edema  - patient noted with bilateral lower extremity swelling  - appears to be slightly worse on left compared to right  - per patient swelling is actually better than previous  - no calf tenderness or erythema, and romina's sign negative  - would check LE Duplex of both lower extremities to rule out DVT as patient with recent hospitalization and was likely much less mobile than before  - recommend leg elevation with wedges at bedtime as well as compression stockings once DVT study completed    #CAD/MI s/p PCI  - patient with history of CAD and MI in the past requiring coronary intervention  - recently noted with stress test positive for infarct but no ischemia  - underwent cardiology evaluation at [facility], no plan for LHC or further coronary intervention  - currently without any cardiac symptoms  - c/w ASA 81mg qd  - c/w atorvastatin 80mg qhs  - c/w metoprolol succinate 25mg qd    #HTN  - c/w metoprolol succinate 25mg qd    #MDD/Suicide Attempt  - management per primary  team

## 2024-09-26 NOTE — CONSULT NOTE ADULT - SUBJECTIVE AND OBJECTIVE BOX
HPI: Patient is a 72 y/o F with CAD s/p PCI, MI, HTN, MDD, multiple psychiatric hospitalizations now admitted to [facility] for further psychiatric stabilization after recent suicide attempt with ambien and EtOH. Patient was initially admitted to [facility] for monitoring after overdose however was noted with a positive stress test and due to concern for cardiac ischemia transferred to [facility] for cardiac evaluation. Patient was evaluated by the cardiology team at [facility] and did not require any coronary intervention, recommended for further medical management. Medicine consulted for follow up of medical issues.    Patient seen and examined on  unit. Patient cooperative with interview and exam. Overall in good spirits, reports that she feels well. Denies any chest pain or shortness of breath. Denies any palpitations or dizziness. Notes that she does have some bilateral leg swelling which she notes has been a chronic issue for her. She states that her legs are actually less swollen than usual for her and that her left leg is slightly more swollen than the right. She otherwise has been ambulating without issue and tolerating diet.    PAST MEDICAL & SURGICAL HISTORY:  Benzodiazepine Dependence  Affective Bipolar Disorder  Hypertension  Carcinoma in situ  Breast Cancer  bilateral lumpectomies s/p chemo  had treatment 2008 and 2015   current on Arimidex  Acid reflux  Hypothyroidism  Depression  last admission 1- 2 year ago has been admitted several times   ECT last 20 years ago   currently on medication "antidepressants do not really work well"  Insomnia  Calculus of gallbladder without cholecystitis  Chronic GERD  MI (myocardial infarction)  HTN (hypertension)  Stented coronary artery  Breast cancer  History of lumpectomy  left 2002  & right 2008  left 2013  treated with chemotherapy & radiation therapy  S/P D&C (status post dilation and curettage)  S/P colonoscopy  3 years ago negative  S/P endoscopy  3 years ago negative  S/P dilation and curettage  History of lumpectomy    Review of Systems:   CONSTITUTIONAL: No fever, weight loss, or fatigue  HEENT: No eye pain, visual disturbances, or discharge, No difficulty hearing; No sinus or throat pain  RESPIRATORY: No cough, wheezing, chills or hemoptysis; No shortness of breath  CARDIOVASCULAR: No chest pain, palpitations, dizziness, or leg swelling  GASTROINTESTINAL: No abdominal or epigastric pain. No nausea, vomiting, or hematemesis; No diarrhea or constipation  GENITOURINARY: No dysuria, frequency, hematuria, or incontinence  NEUROLOGICAL: No headaches, memory loss, loss of strength, numbness, or tremors  SKIN: No itching, burning, rashes, or lesions   MUSCULOSKELETAL: +b/l ankle swelling; No muscle, back, or extremity pain    Allergies  adhesives (Rash)  Gadavist (Rash; Urticaria; Hives)  Intolerances    Social History: Denies any current toxic habits    FAMILY HISTORY:  Family history of Alzheimer's disease (Mother)  Family history of heart failure (Father)    MEDICATIONS  (STANDING):  aspirin enteric coated 81 milliGRAM(s) Oral daily  atorvastatin 80 milliGRAM(s) Oral at bedtime  escitalopram 10 milliGRAM(s) Oral daily  famotidine    Tablet 20 milliGRAM(s) Oral daily  gabapentin 400 milliGRAM(s) Oral two times a day  levothyroxine 112 MICROGram(s) Oral daily  melatonin. 3 milliGRAM(s) Oral at bedtime  metoprolol succinate ER 25 milliGRAM(s) Oral daily  mirtazapine 15 milliGRAM(s) Oral at bedtime  QUEtiapine 25 milliGRAM(s) Oral daily    MEDICATIONS  (PRN):  acetaminophen     Tablet .. 650 milliGRAM(s) Oral every 6 hours PRN Temp greater or equal to 38C (100.4F), Mild Pain (1 - 3)  hydrOXYzine hydrochloride 25 milliGRAM(s) Oral three times a day PRN Anxiety    Vital Signs Last 24 Hrs  T(C): 37 (26 Sep 2024 07:51), Max: 37 (26 Sep 2024 07:51)  T(F): 98.6 (26 Sep 2024 07:51), Max: 98.6 (26 Sep 2024 07:51)  HR: 64 (25 Sep 2024 13:29) (64 - 64)  BP: 101/70 (25 Sep 2024 13:29) (101/70 - 101/70)  BP(mean): --  RR: 16 (25 Sep 2024 13:29) (16 - 16)  SpO2: 96% (26 Sep 2024 07:51) (96% - 96%)    CAPILLARY BLOOD GLUCOSE    PHYSICAL EXAM:  GENERAL: NAD, well-developed  HEAD:  Atraumatic, Normocephalic  EYES: eyes tracking  NECK: Supple, No JVD  CHEST/LUNG: Clear to auscultation bilaterally; No wheeze  HEART: Regular rate and rhythm; No murmurs, rubs, or gallops  ABDOMEN: Soft, Nontender, Nondistended; Bowel sounds present  EXTREMITIES: trace pitting edema of RLE, mild TTP over left ankle, trace/1+ pitting edema over left LE, romina's sign negative, no erythema  NEUROLOGY: non-focal  SKIN: No rashes or lesions    LABS:    EKG(personally reviewed): NSR, no gross ischemic changes, EKG 9/25 also reviewed by cardiology team at [facility], no concern ACS    RADIOLOGY & ADDITIONAL TESTS:    Imaging Personally Reviewed:    Consultant(s) Notes Reviewed:      Care Discussed with Consultants/Other Providers:

## 2024-09-26 NOTE — BH SOCIAL WORK INITIAL PSYCHOSOCIAL EVALUATION - NSBHHOUSECOMMENTFT_PSY_ALL_CORE
pt would like to return to her home in [place].  Sons vs  [place] but now reports that it is actually her  husbands apartment and since his death- his daughter- from another relationship -has reportedly removed furniture,computer,small appliances. Reports her coop is in [place]- She states she has not been there for over a year and doesn't think she can live there due to the following factors; one of two bathrooms work, and the Stove was to be replaced by the coop but she wasn't home yet does use it.

## 2024-09-26 NOTE — BH INPATIENT PSYCHIATRY PROGRESS NOTE - NSBHCHARTREVIEWVS_PSY_A_CORE FT
Vital Signs Last 24 Hrs  T(C): 37 (09-26-24 @ 07:51), Max: 37 (09-26-24 @ 07:51)  T(F): 98.6 (09-26-24 @ 07:51), Max: 98.6 (09-26-24 @ 07:51)  HR: --  BP: --  BP(mean): --  RR: --  SpO2: 96% (09-26-24 @ 07:51) (96% - 96%)    Orthostatic VS  09-26-24 @ 07:51  Lying BP: 102/59 HR: 72  Sitting BP: 100/56 HR: 73  Standing BP: --/-- HR: --  Site: --  Mode: --  Orthostatic VS  09-25-24 @ 19:03  Lying BP: --/-- HR: --  Sitting BP: 143/76 HR: 60  Standing BP: 129/76 HR: 76  Site: upper right arm  Mode: electronic

## 2024-09-26 NOTE — BH INPATIENT PSYCHIATRY PROGRESS NOTE - NSBHMETABOLIC_PSY_ALL_CORE_FT
BMI: BMI (kg/m2): 28.4 (09-22-24 @ 19:00)  HbA1c: A1C with Estimated Average Glucose Result: 5.6 % (09-21-24 @ 06:39)    Glucose: POCT Blood Glucose.: 80 mg/dL (08-12-24 @ 19:41)    BP: --Vital Signs Last 24 Hrs  T(C): 37 (09-26-24 @ 07:51), Max: 37 (09-26-24 @ 07:51)  T(F): 98.6 (09-26-24 @ 07:51), Max: 98.6 (09-26-24 @ 07:51)  HR: --  BP: --  BP(mean): --  RR: --  SpO2: 96% (09-26-24 @ 07:51) (96% - 96%)    Orthostatic VS  09-26-24 @ 07:51  Lying BP: 102/59 HR: 72  Sitting BP: 100/56 HR: 73  Standing BP: --/-- HR: --  Site: --  Mode: --  Orthostatic VS  09-25-24 @ 19:03  Lying BP: --/-- HR: --  Sitting BP: 143/76 HR: 60  Standing BP: 129/76 HR: 76  Site: upper right arm  Mode: electronic    Lipid Panel: Date/Time: 09-21-24 @ 06:32  Cholesterol, Serum: 95  LDL Cholesterol Calculated: 34  HDL Cholesterol, Serum: 44  Total Cholesterol/HDL Ration Measurement: --  Triglycerides, Serum: 82

## 2024-09-26 NOTE — BH INPATIENT PSYCHIATRY PROGRESS NOTE - MSE UNSTRUCTURED FT
Appearance: poor grooming, adequate hygiene, no abnormal involuntary movements, stable gait  Behavior: superficial, resistive, no psychomotor retardation or agitation  Speech: wnl  Mood: "I'm not depressed"  Affect: dysphoric but mild, constricted, stable  Thought process: inconsistent, vague, frequently interrupts  Thought content: no delusions elicited; denies SI/HI  Perceptual disturbances: denies Ah/VH  Cognition: oriented to time, place; poor recall  Insight: poor  Judgement: poor

## 2024-09-26 NOTE — PSYCHIATRIC REHAB INITIAL EVALUATION - NSBHPRRECOMMEND_PSY_ALL_CORE
Writer met with patient to orient to unit and introduce self, psychiatric rehabilitation staff and department functions. Writer encouraged patient to attend psychiatric rehabilitation groups and engage in treatment. Patient appeared receptive and engaged in the admission interview. Patient appeared forthcoming with personal data and information surrounding admitting circumstances. Patient expressed having difficulty sleeping and grieving the recent death of her . Writer and patient were able to establish a collaborative psychiatric rehabilitation goal. Psychiatric Rehabilitation staff will continue to engage patient daily in order to develop therapeutic rapport.
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
## 2024-09-26 NOTE — BH SOCIAL WORK INITIAL PSYCHOSOCIAL EVALUATION - NSHIGHRISKBEHFT_PSY_ALL_CORE
hx of  suicidal attempts via OD- and possibly ingestion of rubbing alcohol while also trying to swallow rubber bands.      as pre [facility] psychosocial:  Patient reports having difficulties dealing with aging, losing independence., Increased hopelessness, depression. Pt with h/o [facility] admits, Prior SA  Major depressive disorder   F32.9  Affective Bipolar Disorder  Benzodiazepine Dependence

## 2024-09-27 PROCEDURE — 99232 SBSQ HOSP IP/OBS MODERATE 35: CPT

## 2024-09-27 RX ORDER — MAGNESIUM, ALUMINUM HYDROXIDE 200-200 MG
30 TABLET,CHEWABLE ORAL EVERY 6 HOURS
Refills: 0 | Status: DISCONTINUED | OUTPATIENT
Start: 2024-09-27 | End: 2024-10-23

## 2024-09-27 RX ORDER — TRAZODONE HYDROCHLORIDE 100 MG/1
50 TABLET ORAL AT BEDTIME
Refills: 0 | Status: DISCONTINUED | OUTPATIENT
Start: 2024-09-27 | End: 2024-09-28

## 2024-09-27 RX ADMIN — Medication 81 MILLIGRAM(S): at 08:50

## 2024-09-27 RX ADMIN — TICAGRELOR 60 MILLIGRAM(S): 90 TABLET ORAL at 08:50

## 2024-09-27 RX ADMIN — MIRTAZAPINE 7.5 MILLIGRAM(S): 30 TABLET ORAL at 20:49

## 2024-09-27 RX ADMIN — TRAZODONE HYDROCHLORIDE 50 MILLIGRAM(S): 100 TABLET ORAL at 23:56

## 2024-09-27 RX ADMIN — GABAPENTIN 400 MILLIGRAM(S): 300 CAPSULE ORAL at 20:49

## 2024-09-27 RX ADMIN — Medication 3 MILLIGRAM(S): at 20:49

## 2024-09-27 RX ADMIN — FAMOTIDINE 20 MILLIGRAM(S): 10 INJECTION INTRAVENOUS at 08:50

## 2024-09-27 RX ADMIN — Medication 30 MILLILITER(S): at 23:10

## 2024-09-27 RX ADMIN — ESCITALOPRAM 5 MILLIGRAM(S): 10 TABLET, FILM COATED ORAL at 08:50

## 2024-09-27 RX ADMIN — Medication 25 MILLIGRAM(S): at 08:50

## 2024-09-27 RX ADMIN — Medication 112 MICROGRAM(S): at 06:20

## 2024-09-27 RX ADMIN — GABAPENTIN 400 MILLIGRAM(S): 300 CAPSULE ORAL at 08:50

## 2024-09-27 RX ADMIN — QUETIAPINE FUMARATE 25 MILLIGRAM(S): 200 TABLET ORAL at 20:49

## 2024-09-27 RX ADMIN — Medication 80 MILLIGRAM(S): at 20:49

## 2024-09-27 RX ADMIN — TICAGRELOR 60 MILLIGRAM(S): 90 TABLET ORAL at 20:49

## 2024-09-27 NOTE — BH INPATIENT PSYCHIATRY PROGRESS NOTE - NSBHFUPINTERVALHXFT_PSY_A_CORE
Patient is seen and evaluated for follow up for MDD.  Chart is reviewed and d/w treatment team.  No issues overnight, reports fair sleep with Seroquel, fair appetite and ambivalent about meds, treatment.  Explored relationship dynamics with son and pt denies need to address medication overuse. Denies depressed mood despite having stated she's "not functioning." A bit more receptive to treatment recommendations. Frequently has poor recall regarding various important events over the past year. She is focused on groups starting and schedule of such, reports recent positive experience while hospitalized at [facility].  Denies SI/HI, no intent or plan, denies AH/VH, no manic sx, no lability.  No agitation and in fair behavioral.  No somatic complaints. Patient is seen and evaluated for follow up for MDD.  Chart is reviewed and d/w treatment team.  No issues overnight, reports fair sleep with Seroquel, fair appetite and ambivalent about meds, treatment.  Explored relationship dynamics with son and pt denies need to address medication overuse. Denies depressed mood despite having stated she's "not functioning." A bit more receptive to treatment recommendations. Frequently has poor recall regarding various important events over the past year. She is focused on groups starting and schedule of such, reports recent positive experience while hospitalized at [facility].  Denies SI/HI, no intent or plan, denies AH/VH, no manic sx, no lability.  No agitation and in fair behavioral.  Pt had episode of acute anxiety with associated chest discomfort, vague, non-radiating that resolved quickly when vital signs taken, responded positively to support and reassurance provided.  Reports anxiety associated with upcoming shower but no SOB, no evidence of cardiac etiology.

## 2024-09-27 NOTE — BH INPATIENT PSYCHIATRY PROGRESS NOTE - NSBHCHARTREVIEWVS_PSY_A_CORE FT
Vital Signs Last 24 Hrs  T(C): 36.4 (09-27-24 @ 08:08), Max: 36.4 (09-27-24 @ 08:08)  T(F): 97.6 (09-27-24 @ 08:08), Max: 97.6 (09-27-24 @ 08:08)  HR: --  BP: --  BP(mean): --  RR: 17 (09-27-24 @ 08:08) (17 - 17)  SpO2: 97% (09-27-24 @ 08:08) (97% - 97%)    Orthostatic VS  09-27-24 @ 08:08  Lying BP: 118/60 HR: 66  Sitting BP: 119/62 HR: 68  Standing BP: --/-- HR: --  Site: upper left arm  Mode: electronic  Orthostatic VS  09-26-24 @ 07:51  Lying BP: 102/59 HR: 72  Sitting BP: 100/56 HR: 73  Standing BP: --/-- HR: --  Site: --  Mode: --  Orthostatic VS  09-25-24 @ 19:03  Lying BP: --/-- HR: --  Sitting BP: 143/76 HR: 60  Standing BP: 129/76 HR: 76  Site: upper right arm  Mode: electronic   Vital Signs Last 24 Hrs  T(C): 36.4 (09-27-24 @ 08:08), Max: 36.4 (09-27-24 @ 08:08)  T(F): 97.6 (09-27-24 @ 08:08), Max: 97.6 (09-27-24 @ 08:08)  HR: 57 (09-27-24 @ 13:30) (57 - 57)  BP: 103/57 (09-27-24 @ 13:30) (103/57 - 103/57)  BP(mean): --  RR: 18 (09-27-24 @ 13:30) (17 - 18)  SpO2: 96% (09-27-24 @ 13:30) (96% - 97%)    Orthostatic VS  09-27-24 @ 08:08  Lying BP: 118/60 HR: 66  Sitting BP: 119/62 HR: 68  Standing BP: --/-- HR: --  Site: upper left arm  Mode: electronic  Orthostatic VS  09-26-24 @ 07:51  Lying BP: 102/59 HR: 72  Sitting BP: 100/56 HR: 73  Standing BP: --/-- HR: --  Site: --  Mode: --  Orthostatic VS  09-25-24 @ 19:03  Lying BP: --/-- HR: --  Sitting BP: 143/76 HR: 60  Standing BP: 129/76 HR: 76  Site: upper right arm  Mode: electronic

## 2024-09-27 NOTE — BH INPATIENT PSYCHIATRY PROGRESS NOTE - NSBHMETABOLIC_PSY_ALL_CORE_FT
BMI: BMI (kg/m2): 28.4 (09-22-24 @ 19:00)  HbA1c: A1C with Estimated Average Glucose Result: 5.6 % (09-21-24 @ 06:39)    Glucose: POCT Blood Glucose.: 80 mg/dL (08-12-24 @ 19:41)    BP: --Vital Signs Last 24 Hrs  T(C): 36.4 (09-27-24 @ 08:08), Max: 36.4 (09-27-24 @ 08:08)  T(F): 97.6 (09-27-24 @ 08:08), Max: 97.6 (09-27-24 @ 08:08)  HR: --  BP: --  BP(mean): --  RR: 17 (09-27-24 @ 08:08) (17 - 17)  SpO2: 97% (09-27-24 @ 08:08) (97% - 97%)    Orthostatic VS  09-27-24 @ 08:08  Lying BP: 118/60 HR: 66  Sitting BP: 119/62 HR: 68  Standing BP: --/-- HR: --  Site: upper left arm  Mode: electronic  Orthostatic VS  09-26-24 @ 07:51  Lying BP: 102/59 HR: 72  Sitting BP: 100/56 HR: 73  Standing BP: --/-- HR: --  Site: --  Mode: --  Orthostatic VS  09-25-24 @ 19:03  Lying BP: --/-- HR: --  Sitting BP: 143/76 HR: 60  Standing BP: 129/76 HR: 76  Site: upper right arm  Mode: electronic    Lipid Panel: Date/Time: 09-21-24 @ 06:32  Cholesterol, Serum: 95  LDL Cholesterol Calculated: 34  HDL Cholesterol, Serum: 44  Total Cholesterol/HDL Ration Measurement: --  Triglycerides, Serum: 82   BMI: BMI (kg/m2): 28.4 (09-22-24 @ 19:00)  HbA1c: A1C with Estimated Average Glucose Result: 5.6 % (09-21-24 @ 06:39)    Glucose: POCT Blood Glucose.: 80 mg/dL (08-12-24 @ 19:41)    BP: 103/57 (09-27-24 @ 13:30) (103/57 - 103/57)Vital Signs Last 24 Hrs  T(C): 36.4 (09-27-24 @ 08:08), Max: 36.4 (09-27-24 @ 08:08)  T(F): 97.6 (09-27-24 @ 08:08), Max: 97.6 (09-27-24 @ 08:08)  HR: 57 (09-27-24 @ 13:30) (57 - 57)  BP: 103/57 (09-27-24 @ 13:30) (103/57 - 103/57)  BP(mean): --  RR: 18 (09-27-24 @ 13:30) (17 - 18)  SpO2: 96% (09-27-24 @ 13:30) (96% - 97%)    Orthostatic VS  09-27-24 @ 08:08  Lying BP: 118/60 HR: 66  Sitting BP: 119/62 HR: 68  Standing BP: --/-- HR: --  Site: upper left arm  Mode: electronic  Orthostatic VS  09-26-24 @ 07:51  Lying BP: 102/59 HR: 72  Sitting BP: 100/56 HR: 73  Standing BP: --/-- HR: --  Site: --  Mode: --  Orthostatic VS  09-25-24 @ 19:03  Lying BP: --/-- HR: --  Sitting BP: 143/76 HR: 60  Standing BP: 129/76 HR: 76  Site: upper right arm  Mode: electronic    Lipid Panel: Date/Time: 09-21-24 @ 06:32  Cholesterol, Serum: 95  LDL Cholesterol Calculated: 34  HDL Cholesterol, Serum: 44  Total Cholesterol/HDL Ration Measurement: --  Triglycerides, Serum: 82

## 2024-09-28 PROCEDURE — 99232 SBSQ HOSP IP/OBS MODERATE 35: CPT

## 2024-09-28 RX ORDER — LOPERAMIDE HCL 2 MG
2 TABLET ORAL
Refills: 0 | Status: DISCONTINUED | OUTPATIENT
Start: 2024-09-28 | End: 2024-10-02

## 2024-09-28 RX ORDER — LOPERAMIDE HCL 2 MG
4 TABLET ORAL ONCE
Refills: 0 | Status: COMPLETED | OUTPATIENT
Start: 2024-09-28 | End: 2024-09-28

## 2024-09-28 RX ORDER — GABAPENTIN 300 MG/1
600 CAPSULE ORAL AT BEDTIME
Refills: 0 | Status: DISCONTINUED | OUTPATIENT
Start: 2024-09-28 | End: 2024-10-03

## 2024-09-28 RX ORDER — GABAPENTIN 300 MG/1
400 CAPSULE ORAL THREE TIMES A DAY
Refills: 0 | Status: DISCONTINUED | OUTPATIENT
Start: 2024-09-28 | End: 2024-10-09

## 2024-09-28 RX ORDER — SERTRALINE HYDROCHLORIDE 50 MG/1
25 TABLET, FILM COATED ORAL DAILY
Refills: 0 | Status: DISCONTINUED | OUTPATIENT
Start: 2024-09-28 | End: 2024-09-29

## 2024-09-28 RX ORDER — HYDROXYZINE HCL 25 MG
25 TABLET ORAL EVERY 8 HOURS
Refills: 0 | Status: DISCONTINUED | OUTPATIENT
Start: 2024-09-28 | End: 2024-10-04

## 2024-09-28 RX ADMIN — Medication 80 MILLIGRAM(S): at 21:14

## 2024-09-28 RX ADMIN — Medication 30 MILLILITER(S): at 03:58

## 2024-09-28 RX ADMIN — Medication 2 MILLIGRAM(S): at 21:44

## 2024-09-28 RX ADMIN — Medication 4 MILLIGRAM(S): at 12:01

## 2024-09-28 RX ADMIN — Medication 650 MILLIGRAM(S): at 03:58

## 2024-09-28 RX ADMIN — Medication 30 MILLILITER(S): at 18:52

## 2024-09-28 RX ADMIN — Medication 112 MICROGRAM(S): at 05:39

## 2024-09-28 RX ADMIN — Medication 2 MILLIGRAM(S): at 17:51

## 2024-09-28 RX ADMIN — QUETIAPINE FUMARATE 25 MILLIGRAM(S): 200 TABLET ORAL at 21:17

## 2024-09-28 RX ADMIN — Medication 25 MILLIGRAM(S): at 15:26

## 2024-09-28 RX ADMIN — TICAGRELOR 60 MILLIGRAM(S): 90 TABLET ORAL at 21:14

## 2024-09-28 RX ADMIN — GABAPENTIN 600 MILLIGRAM(S): 300 CAPSULE ORAL at 21:14

## 2024-09-28 RX ADMIN — MIRTAZAPINE 7.5 MILLIGRAM(S): 30 TABLET ORAL at 21:14

## 2024-09-28 RX ADMIN — Medication 3 MILLIGRAM(S): at 21:14

## 2024-09-28 NOTE — BH INPATIENT PSYCHIATRY PROGRESS NOTE - MSE UNSTRUCTURED FT
Appearance: limited grooming, adequate hygiene, no abnormal involuntary movements, stable gait  Behavior: superficially cooperative, somewhat on edge, guarded, no psychomotor retardation or agitation  Speech: wnl  Mood: "depressed"  Affect: anxious, constricted, stable  Thought process: inconsistent, vague, frequently interrupts due to ruminations, perseverative  Thought content: no delusions elicited; denies SI/HI  Perceptual disturbances: denies Ah/VH  Cognition: oriented to time, place; poor recall  Insight: poor  Judgement: poor

## 2024-09-28 NOTE — BH INPATIENT PSYCHIATRY PROGRESS NOTE - NSBHMETABOLIC_PSY_ALL_CORE_FT
BMI: BMI (kg/m2): 28.4 (09-22-24 @ 19:00)  HbA1c: A1C with Estimated Average Glucose Result: 5.6 % (09-21-24 @ 06:39)    Glucose: POCT Blood Glucose.: 80 mg/dL (08-12-24 @ 19:41)    BP: 103/57 (09-27-24 @ 13:30) (103/57 - 103/57)Vital Signs Last 24 Hrs  T(C): 36.6 (09-28-24 @ 05:16), Max: 36.6 (09-28-24 @ 05:16)  T(F): 97.9 (09-28-24 @ 05:16), Max: 97.9 (09-28-24 @ 05:16)  HR: --  BP: --  BP(mean): --  RR: 18 (09-28-24 @ 05:16) (18 - 18)  SpO2: --    Orthostatic VS  09-28-24 @ 05:16  Lying BP: 99/57 HR: 72  Sitting BP: 103/59 HR: 77  Standing BP: --/-- HR: --  Site: upper right arm  Mode: electronic  Orthostatic VS  09-27-24 @ 08:08  Lying BP: 118/60 HR: 66  Sitting BP: 119/62 HR: 68  Standing BP: --/-- HR: --  Site: upper left arm  Mode: electronic    Lipid Panel: Date/Time: 09-21-24 @ 06:32  Cholesterol, Serum: 95  LDL Cholesterol Calculated: 34  HDL Cholesterol, Serum: 44  Total Cholesterol/HDL Ration Measurement: --  Triglycerides, Serum: 82

## 2024-09-28 NOTE — BH INPATIENT PSYCHIATRY PROGRESS NOTE - NSBHCHARTREVIEWVS_PSY_A_CORE FT
Vital Signs Last 24 Hrs  T(C): 36.6 (09-28-24 @ 05:16), Max: 36.6 (09-28-24 @ 05:16)  T(F): 97.9 (09-28-24 @ 05:16), Max: 97.9 (09-28-24 @ 05:16)  HR: --  BP: --  BP(mean): --  RR: 18 (09-28-24 @ 05:16) (18 - 18)  SpO2: --    Orthostatic VS  09-28-24 @ 05:16  Lying BP: 99/57 HR: 72  Sitting BP: 103/59 HR: 77  Standing BP: --/-- HR: --  Site: upper right arm  Mode: electronic  Orthostatic VS  09-27-24 @ 08:08  Lying BP: 118/60 HR: 66  Sitting BP: 119/62 HR: 68  Standing BP: --/-- HR: --  Site: upper left arm  Mode: electronic

## 2024-09-28 NOTE — BH INPATIENT PSYCHIATRY PROGRESS NOTE - NSBHFUPINTERVALHXFT_PSY_A_CORE
Patient reports poor sleep and requesting to continues HS meds as she was given prior to transfer to our unit (current all the same except remeron dose slightly lower at 7.5mg not 15mg). She also took trazodone 50mg prn. However, patient had diarrhea and tooth pain overnight which likely was a significant contributor as she had been documented to have fallen asleep initially. She is more open to taking zoloft.

## 2024-09-29 PROCEDURE — 99232 SBSQ HOSP IP/OBS MODERATE 35: CPT

## 2024-09-29 RX ORDER — SERTRALINE HYDROCHLORIDE 50 MG/1
50 TABLET, FILM COATED ORAL DAILY
Refills: 0 | Status: DISCONTINUED | OUTPATIENT
Start: 2024-09-29 | End: 2024-10-01

## 2024-09-29 RX ADMIN — MIRTAZAPINE 7.5 MILLIGRAM(S): 30 TABLET ORAL at 21:55

## 2024-09-29 RX ADMIN — Medication 650 MILLIGRAM(S): at 19:16

## 2024-09-29 RX ADMIN — Medication 3 MILLIGRAM(S): at 21:55

## 2024-09-29 RX ADMIN — TICAGRELOR 60 MILLIGRAM(S): 90 TABLET ORAL at 21:55

## 2024-09-29 RX ADMIN — Medication 81 MILLIGRAM(S): at 09:56

## 2024-09-29 RX ADMIN — FAMOTIDINE 20 MILLIGRAM(S): 10 INJECTION INTRAVENOUS at 10:10

## 2024-09-29 RX ADMIN — Medication 25 MILLIGRAM(S): at 09:56

## 2024-09-29 RX ADMIN — ESCITALOPRAM 5 MILLIGRAM(S): 10 TABLET, FILM COATED ORAL at 09:56

## 2024-09-29 RX ADMIN — Medication 112 MICROGRAM(S): at 06:14

## 2024-09-29 RX ADMIN — TICAGRELOR 60 MILLIGRAM(S): 90 TABLET ORAL at 09:55

## 2024-09-29 RX ADMIN — Medication 650 MILLIGRAM(S): at 18:16

## 2024-09-29 RX ADMIN — Medication 2 MILLIGRAM(S): at 14:54

## 2024-09-29 RX ADMIN — GABAPENTIN 400 MILLIGRAM(S): 300 CAPSULE ORAL at 10:48

## 2024-09-29 RX ADMIN — GABAPENTIN 600 MILLIGRAM(S): 300 CAPSULE ORAL at 21:54

## 2024-09-29 RX ADMIN — Medication 80 MILLIGRAM(S): at 21:55

## 2024-09-29 RX ADMIN — QUETIAPINE FUMARATE 25 MILLIGRAM(S): 200 TABLET ORAL at 21:55

## 2024-09-29 RX ADMIN — SERTRALINE HYDROCHLORIDE 25 MILLIGRAM(S): 50 TABLET, FILM COATED ORAL at 09:55

## 2024-09-29 NOTE — BH INPATIENT PSYCHIATRY PROGRESS NOTE - NSBHMETABOLIC_PSY_ALL_CORE_FT
BMI: BMI (kg/m2): 28.4 (09-22-24 @ 19:00)  HbA1c: A1C with Estimated Average Glucose Result: 5.6 % (09-21-24 @ 06:39)    Glucose: POCT Blood Glucose.: 80 mg/dL (08-12-24 @ 19:41)    BP: 103/57 (09-27-24 @ 13:30) (103/57 - 103/57)Vital Signs Last 24 Hrs  T(C): 36.6 (09-29-24 @ 05:30), Max: 36.6 (09-29-24 @ 05:30)  T(F): 97.8 (09-29-24 @ 05:30), Max: 97.8 (09-29-24 @ 05:30)  HR: --  BP: --  BP(mean): --  RR: --  SpO2: --    Orthostatic VS  09-29-24 @ 05:30  Lying BP: 121/58 HR: 77  Sitting BP: 107/65 HR: 88  Standing BP: --/-- HR: --  Site: upper right arm  Mode: electronic  Orthostatic VS  09-28-24 @ 05:16  Lying BP: 99/57 HR: 72  Sitting BP: 103/59 HR: 77  Standing BP: --/-- HR: --  Site: upper right arm  Mode: electronic    Lipid Panel: Date/Time: 09-21-24 @ 06:32  Cholesterol, Serum: 95  LDL Cholesterol Calculated: 34  HDL Cholesterol, Serum: 44  Total Cholesterol/HDL Ration Measurement: --  Triglycerides, Serum: 82

## 2024-09-29 NOTE — BH INPATIENT PSYCHIATRY PROGRESS NOTE - NSBHFUPINTERVALHXFT_PSY_A_CORE
Patient reports feeling very depressed today though behavior remains consistent with anxiety. Attributes mood to son not calling or visiting her. No mention of insomnia today and no comment on effectiveness of gabapentin. She is easily overwhelmed when discussing next steps in treatment. Denies adverse effects of medications.

## 2024-09-29 NOTE — BH INPATIENT PSYCHIATRY PROGRESS NOTE - NSBHCHARTREVIEWVS_PSY_A_CORE FT
Vital Signs Last 24 Hrs  T(C): 36.6 (09-29-24 @ 05:30), Max: 36.6 (09-29-24 @ 05:30)  T(F): 97.8 (09-29-24 @ 05:30), Max: 97.8 (09-29-24 @ 05:30)  HR: --  BP: --  BP(mean): --  RR: --  SpO2: --    Orthostatic VS  09-29-24 @ 05:30  Lying BP: 121/58 HR: 77  Sitting BP: 107/65 HR: 88  Standing BP: --/-- HR: --  Site: upper right arm  Mode: electronic  Orthostatic VS  09-28-24 @ 05:16  Lying BP: 99/57 HR: 72  Sitting BP: 103/59 HR: 77  Standing BP: --/-- HR: --  Site: upper right arm  Mode: electronic

## 2024-09-29 NOTE — BH INPATIENT PSYCHIATRY PROGRESS NOTE - MSE UNSTRUCTURED FT
Appearance: limited grooming, adequate hygiene, no abnormal involuntary movements, stable gait  Behavior: well engaged, calm, no psychomotor retardation or agitation  Speech: wnl  Mood: "very depressed"  Affect: anxious, dysphoric, more intense, stable  Thought process: inconsistent, vague, perseverative  Thought content: no delusions elicited; denies SI/HI  Perceptual disturbances: denies Ah/VH  Cognition: oriented to time, place; poor recall  Insight: poor  Judgement: poor

## 2024-09-30 PROCEDURE — 99232 SBSQ HOSP IP/OBS MODERATE 35: CPT

## 2024-09-30 RX ORDER — TRAZODONE HYDROCHLORIDE 100 MG/1
50 TABLET ORAL AT BEDTIME
Refills: 0 | Status: DISCONTINUED | OUTPATIENT
Start: 2024-09-30 | End: 2024-10-04

## 2024-09-30 RX ADMIN — Medication 81 MILLIGRAM(S): at 09:28

## 2024-09-30 RX ADMIN — ESCITALOPRAM 5 MILLIGRAM(S): 10 TABLET, FILM COATED ORAL at 09:27

## 2024-09-30 RX ADMIN — TICAGRELOR 60 MILLIGRAM(S): 90 TABLET ORAL at 09:27

## 2024-09-30 RX ADMIN — QUETIAPINE FUMARATE 25 MILLIGRAM(S): 200 TABLET ORAL at 22:04

## 2024-09-30 RX ADMIN — FAMOTIDINE 20 MILLIGRAM(S): 10 INJECTION INTRAVENOUS at 09:28

## 2024-09-30 RX ADMIN — Medication 3 MILLIGRAM(S): at 22:04

## 2024-09-30 RX ADMIN — GABAPENTIN 400 MILLIGRAM(S): 300 CAPSULE ORAL at 14:06

## 2024-09-30 RX ADMIN — TRAZODONE HYDROCHLORIDE 50 MILLIGRAM(S): 100 TABLET ORAL at 23:12

## 2024-09-30 RX ADMIN — Medication 80 MILLIGRAM(S): at 22:04

## 2024-09-30 RX ADMIN — SERTRALINE HYDROCHLORIDE 50 MILLIGRAM(S): 50 TABLET, FILM COATED ORAL at 09:29

## 2024-09-30 RX ADMIN — GABAPENTIN 600 MILLIGRAM(S): 300 CAPSULE ORAL at 22:03

## 2024-09-30 RX ADMIN — MIRTAZAPINE 7.5 MILLIGRAM(S): 30 TABLET ORAL at 22:04

## 2024-09-30 RX ADMIN — Medication 112 MICROGRAM(S): at 05:58

## 2024-09-30 RX ADMIN — TICAGRELOR 60 MILLIGRAM(S): 90 TABLET ORAL at 22:04

## 2024-09-30 RX ADMIN — Medication 25 MILLIGRAM(S): at 09:27

## 2024-09-30 NOTE — BH INPATIENT PSYCHIATRY PROGRESS NOTE - NSBHFUPINTERVALHXFT_PSY_A_CORE
Patient is less preoccupied with depressive thought content but is back to feeling anxious. She demonstrates indecisiveness and helplessness. Sleeping better now. Denies SI but clearly unable to care for self.  Still has low motivation for addiction treatment.

## 2024-09-30 NOTE — BH INPATIENT PSYCHIATRY PROGRESS NOTE - NSBHCHARTREVIEWVS_PSY_A_CORE FT
Vital Signs Last 24 Hrs  T(C): 36.7 (09-30-24 @ 07:47), Max: 36.7 (09-30-24 @ 07:47)  T(F): 98 (09-30-24 @ 07:47), Max: 98 (09-30-24 @ 07:47)  HR: --  BP: --  BP(mean): --  RR: --  SpO2: --    Orthostatic VS  09-30-24 @ 07:47  Lying BP: --/-- HR: --  Sitting BP: 109/79 HR: 60  Standing BP: 110/64 HR: 71  Site: --  Mode: --  Orthostatic VS  09-29-24 @ 05:30  Lying BP: 121/58 HR: 77  Sitting BP: 107/65 HR: 88  Standing BP: --/-- HR: --  Site: upper right arm  Mode: electronic

## 2024-09-30 NOTE — BH INPATIENT PSYCHIATRY PROGRESS NOTE - NSBHMETABOLIC_PSY_ALL_CORE_FT
BMI: BMI (kg/m2): 28.4 (09-22-24 @ 19:00)  HbA1c: A1C with Estimated Average Glucose Result: 5.6 % (09-21-24 @ 06:39)    Glucose: POCT Blood Glucose.: 80 mg/dL (08-12-24 @ 19:41)    BP: --Vital Signs Last 24 Hrs  T(C): 36.7 (09-30-24 @ 07:47), Max: 36.7 (09-30-24 @ 07:47)  T(F): 98 (09-30-24 @ 07:47), Max: 98 (09-30-24 @ 07:47)  HR: --  BP: --  BP(mean): --  RR: --  SpO2: --    Orthostatic VS  09-30-24 @ 07:47  Lying BP: --/-- HR: --  Sitting BP: 109/79 HR: 60  Standing BP: 110/64 HR: 71  Site: --  Mode: --  Orthostatic VS  09-29-24 @ 05:30  Lying BP: 121/58 HR: 77  Sitting BP: 107/65 HR: 88  Standing BP: --/-- HR: --  Site: upper right arm  Mode: electronic    Lipid Panel: Date/Time: 09-21-24 @ 06:32  Cholesterol, Serum: 95  LDL Cholesterol Calculated: 34  HDL Cholesterol, Serum: 44  Total Cholesterol/HDL Ration Measurement: --  Triglycerides, Serum: 82

## 2024-09-30 NOTE — BH INPATIENT PSYCHIATRY PROGRESS NOTE - MSE UNSTRUCTURED FT
Appearance: limited grooming, adequate hygiene, no abnormal involuntary movements, stable gait  Behavior: attentive, somewhat superficial, calm, no psychomotor retardation or agitation  Speech: wnl  Mood: anxious  Affect: congruent, dysphoric, stable  Thought process: inconsistent, vague, perseverative  Thought content: no delusions elicited; denies SI/HI  Perceptual disturbances: denies Ah/VH  Cognition: oriented to time, place; poor recall  Insight: poor  Judgement: poor

## 2024-10-01 PROCEDURE — 99232 SBSQ HOSP IP/OBS MODERATE 35: CPT

## 2024-10-01 RX ORDER — SERTRALINE HYDROCHLORIDE 50 MG/1
75 TABLET, FILM COATED ORAL DAILY
Refills: 0 | Status: DISCONTINUED | OUTPATIENT
Start: 2024-10-01 | End: 2024-10-04

## 2024-10-01 RX ORDER — PANTOPRAZOLE SODIUM 40 MG/1
40 TABLET, DELAYED RELEASE ORAL
Refills: 0 | Status: DISCONTINUED | OUTPATIENT
Start: 2024-10-01 | End: 2024-10-23

## 2024-10-01 RX ADMIN — Medication 112 MICROGRAM(S): at 06:28

## 2024-10-01 RX ADMIN — Medication 81 MILLIGRAM(S): at 09:30

## 2024-10-01 RX ADMIN — QUETIAPINE FUMARATE 25 MILLIGRAM(S): 200 TABLET ORAL at 20:12

## 2024-10-01 RX ADMIN — TICAGRELOR 60 MILLIGRAM(S): 90 TABLET ORAL at 09:29

## 2024-10-01 RX ADMIN — Medication 25 MILLIGRAM(S): at 20:12

## 2024-10-01 RX ADMIN — FAMOTIDINE 20 MILLIGRAM(S): 10 INJECTION INTRAVENOUS at 09:29

## 2024-10-01 RX ADMIN — SERTRALINE HYDROCHLORIDE 50 MILLIGRAM(S): 50 TABLET, FILM COATED ORAL at 09:30

## 2024-10-01 RX ADMIN — TICAGRELOR 60 MILLIGRAM(S): 90 TABLET ORAL at 20:13

## 2024-10-01 RX ADMIN — GABAPENTIN 600 MILLIGRAM(S): 300 CAPSULE ORAL at 20:12

## 2024-10-01 RX ADMIN — Medication 80 MILLIGRAM(S): at 20:12

## 2024-10-01 RX ADMIN — GABAPENTIN 400 MILLIGRAM(S): 300 CAPSULE ORAL at 18:03

## 2024-10-01 RX ADMIN — Medication 25 MILLIGRAM(S): at 09:29

## 2024-10-01 RX ADMIN — Medication 3 MILLIGRAM(S): at 20:13

## 2024-10-01 NOTE — BH INPATIENT PSYCHIATRY PROGRESS NOTE - NSBHCHARTREVIEWVS_PSY_A_CORE FT
Vital Signs Last 24 Hrs  T(C): 36.4 (10-01-24 @ 08:15), Max: 36.4 (10-01-24 @ 08:15)  T(F): 97.5 (10-01-24 @ 08:15), Max: 97.5 (10-01-24 @ 08:15)  HR: --  BP: --  BP(mean): --  RR: --  SpO2: 95% (10-01-24 @ 08:15) (95% - 95%)    Orthostatic VS  10-01-24 @ 08:15  Lying BP: --/-- HR: --  Sitting BP: 112/66 HR: 62  Standing BP: 110/60 HR: 74  Site: --  Mode: --  Orthostatic VS  09-30-24 @ 07:47  Lying BP: --/-- HR: --  Sitting BP: 109/79 HR: 60  Standing BP: 110/64 HR: 71  Site: --  Mode: --

## 2024-10-01 NOTE — BH INPATIENT PSYCHIATRY PROGRESS NOTE - NSBHFUPINTERVALHXFT_PSY_A_CORE
Patient continues to have catastrophic thinking, indecisiveness, anticipatory anxiety. Helpless behavior. Defensive and guarded about addiction treatment.

## 2024-10-01 NOTE — BH INPATIENT PSYCHIATRY PROGRESS NOTE - MSE UNSTRUCTURED FT
Appearance: limited grooming, adequate hygiene, no abnormal involuntary movements, stable gait  Behavior: helpless, well engaged, defensive, no psychomotor retardation or agitation  Speech: wnl  Mood: depressed  Affect: anxious, reactive, stable  Thought process: inconsistent, vague  Thought content: no delusions elicited; denies SI/HI  Perceptual disturbances: denies Ah/VH  Cognition: oriented to time, place; poor recall  Insight: poor  Judgement: poor

## 2024-10-01 NOTE — BH INPATIENT PSYCHIATRY PROGRESS NOTE - NSBHMETABOLIC_PSY_ALL_CORE_FT
BMI: BMI (kg/m2): 28.4 (09-22-24 @ 19:00)  HbA1c: A1C with Estimated Average Glucose Result: 5.6 % (09-21-24 @ 06:39)    Glucose: POCT Blood Glucose.: 80 mg/dL (08-12-24 @ 19:41)    BP: --Vital Signs Last 24 Hrs  T(C): 36.4 (10-01-24 @ 08:15), Max: 36.4 (10-01-24 @ 08:15)  T(F): 97.5 (10-01-24 @ 08:15), Max: 97.5 (10-01-24 @ 08:15)  HR: --  BP: --  BP(mean): --  RR: --  SpO2: 95% (10-01-24 @ 08:15) (95% - 95%)    Orthostatic VS  10-01-24 @ 08:15  Lying BP: --/-- HR: --  Sitting BP: 112/66 HR: 62  Standing BP: 110/60 HR: 74  Site: --  Mode: --  Orthostatic VS  09-30-24 @ 07:47  Lying BP: --/-- HR: --  Sitting BP: 109/79 HR: 60  Standing BP: 110/64 HR: 71  Site: --  Mode: --    Lipid Panel: Date/Time: 09-21-24 @ 06:32  Cholesterol, Serum: 95  LDL Cholesterol Calculated: 34  HDL Cholesterol, Serum: 44  Total Cholesterol/HDL Ration Measurement: --  Triglycerides, Serum: 82

## 2024-10-02 PROCEDURE — 99232 SBSQ HOSP IP/OBS MODERATE 35: CPT

## 2024-10-02 PROCEDURE — 93010 ELECTROCARDIOGRAM REPORT: CPT

## 2024-10-02 RX ORDER — LOPERAMIDE HCL 2 MG
2 TABLET ORAL EVERY 6 HOURS
Refills: 0 | Status: COMPLETED | OUTPATIENT
Start: 2024-10-02 | End: 2024-10-04

## 2024-10-02 RX ADMIN — Medication 25 MILLIGRAM(S): at 09:27

## 2024-10-02 RX ADMIN — Medication 3 MILLIGRAM(S): at 21:36

## 2024-10-02 RX ADMIN — SERTRALINE HYDROCHLORIDE 75 MILLIGRAM(S): 50 TABLET, FILM COATED ORAL at 09:27

## 2024-10-02 RX ADMIN — TICAGRELOR 60 MILLIGRAM(S): 90 TABLET ORAL at 21:36

## 2024-10-02 RX ADMIN — GABAPENTIN 600 MILLIGRAM(S): 300 CAPSULE ORAL at 21:36

## 2024-10-02 RX ADMIN — TICAGRELOR 60 MILLIGRAM(S): 90 TABLET ORAL at 09:27

## 2024-10-02 RX ADMIN — Medication 81 MILLIGRAM(S): at 09:27

## 2024-10-02 RX ADMIN — GABAPENTIN 400 MILLIGRAM(S): 300 CAPSULE ORAL at 16:11

## 2024-10-02 RX ADMIN — PANTOPRAZOLE SODIUM 40 MILLIGRAM(S): 40 TABLET, DELAYED RELEASE ORAL at 08:05

## 2024-10-02 RX ADMIN — Medication 25 MILLIGRAM(S): at 21:40

## 2024-10-02 RX ADMIN — TRAZODONE HYDROCHLORIDE 50 MILLIGRAM(S): 100 TABLET ORAL at 23:34

## 2024-10-02 RX ADMIN — Medication 80 MILLIGRAM(S): at 21:36

## 2024-10-02 RX ADMIN — Medication 112 MICROGRAM(S): at 06:23

## 2024-10-02 RX ADMIN — TRAZODONE HYDROCHLORIDE 50 MILLIGRAM(S): 100 TABLET ORAL at 00:14

## 2024-10-02 NOTE — BH INPATIENT PSYCHIATRY PROGRESS NOTE - NSBHMETABOLIC_PSY_ALL_CORE_FT
BMI: BMI (kg/m2): 28.4 (09-22-24 @ 19:00)  HbA1c: A1C with Estimated Average Glucose Result: 5.6 % (09-21-24 @ 06:39)    Glucose: POCT Blood Glucose.: 80 mg/dL (08-12-24 @ 19:41)    BP: --Vital Signs Last 24 Hrs  T(C): 36.7 (10-02-24 @ 08:24), Max: 36.7 (10-02-24 @ 08:24)  T(F): 98.1 (10-02-24 @ 08:24), Max: 98.1 (10-02-24 @ 08:24)  HR: --  BP: --  BP(mean): --  RR: 17 (10-02-24 @ 08:24) (17 - 17)  SpO2: 98% (10-02-24 @ 08:24) (98% - 98%)    Orthostatic VS  10-02-24 @ 08:24  Lying BP: --/-- HR: --  Sitting BP: 126/59 HR: 76  Standing BP: 118/63 HR: 75  Site: --  Mode: --  Orthostatic VS  10-01-24 @ 08:15  Lying BP: --/-- HR: --  Sitting BP: 112/66 HR: 62  Standing BP: 110/60 HR: 74  Site: --  Mode: --    Lipid Panel: Date/Time: 09-21-24 @ 06:32  Cholesterol, Serum: 95  LDL Cholesterol Calculated: 34  HDL Cholesterol, Serum: 44  Total Cholesterol/HDL Ration Measurement: --  Triglycerides, Serum: 82

## 2024-10-02 NOTE — BH INPATIENT PSYCHIATRY PROGRESS NOTE - NSBHCHARTREVIEWVS_PSY_A_CORE FT
Vital Signs Last 24 Hrs  T(C): 36.7 (10-02-24 @ 08:24), Max: 36.7 (10-02-24 @ 08:24)  T(F): 98.1 (10-02-24 @ 08:24), Max: 98.1 (10-02-24 @ 08:24)  HR: --  BP: --  BP(mean): --  RR: 17 (10-02-24 @ 08:24) (17 - 17)  SpO2: 98% (10-02-24 @ 08:24) (98% - 98%)    Orthostatic VS  10-02-24 @ 08:24  Lying BP: --/-- HR: --  Sitting BP: 126/59 HR: 76  Standing BP: 118/63 HR: 75  Site: --  Mode: --  Orthostatic VS  10-01-24 @ 08:15  Lying BP: --/-- HR: --  Sitting BP: 112/66 HR: 62  Standing BP: 110/60 HR: 74  Site: --  Mode: --

## 2024-10-02 NOTE — BH INPATIENT PSYCHIATRY PROGRESS NOTE - MSE UNSTRUCTURED FT
Appearance: limited grooming, adequate hygiene, no abnormal involuntary movements, stable gait  Behavior: helpless, well engaged, defensive, no psychomotor retardation or agitation  Speech: wnl  Mood: depressed  Affect: anxious, reactive, stable  Thought process: inconsistent, vague  Thought content: no delusions elicited; denies SI/HI  Perceptual disturbances: denies Ah/VH  Cognition: oriented to time, place; poor recall  Insight: poor  Judgement: poor   Appearance: limited grooming, adequate hygiene, no abnormal involuntary movements, stable gait  Behavior: helpless, intensely related, defensive, no psychomotor retardation or agitation  Speech: wnl  Mood: "very depressed"  Affect: anxious, reactive, stable  Thought process: inconsistent, vague  Thought content: no delusions elicited but catastrophic thoughts are very irrational; denies SI/HI  Perceptual disturbances: denies Ah/VH  Cognition: oriented to time, place; poor recall  Insight: poor  Judgement: poor

## 2024-10-02 NOTE — BH INPATIENT PSYCHIATRY PROGRESS NOTE - NSBHFUPINTERVALHXFT_PSY_A_CORE
Patient reports sleeping  Patient reports sleeping adequately though with additional prns beyond standing gabapentin 600mg po qhs. Received hydroxyzine around 8pm for anxiety and got trazodone 50mg prn around midnight and slept till 6:30. Patient reports ongoing depressed mood and anxiety. Remains helpless, regressed.

## 2024-10-03 PROCEDURE — 99232 SBSQ HOSP IP/OBS MODERATE 35: CPT

## 2024-10-03 RX ORDER — GABAPENTIN 300 MG/1
400 CAPSULE ORAL AT BEDTIME
Refills: 0 | Status: DISCONTINUED | OUTPATIENT
Start: 2024-10-03 | End: 2024-10-09

## 2024-10-03 RX ADMIN — GABAPENTIN 400 MILLIGRAM(S): 300 CAPSULE ORAL at 10:39

## 2024-10-03 RX ADMIN — Medication 81 MILLIGRAM(S): at 09:31

## 2024-10-03 RX ADMIN — SERTRALINE HYDROCHLORIDE 75 MILLIGRAM(S): 50 TABLET, FILM COATED ORAL at 09:31

## 2024-10-03 RX ADMIN — Medication 3 MILLIGRAM(S): at 21:32

## 2024-10-03 RX ADMIN — TICAGRELOR 60 MILLIGRAM(S): 90 TABLET ORAL at 09:30

## 2024-10-03 RX ADMIN — Medication 25 MILLIGRAM(S): at 21:34

## 2024-10-03 RX ADMIN — PANTOPRAZOLE SODIUM 40 MILLIGRAM(S): 40 TABLET, DELAYED RELEASE ORAL at 08:03

## 2024-10-03 RX ADMIN — Medication 80 MILLIGRAM(S): at 21:31

## 2024-10-03 RX ADMIN — GABAPENTIN 400 MILLIGRAM(S): 300 CAPSULE ORAL at 21:31

## 2024-10-03 RX ADMIN — TICAGRELOR 60 MILLIGRAM(S): 90 TABLET ORAL at 21:31

## 2024-10-03 RX ADMIN — TRAZODONE HYDROCHLORIDE 50 MILLIGRAM(S): 100 TABLET ORAL at 22:41

## 2024-10-03 RX ADMIN — Medication 112 MICROGRAM(S): at 06:45

## 2024-10-03 NOTE — DIETITIAN INITIAL EVALUATION ADULT - PERTINENT LABORATORY DATA
9/23/24 CMP, Mg, Phos WNL    A1C with Estimated Average Glucose Result: 5.6 % (09-21-24 @ 06:39)    Lipid Panel: Date/Time: 09-21-24 @ 06:32  Cholesterol, Serum: 95  LDL Cholesterol Calculated: 34  HDL Cholesterol, Serum: 44  Total Cholesterol/HDL Ration Measurement: --  Triglycerides, Serum: 82    Vitamin B12, Serum: 442 pg/mL (09.13.24 @ 06:39)   Folate, Serum: 14.3 ng/mL (09.13.24 @ 06:39)

## 2024-10-03 NOTE — DIETITIAN INITIAL EVALUATION ADULT - OTHER INFO
Patient is a 74 y/o female with PPHx depression since her 20s, numerous past admissions, this is the 3rd admission over the past year, following with psychiatrist Dr. Ryan Sparrow, h/o prescription sedative abuse, multiple prior overdoses (patient denies suicide attempts but contradicts chart), initially bib EMS after son found her with altered mental status after having taken more than prescribed ambien in addition to drinking alcohol. Patient states alcohol consumption was accidental. Patient was admitted to [facility] briefly due to abnormal EKG but has since been medically cleared and transferred to our unit for ongoing treatment.    Met with patient in her room today. Patient reports a fluctuated appetite/PO intake (uncertain duration) in settings of anxiety/depression, but today she states that there is "not enough food" for her especially at breakfast, requesting for similar food items that her roommate has been receiving such as yogurt, pudding, apple juice. Dislikes pizza/pulled pork, prefers salmon. Menu options explored with patient today, food preferences taken and honored on CBoard. NKFA reported. + dental implants but denies chewing/swallowing difficulties on current diet, declined diet consistency modification at this time. Denies GI distress (nausea/vomiting/diarrhea/ constipation). UBW ~150lb reported, current wt: 161.2lb (9/28/24). Gained 7.5% over the past ~1 month, possibly related to PO intake. Discussed the importance of adequate & consistent meal and portion sizes, patient exhibited partial interest in learning and verbalized partial understanding.

## 2024-10-03 NOTE — BH INPATIENT PSYCHIATRY PROGRESS NOTE - NSBHMETABOLIC_PSY_ALL_CORE_FT
BMI: BMI (kg/m2): 28.4 (09-22-24 @ 19:00)  HbA1c: A1C with Estimated Average Glucose Result: 5.6 % (09-21-24 @ 06:39)    Glucose: POCT Blood Glucose.: 80 mg/dL (08-12-24 @ 19:41)    BP: --Vital Signs Last 24 Hrs  T(C): 36.2 (10-03-24 @ 09:09), Max: 36.2 (10-03-24 @ 09:09)  T(F): 97.2 (10-03-24 @ 09:09), Max: 97.2 (10-03-24 @ 09:09)  HR: --  BP: --  BP(mean): --  RR: 18 (10-03-24 @ 09:09) (18 - 18)  SpO2: 97% (10-03-24 @ 09:09) (97% - 97%)    Orthostatic VS  10-03-24 @ 09:09  Lying BP: --/-- HR: --  Sitting BP: 110/58 HR: 58  Standing BP: 99/57 HR: 69  Site: --  Mode: --  Orthostatic VS  10-02-24 @ 08:24  Lying BP: --/-- HR: --  Sitting BP: 126/59 HR: 76  Standing BP: 118/63 HR: 75  Site: --  Mode: --    Lipid Panel: Date/Time: 09-21-24 @ 06:32  Cholesterol, Serum: 95  LDL Cholesterol Calculated: 34  HDL Cholesterol, Serum: 44  Total Cholesterol/HDL Ration Measurement: --  Triglycerides, Serum: 82

## 2024-10-03 NOTE — BH INPATIENT PSYCHIATRY PROGRESS NOTE - NSBHCHARTREVIEWVS_PSY_A_CORE FT
Vital Signs Last 24 Hrs  T(C): 36.2 (10-03-24 @ 09:09), Max: 36.2 (10-03-24 @ 09:09)  T(F): 97.2 (10-03-24 @ 09:09), Max: 97.2 (10-03-24 @ 09:09)  HR: --  BP: --  BP(mean): --  RR: 18 (10-03-24 @ 09:09) (18 - 18)  SpO2: 97% (10-03-24 @ 09:09) (97% - 97%)    Orthostatic VS  10-03-24 @ 09:09  Lying BP: --/-- HR: --  Sitting BP: 110/58 HR: 58  Standing BP: 99/57 HR: 69  Site: --  Mode: --  Orthostatic VS  10-02-24 @ 08:24  Lying BP: --/-- HR: --  Sitting BP: 126/59 HR: 76  Standing BP: 118/63 HR: 75  Site: --  Mode: --

## 2024-10-03 NOTE — DIETITIAN INITIAL EVALUATION ADULT - ADD RECOMMEND
- Encourage healthy food and snack choices.   - May consider daily MVI for micronutrient coverage if no medical contraindications per MD's discretion.   - Monitor PO intake/tolerance, weights, labs, BM's, and skin integrity.

## 2024-10-03 NOTE — BH INPATIENT PSYCHIATRY PROGRESS NOTE - MSE UNSTRUCTURED FT
Appearance: limited grooming, adequate hygiene, no abnormal involuntary movements, stable gait  Behavior: helpless, intensely related, defensive, no psychomotor retardation or agitation  Speech: wnl  Mood: "very depressed"  Affect: anxious, reactive, stable  Thought process: more logical and linear  Thought content: no delusions elicited; catastrophic thoughts are less intense; denies SI/HI  Perceptual disturbances: denies Ah/VH  Cognition: oriented to time, place; poor recall  Insight: poor  Judgement: poor

## 2024-10-03 NOTE — BH INPATIENT PSYCHIATRY PROGRESS NOTE - NSBHFUPINTERVALHXFT_PSY_A_CORE
Patient reports feeling "very depressed" still. Has been anxious also but states gabapentin prn has helped. Slept with trazodone prn overnight. Denies gabapentin 600mg helps with sleep but acknowledges it helps with anxiety. No diarrhea today.

## 2024-10-03 NOTE — DIETITIAN INITIAL EVALUATION ADULT - PERTINENT MEDS FT
MEDICATIONS  (STANDING):  aspirin enteric coated 81 milliGRAM(s) Oral daily  atorvastatin 80 milliGRAM(s) Oral at bedtime  gabapentin 600 milliGRAM(s) Oral at bedtime  levothyroxine 112 MICROGram(s) Oral daily  melatonin. 3 milliGRAM(s) Oral at bedtime  metoprolol succinate ER 25 milliGRAM(s) Oral daily  pantoprazole    Tablet 40 milliGRAM(s) Oral before breakfast  sertraline 75 milliGRAM(s) Oral daily  ticagrelor 60 milliGRAM(s) Oral every 12 hours    MEDICATIONS  (PRN):  acetaminophen     Tablet .. 650 milliGRAM(s) Oral every 6 hours PRN Temp greater or equal to 38C (100.4F), Mild Pain (1 - 3)  aluminum hydroxide/magnesium hydroxide/simethicone Suspension 30 milliLiter(s) Oral every 6 hours PRN Dyspepsia  gabapentin 400 milliGRAM(s) Oral three times a day PRN anxiety  hydrOXYzine hydrochloride 25 milliGRAM(s) Oral every 8 hours PRN anxiety if gabapentin ineffective  loperamide 2 milliGRAM(s) Oral every 6 hours PRN diarrhea  traZODone 50 milliGRAM(s) Oral at bedtime PRN insomnia

## 2024-10-04 PROCEDURE — 99232 SBSQ HOSP IP/OBS MODERATE 35: CPT

## 2024-10-04 RX ORDER — SERTRALINE HYDROCHLORIDE 50 MG/1
100 TABLET, FILM COATED ORAL DAILY
Refills: 0 | Status: DISCONTINUED | OUTPATIENT
Start: 2024-10-04 | End: 2024-10-09

## 2024-10-04 RX ORDER — TRAZODONE HYDROCHLORIDE 100 MG/1
75 TABLET ORAL AT BEDTIME
Refills: 0 | Status: DISCONTINUED | OUTPATIENT
Start: 2024-10-04 | End: 2024-10-18

## 2024-10-04 RX ADMIN — Medication 25 MILLIGRAM(S): at 09:14

## 2024-10-04 RX ADMIN — Medication 3 MILLIGRAM(S): at 21:50

## 2024-10-04 RX ADMIN — TICAGRELOR 60 MILLIGRAM(S): 90 TABLET ORAL at 21:49

## 2024-10-04 RX ADMIN — GABAPENTIN 400 MILLIGRAM(S): 300 CAPSULE ORAL at 21:50

## 2024-10-04 RX ADMIN — Medication 80 MILLIGRAM(S): at 21:50

## 2024-10-04 RX ADMIN — PANTOPRAZOLE SODIUM 40 MILLIGRAM(S): 40 TABLET, DELAYED RELEASE ORAL at 07:46

## 2024-10-04 RX ADMIN — Medication 81 MILLIGRAM(S): at 09:14

## 2024-10-04 RX ADMIN — SERTRALINE HYDROCHLORIDE 75 MILLIGRAM(S): 50 TABLET, FILM COATED ORAL at 09:14

## 2024-10-04 RX ADMIN — TRAZODONE HYDROCHLORIDE 75 MILLIGRAM(S): 100 TABLET ORAL at 21:52

## 2024-10-04 RX ADMIN — Medication 112 MICROGRAM(S): at 05:29

## 2024-10-04 RX ADMIN — TICAGRELOR 60 MILLIGRAM(S): 90 TABLET ORAL at 09:14

## 2024-10-04 RX ADMIN — GABAPENTIN 400 MILLIGRAM(S): 300 CAPSULE ORAL at 22:46

## 2024-10-04 RX ADMIN — GABAPENTIN 400 MILLIGRAM(S): 300 CAPSULE ORAL at 16:00

## 2024-10-04 NOTE — BH INPATIENT PSYCHIATRY PROGRESS NOTE - NSBHFUPINTERVALHXFT_PSY_A_CORE
Patient reports significant depressed mood and anxiety. Reports not having slept at all overnight which is inconsistent with check sheet and also her reports that she had nightmares. Catastrophic thoughts remain. Continues to focus on sleep medications. Reviewed records from most recent admission which was suggestive that patient was noncompliant with outpatient appointment following discharge and ran out of medications.

## 2024-10-04 NOTE — BH INPATIENT PSYCHIATRY PROGRESS NOTE - MSE UNSTRUCTURED FT
Appearance: limited grooming, adequate hygiene, no abnormal involuntary movements, stable gait  Behavior: helpless, intensely related, defensive, no psychomotor retardation or agitation  Speech: wnl  Mood: "very depressed"  Affect: anxious, reactive, stable  Thought process: more logical and linear but inconsistent  Thought content: no delusions elicited; catastrophic thoughts are intense; denies SI/HI  Perceptual disturbances: denies Ah/VH  Cognition: oriented to time, place; poor recall  Insight: poor  Judgement: poor

## 2024-10-04 NOTE — BH INPATIENT PSYCHIATRY PROGRESS NOTE - NSBHMETABOLIC_PSY_ALL_CORE_FT
BMI: BMI (kg/m2): 28.4 (09-22-24 @ 19:00)  HbA1c: A1C with Estimated Average Glucose Result: 5.6 % (09-21-24 @ 06:39)    Glucose: POCT Blood Glucose.: 80 mg/dL (08-12-24 @ 19:41)    BP: --Vital Signs Last 24 Hrs  T(C): 36.6 (10-04-24 @ 06:04), Max: 36.6 (10-04-24 @ 06:04)  T(F): 97.8 (10-04-24 @ 06:04), Max: 97.8 (10-04-24 @ 06:04)  HR: --  BP: --  BP(mean): --  RR: 17 (10-04-24 @ 06:04) (17 - 17)  SpO2: 96% (10-04-24 @ 06:04) (96% - 96%)    Orthostatic VS  10-04-24 @ 06:04  Lying BP: --/-- HR: --  Sitting BP: 120/67 HR: 67  Standing BP: 108/71 HR: 77  Site: --  Mode: --  Orthostatic VS  10-03-24 @ 09:09  Lying BP: --/-- HR: --  Sitting BP: 110/58 HR: 58  Standing BP: 99/57 HR: 69  Site: --  Mode: --    Lipid Panel: Date/Time: 09-21-24 @ 06:32  Cholesterol, Serum: 95  LDL Cholesterol Calculated: 34  HDL Cholesterol, Serum: 44  Total Cholesterol/HDL Ration Measurement: --  Triglycerides, Serum: 82   [FreeTextEntry6] : \par [age] year old male brought to the office because of some irritation on his penis.He is complaining of pain.He also has dry patches on the forearms.

## 2024-10-05 PROCEDURE — 99232 SBSQ HOSP IP/OBS MODERATE 35: CPT

## 2024-10-05 RX ORDER — GLYCERIN/PROPYLENE GLYCOL 0.6 %-0.6%
1 DROPPERETTE, SINGLE-USE DROP DISPENSER OPHTHALMIC (EYE)
Refills: 0 | Status: DISCONTINUED | OUTPATIENT
Start: 2024-10-05 | End: 2024-10-23

## 2024-10-05 RX ORDER — ARIPIPRAZOLE 2 MG/1
2 TABLET ORAL DAILY
Refills: 0 | Status: DISCONTINUED | OUTPATIENT
Start: 2024-10-05 | End: 2024-10-08

## 2024-10-05 RX ORDER — ARIPIPRAZOLE 2 MG/1
2 TABLET ORAL ONCE
Refills: 0 | Status: COMPLETED | OUTPATIENT
Start: 2024-10-05 | End: 2024-10-05

## 2024-10-05 RX ADMIN — Medication 1 DROP(S): at 15:32

## 2024-10-05 RX ADMIN — Medication 112 MICROGRAM(S): at 06:29

## 2024-10-05 RX ADMIN — GABAPENTIN 400 MILLIGRAM(S): 300 CAPSULE ORAL at 23:18

## 2024-10-05 RX ADMIN — TRAZODONE HYDROCHLORIDE 75 MILLIGRAM(S): 100 TABLET ORAL at 22:10

## 2024-10-05 RX ADMIN — GABAPENTIN 400 MILLIGRAM(S): 300 CAPSULE ORAL at 22:08

## 2024-10-05 RX ADMIN — TICAGRELOR 60 MILLIGRAM(S): 90 TABLET ORAL at 08:56

## 2024-10-05 RX ADMIN — Medication 3 MILLIGRAM(S): at 22:08

## 2024-10-05 RX ADMIN — SERTRALINE HYDROCHLORIDE 100 MILLIGRAM(S): 50 TABLET, FILM COATED ORAL at 08:56

## 2024-10-05 RX ADMIN — PANTOPRAZOLE SODIUM 40 MILLIGRAM(S): 40 TABLET, DELAYED RELEASE ORAL at 08:27

## 2024-10-05 RX ADMIN — Medication 80 MILLIGRAM(S): at 22:08

## 2024-10-05 RX ADMIN — Medication 81 MILLIGRAM(S): at 08:56

## 2024-10-05 RX ADMIN — ARIPIPRAZOLE 2 MILLIGRAM(S): 2 TABLET ORAL at 15:07

## 2024-10-05 RX ADMIN — TICAGRELOR 60 MILLIGRAM(S): 90 TABLET ORAL at 22:08

## 2024-10-05 NOTE — BH INPATIENT PSYCHIATRY PROGRESS NOTE - NSBHCHARTREVIEWVS_PSY_A_CORE FT
Vital Signs Last 24 Hrs  T(C): 36.6 (10-05-24 @ 05:54), Max: 36.6 (10-05-24 @ 05:54)  T(F): 97.9 (10-05-24 @ 05:54), Max: 97.9 (10-05-24 @ 05:54)  HR: --  BP: --  BP(mean): --  RR: --  SpO2: --    Orthostatic VS  10-05-24 @ 05:54  Lying BP: 111/53 HR: 64  Sitting BP: 95/52 HR: 63  Standing BP: --/-- HR: --  Site: --  Mode: --  Orthostatic VS  10-04-24 @ 06:04  Lying BP: --/-- HR: --  Sitting BP: 120/67 HR: 67  Standing BP: 108/71 HR: 77  Site: --  Mode: --

## 2024-10-05 NOTE — BH INPATIENT PSYCHIATRY PROGRESS NOTE - NSBHFUPINTERVALHXFT_PSY_A_CORE
Patient continues to be anxious, indecisive, has anticipatory anxiety, catastrophic thinking. Remains preoccupied with insomnia treatment. She now states she's hearing music, a particular song, possibly an auditory hallucination. Patient is inconsistent, vague and concrete due to anxiety, possibly due to cognitive deficits.

## 2024-10-05 NOTE — BH INPATIENT PSYCHIATRY PROGRESS NOTE - MSE UNSTRUCTURED FT
Appearance: limited grooming, adequate hygiene, no abnormal involuntary movements, stable gait  Behavior: helpless, intensely related, defensive, no psychomotor retardation or agitation  Speech: wnl  Mood: "I'm not depressed"  Affect: anxious, incongruent, reactive, stable  Thought process: concrete, inconsistent  Thought content: no delusions elicited; catastrophic thoughts are intense; denies SI/HI  Perceptual disturbances: denies Ah/VH  Cognition: oriented to time, place; poor recall  Insight: poor  Judgement: poor

## 2024-10-05 NOTE — BH INPATIENT PSYCHIATRY PROGRESS NOTE - NSBHMETABOLIC_PSY_ALL_CORE_FT
BMI: BMI (kg/m2): 28.4 (09-22-24 @ 19:00)  HbA1c: A1C with Estimated Average Glucose Result: 5.6 % (09-21-24 @ 06:39)    Glucose: POCT Blood Glucose.: 80 mg/dL (08-12-24 @ 19:41)    BP: --Vital Signs Last 24 Hrs  T(C): 36.6 (10-05-24 @ 05:54), Max: 36.6 (10-05-24 @ 05:54)  T(F): 97.9 (10-05-24 @ 05:54), Max: 97.9 (10-05-24 @ 05:54)  HR: --  BP: --  BP(mean): --  RR: --  SpO2: --    Orthostatic VS  10-05-24 @ 05:54  Lying BP: 111/53 HR: 64  Sitting BP: 95/52 HR: 63  Standing BP: --/-- HR: --  Site: --  Mode: --  Orthostatic VS  10-04-24 @ 06:04  Lying BP: --/-- HR: --  Sitting BP: 120/67 HR: 67  Standing BP: 108/71 HR: 77  Site: --  Mode: --    Lipid Panel: Date/Time: 09-21-24 @ 06:32  Cholesterol, Serum: 95  LDL Cholesterol Calculated: 34  HDL Cholesterol, Serum: 44  Total Cholesterol/HDL Ration Measurement: --  Triglycerides, Serum: 82

## 2024-10-06 PROCEDURE — 99232 SBSQ HOSP IP/OBS MODERATE 35: CPT

## 2024-10-06 RX ORDER — DOXEPIN HYDROCHLORIDE 150 MG/1
1 CAPSULE ORAL
Qty: 30 | Refills: 0
Start: 2024-10-06 | End: 2024-11-04

## 2024-10-06 RX ADMIN — Medication 81 MILLIGRAM(S): at 09:19

## 2024-10-06 RX ADMIN — Medication 3 MILLIGRAM(S): at 21:58

## 2024-10-06 RX ADMIN — TRAZODONE HYDROCHLORIDE 75 MILLIGRAM(S): 100 TABLET ORAL at 21:57

## 2024-10-06 RX ADMIN — Medication 25 MILLIGRAM(S): at 09:21

## 2024-10-06 RX ADMIN — GABAPENTIN 400 MILLIGRAM(S): 300 CAPSULE ORAL at 21:57

## 2024-10-06 RX ADMIN — Medication 1 DROP(S): at 17:27

## 2024-10-06 RX ADMIN — PANTOPRAZOLE SODIUM 40 MILLIGRAM(S): 40 TABLET, DELAYED RELEASE ORAL at 09:19

## 2024-10-06 RX ADMIN — ARIPIPRAZOLE 2 MILLIGRAM(S): 2 TABLET ORAL at 09:19

## 2024-10-06 RX ADMIN — SERTRALINE HYDROCHLORIDE 100 MILLIGRAM(S): 50 TABLET, FILM COATED ORAL at 09:19

## 2024-10-06 RX ADMIN — Medication 80 MILLIGRAM(S): at 21:58

## 2024-10-06 RX ADMIN — TICAGRELOR 60 MILLIGRAM(S): 90 TABLET ORAL at 21:56

## 2024-10-06 RX ADMIN — TICAGRELOR 60 MILLIGRAM(S): 90 TABLET ORAL at 09:19

## 2024-10-06 RX ADMIN — Medication 112 MICROGRAM(S): at 06:31

## 2024-10-06 NOTE — BH INPATIENT PSYCHIATRY PROGRESS NOTE - NSBHFUPINTERVALHXFT_PSY_A_CORE
Remains depressed, anxious. Has catastrophic thoughts. Poor insight and defensiveness noted when attempting cognitive redirection. Reports she is still hearing music.

## 2024-10-06 NOTE — BH INPATIENT PSYCHIATRY PROGRESS NOTE - MSE UNSTRUCTURED FT
Appearance: limited grooming, adequate hygiene, no abnormal involuntary movements, stable gait  Behavior: helpless, defensive, no psychomotor retardation or agitation  Speech: wnl  Mood: "depressed"  Affect: anxious, reactive, stable  Thought process: concrete, inconsistent  Thought content: no delusions elicited; catastrophic thoughts remain; denies SI/HI  Perceptual disturbances: denies Ah/VH  Cognition: oriented to time, place; poor recall  Insight: poor  Judgement: poor

## 2024-10-06 NOTE — BH INPATIENT PSYCHIATRY PROGRESS NOTE - NSBHCHARTREVIEWVS_PSY_A_CORE FT
Vital Signs Last 24 Hrs  T(C): 36.5 (10-06-24 @ 08:29), Max: 36.5 (10-06-24 @ 08:29)  T(F): 97.7 (10-06-24 @ 08:29), Max: 97.7 (10-06-24 @ 08:29)  HR: --  BP: --  BP(mean): --  RR: --  SpO2: 96% (10-06-24 @ 08:29) (96% - 96%)    Orthostatic VS  10-06-24 @ 08:29  Lying BP: --/-- HR: --  Sitting BP: 112/68 HR: 68  Standing BP: 106/61 HR: 85  Site: --  Mode: --  Orthostatic VS  10-05-24 @ 05:54  Lying BP: 111/53 HR: 64  Sitting BP: 95/52 HR: 63  Standing BP: --/-- HR: --  Site: --  Mode: --

## 2024-10-06 NOTE — BH INPATIENT PSYCHIATRY PROGRESS NOTE - NSBHMETABOLIC_PSY_ALL_CORE_FT
BMI: BMI (kg/m2): 28.4 (09-22-24 @ 19:00)  HbA1c: A1C with Estimated Average Glucose Result: 5.6 % (09-21-24 @ 06:39)    Glucose: POCT Blood Glucose.: 80 mg/dL (08-12-24 @ 19:41)    BP: --Vital Signs Last 24 Hrs  T(C): 36.5 (10-06-24 @ 08:29), Max: 36.5 (10-06-24 @ 08:29)  T(F): 97.7 (10-06-24 @ 08:29), Max: 97.7 (10-06-24 @ 08:29)  HR: --  BP: --  BP(mean): --  RR: --  SpO2: 96% (10-06-24 @ 08:29) (96% - 96%)    Orthostatic VS  10-06-24 @ 08:29  Lying BP: --/-- HR: --  Sitting BP: 112/68 HR: 68  Standing BP: 106/61 HR: 85  Site: --  Mode: --  Orthostatic VS  10-05-24 @ 05:54  Lying BP: 111/53 HR: 64  Sitting BP: 95/52 HR: 63  Standing BP: --/-- HR: --  Site: --  Mode: --    Lipid Panel: Date/Time: 09-21-24 @ 06:32  Cholesterol, Serum: 95  LDL Cholesterol Calculated: 34  HDL Cholesterol, Serum: 44  Total Cholesterol/HDL Ration Measurement: --  Triglycerides, Serum: 82

## 2024-10-07 PROCEDURE — 90832 PSYTX W PT 30 MINUTES: CPT

## 2024-10-07 PROCEDURE — 99232 SBSQ HOSP IP/OBS MODERATE 35: CPT

## 2024-10-07 RX ADMIN — SERTRALINE HYDROCHLORIDE 100 MILLIGRAM(S): 50 TABLET, FILM COATED ORAL at 08:48

## 2024-10-07 RX ADMIN — PANTOPRAZOLE SODIUM 40 MILLIGRAM(S): 40 TABLET, DELAYED RELEASE ORAL at 08:05

## 2024-10-07 RX ADMIN — TRAZODONE HYDROCHLORIDE 75 MILLIGRAM(S): 100 TABLET ORAL at 21:56

## 2024-10-07 RX ADMIN — GABAPENTIN 400 MILLIGRAM(S): 300 CAPSULE ORAL at 21:51

## 2024-10-07 RX ADMIN — Medication 112 MICROGRAM(S): at 06:17

## 2024-10-07 RX ADMIN — ARIPIPRAZOLE 2 MILLIGRAM(S): 2 TABLET ORAL at 08:46

## 2024-10-07 RX ADMIN — TICAGRELOR 60 MILLIGRAM(S): 90 TABLET ORAL at 21:52

## 2024-10-07 RX ADMIN — Medication 80 MILLIGRAM(S): at 21:52

## 2024-10-07 RX ADMIN — Medication 81 MILLIGRAM(S): at 08:47

## 2024-10-07 RX ADMIN — Medication 25 MILLIGRAM(S): at 08:47

## 2024-10-07 RX ADMIN — GABAPENTIN 400 MILLIGRAM(S): 300 CAPSULE ORAL at 01:33

## 2024-10-07 RX ADMIN — Medication 3 MILLIGRAM(S): at 21:52

## 2024-10-07 RX ADMIN — TICAGRELOR 60 MILLIGRAM(S): 90 TABLET ORAL at 08:47

## 2024-10-07 NOTE — BH INPATIENT PSYCHIATRY PROGRESS NOTE - NSBHCHARTREVIEWVS_PSY_A_CORE FT
Vital Signs Last 24 Hrs  T(C): 36.6 (10-07-24 @ 07:53), Max: 36.6 (10-07-24 @ 07:53)  T(F): 97.8 (10-07-24 @ 07:53), Max: 97.8 (10-07-24 @ 07:53)  HR: --  BP: --  BP(mean): --  RR: --  SpO2: 97% (10-07-24 @ 07:53) (97% - 97%)    Orthostatic VS  10-07-24 @ 07:53  Lying BP: --/-- HR: --  Sitting BP: 104/86 HR: 70  Standing BP: 103/62 HR: 82  Site: --  Mode: --  Orthostatic VS  10-06-24 @ 08:29  Lying BP: --/-- HR: --  Sitting BP: 112/68 HR: 68  Standing BP: 106/61 HR: 85  Site: --  Mode: --

## 2024-10-07 NOTE — BH INPATIENT PSYCHIATRY PROGRESS NOTE - NSBHFUPINTERVALHXFT_PSY_A_CORE
Patient continues to have anxious, depressed mood. Significantly defensive and minimizing. Family meeting held today. Son continues to push for a rehab type facility and worries patient will find other means to obtain sedatives even if she were to be moved into an senior care.

## 2024-10-07 NOTE — BH INPATIENT PSYCHIATRY PROGRESS NOTE - NSBHMETABOLIC_PSY_ALL_CORE_FT
BMI: BMI (kg/m2): 28.4 (09-22-24 @ 19:00)  HbA1c: A1C with Estimated Average Glucose Result: 5.6 % (09-21-24 @ 06:39)    Glucose: POCT Blood Glucose.: 80 mg/dL (08-12-24 @ 19:41)    BP: --Vital Signs Last 24 Hrs  T(C): 36.6 (10-07-24 @ 07:53), Max: 36.6 (10-07-24 @ 07:53)  T(F): 97.8 (10-07-24 @ 07:53), Max: 97.8 (10-07-24 @ 07:53)  HR: --  BP: --  BP(mean): --  RR: --  SpO2: 97% (10-07-24 @ 07:53) (97% - 97%)    Orthostatic VS  10-07-24 @ 07:53  Lying BP: --/-- HR: --  Sitting BP: 104/86 HR: 70  Standing BP: 103/62 HR: 82  Site: --  Mode: --  Orthostatic VS  10-06-24 @ 08:29  Lying BP: --/-- HR: --  Sitting BP: 112/68 HR: 68  Standing BP: 106/61 HR: 85  Site: --  Mode: --    Lipid Panel: Date/Time: 09-21-24 @ 06:32  Cholesterol, Serum: 95  LDL Cholesterol Calculated: 34  HDL Cholesterol, Serum: 44  Total Cholesterol/HDL Ration Measurement: --  Triglycerides, Serum: 82

## 2024-10-08 PROCEDURE — 99232 SBSQ HOSP IP/OBS MODERATE 35: CPT

## 2024-10-08 RX ORDER — ARIPIPRAZOLE 2 MG/1
5 TABLET ORAL DAILY
Refills: 0 | Status: DISCONTINUED | OUTPATIENT
Start: 2024-10-08 | End: 2024-10-21

## 2024-10-08 RX ADMIN — TRAZODONE HYDROCHLORIDE 75 MILLIGRAM(S): 100 TABLET ORAL at 22:01

## 2024-10-08 RX ADMIN — Medication 3 MILLIGRAM(S): at 21:59

## 2024-10-08 RX ADMIN — TICAGRELOR 60 MILLIGRAM(S): 90 TABLET ORAL at 08:55

## 2024-10-08 RX ADMIN — PANTOPRAZOLE SODIUM 40 MILLIGRAM(S): 40 TABLET, DELAYED RELEASE ORAL at 07:49

## 2024-10-08 RX ADMIN — Medication 25 MILLIGRAM(S): at 08:55

## 2024-10-08 RX ADMIN — Medication 1 DROP(S): at 21:54

## 2024-10-08 RX ADMIN — SERTRALINE HYDROCHLORIDE 100 MILLIGRAM(S): 50 TABLET, FILM COATED ORAL at 08:55

## 2024-10-08 RX ADMIN — Medication 81 MILLIGRAM(S): at 08:55

## 2024-10-08 RX ADMIN — GABAPENTIN 400 MILLIGRAM(S): 300 CAPSULE ORAL at 23:26

## 2024-10-08 RX ADMIN — Medication 80 MILLIGRAM(S): at 21:59

## 2024-10-08 RX ADMIN — Medication 112 MICROGRAM(S): at 06:26

## 2024-10-08 RX ADMIN — GABAPENTIN 400 MILLIGRAM(S): 300 CAPSULE ORAL at 00:39

## 2024-10-08 RX ADMIN — GABAPENTIN 400 MILLIGRAM(S): 300 CAPSULE ORAL at 20:53

## 2024-10-08 RX ADMIN — ARIPIPRAZOLE 2 MILLIGRAM(S): 2 TABLET ORAL at 08:55

## 2024-10-08 RX ADMIN — TICAGRELOR 60 MILLIGRAM(S): 90 TABLET ORAL at 21:59

## 2024-10-08 NOTE — BH INPATIENT PSYCHIATRY PROGRESS NOTE - NSBHFUPINTERVALHXFT_PSY_A_CORE
Patient remains anxious, indecisive, has anticipatory anxiety, catastrophic thinking, demonstrates helplessness. Slept with prns.

## 2024-10-08 NOTE — BH INPATIENT PSYCHIATRY PROGRESS NOTE - NSBHCHARTREVIEWVS_PSY_A_CORE FT
Vital Signs Last 24 Hrs  T(C): 37 (10-08-24 @ 07:59), Max: 37 (10-08-24 @ 07:59)  T(F): 98.6 (10-08-24 @ 07:59), Max: 98.6 (10-08-24 @ 07:59)  HR: --  BP: --  BP(mean): --  RR: 18 (10-08-24 @ 07:59) (18 - 18)  SpO2: 97% (10-08-24 @ 07:59) (97% - 97%)    Orthostatic VS  10-08-24 @ 07:59  Lying BP: 106/60 HR: 65  Sitting BP: --/-- HR: --  Standing BP: --/-- HR: --  Site: upper right arm  Mode: electronic  Orthostatic VS  10-07-24 @ 07:53  Lying BP: --/-- HR: --  Sitting BP: 104/86 HR: 70  Standing BP: 103/62 HR: 82  Site: --  Mode: --

## 2024-10-08 NOTE — BH INPATIENT PSYCHIATRY PROGRESS NOTE - NSBHMETABOLIC_PSY_ALL_CORE_FT
BMI: BMI (kg/m2): 28.4 (09-22-24 @ 19:00)  HbA1c: A1C with Estimated Average Glucose Result: 5.6 % (09-21-24 @ 06:39)    Glucose: POCT Blood Glucose.: 80 mg/dL (08-12-24 @ 19:41)    BP: --Vital Signs Last 24 Hrs  T(C): 37 (10-08-24 @ 07:59), Max: 37 (10-08-24 @ 07:59)  T(F): 98.6 (10-08-24 @ 07:59), Max: 98.6 (10-08-24 @ 07:59)  HR: --  BP: --  BP(mean): --  RR: 18 (10-08-24 @ 07:59) (18 - 18)  SpO2: 97% (10-08-24 @ 07:59) (97% - 97%)    Orthostatic VS  10-08-24 @ 07:59  Lying BP: 106/60 HR: 65  Sitting BP: --/-- HR: --  Standing BP: --/-- HR: --  Site: upper right arm  Mode: electronic  Orthostatic VS  10-07-24 @ 07:53  Lying BP: --/-- HR: --  Sitting BP: 104/86 HR: 70  Standing BP: 103/62 HR: 82  Site: --  Mode: --    Lipid Panel: Date/Time: 09-21-24 @ 06:32  Cholesterol, Serum: 95  LDL Cholesterol Calculated: 34  HDL Cholesterol, Serum: 44  Total Cholesterol/HDL Ration Measurement: --  Triglycerides, Serum: 82

## 2024-10-09 LAB
CULTURE RESULTS: SIGNIFICANT CHANGE UP
SPECIMEN SOURCE: SIGNIFICANT CHANGE UP

## 2024-10-09 PROCEDURE — 99232 SBSQ HOSP IP/OBS MODERATE 35: CPT

## 2024-10-09 PROCEDURE — 90832 PSYTX W PT 30 MINUTES: CPT

## 2024-10-09 RX ORDER — SERTRALINE HYDROCHLORIDE 50 MG/1
125 TABLET, FILM COATED ORAL DAILY
Refills: 0 | Status: DISCONTINUED | OUTPATIENT
Start: 2024-10-09 | End: 2024-10-15

## 2024-10-09 RX ORDER — GABAPENTIN 300 MG/1
300 CAPSULE ORAL THREE TIMES A DAY
Refills: 0 | Status: DISCONTINUED | OUTPATIENT
Start: 2024-10-09 | End: 2024-10-10

## 2024-10-09 RX ORDER — LOPERAMIDE HCL 2 MG
2 TABLET ORAL EVERY 4 HOURS
Refills: 0 | Status: DISCONTINUED | OUTPATIENT
Start: 2024-10-09 | End: 2024-10-23

## 2024-10-09 RX ORDER — GABAPENTIN 300 MG/1
600 CAPSULE ORAL AT BEDTIME
Refills: 0 | Status: DISCONTINUED | OUTPATIENT
Start: 2024-10-09 | End: 2024-10-10

## 2024-10-09 RX ADMIN — TICAGRELOR 60 MILLIGRAM(S): 90 TABLET ORAL at 22:06

## 2024-10-09 RX ADMIN — ARIPIPRAZOLE 5 MILLIGRAM(S): 2 TABLET ORAL at 09:07

## 2024-10-09 RX ADMIN — Medication 112 MICROGRAM(S): at 06:34

## 2024-10-09 RX ADMIN — GABAPENTIN 600 MILLIGRAM(S): 300 CAPSULE ORAL at 22:05

## 2024-10-09 RX ADMIN — TRAZODONE HYDROCHLORIDE 75 MILLIGRAM(S): 100 TABLET ORAL at 22:06

## 2024-10-09 RX ADMIN — PANTOPRAZOLE SODIUM 40 MILLIGRAM(S): 40 TABLET, DELAYED RELEASE ORAL at 07:59

## 2024-10-09 RX ADMIN — TICAGRELOR 60 MILLIGRAM(S): 90 TABLET ORAL at 09:07

## 2024-10-09 RX ADMIN — SERTRALINE HYDROCHLORIDE 100 MILLIGRAM(S): 50 TABLET, FILM COATED ORAL at 09:13

## 2024-10-09 RX ADMIN — Medication 80 MILLIGRAM(S): at 22:04

## 2024-10-09 RX ADMIN — Medication 2 MILLIGRAM(S): at 18:10

## 2024-10-09 RX ADMIN — Medication 3 MILLIGRAM(S): at 22:04

## 2024-10-09 RX ADMIN — Medication 25 MILLIGRAM(S): at 09:07

## 2024-10-09 RX ADMIN — Medication 81 MILLIGRAM(S): at 09:12

## 2024-10-09 RX ADMIN — GABAPENTIN 400 MILLIGRAM(S): 300 CAPSULE ORAL at 10:53

## 2024-10-09 NOTE — BH INPATIENT PSYCHIATRY PROGRESS NOTE - NSBHCHARTREVIEWVS_PSY_A_CORE FT
Vital Signs Last 24 Hrs  T(C): 36.6 (10-09-24 @ 08:15), Max: 36.6 (10-09-24 @ 08:15)  T(F): 97.8 (10-09-24 @ 08:15), Max: 97.8 (10-09-24 @ 08:15)  HR: --  BP: --  BP(mean): --  RR: 18 (10-09-24 @ 08:15) (18 - 18)  SpO2: 99% (10-09-24 @ 08:15) (99% - 99%)    Orthostatic VS  10-09-24 @ 08:15  Lying BP: --/-- HR: --  Sitting BP: 111/67 HR: 60  Standing BP: 112/67 HR: 60  Site: upper left arm  Mode: electronic  Orthostatic VS  10-08-24 @ 07:59  Lying BP: 106/60 HR: 65  Sitting BP: --/-- HR: --  Standing BP: --/-- HR: --  Site: upper right arm  Mode: electronic

## 2024-10-09 NOTE — BH INPATIENT PSYCHIATRY PROGRESS NOTE - MSE UNSTRUCTURED FT
Appearance: limited grooming, adequate hygiene, no abnormal involuntary movements, stable gait  Behavior: helpless, defensive, no psychomotor retardation or agitation  Speech: wnl  Mood: anxious  Affect: congruent, reactive, stable  Thought process: concrete, inconsistent  Thought content: no delusions elicited; catastrophic thoughts remain; denies SI/HI  Perceptual disturbances: denies Ah/VH  Cognition: oriented to time, place; poor recall  Insight: poor  Judgement: poor

## 2024-10-09 NOTE — BH INPATIENT PSYCHIATRY PROGRESS NOTE - NSBHMETABOLIC_PSY_ALL_CORE_FT
BMI: BMI (kg/m2): 28.4 (09-22-24 @ 19:00)  HbA1c: A1C with Estimated Average Glucose Result: 5.6 % (09-21-24 @ 06:39)    Glucose: POCT Blood Glucose.: 80 mg/dL (08-12-24 @ 19:41)    BP: --Vital Signs Last 24 Hrs  T(C): 36.6 (10-09-24 @ 08:15), Max: 36.6 (10-09-24 @ 08:15)  T(F): 97.8 (10-09-24 @ 08:15), Max: 97.8 (10-09-24 @ 08:15)  HR: --  BP: --  BP(mean): --  RR: 18 (10-09-24 @ 08:15) (18 - 18)  SpO2: 99% (10-09-24 @ 08:15) (99% - 99%)    Orthostatic VS  10-09-24 @ 08:15  Lying BP: --/-- HR: --  Sitting BP: 111/67 HR: 60  Standing BP: 112/67 HR: 60  Site: upper left arm  Mode: electronic  Orthostatic VS  10-08-24 @ 07:59  Lying BP: 106/60 HR: 65  Sitting BP: --/-- HR: --  Standing BP: --/-- HR: --  Site: upper right arm  Mode: electronic    Lipid Panel: Date/Time: 09-21-24 @ 06:32  Cholesterol, Serum: 95  LDL Cholesterol Calculated: 34  HDL Cholesterol, Serum: 44  Total Cholesterol/HDL Ration Measurement: --  Triglycerides, Serum: 82

## 2024-10-09 NOTE — BH INPATIENT PSYCHIATRY PROGRESS NOTE - NSBHFUPINTERVALHXFT_PSY_A_CORE
Patient continues to have depressed mood, anxiety. Sleeping poorly and requiring multiple prns. She took trazodone at 10pm then neurontin for anxiety at 11:30pm. Patient also reports urinating frequently but denies irritation, pain, or burning. Denies any more AH.

## 2024-10-10 LAB
APPEARANCE UR: CLEAR — SIGNIFICANT CHANGE UP
BACTERIA # UR AUTO: NEGATIVE /HPF — SIGNIFICANT CHANGE UP
BILIRUB UR-MCNC: NEGATIVE — SIGNIFICANT CHANGE UP
CAST: 0 /LPF — SIGNIFICANT CHANGE UP (ref 0–4)
COLOR SPEC: YELLOW — SIGNIFICANT CHANGE UP
DIFF PNL FLD: NEGATIVE — SIGNIFICANT CHANGE UP
GLUCOSE UR QL: NEGATIVE MG/DL — SIGNIFICANT CHANGE UP
KETONES UR-MCNC: NEGATIVE MG/DL — SIGNIFICANT CHANGE UP
LEUKOCYTE ESTERASE UR-ACNC: NEGATIVE — SIGNIFICANT CHANGE UP
NITRITE UR-MCNC: NEGATIVE — SIGNIFICANT CHANGE UP
PH UR: 5.5 — SIGNIFICANT CHANGE UP (ref 5–8)
PROT UR-MCNC: NEGATIVE MG/DL — SIGNIFICANT CHANGE UP
RBC CASTS # UR COMP ASSIST: 1 /HPF — SIGNIFICANT CHANGE UP (ref 0–4)
SP GR SPEC: 1.01 — SIGNIFICANT CHANGE UP (ref 1–1.03)
SQUAMOUS # UR AUTO: 2 /HPF — SIGNIFICANT CHANGE UP (ref 0–5)
UROBILINOGEN FLD QL: 0.2 MG/DL — SIGNIFICANT CHANGE UP (ref 0.2–1)
WBC UR QL: 1 /HPF — SIGNIFICANT CHANGE UP (ref 0–5)

## 2024-10-10 PROCEDURE — 99232 SBSQ HOSP IP/OBS MODERATE 35: CPT

## 2024-10-10 RX ORDER — GABAPENTIN 300 MG/1
800 CAPSULE ORAL AT BEDTIME
Refills: 0 | Status: DISCONTINUED | OUTPATIENT
Start: 2024-10-10 | End: 2024-10-23

## 2024-10-10 RX ORDER — GABAPENTIN 300 MG/1
300 CAPSULE ORAL THREE TIMES A DAY
Refills: 0 | Status: DISCONTINUED | OUTPATIENT
Start: 2024-10-10 | End: 2024-10-23

## 2024-10-10 RX ADMIN — GABAPENTIN 300 MILLIGRAM(S): 300 CAPSULE ORAL at 02:19

## 2024-10-10 RX ADMIN — SERTRALINE HYDROCHLORIDE 125 MILLIGRAM(S): 50 TABLET, FILM COATED ORAL at 09:03

## 2024-10-10 RX ADMIN — TICAGRELOR 60 MILLIGRAM(S): 90 TABLET ORAL at 22:24

## 2024-10-10 RX ADMIN — ARIPIPRAZOLE 5 MILLIGRAM(S): 2 TABLET ORAL at 09:03

## 2024-10-10 RX ADMIN — Medication 3 MILLIGRAM(S): at 22:25

## 2024-10-10 RX ADMIN — PANTOPRAZOLE SODIUM 40 MILLIGRAM(S): 40 TABLET, DELAYED RELEASE ORAL at 09:04

## 2024-10-10 RX ADMIN — GABAPENTIN 800 MILLIGRAM(S): 300 CAPSULE ORAL at 22:24

## 2024-10-10 RX ADMIN — Medication 112 MICROGRAM(S): at 06:38

## 2024-10-10 RX ADMIN — Medication 25 MILLIGRAM(S): at 09:02

## 2024-10-10 RX ADMIN — TRAZODONE HYDROCHLORIDE 75 MILLIGRAM(S): 100 TABLET ORAL at 22:24

## 2024-10-10 RX ADMIN — Medication 80 MILLIGRAM(S): at 22:23

## 2024-10-10 RX ADMIN — TICAGRELOR 60 MILLIGRAM(S): 90 TABLET ORAL at 09:02

## 2024-10-10 RX ADMIN — Medication 81 MILLIGRAM(S): at 09:03

## 2024-10-10 NOTE — BH INPATIENT PSYCHIATRY PROGRESS NOTE - NSBHCHARTREVIEWVS_PSY_A_CORE FT
Vital Signs Last 24 Hrs  T(C): 36.9 (10-10-24 @ 08:12), Max: 36.9 (10-10-24 @ 08:12)  T(F): 98.5 (10-10-24 @ 08:12), Max: 98.5 (10-10-24 @ 08:12)  HR: --  BP: --  BP(mean): --  RR: 16 (10-10-24 @ 16:12) (16 - 16)  SpO2: --    Orthostatic VS  10-10-24 @ 08:12  Lying BP: --/-- HR: --  Sitting BP: 110/63 HR: 70  Standing BP: 102/64 HR: 81  Site: --  Mode: --  Orthostatic VS  10-09-24 @ 08:15  Lying BP: --/-- HR: --  Sitting BP: 111/67 HR: 60  Standing BP: 112/67 HR: 60  Site: upper left arm  Mode: electronic

## 2024-10-10 NOTE — BH INPATIENT PSYCHIATRY PROGRESS NOTE - NSBHFUPINTERVALHXFT_PSY_A_CORE
Patient continues to be anxious. Has poor sleep. Trazodone not effective but insists on keeping it as prn. Denies SI.

## 2024-10-10 NOTE — BH INPATIENT PSYCHIATRY PROGRESS NOTE - MSE UNSTRUCTURED FT
Appearance: unkempt hair (wig) but with intact hygiene, no abnormal involuntary movements, stable gait  Behavior: helpless, defensive, but cooperative, no psychomotor retardation or agitation  Speech: wnl  Mood: anxious  Affect: congruent, reactive, stable  Thought process: concrete, inconsistent  Thought content: no delusions elicited; catastrophic thoughts remain; denies SI/HI  Perceptual disturbances: denies Ah/VH  Cognition: oriented to time, place; poor recall  Insight: poor  Judgement: poor

## 2024-10-10 NOTE — BH INPATIENT PSYCHIATRY PROGRESS NOTE - NSBHMETABOLIC_PSY_ALL_CORE_FT
BMI: BMI (kg/m2): 28.4 (09-22-24 @ 19:00)  HbA1c: A1C with Estimated Average Glucose Result: 5.6 % (09-21-24 @ 06:39)    Glucose: POCT Blood Glucose.: 80 mg/dL (08-12-24 @ 19:41)    BP: --Vital Signs Last 24 Hrs  T(C): 36.9 (10-10-24 @ 08:12), Max: 36.9 (10-10-24 @ 08:12)  T(F): 98.5 (10-10-24 @ 08:12), Max: 98.5 (10-10-24 @ 08:12)  HR: --  BP: --  BP(mean): --  RR: 16 (10-10-24 @ 16:12) (16 - 16)  SpO2: --    Orthostatic VS  10-10-24 @ 08:12  Lying BP: --/-- HR: --  Sitting BP: 110/63 HR: 70  Standing BP: 102/64 HR: 81  Site: --  Mode: --  Orthostatic VS  10-09-24 @ 08:15  Lying BP: --/-- HR: --  Sitting BP: 111/67 HR: 60  Standing BP: 112/67 HR: 60  Site: upper left arm  Mode: electronic    Lipid Panel: Date/Time: 09-21-24 @ 06:32  Cholesterol, Serum: 95  LDL Cholesterol Calculated: 34  HDL Cholesterol, Serum: 44  Total Cholesterol/HDL Ration Measurement: --  Triglycerides, Serum: 82

## 2024-10-11 PROCEDURE — 90834 PSYTX W PT 45 MINUTES: CPT

## 2024-10-11 PROCEDURE — 99232 SBSQ HOSP IP/OBS MODERATE 35: CPT

## 2024-10-11 RX ORDER — MELATONIN 5 MG
6 TABLET ORAL AT BEDTIME
Refills: 0 | Status: DISCONTINUED | OUTPATIENT
Start: 2024-10-11 | End: 2024-10-23

## 2024-10-11 RX ADMIN — ARIPIPRAZOLE 5 MILLIGRAM(S): 2 TABLET ORAL at 08:57

## 2024-10-11 RX ADMIN — SERTRALINE HYDROCHLORIDE 125 MILLIGRAM(S): 50 TABLET, FILM COATED ORAL at 08:58

## 2024-10-11 RX ADMIN — TICAGRELOR 60 MILLIGRAM(S): 90 TABLET ORAL at 21:58

## 2024-10-11 RX ADMIN — TRAZODONE HYDROCHLORIDE 75 MILLIGRAM(S): 100 TABLET ORAL at 21:59

## 2024-10-11 RX ADMIN — Medication 80 MILLIGRAM(S): at 21:58

## 2024-10-11 RX ADMIN — TICAGRELOR 60 MILLIGRAM(S): 90 TABLET ORAL at 08:57

## 2024-10-11 RX ADMIN — Medication 25 MILLIGRAM(S): at 08:57

## 2024-10-11 RX ADMIN — PANTOPRAZOLE SODIUM 40 MILLIGRAM(S): 40 TABLET, DELAYED RELEASE ORAL at 08:35

## 2024-10-11 RX ADMIN — Medication 6 MILLIGRAM(S): at 21:59

## 2024-10-11 RX ADMIN — GABAPENTIN 800 MILLIGRAM(S): 300 CAPSULE ORAL at 21:58

## 2024-10-11 RX ADMIN — Medication 81 MILLIGRAM(S): at 08:58

## 2024-10-11 RX ADMIN — Medication 112 MICROGRAM(S): at 06:31

## 2024-10-11 NOTE — BH INPATIENT PSYCHIATRY PROGRESS NOTE - NSBHFUPINTERVALHXFT_PSY_A_CORE
Patient continues to be anxious. She is more visible, more involved in groups however, suggestive of decreased anhedonia. Reports falling asleep late and was upset to be woken for meds. Did take trazodone 75mg prn. Patient states she was ruminating when trying to fall asleep.

## 2024-10-11 NOTE — BH INPATIENT PSYCHIATRY PROGRESS NOTE - NSBHCHARTREVIEWVS_PSY_A_CORE FT
Vital Signs Last 24 Hrs  T(C): 36.8 (10-11-24 @ 08:01), Max: 36.8 (10-11-24 @ 08:01)  T(F): 98.2 (10-11-24 @ 08:01), Max: 98.2 (10-11-24 @ 08:01)  HR: --  BP: --  BP(mean): --  RR: --  SpO2: --    Orthostatic VS  10-11-24 @ 08:01  Lying BP: --/-- HR: --  Sitting BP: 114/69 HR: 57  Standing BP: 113/67 HR: 71  Site: --  Mode: --  Orthostatic VS  10-10-24 @ 08:12  Lying BP: --/-- HR: --  Sitting BP: 110/63 HR: 70  Standing BP: 102/64 HR: 81  Site: --  Mode: --

## 2024-10-12 RX ADMIN — GABAPENTIN 300 MILLIGRAM(S): 300 CAPSULE ORAL at 13:35

## 2024-10-12 RX ADMIN — TRAZODONE HYDROCHLORIDE 75 MILLIGRAM(S): 100 TABLET ORAL at 21:35

## 2024-10-12 RX ADMIN — ARIPIPRAZOLE 5 MILLIGRAM(S): 2 TABLET ORAL at 08:48

## 2024-10-12 RX ADMIN — SERTRALINE HYDROCHLORIDE 125 MILLIGRAM(S): 50 TABLET, FILM COATED ORAL at 08:46

## 2024-10-12 RX ADMIN — PANTOPRAZOLE SODIUM 40 MILLIGRAM(S): 40 TABLET, DELAYED RELEASE ORAL at 07:57

## 2024-10-12 RX ADMIN — Medication 80 MILLIGRAM(S): at 21:35

## 2024-10-12 RX ADMIN — TICAGRELOR 60 MILLIGRAM(S): 90 TABLET ORAL at 08:47

## 2024-10-12 RX ADMIN — Medication 81 MILLIGRAM(S): at 08:46

## 2024-10-12 RX ADMIN — TICAGRELOR 60 MILLIGRAM(S): 90 TABLET ORAL at 21:36

## 2024-10-12 RX ADMIN — Medication 112 MICROGRAM(S): at 06:05

## 2024-10-12 RX ADMIN — GABAPENTIN 800 MILLIGRAM(S): 300 CAPSULE ORAL at 21:35

## 2024-10-12 RX ADMIN — Medication 6 MILLIGRAM(S): at 21:35

## 2024-10-13 RX ADMIN — GABAPENTIN 800 MILLIGRAM(S): 300 CAPSULE ORAL at 21:39

## 2024-10-13 RX ADMIN — PANTOPRAZOLE SODIUM 40 MILLIGRAM(S): 40 TABLET, DELAYED RELEASE ORAL at 07:52

## 2024-10-13 RX ADMIN — Medication 112 MICROGRAM(S): at 06:44

## 2024-10-13 RX ADMIN — TICAGRELOR 60 MILLIGRAM(S): 90 TABLET ORAL at 21:39

## 2024-10-13 RX ADMIN — Medication 650 MILLIGRAM(S): at 12:40

## 2024-10-13 RX ADMIN — Medication 6 MILLIGRAM(S): at 21:40

## 2024-10-13 RX ADMIN — Medication 80 MILLIGRAM(S): at 21:39

## 2024-10-13 RX ADMIN — TICAGRELOR 60 MILLIGRAM(S): 90 TABLET ORAL at 09:04

## 2024-10-13 RX ADMIN — ARIPIPRAZOLE 5 MILLIGRAM(S): 2 TABLET ORAL at 09:05

## 2024-10-13 RX ADMIN — SERTRALINE HYDROCHLORIDE 125 MILLIGRAM(S): 50 TABLET, FILM COATED ORAL at 09:04

## 2024-10-13 RX ADMIN — TRAZODONE HYDROCHLORIDE 75 MILLIGRAM(S): 100 TABLET ORAL at 21:40

## 2024-10-13 RX ADMIN — Medication 25 MILLIGRAM(S): at 09:04

## 2024-10-13 RX ADMIN — Medication 81 MILLIGRAM(S): at 09:04

## 2024-10-13 RX ADMIN — Medication 650 MILLIGRAM(S): at 13:40

## 2024-10-14 PROCEDURE — 99232 SBSQ HOSP IP/OBS MODERATE 35: CPT

## 2024-10-14 PROCEDURE — 90832 PSYTX W PT 30 MINUTES: CPT

## 2024-10-14 RX ORDER — FLUTICASONE PROPIONATE 50 UG/1
1 SPRAY, METERED NASAL
Refills: 0 | Status: DISCONTINUED | OUTPATIENT
Start: 2024-10-14 | End: 2024-10-23

## 2024-10-14 RX ORDER — SALIVA SUBSTITUTE COMBO NO.5 538 MG
5 POWDER IN PACKET (EA) MUCOUS MEMBRANE EVERY 6 HOURS
Refills: 0 | Status: DISCONTINUED | OUTPATIENT
Start: 2024-10-14 | End: 2024-10-23

## 2024-10-14 RX ADMIN — ARIPIPRAZOLE 5 MILLIGRAM(S): 2 TABLET ORAL at 08:39

## 2024-10-14 RX ADMIN — TICAGRELOR 60 MILLIGRAM(S): 90 TABLET ORAL at 21:32

## 2024-10-14 RX ADMIN — Medication 25 MILLIGRAM(S): at 08:40

## 2024-10-14 RX ADMIN — PANTOPRAZOLE SODIUM 40 MILLIGRAM(S): 40 TABLET, DELAYED RELEASE ORAL at 07:40

## 2024-10-14 RX ADMIN — GABAPENTIN 300 MILLIGRAM(S): 300 CAPSULE ORAL at 12:32

## 2024-10-14 RX ADMIN — TRAZODONE HYDROCHLORIDE 75 MILLIGRAM(S): 100 TABLET ORAL at 21:32

## 2024-10-14 RX ADMIN — Medication 81 MILLIGRAM(S): at 08:39

## 2024-10-14 RX ADMIN — Medication 6 MILLIGRAM(S): at 21:33

## 2024-10-14 RX ADMIN — FLUTICASONE PROPIONATE 1 SPRAY(S): 50 SPRAY, METERED NASAL at 21:42

## 2024-10-14 RX ADMIN — TICAGRELOR 60 MILLIGRAM(S): 90 TABLET ORAL at 08:40

## 2024-10-14 RX ADMIN — Medication 112 MICROGRAM(S): at 06:40

## 2024-10-14 RX ADMIN — SERTRALINE HYDROCHLORIDE 125 MILLIGRAM(S): 50 TABLET, FILM COATED ORAL at 08:39

## 2024-10-14 RX ADMIN — Medication 80 MILLIGRAM(S): at 21:32

## 2024-10-14 RX ADMIN — Medication 650 MILLIGRAM(S): at 13:31

## 2024-10-14 RX ADMIN — GABAPENTIN 800 MILLIGRAM(S): 300 CAPSULE ORAL at 21:32

## 2024-10-14 NOTE — BH INPATIENT PSYCHIATRY PROGRESS NOTE - NSBHMETABOLIC_PSY_ALL_CORE_FT
BMI: BMI (kg/m2): 28.4 (09-22-24 @ 19:00)  HbA1c: A1C with Estimated Average Glucose Result: 5.6 % (09-21-24 @ 06:39)    Glucose: POCT Blood Glucose.: 80 mg/dL (08-12-24 @ 19:41)    BP: --Vital Signs Last 24 Hrs  T(C): 36.7 (10-14-24 @ 07:58), Max: 36.7 (10-14-24 @ 07:58)  T(F): 98 (10-14-24 @ 07:58), Max: 98 (10-14-24 @ 07:58)  HR: --  BP: --  BP(mean): --  RR: 18 (10-14-24 @ 07:58) (18 - 18)  SpO2: 95% (10-14-24 @ 07:58) (95% - 95%)    Orthostatic VS  10-14-24 @ 07:58  Lying BP: --/-- HR: --  Sitting BP: 101/69 HR: 74  Standing BP: 107/61 HR: 90  Site: upper left arm  Mode: electronic  Orthostatic VS  10-13-24 @ 05:25  Lying BP: 127/57 HR: 88  Sitting BP: 104/65 HR: 90  Standing BP: --/-- HR: --  Site: upper right arm  Mode: electronic    Lipid Panel: Date/Time: 09-21-24 @ 06:32  Cholesterol, Serum: 95  LDL Cholesterol Calculated: 34  HDL Cholesterol, Serum: 44  Total Cholesterol/HDL Ration Measurement: --  Triglycerides, Serum: 82

## 2024-10-14 NOTE — BH INPATIENT PSYCHIATRY PROGRESS NOTE - NSBHCHARTREVIEWVS_PSY_A_CORE FT
Vital Signs Last 24 Hrs  T(C): 36.7 (10-14-24 @ 07:58), Max: 36.7 (10-14-24 @ 07:58)  T(F): 98 (10-14-24 @ 07:58), Max: 98 (10-14-24 @ 07:58)  HR: --  BP: --  BP(mean): --  RR: 18 (10-14-24 @ 07:58) (18 - 18)  SpO2: 95% (10-14-24 @ 07:58) (95% - 95%)    Orthostatic VS  10-14-24 @ 07:58  Lying BP: --/-- HR: --  Sitting BP: 101/69 HR: 74  Standing BP: 107/61 HR: 90  Site: upper left arm  Mode: electronic  Orthostatic VS  10-13-24 @ 05:25  Lying BP: 127/57 HR: 88  Sitting BP: 104/65 HR: 90  Standing BP: --/-- HR: --  Site: upper right arm  Mode: electronic

## 2024-10-14 NOTE — BH INPATIENT PSYCHIATRY PROGRESS NOTE - NSBHFUPINTERVALHXFT_PSY_A_CORE
Pt seen for f/u of depression and anxiety. Still reporting soft stools today (stool culture from 10/7 grew no enteric pathogens). Pt denies watery diarrhea. Continues to receive 1 time orders for imodium as needed. Reports in addition dry mouth and nasal congestion. Reports stable mood, denies SI/HI.

## 2024-10-15 PROCEDURE — 84443 ASSAY THYROID STIM HORMONE: CPT

## 2024-10-15 PROCEDURE — 83735 ASSAY OF MAGNESIUM: CPT

## 2024-10-15 PROCEDURE — 82746 ASSAY OF FOLIC ACID SERUM: CPT

## 2024-10-15 PROCEDURE — 85025 COMPLETE CBC W/AUTO DIFF WBC: CPT

## 2024-10-15 PROCEDURE — 85027 COMPLETE CBC AUTOMATED: CPT

## 2024-10-15 PROCEDURE — 93005 ELECTROCARDIOGRAM TRACING: CPT

## 2024-10-15 PROCEDURE — 71045 X-RAY EXAM CHEST 1 VIEW: CPT

## 2024-10-15 PROCEDURE — 99232 SBSQ HOSP IP/OBS MODERATE 35: CPT

## 2024-10-15 PROCEDURE — 93306 TTE W/DOPPLER COMPLETE: CPT

## 2024-10-15 PROCEDURE — 87635 SARS-COV-2 COVID-19 AMP PRB: CPT

## 2024-10-15 PROCEDURE — 97162 PT EVAL MOD COMPLEX 30 MIN: CPT

## 2024-10-15 PROCEDURE — 80048 BASIC METABOLIC PNL TOTAL CA: CPT

## 2024-10-15 PROCEDURE — 81003 URINALYSIS AUTO W/O SCOPE: CPT

## 2024-10-15 PROCEDURE — 36415 COLL VENOUS BLD VENIPUNCTURE: CPT

## 2024-10-15 PROCEDURE — A9500: CPT

## 2024-10-15 PROCEDURE — 80307 DRUG TEST PRSMV CHEM ANLYZR: CPT

## 2024-10-15 PROCEDURE — 80053 COMPREHEN METABOLIC PANEL: CPT

## 2024-10-15 PROCEDURE — 70450 CT HEAD/BRAIN W/O DYE: CPT | Mod: MC

## 2024-10-15 PROCEDURE — 78452 HT MUSCLE IMAGE SPECT MULT: CPT | Mod: MC

## 2024-10-15 PROCEDURE — 82607 VITAMIN B-12: CPT

## 2024-10-15 PROCEDURE — 93017 CV STRESS TEST TRACING ONLY: CPT

## 2024-10-15 PROCEDURE — 80061 LIPID PANEL: CPT

## 2024-10-15 PROCEDURE — 96372 THER/PROPH/DIAG INJ SC/IM: CPT

## 2024-10-15 PROCEDURE — 99285 EMERGENCY DEPT VISIT HI MDM: CPT | Mod: 25

## 2024-10-15 RX ORDER — SERTRALINE HYDROCHLORIDE 50 MG/1
150 TABLET, FILM COATED ORAL DAILY
Refills: 0 | Status: DISCONTINUED | OUTPATIENT
Start: 2024-10-15 | End: 2024-10-23

## 2024-10-15 RX ORDER — TUBERCULIN,PURIF.PROT.DERIV. 5 T/0.1 ML
5 VIAL (ML) INTRADERMAL ONCE
Refills: 0 | Status: COMPLETED | OUTPATIENT
Start: 2024-10-15 | End: 2024-10-15

## 2024-10-15 RX ADMIN — Medication 81 MILLIGRAM(S): at 09:36

## 2024-10-15 RX ADMIN — PANTOPRAZOLE SODIUM 40 MILLIGRAM(S): 40 TABLET, DELAYED RELEASE ORAL at 09:35

## 2024-10-15 RX ADMIN — SERTRALINE HYDROCHLORIDE 125 MILLIGRAM(S): 50 TABLET, FILM COATED ORAL at 09:36

## 2024-10-15 RX ADMIN — ARIPIPRAZOLE 5 MILLIGRAM(S): 2 TABLET ORAL at 09:36

## 2024-10-15 RX ADMIN — GABAPENTIN 800 MILLIGRAM(S): 300 CAPSULE ORAL at 21:51

## 2024-10-15 RX ADMIN — Medication 80 MILLIGRAM(S): at 21:50

## 2024-10-15 RX ADMIN — Medication 5 MILLILITER(S): at 09:44

## 2024-10-15 RX ADMIN — Medication 6 MILLIGRAM(S): at 21:50

## 2024-10-15 RX ADMIN — TICAGRELOR 60 MILLIGRAM(S): 90 TABLET ORAL at 21:50

## 2024-10-15 RX ADMIN — FLUTICASONE PROPIONATE 1 SPRAY(S): 50 SPRAY, METERED NASAL at 21:49

## 2024-10-15 RX ADMIN — Medication 25 MILLIGRAM(S): at 09:36

## 2024-10-15 RX ADMIN — Medication 5 UNIT(S): at 13:01

## 2024-10-15 RX ADMIN — Medication 112 MICROGRAM(S): at 06:38

## 2024-10-15 RX ADMIN — TRAZODONE HYDROCHLORIDE 75 MILLIGRAM(S): 100 TABLET ORAL at 21:54

## 2024-10-15 RX ADMIN — TICAGRELOR 60 MILLIGRAM(S): 90 TABLET ORAL at 09:35

## 2024-10-15 RX ADMIN — FLUTICASONE PROPIONATE 1 SPRAY(S): 50 SPRAY, METERED NASAL at 09:35

## 2024-10-15 NOTE — BH INPATIENT PSYCHIATRY PROGRESS NOTE - NSBHMETABOLIC_PSY_ALL_CORE_FT
BMI: BMI (kg/m2): 28.4 (09-22-24 @ 19:00)  HbA1c: A1C with Estimated Average Glucose Result: 5.6 % (09-21-24 @ 06:39)    Glucose: POCT Blood Glucose.: 80 mg/dL (08-12-24 @ 19:41)    BP: --Vital Signs Last 24 Hrs  T(C): 36.4 (10-15-24 @ 05:20), Max: 36.4 (10-15-24 @ 05:20)  T(F): 97.5 (10-15-24 @ 05:20), Max: 97.5 (10-15-24 @ 05:20)  HR: --  BP: --  BP(mean): --  RR: 18 (10-15-24 @ 05:20) (18 - 18)  SpO2: --    Orthostatic VS  10-15-24 @ 05:20  Lying BP: 95/59 HR: 70  Sitting BP: 104/65 HR: 76  Standing BP: --/-- HR: --  Site: upper left arm  Mode: electronic  Orthostatic VS  10-14-24 @ 07:58  Lying BP: --/-- HR: --  Sitting BP: 101/69 HR: 74  Standing BP: 107/61 HR: 90  Site: upper left arm  Mode: electronic    Lipid Panel: Date/Time: 09-21-24 @ 06:32  Cholesterol, Serum: 95  LDL Cholesterol Calculated: 34  HDL Cholesterol, Serum: 44  Total Cholesterol/HDL Ration Measurement: --  Triglycerides, Serum: 82

## 2024-10-15 NOTE — BH INPATIENT PSYCHIATRY PROGRESS NOTE - NSBHFUPINTERVALHXFT_PSY_A_CORE
Pt seen for f/u of depression and anxiety.  Chart is reviewed and d/w treatment team.  No issues overnight.  Pt remains anxious, discussed stressors related to assisted living, agrees to titration of Sertraline dose.  Reports stable mood, denies SI/HI.  No agitation, in fair behavioral control, attending groups.  No somatic complaints.

## 2024-10-15 NOTE — BH INPATIENT PSYCHIATRY PROGRESS NOTE - NSBHCHARTREVIEWVS_PSY_A_CORE FT
Vital Signs Last 24 Hrs  T(C): 36.4 (10-15-24 @ 05:20), Max: 36.4 (10-15-24 @ 05:20)  T(F): 97.5 (10-15-24 @ 05:20), Max: 97.5 (10-15-24 @ 05:20)  HR: --  BP: --  BP(mean): --  RR: 18 (10-15-24 @ 05:20) (18 - 18)  SpO2: --    Orthostatic VS  10-15-24 @ 05:20  Lying BP: 95/59 HR: 70  Sitting BP: 104/65 HR: 76  Standing BP: --/-- HR: --  Site: upper left arm  Mode: electronic  Orthostatic VS  10-14-24 @ 07:58  Lying BP: --/-- HR: --  Sitting BP: 101/69 HR: 74  Standing BP: 107/61 HR: 90  Site: upper left arm  Mode: electronic

## 2024-10-16 PROCEDURE — 90832 PSYTX W PT 30 MINUTES: CPT

## 2024-10-16 PROCEDURE — 99232 SBSQ HOSP IP/OBS MODERATE 35: CPT

## 2024-10-16 RX ADMIN — Medication 1 DROP(S): at 09:04

## 2024-10-16 RX ADMIN — TICAGRELOR 60 MILLIGRAM(S): 90 TABLET ORAL at 09:05

## 2024-10-16 RX ADMIN — GABAPENTIN 800 MILLIGRAM(S): 300 CAPSULE ORAL at 21:35

## 2024-10-16 RX ADMIN — PANTOPRAZOLE SODIUM 40 MILLIGRAM(S): 40 TABLET, DELAYED RELEASE ORAL at 07:32

## 2024-10-16 RX ADMIN — GABAPENTIN 300 MILLIGRAM(S): 300 CAPSULE ORAL at 13:55

## 2024-10-16 RX ADMIN — Medication 6 MILLIGRAM(S): at 21:35

## 2024-10-16 RX ADMIN — TRAZODONE HYDROCHLORIDE 75 MILLIGRAM(S): 100 TABLET ORAL at 21:40

## 2024-10-16 RX ADMIN — FLUTICASONE PROPIONATE 1 SPRAY(S): 50 SPRAY, METERED NASAL at 09:04

## 2024-10-16 RX ADMIN — Medication 25 MILLIGRAM(S): at 09:05

## 2024-10-16 RX ADMIN — ARIPIPRAZOLE 5 MILLIGRAM(S): 2 TABLET ORAL at 09:05

## 2024-10-16 RX ADMIN — Medication 81 MILLIGRAM(S): at 09:05

## 2024-10-16 RX ADMIN — Medication 650 MILLIGRAM(S): at 02:21

## 2024-10-16 RX ADMIN — Medication 80 MILLIGRAM(S): at 21:35

## 2024-10-16 RX ADMIN — SERTRALINE HYDROCHLORIDE 150 MILLIGRAM(S): 50 TABLET, FILM COATED ORAL at 09:05

## 2024-10-16 RX ADMIN — TICAGRELOR 60 MILLIGRAM(S): 90 TABLET ORAL at 21:36

## 2024-10-16 RX ADMIN — FLUTICASONE PROPIONATE 1 SPRAY(S): 50 SPRAY, METERED NASAL at 21:34

## 2024-10-16 RX ADMIN — Medication 112 MICROGRAM(S): at 06:15

## 2024-10-16 RX ADMIN — Medication 650 MILLIGRAM(S): at 03:00

## 2024-10-16 NOTE — BH INPATIENT PSYCHIATRY PROGRESS NOTE - NSBHCHARTREVIEWVS_PSY_A_CORE FT
Vital Signs Last 24 Hrs  T(C): 36.4 (10-16-24 @ 08:28), Max: 36.4 (10-16-24 @ 08:28)  T(F): 97.5 (10-16-24 @ 08:28), Max: 97.5 (10-16-24 @ 08:28)  HR: --  BP: --  BP(mean): --  RR: --  SpO2: --    Orthostatic VS  10-16-24 @ 08:28  Lying BP: --/-- HR: --  Sitting BP: 109/62 HR: 66  Standing BP: 108/63 HR: 89  Site: --  Mode: --  Orthostatic VS  10-15-24 @ 05:20  Lying BP: 95/59 HR: 70  Sitting BP: 104/65 HR: 76  Standing BP: --/-- HR: --  Site: upper left arm  Mode: electronic

## 2024-10-16 NOTE — BH INPATIENT PSYCHIATRY PROGRESS NOTE - NSBHFUPINTERVALHXFT_PSY_A_CORE
Pt seen for f/u of depression and anxiety.  Chart is reviewed and d/w treatment team.  No issues overnight.  Pt remains anxious, discussed stressors related to assisted living with interview today.  Pt agrees to titration of Sertraline dose which she received today.  Reports stable mood, denies SI/HI.  No agitation, in fair behavioral control, attending groups.  No somatic complaints.  Pt seen for f/u of depression and anxiety.  Chart is reviewed and d/w treatment team.  No issues overnight but reports poor sleep, did take Trazodone prn.  Pt remains anxious, discussed stressors related to assisted living with interview today.  Pt agrees to titration of Sertraline dose which she received today.  Reports stable mood, denies SI/HI.  No agitation, in fair behavioral control, attending groups.  No somatic complaints.

## 2024-10-16 NOTE — BH INPATIENT PSYCHIATRY PROGRESS NOTE - NSBHMETABOLIC_PSY_ALL_CORE_FT
BMI: BMI (kg/m2): 28.4 (09-22-24 @ 19:00)  HbA1c: A1C with Estimated Average Glucose Result: 5.6 % (09-21-24 @ 06:39)    Glucose: POCT Blood Glucose.: 80 mg/dL (08-12-24 @ 19:41)    BP: --Vital Signs Last 24 Hrs  T(C): 36.4 (10-16-24 @ 08:28), Max: 36.4 (10-16-24 @ 08:28)  T(F): 97.5 (10-16-24 @ 08:28), Max: 97.5 (10-16-24 @ 08:28)  HR: --  BP: --  BP(mean): --  RR: --  SpO2: --    Orthostatic VS  10-16-24 @ 08:28  Lying BP: --/-- HR: --  Sitting BP: 109/62 HR: 66  Standing BP: 108/63 HR: 89  Site: --  Mode: --  Orthostatic VS  10-15-24 @ 05:20  Lying BP: 95/59 HR: 70  Sitting BP: 104/65 HR: 76  Standing BP: --/-- HR: --  Site: upper left arm  Mode: electronic    Lipid Panel: Date/Time: 09-21-24 @ 06:32  Cholesterol, Serum: 95  LDL Cholesterol Calculated: 34  HDL Cholesterol, Serum: 44  Total Cholesterol/HDL Ration Measurement: --  Triglycerides, Serum: 82

## 2024-10-17 PROCEDURE — 99232 SBSQ HOSP IP/OBS MODERATE 35: CPT

## 2024-10-17 RX ORDER — SIMETHICONE 80 MG/1
80 TABLET, CHEWABLE ORAL
Refills: 0 | Status: DISCONTINUED | OUTPATIENT
Start: 2024-10-17 | End: 2024-10-23

## 2024-10-17 RX ADMIN — Medication 80 MILLIGRAM(S): at 21:54

## 2024-10-17 RX ADMIN — Medication 112 MICROGRAM(S): at 06:41

## 2024-10-17 RX ADMIN — ARIPIPRAZOLE 5 MILLIGRAM(S): 2 TABLET ORAL at 09:07

## 2024-10-17 RX ADMIN — TICAGRELOR 60 MILLIGRAM(S): 90 TABLET ORAL at 09:06

## 2024-10-17 RX ADMIN — SERTRALINE HYDROCHLORIDE 150 MILLIGRAM(S): 50 TABLET, FILM COATED ORAL at 09:07

## 2024-10-17 RX ADMIN — Medication 5 MILLILITER(S): at 10:55

## 2024-10-17 RX ADMIN — Medication 650 MILLIGRAM(S): at 00:44

## 2024-10-17 RX ADMIN — TICAGRELOR 60 MILLIGRAM(S): 90 TABLET ORAL at 21:54

## 2024-10-17 RX ADMIN — Medication 81 MILLIGRAM(S): at 09:07

## 2024-10-17 RX ADMIN — Medication 650 MILLIGRAM(S): at 01:40

## 2024-10-17 RX ADMIN — FLUTICASONE PROPIONATE 1 SPRAY(S): 50 SPRAY, METERED NASAL at 21:58

## 2024-10-17 RX ADMIN — GABAPENTIN 800 MILLIGRAM(S): 300 CAPSULE ORAL at 21:54

## 2024-10-17 RX ADMIN — Medication 6 MILLIGRAM(S): at 21:54

## 2024-10-17 RX ADMIN — PANTOPRAZOLE SODIUM 40 MILLIGRAM(S): 40 TABLET, DELAYED RELEASE ORAL at 08:44

## 2024-10-17 RX ADMIN — FLUTICASONE PROPIONATE 1 SPRAY(S): 50 SPRAY, METERED NASAL at 09:07

## 2024-10-17 RX ADMIN — TRAZODONE HYDROCHLORIDE 75 MILLIGRAM(S): 100 TABLET ORAL at 23:01

## 2024-10-17 NOTE — BH INPATIENT PSYCHIATRY PROGRESS NOTE - NSBHFUPINTERVALHXFT_PSY_A_CORE
Pt seen for f/u of depression and anxiety.  Chart is reviewed and d/w treatment team.  No issues overnight but reports poor sleep, took Trazodone prn.   Remains somewhat anxious about transition to [facility] and somatically preoccupied with soft BMs. Offered PRN pepto bismol last week but pt has not requested it. Today complains of gas, offered simethicone. Denies any other complaints. Sertraline just titrated to 150mg.

## 2024-10-17 NOTE — BH INPATIENT PSYCHIATRY PROGRESS NOTE - NSBHMETABOLIC_PSY_ALL_CORE_FT
BMI: BMI (kg/m2): 28.4 (09-22-24 @ 19:00)  HbA1c: A1C with Estimated Average Glucose Result: 5.6 % (09-21-24 @ 06:39)    Glucose: POCT Blood Glucose.: 80 mg/dL (08-12-24 @ 19:41)    BP: --Vital Signs Last 24 Hrs  T(C): 36.7 (10-17-24 @ 05:47), Max: 36.7 (10-17-24 @ 05:47)  T(F): 98.1 (10-17-24 @ 05:47), Max: 98.1 (10-17-24 @ 05:47)  HR: --  BP: --  BP(mean): --  RR: --  SpO2: --    Orthostatic VS  10-17-24 @ 09:03  Lying BP: 97/57 HR: 62  Sitting BP: 96/66 HR: 75  Standing BP: --/-- HR: --  Site: --  Mode: --  Orthostatic VS  10-17-24 @ 05:47  Lying BP: 101/50 HR: 70  Sitting BP: --/-- HR: --  Standing BP: --/-- HR: --  Site: --  Mode: --  Orthostatic VS  10-16-24 @ 08:28  Lying BP: --/-- HR: --  Sitting BP: 109/62 HR: 66  Standing BP: 108/63 HR: 89  Site: --  Mode: --    Lipid Panel: Date/Time: 09-21-24 @ 06:32  Cholesterol, Serum: 95  LDL Cholesterol Calculated: 34  HDL Cholesterol, Serum: 44  Total Cholesterol/HDL Ration Measurement: --  Triglycerides, Serum: 82

## 2024-10-17 NOTE — BH INPATIENT PSYCHIATRY PROGRESS NOTE - NSBHCHARTREVIEWVS_PSY_A_CORE FT
Vital Signs Last 24 Hrs  T(C): 36.7 (10-17-24 @ 05:47), Max: 36.7 (10-17-24 @ 05:47)  T(F): 98.1 (10-17-24 @ 05:47), Max: 98.1 (10-17-24 @ 05:47)  HR: --  BP: --  BP(mean): --  RR: --  SpO2: --    Orthostatic VS  10-17-24 @ 09:03  Lying BP: 97/57 HR: 62  Sitting BP: 96/66 HR: 75  Standing BP: --/-- HR: --  Site: --  Mode: --  Orthostatic VS  10-17-24 @ 05:47  Lying BP: 101/50 HR: 70  Sitting BP: --/-- HR: --  Standing BP: --/-- HR: --  Site: --  Mode: --  Orthostatic VS  10-16-24 @ 08:28  Lying BP: --/-- HR: --  Sitting BP: 109/62 HR: 66  Standing BP: 108/63 HR: 89  Site: --  Mode: --

## 2024-10-18 PROCEDURE — 90834 PSYTX W PT 45 MINUTES: CPT

## 2024-10-18 PROCEDURE — 99232 SBSQ HOSP IP/OBS MODERATE 35: CPT

## 2024-10-18 RX ORDER — MIRTAZAPINE 30 MG/1
15 TABLET ORAL AT BEDTIME
Refills: 0 | Status: DISCONTINUED | OUTPATIENT
Start: 2024-10-18 | End: 2024-10-23

## 2024-10-18 RX ADMIN — Medication 25 MILLIGRAM(S): at 08:42

## 2024-10-18 RX ADMIN — Medication 81 MILLIGRAM(S): at 08:42

## 2024-10-18 RX ADMIN — ARIPIPRAZOLE 5 MILLIGRAM(S): 2 TABLET ORAL at 08:42

## 2024-10-18 RX ADMIN — FLUTICASONE PROPIONATE 1 SPRAY(S): 50 SPRAY, METERED NASAL at 08:43

## 2024-10-18 RX ADMIN — TICAGRELOR 60 MILLIGRAM(S): 90 TABLET ORAL at 08:42

## 2024-10-18 RX ADMIN — Medication 80 MILLIGRAM(S): at 20:42

## 2024-10-18 RX ADMIN — Medication 112 MICROGRAM(S): at 06:54

## 2024-10-18 RX ADMIN — PANTOPRAZOLE SODIUM 40 MILLIGRAM(S): 40 TABLET, DELAYED RELEASE ORAL at 07:39

## 2024-10-18 RX ADMIN — SERTRALINE HYDROCHLORIDE 150 MILLIGRAM(S): 50 TABLET, FILM COATED ORAL at 08:42

## 2024-10-18 RX ADMIN — TICAGRELOR 60 MILLIGRAM(S): 90 TABLET ORAL at 20:40

## 2024-10-18 RX ADMIN — Medication 6 MILLIGRAM(S): at 20:41

## 2024-10-18 RX ADMIN — MIRTAZAPINE 15 MILLIGRAM(S): 30 TABLET ORAL at 20:42

## 2024-10-18 RX ADMIN — GABAPENTIN 800 MILLIGRAM(S): 300 CAPSULE ORAL at 20:41

## 2024-10-18 RX ADMIN — SIMETHICONE 80 MILLIGRAM(S): 80 TABLET, CHEWABLE ORAL at 08:43

## 2024-10-18 NOTE — PHARMACOTHERAPY INTERVENTION NOTE - COMMENTS
Concurrent administration of levothyroxine and other medications within 1 hour may result in decreased levothyroxine effectiveness.    Since levothyroxine administration time defaults to 06:00 and pantoprazole administration time defaults to 07:30, there is the potential for the medications to be administered together. Added administration instruction to pantoprazole order to "Administer at least 1 hours AFTER levothyroxine". 
spoke to dr. pang to add prn to pepto-bismol order.
Concurrent use of levothyroxine and simethicone may result in decreased levothyroxine effectiveness.     Added administration instruction to PRN order for simethicone tablet to "Do not administer within 4 hours of levothyroxine". 
Concurrent use of levothyroxine antacids (e.g. magnesium and aluminum), and simethicone may result it decreased levothyroxine effectiveness.     Added administration instruction to PRN order for aluminum hydroxide/magnesium hydroxide/simethicone suspension to "Do not administer within 4 hours of levothyroxine.
spoke to dr. de la vega about zoloft 25mg and lexapro 5mg --she verified that they are cross tapering lexapro

## 2024-10-18 NOTE — BH INPATIENT PSYCHIATRY PROGRESS NOTE - NSBHFUPINTERVALHXFT_PSY_A_CORE
Patient continues to report feeling anxious but denies depressed mood, denies SI. Denies adverse effects of medications. Reports hearing music intermittently. Poor sleep ongoing.

## 2024-10-18 NOTE — BH INPATIENT PSYCHIATRY PROGRESS NOTE - NSBHCHARTREVIEWVS_PSY_A_CORE FT
Vital Signs Last 24 Hrs  T(C): 36.5 (10-18-24 @ 05:22), Max: 36.5 (10-18-24 @ 05:22)  T(F): 97.7 (10-18-24 @ 05:22), Max: 97.7 (10-18-24 @ 05:22)  HR: --  BP: --  BP(mean): --  RR: 18 (10-18-24 @ 05:22) (18 - 18)  SpO2: --    Orthostatic VS  10-18-24 @ 05:22  Lying BP: 114/58 HR: 74  Sitting BP: 104/56 HR: 81  Standing BP: --/-- HR: --  Site: upper right arm  Mode: electronic  Orthostatic VS  10-17-24 @ 09:03  Lying BP: 97/57 HR: 62  Sitting BP: 96/66 HR: 75  Standing BP: --/-- HR: --  Site: --  Mode: --  Orthostatic VS  10-17-24 @ 05:47  Lying BP: 101/50 HR: 70  Sitting BP: --/-- HR: --  Standing BP: --/-- HR: --  Site: --  Mode: --

## 2024-10-18 NOTE — BH DISCHARGE NOTE NURSING/SOCIAL WORK/PSYCH REHAB - PATIENT PORTAL LINK FT
You can access the FollowMyHealth Patient Portal offered by [facility] by registering at the following website: [email or web address]. By joining [employer]’s FollowMyHealth portal, you will also be able to view your health information using other applications (apps) compatible with our system.

## 2024-10-18 NOTE — BH DISCHARGE NOTE NURSING/SOCIAL WORK/PSYCH REHAB - NSCDUDCCRISIS_PSY_A_CORE
.  [facility] - Count includes the [facility] – Crisis Care for Children, Adults and Families  [address] [place] [address]  Mobile Crisis Hotline – [phone]) 895-2453/.National Suicide Prevention Lifeline 7 (733) 739-4756/.  [employer]  1 (086) LIFENET (933-9354)/.  [facility]  [phone]/.  [facility]  [phone] [place], [place] [address] [phone]   Hours:  Monday through Friday from 9 AM to 3 PM/988 Suicide and Crisis Lifeline

## 2024-10-18 NOTE — BH DISCHARGE NOTE NURSING/SOCIAL WORK/PSYCH REHAB - NSDCPRRECOMMEND_PSY_ALL_CORE
Psychiatric Rehabilitation staff recommends that patient returns to treatment by following up with outpatient care with Dr Olegario Andino on site at [facility] to continue medication management, support, and psychotherapy. In addition, psych rehab recommends patient continue to demonstrate identify and utilizing coping skills.

## 2024-10-18 NOTE — BH DISCHARGE NOTE NURSING/SOCIAL WORK/PSYCH REHAB - NSDCADDINFO1FT_PSY_ALL_CORE
the above appointment is estimate.  will be seen within 5 business days of residency.     Dr. Olegario Andino [address], [place] [address]  Cell – [phone]  Fax - [phone]

## 2024-10-18 NOTE — PHARMACOTHERAPY INTERVENTION NOTE - INTERVENTION TYPE RECOOMEND
Therapy duplication
Timing/Frequency of Administration Recommended
Timing/Frequency of Administration Recommended

## 2024-10-18 NOTE — BH DISCHARGE NOTE NURSING/SOCIAL WORK/PSYCH REHAB - DISCHARGE INSTRUCTIONS AFTERCARE APPOINTMENTS
In order to check the location, date, or time of your aftercare appointment, please refer to your Discharge Instructions Document given to you upon leaving the hospital.  If you have lost the instructions please call [phone]

## 2024-10-18 NOTE — BH DISCHARGE NOTE NURSING/SOCIAL WORK/PSYCH REHAB - NSDCPRGOAL_PSY_ALL_CORE
Patient met specified goal of identifying two coping skills that assist in improving mood. Patient identified music and art as coping skills. Patient further expressed connecting and talking with others as a coping skill. Patient reported feeling prepared to discharge. Patient attended and engaged in group activities. Patient expressed enjoying discussion, mindfulness, and leisure. Patient completed a safety plan upon discharge.

## 2024-10-18 NOTE — BH INPATIENT PSYCHIATRY PROGRESS NOTE - MSE UNSTRUCTURED FT
Appearance: unkempt hair (wig) but with intact hygiene, no abnormal involuntary movements, stable gait  Behavior: less helpless, cooperative, no psychomotor retardation or agitation  Speech: wnl  Mood: anxious  Affect: congruent, reactive, stable  Thought process: concrete, inconsistent  Thought content: no delusions elicited; catastrophic thoughts less intense; denies SI/HI  Perceptual disturbances: denies Ah/VH  Cognition: oriented to time, place; poor recall  Insight: partially intact  Judgement: improved

## 2024-10-18 NOTE — BH INPATIENT PSYCHIATRY PROGRESS NOTE - NSBHMETABOLIC_PSY_ALL_CORE_FT
BMI: BMI (kg/m2): 28.4 (09-22-24 @ 19:00)  HbA1c: A1C with Estimated Average Glucose Result: 5.6 % (09-21-24 @ 06:39)    Glucose: POCT Blood Glucose.: 80 mg/dL (08-12-24 @ 19:41)    BP: --Vital Signs Last 24 Hrs  T(C): 36.5 (10-18-24 @ 05:22), Max: 36.5 (10-18-24 @ 05:22)  T(F): 97.7 (10-18-24 @ 05:22), Max: 97.7 (10-18-24 @ 05:22)  HR: --  BP: --  BP(mean): --  RR: 18 (10-18-24 @ 05:22) (18 - 18)  SpO2: --    Orthostatic VS  10-18-24 @ 05:22  Lying BP: 114/58 HR: 74  Sitting BP: 104/56 HR: 81  Standing BP: --/-- HR: --  Site: upper right arm  Mode: electronic  Orthostatic VS  10-17-24 @ 09:03  Lying BP: 97/57 HR: 62  Sitting BP: 96/66 HR: 75  Standing BP: --/-- HR: --  Site: --  Mode: --  Orthostatic VS  10-17-24 @ 05:47  Lying BP: 101/50 HR: 70  Sitting BP: --/-- HR: --  Standing BP: --/-- HR: --  Site: --  Mode: --    Lipid Panel: Date/Time: 09-21-24 @ 06:32  Cholesterol, Serum: 95  LDL Cholesterol Calculated: 34  HDL Cholesterol, Serum: 44  Total Cholesterol/HDL Ration Measurement: --  Triglycerides, Serum: 82

## 2024-10-19 RX ADMIN — TICAGRELOR 60 MILLIGRAM(S): 90 TABLET ORAL at 21:39

## 2024-10-19 RX ADMIN — SIMETHICONE 80 MILLIGRAM(S): 80 TABLET, CHEWABLE ORAL at 10:04

## 2024-10-19 RX ADMIN — Medication 25 MILLIGRAM(S): at 09:39

## 2024-10-19 RX ADMIN — FLUTICASONE PROPIONATE 1 SPRAY(S): 50 SPRAY, METERED NASAL at 21:40

## 2024-10-19 RX ADMIN — Medication 81 MILLIGRAM(S): at 09:39

## 2024-10-19 RX ADMIN — ARIPIPRAZOLE 5 MILLIGRAM(S): 2 TABLET ORAL at 09:40

## 2024-10-19 RX ADMIN — Medication 112 MICROGRAM(S): at 06:31

## 2024-10-19 RX ADMIN — TICAGRELOR 60 MILLIGRAM(S): 90 TABLET ORAL at 09:39

## 2024-10-19 RX ADMIN — Medication 80 MILLIGRAM(S): at 21:39

## 2024-10-19 RX ADMIN — GABAPENTIN 800 MILLIGRAM(S): 300 CAPSULE ORAL at 21:39

## 2024-10-19 RX ADMIN — MIRTAZAPINE 15 MILLIGRAM(S): 30 TABLET ORAL at 21:39

## 2024-10-19 RX ADMIN — PANTOPRAZOLE SODIUM 40 MILLIGRAM(S): 40 TABLET, DELAYED RELEASE ORAL at 07:02

## 2024-10-19 RX ADMIN — SERTRALINE HYDROCHLORIDE 150 MILLIGRAM(S): 50 TABLET, FILM COATED ORAL at 09:40

## 2024-10-19 RX ADMIN — Medication 6 MILLIGRAM(S): at 21:39

## 2024-10-20 RX ADMIN — Medication 112 MICROGRAM(S): at 05:13

## 2024-10-20 RX ADMIN — Medication 80 MILLIGRAM(S): at 22:10

## 2024-10-20 RX ADMIN — Medication 5 MILLILITER(S): at 11:55

## 2024-10-20 RX ADMIN — FLUTICASONE PROPIONATE 1 SPRAY(S): 50 SPRAY, METERED NASAL at 08:41

## 2024-10-20 RX ADMIN — TICAGRELOR 60 MILLIGRAM(S): 90 TABLET ORAL at 08:39

## 2024-10-20 RX ADMIN — ARIPIPRAZOLE 5 MILLIGRAM(S): 2 TABLET ORAL at 08:39

## 2024-10-20 RX ADMIN — GABAPENTIN 800 MILLIGRAM(S): 300 CAPSULE ORAL at 20:53

## 2024-10-20 RX ADMIN — Medication 25 MILLIGRAM(S): at 08:39

## 2024-10-20 RX ADMIN — SERTRALINE HYDROCHLORIDE 150 MILLIGRAM(S): 50 TABLET, FILM COATED ORAL at 08:39

## 2024-10-20 RX ADMIN — PANTOPRAZOLE SODIUM 40 MILLIGRAM(S): 40 TABLET, DELAYED RELEASE ORAL at 07:17

## 2024-10-20 RX ADMIN — TICAGRELOR 60 MILLIGRAM(S): 90 TABLET ORAL at 20:53

## 2024-10-20 RX ADMIN — Medication 6 MILLIGRAM(S): at 20:53

## 2024-10-20 RX ADMIN — MIRTAZAPINE 15 MILLIGRAM(S): 30 TABLET ORAL at 20:53

## 2024-10-20 RX ADMIN — Medication 81 MILLIGRAM(S): at 08:39

## 2024-10-21 PROCEDURE — 99232 SBSQ HOSP IP/OBS MODERATE 35: CPT

## 2024-10-21 PROCEDURE — 90834 PSYTX W PT 45 MINUTES: CPT

## 2024-10-21 RX ORDER — ARIPIPRAZOLE 2 MG/1
7 TABLET ORAL DAILY
Refills: 0 | Status: DISCONTINUED | OUTPATIENT
Start: 2024-10-21 | End: 2024-10-23

## 2024-10-21 RX ORDER — ARIPIPRAZOLE 2 MG/1
7.5 TABLET ORAL DAILY
Refills: 0 | Status: DISCONTINUED | OUTPATIENT
Start: 2024-10-21 | End: 2024-10-21

## 2024-10-21 RX ADMIN — FLUTICASONE PROPIONATE 1 SPRAY(S): 50 SPRAY, METERED NASAL at 09:00

## 2024-10-21 RX ADMIN — MIRTAZAPINE 15 MILLIGRAM(S): 30 TABLET ORAL at 22:16

## 2024-10-21 RX ADMIN — FLUTICASONE PROPIONATE 1 SPRAY(S): 50 SPRAY, METERED NASAL at 22:16

## 2024-10-21 RX ADMIN — Medication 81 MILLIGRAM(S): at 08:50

## 2024-10-21 RX ADMIN — Medication 5 MILLILITER(S): at 07:58

## 2024-10-21 RX ADMIN — GABAPENTIN 800 MILLIGRAM(S): 300 CAPSULE ORAL at 22:15

## 2024-10-21 RX ADMIN — Medication 25 MILLIGRAM(S): at 08:50

## 2024-10-21 RX ADMIN — Medication 5 MILLILITER(S): at 17:26

## 2024-10-21 RX ADMIN — Medication 80 MILLIGRAM(S): at 22:16

## 2024-10-21 RX ADMIN — SERTRALINE HYDROCHLORIDE 150 MILLIGRAM(S): 50 TABLET, FILM COATED ORAL at 08:50

## 2024-10-21 RX ADMIN — TICAGRELOR 60 MILLIGRAM(S): 90 TABLET ORAL at 08:50

## 2024-10-21 RX ADMIN — PANTOPRAZOLE SODIUM 40 MILLIGRAM(S): 40 TABLET, DELAYED RELEASE ORAL at 07:57

## 2024-10-21 RX ADMIN — Medication 112 MICROGRAM(S): at 05:26

## 2024-10-21 RX ADMIN — Medication 6 MILLIGRAM(S): at 22:16

## 2024-10-21 RX ADMIN — TICAGRELOR 60 MILLIGRAM(S): 90 TABLET ORAL at 22:16

## 2024-10-21 RX ADMIN — ARIPIPRAZOLE 5 MILLIGRAM(S): 2 TABLET ORAL at 08:50

## 2024-10-21 RX ADMIN — Medication 5 MILLILITER(S): at 23:03

## 2024-10-21 NOTE — BH PSYCHOLOGY - CLINICIAN PSYCHOTHERAPY NOTE - NSTXDCOTHRGOAL_PSY_ALL_CORE
pt will improve mental status in order to effectively engage with dc planning.

## 2024-10-21 NOTE — BH INPATIENT PSYCHIATRY PROGRESS NOTE - NSBHFUPINTERVALHXFT_PSY_A_CORE
Patient reports some improvement in anxiety. She is not making catastrophic statements as frequently. Patient notes less inhibition from anxiety in her behavior in groups. Sleep is inconsistent but she is not making negative statements about future outlook.

## 2024-10-21 NOTE — BH INPATIENT PSYCHIATRY PROGRESS NOTE - NSBHMETABOLIC_PSY_ALL_CORE_FT
BMI: BMI (kg/m2): 28.4 (09-22-24 @ 19:00)  HbA1c: A1C with Estimated Average Glucose Result: 5.6 % (09-21-24 @ 06:39)    Glucose: POCT Blood Glucose.: 80 mg/dL (08-12-24 @ 19:41)    BP: --Vital Signs Last 24 Hrs  T(C): 36.6 (10-21-24 @ 05:47), Max: 36.6 (10-21-24 @ 05:47)  T(F): 97.9 (10-21-24 @ 05:47), Max: 97.9 (10-21-24 @ 05:47)  HR: --  BP: --  BP(mean): --  RR: --  SpO2: --    Orthostatic VS  10-21-24 @ 05:47  Lying BP: 127/85 HR: 76  Sitting BP: --/-- HR: --  Standing BP: --/-- HR: --  Site: --  Mode: --  Orthostatic VS  10-20-24 @ 08:27  Lying BP: 125/83 HR: 75  Sitting BP: 116/86 HR: 80  Standing BP: --/-- HR: --  Site: --  Mode: --    Lipid Panel: Date/Time: 09-21-24 @ 06:32  Cholesterol, Serum: 95  LDL Cholesterol Calculated: 34  HDL Cholesterol, Serum: 44  Total Cholesterol/HDL Ration Measurement: --  Triglycerides, Serum: 82

## 2024-10-21 NOTE — BH INPATIENT PSYCHIATRY PROGRESS NOTE - MSE UNSTRUCTURED FT
Appearance: fair hygiene, wearing make up and add embellishment to her hair, no abnormal involuntary movements, stable gait  Behavior: calm, cooperative, well engaged, no psychomotor retardation or agitation  Speech: wnl  Mood: anxious  Affect: congruent, reactive, stable  Thought process: concrete, inconsistent  Thought content: no delusions elicited; catastrophic thoughts less intense; denies SI/HI  Perceptual disturbances: denies Ah/VH  Cognition: oriented to time, place; poor recall  Insight: partially intact  Judgement: improved

## 2024-10-21 NOTE — BH INPATIENT PSYCHIATRY PROGRESS NOTE - NSBHCHARTREVIEWVS_PSY_A_CORE FT
Vital Signs Last 24 Hrs  T(C): 36.6 (10-21-24 @ 05:47), Max: 36.6 (10-21-24 @ 05:47)  T(F): 97.9 (10-21-24 @ 05:47), Max: 97.9 (10-21-24 @ 05:47)  HR: --  BP: --  BP(mean): --  RR: --  SpO2: --    Orthostatic VS  10-21-24 @ 05:47  Lying BP: 127/85 HR: 76  Sitting BP: --/-- HR: --  Standing BP: --/-- HR: --  Site: --  Mode: --  Orthostatic VS  10-20-24 @ 08:27  Lying BP: 125/83 HR: 75  Sitting BP: 116/86 HR: 80  Standing BP: --/-- HR: --  Site: --  Mode: --

## 2024-10-22 LAB
ALBUMIN SERPL ELPH-MCNC: 3.6 G/DL — SIGNIFICANT CHANGE UP (ref 3.3–5)
ALP SERPL-CCNC: 124 U/L — HIGH (ref 40–120)
ALT FLD-CCNC: 24 U/L — SIGNIFICANT CHANGE UP (ref 4–33)
ANION GAP SERPL CALC-SCNC: 14 MMOL/L — SIGNIFICANT CHANGE UP (ref 7–14)
AST SERPL-CCNC: 24 U/L — SIGNIFICANT CHANGE UP (ref 4–32)
BASOPHILS # BLD AUTO: 0.05 K/UL — SIGNIFICANT CHANGE UP (ref 0–0.2)
BASOPHILS NFR BLD AUTO: 0.7 % — SIGNIFICANT CHANGE UP (ref 0–2)
BILIRUB SERPL-MCNC: 0.6 MG/DL — SIGNIFICANT CHANGE UP (ref 0.2–1.2)
BUN SERPL-MCNC: 14 MG/DL — SIGNIFICANT CHANGE UP (ref 7–23)
CALCIUM SERPL-MCNC: 9.9 MG/DL — SIGNIFICANT CHANGE UP (ref 8.4–10.5)
CHLORIDE SERPL-SCNC: 108 MMOL/L — HIGH (ref 98–107)
CO2 SERPL-SCNC: 19 MMOL/L — LOW (ref 22–31)
CREAT SERPL-MCNC: 0.7 MG/DL — SIGNIFICANT CHANGE UP (ref 0.5–1.3)
EGFR: 90 ML/MIN/1.73M2 — SIGNIFICANT CHANGE UP
EOSINOPHIL # BLD AUTO: 0.12 K/UL — SIGNIFICANT CHANGE UP (ref 0–0.5)
EOSINOPHIL NFR BLD AUTO: 1.7 % — SIGNIFICANT CHANGE UP (ref 0–6)
GLUCOSE SERPL-MCNC: 106 MG/DL — HIGH (ref 70–99)
HCT VFR BLD CALC: 40.9 % — SIGNIFICANT CHANGE UP (ref 34.5–45)
HGB BLD-MCNC: 13.4 G/DL — SIGNIFICANT CHANGE UP (ref 11.5–15.5)
IANC: 4.37 K/UL — SIGNIFICANT CHANGE UP (ref 1.8–7.4)
IMM GRANULOCYTES NFR BLD AUTO: 0.3 % — SIGNIFICANT CHANGE UP (ref 0–0.9)
LYMPHOCYTES # BLD AUTO: 1.57 K/UL — SIGNIFICANT CHANGE UP (ref 1–3.3)
LYMPHOCYTES # BLD AUTO: 22.9 % — SIGNIFICANT CHANGE UP (ref 13–44)
MCHC RBC-ENTMCNC: 28.2 PG — SIGNIFICANT CHANGE UP (ref 27–34)
MCHC RBC-ENTMCNC: 32.8 GM/DL — SIGNIFICANT CHANGE UP (ref 32–36)
MCV RBC AUTO: 85.9 FL — SIGNIFICANT CHANGE UP (ref 80–100)
MONOCYTES # BLD AUTO: 0.73 K/UL — SIGNIFICANT CHANGE UP (ref 0–0.9)
MONOCYTES NFR BLD AUTO: 10.6 % — SIGNIFICANT CHANGE UP (ref 2–14)
NEUTROPHILS # BLD AUTO: 4.37 K/UL — SIGNIFICANT CHANGE UP (ref 1.8–7.4)
NEUTROPHILS NFR BLD AUTO: 63.8 % — SIGNIFICANT CHANGE UP (ref 43–77)
NRBC # BLD: 0 /100 WBCS — SIGNIFICANT CHANGE UP (ref 0–0)
NRBC # FLD: 0 K/UL — SIGNIFICANT CHANGE UP (ref 0–0)
PLATELET # BLD AUTO: 250 K/UL — SIGNIFICANT CHANGE UP (ref 150–400)
POTASSIUM SERPL-MCNC: 4 MMOL/L — SIGNIFICANT CHANGE UP (ref 3.5–5.3)
POTASSIUM SERPL-SCNC: 4 MMOL/L — SIGNIFICANT CHANGE UP (ref 3.5–5.3)
PROT SERPL-MCNC: 7 G/DL — SIGNIFICANT CHANGE UP (ref 6–8.3)
RBC # BLD: 4.76 M/UL — SIGNIFICANT CHANGE UP (ref 3.8–5.2)
RBC # FLD: 12.8 % — SIGNIFICANT CHANGE UP (ref 10.3–14.5)
SODIUM SERPL-SCNC: 141 MMOL/L — SIGNIFICANT CHANGE UP (ref 135–145)
WBC # BLD: 6.86 K/UL — SIGNIFICANT CHANGE UP (ref 3.8–10.5)
WBC # FLD AUTO: 6.86 K/UL — SIGNIFICANT CHANGE UP (ref 3.8–10.5)

## 2024-10-22 PROCEDURE — 99232 SBSQ HOSP IP/OBS MODERATE 35: CPT

## 2024-10-22 RX ORDER — TICAGRELOR 90 MG/1
1 TABLET ORAL
Qty: 60 | Refills: 0
Start: 2024-10-22 | End: 2024-11-20

## 2024-10-22 RX ORDER — MELATONIN 5 MG
2 TABLET ORAL
Qty: 60 | Refills: 0
Start: 2024-10-22 | End: 2024-11-20

## 2024-10-22 RX ORDER — METOPROLOL TARTRATE 50 MG
1 TABLET ORAL
Qty: 30 | Refills: 0
Start: 2024-10-22 | End: 2024-11-20

## 2024-10-22 RX ORDER — SERTRALINE HYDROCHLORIDE 50 MG/1
3 TABLET, FILM COATED ORAL
Qty: 90 | Refills: 0
Start: 2024-10-22 | End: 2024-11-20

## 2024-10-22 RX ORDER — ARIPIPRAZOLE 2 MG/1
1.5 TABLET ORAL
Qty: 45 | Refills: 0
Start: 2024-10-22 | End: 2024-11-20

## 2024-10-22 RX ORDER — FLUTICASONE PROPIONATE 50 UG/1
1 SPRAY, METERED NASAL
Qty: 1 | Refills: 0
Start: 2024-10-22 | End: 2024-11-20

## 2024-10-22 RX ORDER — MIRTAZAPINE 30 MG/1
1 TABLET ORAL
Qty: 30 | Refills: 0
Start: 2024-10-22 | End: 2024-11-20

## 2024-10-22 RX ORDER — PANTOPRAZOLE SODIUM 40 MG/1
1 TABLET, DELAYED RELEASE ORAL
Qty: 30 | Refills: 0
Start: 2024-10-22 | End: 2024-11-20

## 2024-10-22 RX ORDER — ACETAMINOPHEN 500 MG
2 TABLET ORAL
Qty: 240 | Refills: 0
Start: 2024-10-22 | End: 2024-11-20

## 2024-10-22 RX ORDER — LOPERAMIDE HCL 2 MG
2 TABLET ORAL EVERY 6 HOURS
Refills: 0 | Status: DISCONTINUED | OUTPATIENT
Start: 2024-10-22 | End: 2024-10-23

## 2024-10-22 RX ORDER — LEVOTHYROXINE SODIUM 88 MCG
1 TABLET ORAL
Qty: 30 | Refills: 0
Start: 2024-10-22 | End: 2024-11-20

## 2024-10-22 RX ORDER — ASPIRIN/MAG CARB/ALUMINUM AMIN 325 MG
1 TABLET ORAL
Qty: 30 | Refills: 0
Start: 2024-10-22 | End: 2024-11-20

## 2024-10-22 RX ORDER — GABAPENTIN 300 MG/1
1 CAPSULE ORAL
Qty: 30 | Refills: 0
Start: 2024-10-22 | End: 2024-11-20

## 2024-10-22 RX ORDER — FAMOTIDINE 10 MG/ML
1 INJECTION INTRAVENOUS
Refills: 0 | DISCHARGE

## 2024-10-22 RX ADMIN — GABAPENTIN 800 MILLIGRAM(S): 300 CAPSULE ORAL at 21:33

## 2024-10-22 RX ADMIN — Medication 5 MILLILITER(S): at 21:35

## 2024-10-22 RX ADMIN — SERTRALINE HYDROCHLORIDE 150 MILLIGRAM(S): 50 TABLET, FILM COATED ORAL at 09:11

## 2024-10-22 RX ADMIN — FLUTICASONE PROPIONATE 1 SPRAY(S): 50 SPRAY, METERED NASAL at 09:16

## 2024-10-22 RX ADMIN — ARIPIPRAZOLE 7 MILLIGRAM(S): 2 TABLET ORAL at 09:11

## 2024-10-22 RX ADMIN — Medication 5 MILLILITER(S): at 09:18

## 2024-10-22 RX ADMIN — Medication 81 MILLIGRAM(S): at 13:20

## 2024-10-22 RX ADMIN — Medication 80 MILLIGRAM(S): at 21:32

## 2024-10-22 RX ADMIN — Medication 112 MICROGRAM(S): at 06:29

## 2024-10-22 RX ADMIN — TICAGRELOR 60 MILLIGRAM(S): 90 TABLET ORAL at 21:32

## 2024-10-22 RX ADMIN — TICAGRELOR 60 MILLIGRAM(S): 90 TABLET ORAL at 09:11

## 2024-10-22 RX ADMIN — PANTOPRAZOLE SODIUM 40 MILLIGRAM(S): 40 TABLET, DELAYED RELEASE ORAL at 08:04

## 2024-10-22 RX ADMIN — Medication 2 MILLIGRAM(S): at 18:39

## 2024-10-22 RX ADMIN — Medication 6 MILLIGRAM(S): at 21:32

## 2024-10-22 RX ADMIN — MIRTAZAPINE 15 MILLIGRAM(S): 30 TABLET ORAL at 21:32

## 2024-10-22 NOTE — BH INPATIENT PSYCHIATRY PROGRESS NOTE - NSTXSLPPATGOAL_PSY_ALL_CORE
Be able to sleep for a minimum of six hours daily

## 2024-10-22 NOTE — BH INPATIENT PSYCHIATRY PROGRESS NOTE - NSTXPROBSLPPAT_PSY_ALL_CORE
SLEEP PATTERN, DISTURBED

## 2024-10-22 NOTE — BH INPATIENT PSYCHIATRY PROGRESS NOTE - NSBHCONSBHPROVDETAILS_PSY_A_CORE  FT
call was placed to Dr Sparrow and awaiting call back by Dr Ngo

## 2024-10-22 NOTE — BH INPATIENT PSYCHIATRY PROGRESS NOTE - NSBHMETABOLIC_PSY_ALL_CORE_FT
BMI: BMI (kg/m2): 28.4 (09-22-24 @ 19:00)  HbA1c: A1C with Estimated Average Glucose Result: 5.6 % (09-21-24 @ 06:39)    Glucose: POCT Blood Glucose.: 80 mg/dL (08-12-24 @ 19:41)    BP: --Vital Signs Last 24 Hrs  T(C): 36.6 (10-22-24 @ 06:13), Max: 36.6 (10-22-24 @ 06:13)  T(F): 97.9 (10-22-24 @ 06:13), Max: 97.9 (10-22-24 @ 06:13)  HR: --  BP: --  BP(mean): --  RR: --  SpO2: --    Orthostatic VS  10-22-24 @ 06:13  Lying BP: 90/52 HR: 60  Sitting BP: --/-- HR: --  Standing BP: --/-- HR: --  Site: --  Mode: --  Orthostatic VS  10-21-24 @ 05:47  Lying BP: 127/85 HR: 76  Sitting BP: --/-- HR: --  Standing BP: --/-- HR: --  Site: --  Mode: --    Lipid Panel: Date/Time: 09-21-24 @ 06:32  Cholesterol, Serum: 95  LDL Cholesterol Calculated: 34  HDL Cholesterol, Serum: 44  Total Cholesterol/HDL Ration Measurement: --  Triglycerides, Serum: 82

## 2024-10-22 NOTE — BH INPATIENT PSYCHIATRY PROGRESS NOTE - NSTXSUBMISDATEEST_PSY_ALL_CORE
25-Sep-2024

## 2024-10-22 NOTE — BH INPATIENT PSYCHIATRY PROGRESS NOTE - MSE UNSTRUCTURED FT
Appearance: fair hygiene, adequate grooming, no abnormal involuntary movements, stable gait  Behavior: calm, cooperative, well engaged, no psychomotor retardation or agitation  Speech: wnl  Mood: anxious  Affect: neutral, reactive, stable  Thought process: linear, logical  Thought content: no delusions elicited; catastrophic thoughts less intense; denies SI/HI  Perceptual disturbances: denies Ah/VH  Cognition: oriented to time, place; poor recall  Insight: partially intact  Judgement: improved

## 2024-10-22 NOTE — BH INPATIENT PSYCHIATRY PROGRESS NOTE - NSTXSUBMISDATETRGT_PSY_ALL_CORE
02-Oct-2024

## 2024-10-22 NOTE — BH INPATIENT PSYCHIATRY PROGRESS NOTE - NSTXOTHERGOAL_PSY_ALL_CORE
patient will accept bereavement counseling 

## 2024-10-22 NOTE — BH INPATIENT PSYCHIATRY PROGRESS NOTE - NSTXDEPRESDATEEST_PSY_ALL_CORE
26-Sep-2024
25-Sep-2024
26-Sep-2024
25-Sep-2024
26-Sep-2024
25-Sep-2024
26-Sep-2024
25-Sep-2024

## 2024-10-22 NOTE — BH INPATIENT PSYCHIATRY PROGRESS NOTE - NSICDXBHPRIMARYDX_PSY_ALL_CORE
MDD (major depressive disorder)   F32.9  

## 2024-10-22 NOTE — BH INPATIENT PSYCHIATRY PROGRESS NOTE - NSBHATTESTBILLING_PSY_A_CORE
36434-Qxlldhotdl OBS or IP - moderate complexity OR 35-49 mins
87375-Tvdhmtgxiy OBS or IP - moderate complexity OR 35-49 mins
17588-Hggqlsxdcp OBS or IP - moderate complexity OR 35-49 mins
14521-Zldrpngsif OBS or IP - moderate complexity OR 35-49 mins
18822-Mwprocyigr OBS or IP - moderate complexity OR 35-49 mins
69646-Uiqmdtfxme OBS or IP - moderate complexity OR 35-49 mins
95446-Dkbreeuphb OBS or IP - moderate complexity OR 35-49 mins
79004-Tktjlgelas OBS or IP - moderate complexity OR 35-49 mins
81813-Tebzejtnvz OBS or IP - moderate complexity OR 35-49 mins
85096-Psnkxziova OBS or IP - moderate complexity OR 35-49 mins
65610-Hslihldebt OBS or IP - moderate complexity OR 35-49 mins
75309-Ugezmpahnl OBS or IP - moderate complexity OR 35-49 mins
79023-Sinpczuljw OBS or IP - moderate complexity OR 35-49 mins
98648-Rmxtidcikf OBS or IP - moderate complexity OR 35-49 mins
43790-Khczclfzyd OBS or IP - moderate complexity OR 35-49 mins
61896-Ynpxkanotj OBS or IP - moderate complexity OR 35-49 mins
74862-Vsiegyurig OBS or IP - moderate complexity OR 35-49 mins
30368-Gqchuiqqoa OBS or IP - moderate complexity OR 35-49 mins
95789-Duomhadeex OBS or IP - moderate complexity OR 35-49 mins
60707-Hzehmihgzw OBS or IP - moderate complexity OR 35-49 mins
35602-Edgkwosxul OBS or IP - moderate complexity OR 35-49 mins
80633-Ssyyjgzgql OBS or IP - moderate complexity OR 35-49 mins
17412-Pwqccxqvsh OBS or IP - moderate complexity OR 35-49 mins

## 2024-10-22 NOTE — BH INPATIENT PSYCHIATRY PROGRESS NOTE - NSTXDCOTHRDATETRGT_PSY_ALL_CORE
03-Oct-2024
10-Oct-2024
17-Oct-2024
03-Oct-2024
03-Oct-2024
10-Oct-2024
10-Oct-2024
17-Oct-2024
24-Oct-2024
10-Oct-2024
17-Oct-2024
24-Oct-2024
03-Oct-2024
24-Oct-2024
03-Oct-2024
24-Oct-2024
17-Oct-2024
10-Oct-2024
17-Oct-2024
10-Oct-2024
10-Oct-2024

## 2024-10-22 NOTE — BH INPATIENT PSYCHIATRY PROGRESS NOTE - PRN MEDS
MEDICATIONS  (PRN):  acetaminophen     Tablet .. 650 milliGRAM(s) Oral every 6 hours PRN Temp greater or equal to 38C (100.4F), Mild Pain (1 - 3)  aluminum hydroxide/magnesium hydroxide/simethicone Suspension 30 milliLiter(s) Oral every 6 hours PRN Dyspepsia  artificial  tears Solution 1 Drop(s) Both EYES every 2 hours PRN eye itchiness  Biotene Dry Mouth Oral Rinse 5 milliLiter(s) Swish and Spit every 6 hours PRN dry mouth  bismuth subsalicylate Liquid 30 milliLiter(s) Oral every 8 hours PRN diarrhea  gabapentin 300 milliGRAM(s) Oral three times a day PRN anxiety  loperamide 2 milliGRAM(s) Oral every 6 hours PRN diarrhea  loperamide 2 milliGRAM(s) Oral every 4 hours PRN diarrhea  loperamide 2 milliGRAM(s) Oral every 6 hours PRN diarrhea  simethicone 80 milliGRAM(s) Chew two times a day PRN gas  
MEDICATIONS  (PRN):  acetaminophen     Tablet .. 650 milliGRAM(s) Oral every 6 hours PRN Temp greater or equal to 38C (100.4F), Mild Pain (1 - 3)  aluminum hydroxide/magnesium hydroxide/simethicone Suspension 30 milliLiter(s) Oral every 6 hours PRN Dyspepsia  gabapentin 400 milliGRAM(s) Oral three times a day PRN anxiety  hydrOXYzine hydrochloride 25 milliGRAM(s) Oral every 8 hours PRN anxiety if gabapentin ineffective  loperamide 2 milliGRAM(s) Oral two times a day PRN diarrhea  
MEDICATIONS  (PRN):  acetaminophen     Tablet .. 650 milliGRAM(s) Oral every 6 hours PRN Temp greater or equal to 38C (100.4F), Mild Pain (1 - 3)  aluminum hydroxide/magnesium hydroxide/simethicone Suspension 30 milliLiter(s) Oral every 6 hours PRN Dyspepsia  artificial  tears Solution 1 Drop(s) Both EYES every 2 hours PRN eye itchiness  gabapentin 400 milliGRAM(s) Oral three times a day PRN anxiety  loperamide 2 milliGRAM(s) Oral every 6 hours PRN diarrhea  traZODone 75 milliGRAM(s) Oral at bedtime PRN insomnia  
MEDICATIONS  (PRN):  acetaminophen     Tablet .. 650 milliGRAM(s) Oral every 6 hours PRN Temp greater or equal to 38C (100.4F), Mild Pain (1 - 3)  aluminum hydroxide/magnesium hydroxide/simethicone Suspension 30 milliLiter(s) Oral every 6 hours PRN Dyspepsia  artificial  tears Solution 1 Drop(s) Both EYES every 2 hours PRN eye itchiness  Biotene Dry Mouth Oral Rinse 5 milliLiter(s) Swish and Spit every 6 hours PRN dry mouth  bismuth subsalicylate Liquid 30 milliLiter(s) Oral every 8 hours PRN diarrhea  gabapentin 300 milliGRAM(s) Oral three times a day PRN anxiety  loperamide 2 milliGRAM(s) Oral every 6 hours PRN diarrhea  loperamide 2 milliGRAM(s) Oral every 4 hours PRN diarrhea  traZODone 75 milliGRAM(s) Oral at bedtime PRN insomnia  
MEDICATIONS  (PRN):  acetaminophen     Tablet .. 650 milliGRAM(s) Oral every 6 hours PRN Temp greater or equal to 38C (100.4F), Mild Pain (1 - 3)  aluminum hydroxide/magnesium hydroxide/simethicone Suspension 30 milliLiter(s) Oral every 6 hours PRN Dyspepsia  gabapentin 400 milliGRAM(s) Oral three times a day PRN anxiety  hydrOXYzine hydrochloride 25 milliGRAM(s) Oral every 8 hours PRN anxiety if gabapentin ineffective  loperamide 2 milliGRAM(s) Oral every 6 hours PRN diarrhea  traZODone 50 milliGRAM(s) Oral at bedtime PRN insomnia  
MEDICATIONS  (PRN):  acetaminophen     Tablet .. 650 milliGRAM(s) Oral every 6 hours PRN Temp greater or equal to 38C (100.4F), Mild Pain (1 - 3)  aluminum hydroxide/magnesium hydroxide/simethicone Suspension 30 milliLiter(s) Oral every 6 hours PRN Dyspepsia  gabapentin 400 milliGRAM(s) Oral three times a day PRN anxiety  hydrOXYzine hydrochloride 25 milliGRAM(s) Oral every 8 hours PRN anxiety if gabapentin ineffective  loperamide 2 milliGRAM(s) Oral two times a day PRN diarrhea  traZODone 50 milliGRAM(s) Oral at bedtime PRN insomnia  
MEDICATIONS  (PRN):  acetaminophen     Tablet .. 650 milliGRAM(s) Oral every 6 hours PRN Temp greater or equal to 38C (100.4F), Mild Pain (1 - 3)  aluminum hydroxide/magnesium hydroxide/simethicone Suspension 30 milliLiter(s) Oral every 6 hours PRN Dyspepsia  artificial  tears Solution 1 Drop(s) Both EYES every 2 hours PRN eye itchiness  Biotene Dry Mouth Oral Rinse 5 milliLiter(s) Swish and Spit every 6 hours PRN dry mouth  bismuth subsalicylate Liquid 30 milliLiter(s) Oral every 8 hours PRN diarrhea  gabapentin 300 milliGRAM(s) Oral three times a day PRN anxiety  loperamide 2 milliGRAM(s) Oral every 4 hours PRN diarrhea  loperamide 2 milliGRAM(s) Oral every 6 hours PRN diarrhea  simethicone 80 milliGRAM(s) Chew two times a day PRN gas  traZODone 75 milliGRAM(s) Oral at bedtime PRN insomnia  
MEDICATIONS  (PRN):  acetaminophen     Tablet .. 650 milliGRAM(s) Oral every 6 hours PRN Temp greater or equal to 38C (100.4F), Mild Pain (1 - 3)  aluminum hydroxide/magnesium hydroxide/simethicone Suspension 30 milliLiter(s) Oral every 6 hours PRN Dyspepsia  artificial  tears Solution 1 Drop(s) Both EYES every 2 hours PRN eye itchiness  gabapentin 400 milliGRAM(s) Oral three times a day PRN anxiety  loperamide 2 milliGRAM(s) Oral every 6 hours PRN diarrhea  traZODone 75 milliGRAM(s) Oral at bedtime PRN insomnia  
MEDICATIONS  (PRN):  acetaminophen     Tablet .. 650 milliGRAM(s) Oral every 6 hours PRN Temp greater or equal to 38C (100.4F), Mild Pain (1 - 3)  aluminum hydroxide/magnesium hydroxide/simethicone Suspension 30 milliLiter(s) Oral every 6 hours PRN Dyspepsia  gabapentin 400 milliGRAM(s) Oral three times a day PRN anxiety  hydrOXYzine hydrochloride 25 milliGRAM(s) Oral every 8 hours PRN anxiety if gabapentin ineffective  loperamide 2 milliGRAM(s) Oral every 6 hours PRN diarrhea  traZODone 50 milliGRAM(s) Oral at bedtime PRN insomnia  
MEDICATIONS  (PRN):  acetaminophen     Tablet .. 650 milliGRAM(s) Oral every 6 hours PRN Temp greater or equal to 38C (100.4F), Mild Pain (1 - 3)  aluminum hydroxide/magnesium hydroxide/simethicone Suspension 30 milliLiter(s) Oral every 6 hours PRN Dyspepsia  artificial  tears Solution 1 Drop(s) Both EYES every 2 hours PRN eye itchiness  gabapentin 300 milliGRAM(s) Oral three times a day PRN anxiety  loperamide 2 milliGRAM(s) Oral every 4 hours PRN diarrhea  loperamide 2 milliGRAM(s) Oral every 6 hours PRN diarrhea  traZODone 75 milliGRAM(s) Oral at bedtime PRN insomnia  
MEDICATIONS  (PRN):  acetaminophen     Tablet .. 650 milliGRAM(s) Oral every 6 hours PRN Temp greater or equal to 38C (100.4F), Mild Pain (1 - 3)  aluminum hydroxide/magnesium hydroxide/simethicone Suspension 30 milliLiter(s) Oral every 6 hours PRN Dyspepsia  gabapentin 400 milliGRAM(s) Oral three times a day PRN anxiety  hydrOXYzine hydrochloride 25 milliGRAM(s) Oral every 8 hours PRN anxiety if gabapentin ineffective  loperamide 2 milliGRAM(s) Oral two times a day PRN diarrhea  
MEDICATIONS  (PRN):  acetaminophen     Tablet .. 650 milliGRAM(s) Oral every 6 hours PRN Temp greater or equal to 38C (100.4F), Mild Pain (1 - 3)  aluminum hydroxide/magnesium hydroxide/simethicone Suspension 30 milliLiter(s) Oral every 6 hours PRN Dyspepsia  artificial  tears Solution 1 Drop(s) Both EYES every 2 hours PRN eye itchiness  Biotene Dry Mouth Oral Rinse 5 milliLiter(s) Swish and Spit every 6 hours PRN dry mouth  bismuth subsalicylate Liquid 30 milliLiter(s) Oral every 8 hours PRN diarrhea  gabapentin 300 milliGRAM(s) Oral three times a day PRN anxiety  loperamide 2 milliGRAM(s) Oral every 4 hours PRN diarrhea  loperamide 2 milliGRAM(s) Oral every 6 hours PRN diarrhea  traZODone 75 milliGRAM(s) Oral at bedtime PRN insomnia  
MEDICATIONS  (PRN):  acetaminophen     Tablet .. 650 milliGRAM(s) Oral every 6 hours PRN Temp greater or equal to 38C (100.4F), Mild Pain (1 - 3)  aluminum hydroxide/magnesium hydroxide/simethicone Suspension 30 milliLiter(s) Oral every 6 hours PRN Dyspepsia  artificial  tears Solution 1 Drop(s) Both EYES every 2 hours PRN eye itchiness  Biotene Dry Mouth Oral Rinse 5 milliLiter(s) Swish and Spit every 6 hours PRN dry mouth  bismuth subsalicylate Liquid 30 milliLiter(s) Oral every 8 hours PRN diarrhea  gabapentin 300 milliGRAM(s) Oral three times a day PRN anxiety  loperamide 2 milliGRAM(s) Oral every 6 hours PRN diarrhea  loperamide 2 milliGRAM(s) Oral every 4 hours PRN diarrhea  simethicone 80 milliGRAM(s) Chew two times a day PRN gas  
MEDICATIONS  (PRN):  acetaminophen     Tablet .. 650 milliGRAM(s) Oral every 6 hours PRN Temp greater or equal to 38C (100.4F), Mild Pain (1 - 3)  aluminum hydroxide/magnesium hydroxide/simethicone Suspension 30 milliLiter(s) Oral every 6 hours PRN Dyspepsia  artificial  tears Solution 1 Drop(s) Both EYES every 2 hours PRN eye itchiness  Biotene Dry Mouth Oral Rinse 5 milliLiter(s) Swish and Spit every 6 hours PRN dry mouth  bismuth subsalicylate Liquid 30 milliLiter(s) Oral every 8 hours PRN diarrhea  gabapentin 300 milliGRAM(s) Oral three times a day PRN anxiety  loperamide 2 milliGRAM(s) Oral every 4 hours PRN diarrhea  loperamide 2 milliGRAM(s) Oral every 6 hours PRN diarrhea  traZODone 75 milliGRAM(s) Oral at bedtime PRN insomnia  
MEDICATIONS  (PRN):  acetaminophen     Tablet .. 650 milliGRAM(s) Oral every 6 hours PRN Temp greater or equal to 38C (100.4F), Mild Pain (1 - 3)  gabapentin 100 milliGRAM(s) Oral every 6 hours PRN anxiety/insomnia  
MEDICATIONS  (PRN):  acetaminophen     Tablet .. 650 milliGRAM(s) Oral every 6 hours PRN Temp greater or equal to 38C (100.4F), Mild Pain (1 - 3)  aluminum hydroxide/magnesium hydroxide/simethicone Suspension 30 milliLiter(s) Oral every 6 hours PRN Dyspepsia  gabapentin 400 milliGRAM(s) Oral three times a day PRN anxiety  loperamide 2 milliGRAM(s) Oral every 6 hours PRN diarrhea  traZODone 75 milliGRAM(s) Oral at bedtime PRN insomnia  
MEDICATIONS  (PRN):  acetaminophen     Tablet .. 650 milliGRAM(s) Oral every 6 hours PRN Temp greater or equal to 38C (100.4F), Mild Pain (1 - 3)  aluminum hydroxide/magnesium hydroxide/simethicone Suspension 30 milliLiter(s) Oral every 6 hours PRN Dyspepsia  artificial  tears Solution 1 Drop(s) Both EYES every 2 hours PRN eye itchiness  gabapentin 400 milliGRAM(s) Oral three times a day PRN anxiety  loperamide 2 milliGRAM(s) Oral every 6 hours PRN diarrhea  traZODone 75 milliGRAM(s) Oral at bedtime PRN insomnia  
MEDICATIONS  (PRN):  acetaminophen     Tablet .. 650 milliGRAM(s) Oral every 6 hours PRN Temp greater or equal to 38C (100.4F), Mild Pain (1 - 3)  aluminum hydroxide/magnesium hydroxide/simethicone Suspension 30 milliLiter(s) Oral every 6 hours PRN Dyspepsia  artificial  tears Solution 1 Drop(s) Both EYES every 2 hours PRN eye itchiness  gabapentin 300 milliGRAM(s) Oral three times a day PRN anxiety  loperamide 2 milliGRAM(s) Oral every 4 hours PRN diarrhea  loperamide 2 milliGRAM(s) Oral every 6 hours PRN diarrhea  traZODone 75 milliGRAM(s) Oral at bedtime PRN insomnia  
MEDICATIONS  (PRN):  acetaminophen     Tablet .. 650 milliGRAM(s) Oral every 6 hours PRN Temp greater or equal to 38C (100.4F), Mild Pain (1 - 3)  aluminum hydroxide/magnesium hydroxide/simethicone Suspension 30 milliLiter(s) Oral every 6 hours PRN Dyspepsia  artificial  tears Solution 1 Drop(s) Both EYES every 2 hours PRN eye itchiness  Biotene Dry Mouth Oral Rinse 5 milliLiter(s) Swish and Spit every 6 hours PRN dry mouth  bismuth subsalicylate Liquid 30 milliLiter(s) Oral every 8 hours PRN diarrhea  gabapentin 300 milliGRAM(s) Oral three times a day PRN anxiety  loperamide 2 milliGRAM(s) Oral every 6 hours PRN diarrhea  loperamide 2 milliGRAM(s) Oral every 4 hours PRN diarrhea  simethicone 80 milliGRAM(s) Chew two times a day PRN gas  
MEDICATIONS  (PRN):  acetaminophen     Tablet .. 650 milliGRAM(s) Oral every 6 hours PRN Temp greater or equal to 38C (100.4F), Mild Pain (1 - 3)  aluminum hydroxide/magnesium hydroxide/simethicone Suspension 30 milliLiter(s) Oral every 6 hours PRN Dyspepsia  artificial  tears Solution 1 Drop(s) Both EYES every 2 hours PRN eye itchiness  gabapentin 300 milliGRAM(s) Oral three times a day PRN anxiety  loperamide 2 milliGRAM(s) Oral every 6 hours PRN diarrhea  loperamide 2 milliGRAM(s) Oral every 4 hours PRN diarrhea  traZODone 75 milliGRAM(s) Oral at bedtime PRN insomnia  
MEDICATIONS  (PRN):  acetaminophen     Tablet .. 650 milliGRAM(s) Oral every 6 hours PRN Temp greater or equal to 38C (100.4F), Mild Pain (1 - 3)  aluminum hydroxide/magnesium hydroxide/simethicone Suspension 30 milliLiter(s) Oral every 6 hours PRN Dyspepsia  gabapentin 400 milliGRAM(s) Oral three times a day PRN anxiety  hydrOXYzine hydrochloride 25 milliGRAM(s) Oral every 8 hours PRN anxiety if gabapentin ineffective  loperamide 2 milliGRAM(s) Oral two times a day PRN diarrhea  
MEDICATIONS  (PRN):  acetaminophen     Tablet .. 650 milliGRAM(s) Oral every 6 hours PRN Temp greater or equal to 38C (100.4F), Mild Pain (1 - 3)  aluminum hydroxide/magnesium hydroxide/simethicone Suspension 30 milliLiter(s) Oral every 6 hours PRN Dyspepsia  gabapentin 400 milliGRAM(s) Oral three times a day PRN anxiety  loperamide 2 milliGRAM(s) Oral every 6 hours PRN diarrhea  traZODone 75 milliGRAM(s) Oral at bedtime PRN insomnia  
MEDICATIONS  (PRN):  acetaminophen     Tablet .. 650 milliGRAM(s) Oral every 6 hours PRN Temp greater or equal to 38C (100.4F), Mild Pain (1 - 3)  gabapentin 100 milliGRAM(s) Oral every 6 hours PRN anxiety/insomnia

## 2024-10-22 NOTE — BH INPATIENT PSYCHIATRY PROGRESS NOTE - NSBHCHARTREVIEWVS_PSY_A_CORE FT
Vital Signs Last 24 Hrs  T(C): 36.6 (10-22-24 @ 06:13), Max: 36.6 (10-22-24 @ 06:13)  T(F): 97.9 (10-22-24 @ 06:13), Max: 97.9 (10-22-24 @ 06:13)  HR: --  BP: --  BP(mean): --  RR: --  SpO2: --    Orthostatic VS  10-22-24 @ 06:13  Lying BP: 90/52 HR: 60  Sitting BP: --/-- HR: --  Standing BP: --/-- HR: --  Site: --  Mode: --  Orthostatic VS  10-21-24 @ 05:47  Lying BP: 127/85 HR: 76  Sitting BP: --/-- HR: --  Standing BP: --/-- HR: --  Site: --  Mode: --

## 2024-10-22 NOTE — BH INPATIENT PSYCHIATRY PROGRESS NOTE - NSBHFUPINTERVALHXFT_PSY_A_CORE
Patient reports having slept well. Has had sustained improvement in anxiety, depressed mood. Denies SI. Nervous about discharge tomorrow but forward thinking. Reports some soft stool, asking for imodium.

## 2024-10-22 NOTE — BH INPATIENT PSYCHIATRY PROGRESS NOTE - NSTXPROBSUBMIS_PSY_ALL_CORE
SUBSTANCE MISUSE

## 2024-10-22 NOTE — BH INPATIENT PSYCHIATRY PROGRESS NOTE - NSICDXBHSECONDARYDX_PSY_ALL_CORE
Severe zolpidem use disorder   F13.20  CAD (coronary artery disease)   I25.10  HTN (hypertension)   I10  H/O malignant neoplasm of breast   Z85.3  Hypothyroidism   E03.9  

## 2024-10-22 NOTE — BH INPATIENT PSYCHIATRY PROGRESS NOTE - NSDCCRITERIA_PSY_ALL_CORE
CGI < 3 

## 2024-10-22 NOTE — BH INPATIENT PSYCHIATRY PROGRESS NOTE - NSTXDEPRESDATETRGT_PSY_ALL_CORE
08-Oct-2024
16-Oct-2024
03-Oct-2024
08-Oct-2024
16-Oct-2024
23-Oct-2024
16-Oct-2024
23-Oct-2024
03-Oct-2024
15-Oct-2024
22-Oct-2024
23-Oct-2024
29-Oct-2024
03-Oct-2024
08-Oct-2024
16-Oct-2024
08-Oct-2024
08-Oct-2024
03-Oct-2024
03-Oct-2024
23-Oct-2024

## 2024-10-22 NOTE — BH INPATIENT PSYCHIATRY PROGRESS NOTE - NSBHASSESSSUMMFT_PSY_ALL_CORE
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 Ryan Sparrow, h/o prescription sedative abuse, multiple prior overdoses (patient denies suicide attempts but contradicts chart), initially bib EMS after son found her with altered mental status after having taken more than prescribed ambien in addition to drinking alcohol. Patient states alcohol consumption was accidental. Patient was admitted to [facility] briefly due to abnormal EKG but has since been medically cleared and transferred to our unit for ongoing treatment. Son strongly advocated for inpatient treatment due to concerns about dangerousness to self given her repeated patterns of unintentional overdose and patient minimizing need for treatment.     Plan:    1) Disposition: patient's son wanting patient to go to inpatient rehab but patient declining; patient wanting to return to own home with private home health  - continue inpatient care due to dangerousness to self  2) Legal status: 2PC  3) Psychotropic medications:  - c/w decreased lexapro to 5mg po daily with plan to taper off, patient was on 20mg/day prior to admission  - changed seroquel to hs dosing - c/w 25mg po qhs due to patient requesting it for insomnia  - c/w decreased dose of remeron 7.5mg po qhs to avoid polypharmacy  - continue melatonin 3mg po qhs for insomnia  - continue gabapentin 400mg po bid for anxiety, can give additional 100mg prn for anxiety or insomnia  - left msg for outpatient psychiatrist Dr. Sparrow to review past med trials, consider abilify  4) Non-pharmacologic interventions:  - individual, group, milieu therapy as appropriate  5) Medical comorbidities:  - CAD: restart brillinta 60mg po bid, continue ASA 81mg po daily  - HTN: continue metoprolol ER 25mg po daily  - HLD: continue statin  - GERD continue pepcid 20mg po daily  - hypothyroidism: continue synthroid 112mcg po daily  6) Work up:  - will eventually need MOCA due to recall deficits  7) Social issues: connect with son to discuss disposition as patient refusing   8) Psychoeducation: Risks and benefits discussed with patient - declining change in antidepressant despite lack of benefit from lexapro  
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 Ryan Sparrow, h/o prescription sedative abuse, multiple prior overdoses (patient denies suicide attempts but contradicts chart), initially bib EMS after son found her with altered mental status after having taken more than prescribed ambien in addition to drinking alcohol. Patient states alcohol consumption was accidental. Patient was admitted to [facility] briefly due to abnormal EKG but has since been medically cleared and transferred to our unit for ongoing treatment. Son strongly advocated for inpatient treatment due to concerns about dangerousness to self given her repeated patterns of unintentional overdose and patient minimizing need for treatment. Has demonstrated poorly controlled anxiety since arrival with particular distress about poor sleep.     Plan:    1) Disposition: patient's son wanting patient to go to inpatient rehab but patient declining; patient wanting to return to own home with private home health  - continue inpatient care due to dangerousness to self  2) Legal status: 2P  3) Psychotropic medications:  - c/w decreased lexapro 5mg po daily with plan to taper off, patient was on 20mg/day prior to admission  - increase zoloft to 50mg po daily for depression/anxiety to replace lexapro with plan to add abilify  - continue seroquel 25mg po qhs for insomnia, anxiety  - c/w decreased dose of remeron 7.5mg po qhs with plan to taper off to avoid polypharmacy  - continue melatonin 3mg po qhs for insomnia  - continue gabapentin 600mg po qhs for insomnia  - can give additional gabapentin 400mg prn for anxiety   - continue trazodone 25mg prn for insomnia  - left msg for outpatient psychiatrist Dr. Sparrow to review past med trials  4) Non-pharmacologic interventions:  - individual, group, milieu therapy as appropriate  5) Medical comorbidities:  - CAD: restart brillinta 60mg po bid, continue ASA 81mg po daily  - HTN: continue metoprolol ER 25mg po daily  - HLD: continue statin  - GERD continue pepcid 20mg po daily  - hypothyroidism: continue synthroid 112mcg po daily  6) Work up:  - will eventually need MOCA due to recall deficits  7) Social issues: connect with son to discuss disposition as patient refusing   8) Psychoeducation: Risks and benefits discussed with patient and she is in agreement with treatment  
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 Ryan Sparrow, h/o prescription sedative abuse, multiple prior overdoses (patient denies suicide attempts but contradicts chart), initially bib EMS after son found her with altered mental status after having taken more than prescribed ambien in addition to drinking alcohol. Patient states alcohol consumption was accidental. Patient was admitted to [facility] briefly due to abnormal EKG but has since been medically cleared and transferred to our unit for ongoing treatment. Son strongly advocated for inpatient treatment due to concerns about dangerousness to self given her repeated patterns of unintentional overdose and patient minimizing need for treatment. Has demonstrated poorly controlled anxiety since arrival with particular distress about poor sleep. Continues to decline addiction treatment.     Plan:    1) Disposition: patient's son wanting patient to go to inpatient rehab but patient declining; patient wanting to return to own home with private home health  - continue inpatient care due to dangerousness to self  2) Legal status: 2PC  3) Psychotropic medications:  - increased zoloft to 100mg po daily for depression/anxiety  - start abilify 2mg po daily due to possible new onset AH - was supposed to start tomorrow for depression treatment but will start earlier due to possible psychosis  - discontinued seroquel 25mg po qhs for insomnia due to lack of benefit  - discontinued remeron 7.5mg po qhs to avoid polypharmacy  - continue melatonin 3mg po qhs for insomnia  - continue gabapentin 400mg po qhs for insomnia/anxiety particularly at night   - can give additional gabapentin 400mg prn for anxiety   - continuee trazodone 75mg hs prn for insomnia  4) Non-pharmacologic interventions:  - individual, group, milieu therapy as appropriate  5) Medical comorbidities:  - CAD: continue brillinta 60mg po bid, continue ASA 81mg po daily  - HTN: continue metoprolol ER 25mg po daily  - HLD: continue statin  - GERD continue pepcid 20mg po daily  - hypothyroidism: continue synthroid 112mcg po daily  6) Work up:  - will eventually need MOCA due to recall deficits  7) Social issues: son involved, advocating for addiction treatment  8) Psychoeducation: Risks and benefits discussed with patient and she is in agreement with treatment  
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 Ryan Sparrow, h/o prescription sedative abuse, multiple prior overdoses (patient denies suicide attempts but contradicts chart), initially bib EMS after son found her with altered mental status after having taken more than prescribed ambien in addition to drinking alcohol. Patient states alcohol consumption was accidental. Patient was admitted to [facility] briefly due to abnormal EKG but has since been medically cleared and transferred to our unit for ongoing treatment. Son strongly advocated for inpatient treatment due to concerns about dangerousness to self given her repeated patterns of unintentional overdose and patient minimizing need for treatment. Has demonstrated poorly controlled anxiety since arrival with particular distress about poor sleep. Continues to decline addiction treatment.     Plan:    1) Disposition: patient's son wanting patient to go to inpatient rehab but patient declining; patient wanting to return to own home with private home health  - continue inpatient care due to dangerousness to self  2) Legal status: 2PC  3) Psychotropic medications:  - increase zoloft to 75mg po daily for depression/anxiety with possible plan to add abilify  - continue seroquel 25mg po qhs for insomnia, anxiety  - discontinued remeron 7.5mg po qhs to avoid polypharmacy  - continue melatonin 3mg po qhs for insomnia  - continue gabapentin 600mg po qhs for insomnia  - can give additional gabapentin 400mg prn for anxiety   - continue trazodone 25mg prn for insomnia - will likely d/c this as well  - left msg for outpatient psychiatrist Dr. Sparrow to review past med trials  4) Non-pharmacologic interventions:  - individual, group, milieu therapy as appropriate  5) Medical comorbidities:  - CAD: continue brillinta 60mg po bid, continue ASA 81mg po daily  - HTN: continue metoprolol ER 25mg po daily  - HLD: continue statin  - GERD continue pepcid 20mg po daily  - hypothyroidism: continue synthroid 112mcg po daily  6) Work up:  - will eventually need MOCA due to recall deficits  7) Social issues: son involved, advocating for addiction treatment  8) Psychoeducation: Risks and benefits discussed with patient and she is in agreement with treatment  
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 Ryan Sparrow, h/o prescription sedative abuse, multiple prior overdoses (patient denies suicide attempts but contradicts chart), initially bib EMS after son found her with altered mental status after having taken more than prescribed ambien in addition to drinking alcohol. Patient states alcohol consumption was accidental. Patient was admitted to [facility] briefly due to abnormal EKG but has since been medically cleared and transferred to our unit for ongoing treatment. Son strongly advocated for inpatient treatment due to concerns about dangerousness to self given her repeated patterns of unintentional overdose and patient minimizing need for treatment. Has demonstrated poorly controlled anxiety since arrival with particular distress about poor sleep. Continues to decline addiction treatment.     Plan:    1) Disposition: patient's son wanting patient to go to inpatient rehab but patient declining; patient wanting to return to own home with private home health  - continue inpatient care due to dangerousness to self  2) Legal status: 2PC  3) Psychotropic medications:  - continue zoloft 75mg po daily for depression/anxiety with possible plan to add abilify  - discontinued seroquel 25mg po qhs for insomnia due to lack of benefit  - discontinued remeron 7.5mg po qhs to avoid polypharmacy  - continue melatonin 3mg po qhs for insomnia  - decrease gabapentin to 400mg po qhs for insomnia as it has been incompletely effective for sleep but seems to help anxiety  - can give additional gabapentin 400mg prn for anxiety   - continue trazodone 50mg hs prn for insomnia   - left msg for outpatient psychiatrist Dr. Sparrow to review past med trials  4) Non-pharmacologic interventions:  - individual, group, milieu therapy as appropriate  5) Medical comorbidities:  - CAD: continue brillinta 60mg po bid, continue ASA 81mg po daily  - HTN: continue metoprolol ER 25mg po daily  - HLD: continue statin  - GERD continue pepcid 20mg po daily  - hypothyroidism: continue synthroid 112mcg po daily  6) Work up:  - will eventually need MOCA due to recall deficits  7) Social issues: son involved, advocating for addiction treatment  8) Psychoeducation: Risks and benefits discussed with patient and she is in agreement with treatment  
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 Ryan Sparrow, h/o prescription sedative abuse, multiple prior overdoses (patient denies suicide attempts but contradicts chart), initially bib EMS after son found her with altered mental status after having taken more than prescribed ambien in addition to drinking alcohol. Patient states alcohol consumption was accidental. Patient was admitted to [facility] briefly due to abnormal EKG but has since been medically cleared and transferred to our unit for ongoing treatment. Son strongly advocated for inpatient treatment due to concerns about dangerousness to self given her repeated patterns of unintentional overdose and patient minimizing need for treatment. Has demonstrated poorly controlled anxiety since arrival with particular distress about poor sleep. Continues to decline addiction treatment.     Plan:    1) Disposition: patient's son wanting patient to go to inpatient rehab but patient declining; patient wanting to return to own home with private home health  - continue inpatient care due to dangerousness to self  2) Legal status: 2PC  3) Psychotropic medications:  - continue zoloft 100mg po daily for depression/anxiety  - increase abilify to 5mg po daily due to possible new onset AH   - discontinued seroquel 25mg po qhs for insomnia due to lack of benefit  - discontinued remeron 7.5mg po qhs to avoid polypharmacy  - continue melatonin 3mg po qhs for insomnia  - continue gabapentin 400mg po qhs for insomnia/anxiety particularly at night   - can give additional gabapentin 400mg prn for anxiety   - continuee trazodone 75mg hs prn for insomnia  4) Non-pharmacologic interventions:  - individual, group, milieu therapy as appropriate  5) Medical comorbidities:  - CAD: continue brillinta 60mg po bid, continue ASA 81mg po daily  - HTN: continue metoprolol ER 25mg po daily  - HLD: continue statin  - GERD continue pepcid 20mg po daily  - hypothyroidism: continue synthroid 112mcg po daily  6) Work up:  - will eventually need MOCA due to recall deficits  7) Social issues: son involved, advocating for addiction treatment  8) Psychoeducation: Risks and benefits discussed with patient and she is in agreement with treatment  
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 Ryan Sparrow, h/o prescription sedative abuse, multiple prior overdoses (patient denies suicide attempts but contradicts chart), initially bib EMS after son found her with altered mental status after having taken more than prescribed ambien in addition to drinking alcohol. Patient states alcohol consumption was accidental. Patient was admitted to [facility] briefly due to abnormal EKG but has since been medically cleared and transferred to our unit for ongoing treatment. Son strongly advocated for inpatient treatment due to concerns about dangerousness to self given her repeated patterns of unintentional overdose and patient minimizing need for treatment. Demonstrated poorly controlled anxiety since arrival though with mild improvement. Continues to have poor sleep. Declines addiction treatment. r/o dependent personality d/o    10/14 Clinical Update: Pt reports stable mood. Reports still having soft stools, denies watery diarrhea. Will add pepto bismol PRN. Start biotene prn for dry mouth and flonase for congestion.   10/15: Improving but anxiety persists, will titrate sertraline to 150mg, place PPD for placement.  10/16: Anxious about possible assisted living placement, continue current meds.  10/18: Reports anxiety improved, less depressed, still hearing music intermittently; increase abilify to 7.5mg po daily and change trazodone to remeron    Plan:    1) Disposition: patient's son wanting patient to go to inpatient rehab but patient declining; patient wanting to go to custodial  - continue inpatient care due to dangerousness to self  2) Legal status: 2PC  3) Psychotropic medications:  - continue zoloft 125mg po daily for depression/anxiety - increase to 150mg as of 10/16  - continue abilify 5mg po daily due to possible new onset AH   - discontinued seroquel 25mg po qhs for insomnia due to lack of benefit  - discontinued remeron 7.5mg po qhs to avoid polypharmacy  - increase melatonin to 6mg po qhs for insomnia  - continue gabapentin 800mg po qhs for insomnia/anxiety at night   - can give additional gabapentin 300mg prn for anxiety but not to be given within 3 hours of HS gabapentin  - continue trazodone 75mg hs prn for insomnia  4) Non-pharmacologic interventions:  - individual, group, milieu therapy as appropriate  5) Medical comorbidities:  - CAD: continue brillinta 60mg po bid, continue ASA 81mg po daily  - HTN: continue metoprolol ER 25mg po daily  - HLD: continue statin  - GERD continue pepcid 20mg po daily  - hypothyroidism: continue synthroid 112mcg po daily  6) Work up:  - will eventually need MOCA due to recall deficits  7) Social issues: son involved, advocating for addiction treatment,  has been somewhat resistant to bringing in her documents  8) Psychoeducation: Risks and benefits discussed with patient and she is in agreement with treatment  
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 Ryan Sparrow, h/o prescription sedative abuse, multiple prior overdoses (patient denies suicide attempts but contradicts chart), initially bib EMS after son found her with altered mental status after having taken more than prescribed ambien in addition to drinking alcohol. Patient states alcohol consumption was accidental. Patient was admitted to [facility] briefly due to abnormal EKG but has since been medically cleared and transferred to our unit for ongoing treatment. Son strongly advocated for inpatient treatment due to concerns about dangerousness to self given her repeated patterns of unintentional overdose and patient minimizing need for treatment. Has demonstrated poorly controlled anxiety since arrival with particular distress about poor sleep.    Plan:    1) Disposition: patient's son wanting patient to go to inpatient rehab but patient declining; patient wanting to return to own home with private home health  - continue inpatient care due to dangerousness to self  2) Legal status: 2PC  3) Psychotropic medications:  - discontinue lexapro 5mg po daily and continue zoloft 50mg po daily for depression/anxiety to replace lexapro with plan to add abilify  - continue seroquel 25mg po qhs for insomnia, anxiety  - discontinue remeron 7.5mg po qhs to avoid polypharmacy  - continue melatonin 3mg po qhs for insomnia  - continue gabapentin 600mg po qhs for insomnia  - can give additional gabapentin 400mg prn for anxiety   - continue trazodone 25mg prn for insomnia - will likely d/c this as well  - left msg for outpatient psychiatrist Dr. Sparrow to review past med trials  4) Non-pharmacologic interventions:  - individual, group, milieu therapy as appropriate  5) Medical comorbidities:  - CAD: restart brillinta 60mg po bid, continue ASA 81mg po daily  - HTN: continue metoprolol ER 25mg po daily  - HLD: continue statin  - GERD continue pepcid 20mg po daily  - hypothyroidism: continue synthroid 112mcg po daily  6) Work up:  - will eventually need MOCA due to recall deficits  7) Social issues: son involved, advocating for addiction treatment  8) Psychoeducation: Risks and benefits discussed with patient and she is in agreement with treatment  
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 Ryan Sparrow, h/o prescription sedative abuse, multiple prior overdoses (patient denies suicide attempts but contradicts chart), initially bib EMS after son found her with altered mental status after having taken more than prescribed ambien in addition to drinking alcohol. Patient states alcohol consumption was accidental. Patient was admitted to [facility] briefly due to abnormal EKG but has since been medically cleared and transferred to our unit for ongoing treatment. Son strongly advocated for inpatient treatment due to concerns about dangerousness to self given her repeated patterns of unintentional overdose and patient minimizing need for treatment. Demonstrated poorly controlled anxiety since arrival though with mild improvement. Continues to have poor sleep. Declines addiction treatment. r/o dependent personality d/o    Plan:    1) Disposition: patient's son wanting patient to go to inpatient rehab but patient declining; patient wanting to go to assisted  - continue inpatient care due to dangerousness to self  2) Legal status: 2PC  3) Psychotropic medications:  - continue zoloft 125mg po daily for depression/anxiety - can increase to 150mg next week  - continue abilify 5mg po daily due to possible new onset AH   - discontinued seroquel 25mg po qhs for insomnia due to lack of benefit  - discontinued remeron 7.5mg po qhs to avoid polypharmacy  - increase melatonin to 6mg po qhs for insomnia  - continue gabapentin 800mg po qhs for insomnia/anxiety at night   - can give additional gabapentin 300mg prn for anxiety but not to be given within 3 hours of HS gabapentin  - continue trazodone 75mg hs prn for insomnia  4) Non-pharmacologic interventions:  - individual, group, milieu therapy as appropriate  5) Medical comorbidities:  - CAD: continue brillinta 60mg po bid, continue ASA 81mg po daily  - HTN: continue metoprolol ER 25mg po daily  - HLD: continue statin  - GERD continue pepcid 20mg po daily  - hypothyroidism: continue synthroid 112mcg po daily  6) Work up:  - will eventually need MOCA due to recall deficits  7) Social issues: son involved, advocating for addiction treatment,  has been somewhat resistant to bringing in her documents  8) Psychoeducation: Risks and benefits discussed with patient and she is in agreement with treatment  
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 Ryan Sparrow, h/o prescription sedative abuse, multiple prior overdoses (patient denies suicide attempts but contradicts chart), initially bib EMS after son found her with altered mental status after having taken more than prescribed ambien in addition to drinking alcohol. Patient states alcohol consumption was accidental. Patient was admitted to [facility] briefly due to abnormal EKG but has since been medically cleared and transferred to our unit for ongoing treatment. Son strongly advocated for inpatient treatment due to concerns about dangerousness to self given her repeated patterns of unintentional overdose and patient minimizing need for treatment. Demonstrated poorly controlled anxiety since arrival though with mild improvement. Continues to have poor sleep. Declines addiction treatment. r/o dependent personality d/o    10/14 Clinical Update: Pt reports stable mood. Reports still having soft stools, denies watery diarrhea. Will add pepto bismol PRN. Start biotene prn for dry mouth and flonase for congestion.   10/15: Improving but anxiety persists, will titrate sertraline to 150mg, place PPD for placement.  10/16: Anxious about possible assisted living placement, continue current meds.    Plan:    1) Disposition: patient's son wanting patient to go to inpatient rehab but patient declining; patient wanting to go to residential  - continue inpatient care due to dangerousness to self  2) Legal status: 2PC  3) Psychotropic medications:  - continue zoloft 125mg po daily for depression/anxiety - increase to 150mg as of 10/16  - continue abilify 5mg po daily due to possible new onset AH   - discontinued seroquel 25mg po qhs for insomnia due to lack of benefit  - discontinued remeron 7.5mg po qhs to avoid polypharmacy  - increase melatonin to 6mg po qhs for insomnia  - continue gabapentin 800mg po qhs for insomnia/anxiety at night   - can give additional gabapentin 300mg prn for anxiety but not to be given within 3 hours of HS gabapentin  - continue trazodone 75mg hs prn for insomnia  4) Non-pharmacologic interventions:  - individual, group, milieu therapy as appropriate  5) Medical comorbidities:  - CAD: continue brillinta 60mg po bid, continue ASA 81mg po daily  - HTN: continue metoprolol ER 25mg po daily  - HLD: continue statin  - GERD continue pepcid 20mg po daily  - hypothyroidism: continue synthroid 112mcg po daily  6) Work up:  - will eventually need MOCA due to recall deficits  7) Social issues: son involved, advocating for addiction treatment,  has been somewhat resistant to bringing in her documents  8) Psychoeducation: Risks and benefits discussed with patient and she is in agreement with treatment  
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 Ryan Sparrow, h/o prescription sedative abuse, multiple prior overdoses (patient denies suicide attempts but contradicts chart), initially bib EMS after son found her with altered mental status after having taken more than prescribed ambien in addition to drinking alcohol. Patient states alcohol consumption was accidental. Patient was admitted to [facility] briefly due to abnormal EKG but has since been medically cleared and transferred to our unit for ongoing treatment. Son strongly advocated for inpatient treatment due to concerns about dangerousness to self given her repeated patterns of unintentional overdose and patient minimizing need for treatment. Demonstrated poorly controlled anxiety since arrival though with mild improvement. Continues to have poor sleep. Declines addiction treatment. r/o dependent personality d/o    10/14 Clinical Update: Pt reports stable mood. Reports still having soft stools, denies watery diarrhea. Will add pepto bismol PRN. Start biotene prn for dry mouth and flonase for congestion.     Plan:    1) Disposition: patient's son wanting patient to go to inpatient rehab but patient declining; patient wanting to go to [facility]  - continue inpatient care due to dangerousness to self  2) Legal status: 2PC  3) Psychotropic medications:  - continue zoloft 125mg po daily for depression/anxiety - can increase to 150mg next week  - continue abilify 5mg po daily due to possible new onset AH   - discontinued seroquel 25mg po qhs for insomnia due to lack of benefit  - discontinued remeron 7.5mg po qhs to avoid polypharmacy  - increase melatonin to 6mg po qhs for insomnia  - continue gabapentin 800mg po qhs for insomnia/anxiety at night   - can give additional gabapentin 300mg prn for anxiety but not to be given within 3 hours of HS gabapentin  - continue trazodone 75mg hs prn for insomnia  4) Non-pharmacologic interventions:  - individual, group, milieu therapy as appropriate  5) Medical comorbidities:  - CAD: continue brillinta 60mg po bid, continue ASA 81mg po daily  - HTN: continue metoprolol ER 25mg po daily  - HLD: continue statin  - GERD continue pepcid 20mg po daily  - hypothyroidism: continue synthroid 112mcg po daily  6) Work up:  - will eventually need MOCA due to recall deficits  7) Social issues: son involved, advocating for addiction treatment,  has been somewhat resistant to bringing in her documents  8) Psychoeducation: Risks and benefits discussed with patient and she is in agreement with treatment  
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 Ryan Sparrow, h/o prescription sedative abuse, multiple prior overdoses (patient denies suicide attempts but contradicts chart), initially bib EMS after son found her with altered mental status after having taken more than prescribed ambien in addition to drinking alcohol. Patient states alcohol consumption was accidental. Patient was admitted to [facility] briefly due to abnormal EKG but has since been medically cleared and transferred to our unit for ongoing treatment. Son strongly advocated for inpatient treatment due to concerns about dangerousness to self given her repeated patterns of unintentional overdose and patient minimizing need for treatment. Has demonstrated poorly controlled anxiety since arrival with particular distress about poor sleep. Continues to decline addiction treatment.     Plan:    1) Disposition: patient's son wanting patient to go to inpatient rehab but patient declining; patient wanting to return to own home with private home health  - continue inpatient care due to dangerousness to self  2) Legal status: 2PC  3) Psychotropic medications:  - continue zoloft 100mg po daily for depression/anxiety  - continue abilify 2mg po daily due to possible new onset AH   - discontinued seroquel 25mg po qhs for insomnia due to lack of benefit  - discontinued remeron 7.5mg po qhs to avoid polypharmacy  - continue melatonin 3mg po qhs for insomnia  - continue gabapentin 400mg po qhs for insomnia/anxiety particularly at night   - can give additional gabapentin 400mg prn for anxiety   - continuee trazodone 75mg hs prn for insomnia  4) Non-pharmacologic interventions:  - individual, group, milieu therapy as appropriate  5) Medical comorbidities:  - CAD: continue brillinta 60mg po bid, continue ASA 81mg po daily  - HTN: continue metoprolol ER 25mg po daily  - HLD: continue statin  - GERD continue pepcid 20mg po daily  - hypothyroidism: continue synthroid 112mcg po daily  6) Work up:  - will eventually need MOCA due to recall deficits  7) Social issues: son involved, advocating for addiction treatment  8) Psychoeducation: Risks and benefits discussed with patient and she is in agreement with treatment  
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 Ryan Sparrow, h/o prescription sedative abuse, multiple prior overdoses (patient denies suicide attempts but contradicts chart), initially bib EMS after son found her with altered mental status after having taken more than prescribed ambien in addition to drinking alcohol. Patient states alcohol consumption was accidental. Patient was admitted to [facility] briefly due to abnormal EKG but has since been medically cleared and transferred to our unit for ongoing treatment. Son strongly advocated for inpatient treatment due to concerns about dangerousness to self given her repeated patterns of unintentional overdose and patient minimizing need for treatment. Demonstrated poorly controlled anxiety since arrival though with mild improvement. Continues to have poor sleep. Declines addiction treatment. r/o dependent personality d/o    10/14 Clinical Update: Pt reports stable mood. Reports still having soft stools, denies watery diarrhea. Will add pepto bismol PRN. Start biotene prn for dry mouth and flonase for congestion.   10/15: Improving but anxiety persists, will titrate sertraline to 150mg, place PPD for placement.  10/16: Anxious about possible assisted living placement, continue current meds.  10/18: Reports anxiety improved, less depressed, still hearing music intermittently; increase abilify to 7.5mg po daily and change trazodone to remeron  10/21: mood improvement ongoing, sleep somewhat improved as well, tolerating medications well  10/22: sustained improvement, slept well, discharge tomorrow    Plan:    1) Disposition: patient's son wanting patient to go to inpatient rehab but patient declining; patient wanting to go to [facility]  - continue inpatient care due to dangerousness to self  2) Legal status: 2PC  3) Psychotropic medications:  - continue zoloft 125mg po daily for depression/anxiety - increase to 150mg as of 10/16  - continue abilify 5mg po daily due to possible new onset AH   - discontinued seroquel 25mg po qhs for insomnia due to lack of benefit  - discontinued remeron 7.5mg po qhs to avoid polypharmacy  - increase melatonin to 6mg po qhs for insomnia  - continue gabapentin 800mg po qhs for insomnia/anxiety at night   - can give additional gabapentin 300mg prn for anxiety but not to be given within 3 hours of HS gabapentin  - continue trazodone 75mg hs prn for insomnia  4) Non-pharmacologic interventions:  - individual, group, milieu therapy as appropriate  5) Medical comorbidities:  - CAD: continue brillinta 60mg po bid, continue ASA 81mg po daily  - HTN: continue metoprolol ER 25mg po daily  - HLD: continue statin  - GERD continue pepcid 20mg po daily  - hypothyroidism: continue synthroid 112mcg po daily  6) Work up:  - will eventually need MOCA due to recall deficits  7) Social issues: son involved, advocating for addiction treatment,  has been somewhat resistant to bringing in her documents  8) Psychoeducation: Risks and benefits discussed with patient and she is in agreement with treatment  
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 Ryan Sparrow, h/o prescription sedative abuse, multiple prior overdoses (patient denies suicide attempts but contradicts chart), initially bib EMS after son found her with altered mental status after having taken more than prescribed ambien in addition to drinking alcohol. Patient states alcohol consumption was accidental. Patient was admitted to [facility] briefly due to abnormal EKG but has since been medically cleared and transferred to our unit for ongoing treatment. Son strongly advocated for inpatient treatment due to concerns about dangerousness to self given her repeated patterns of unintentional overdose and patient minimizing need for treatment. Has demonstrated poorly controlled anxiety since arrival with particular distress about poor sleep. Continues to decline addiction treatment.     Plan:    1) Disposition: patient's son wanting patient to go to inpatient rehab but patient declining; patient wanting to return to own home with private home health  - continue inpatient care due to dangerousness to self  2) Legal status: 2PC  3) Psychotropic medications:  - increase zoloft to 100mg po daily for depression/anxiety with possible plan to add abilify  - discontinued seroquel 25mg po qhs for insomnia due to lack of benefit  - discontinued remeron 7.5mg po qhs to avoid polypharmacy  - continue melatonin 3mg po qhs for insomnia  - continue gabapentin 400mg po qhs for insomnia/anxiety particularly at night   - can give additional gabapentin 400mg prn for anxiety   - increase trazodone to 75mg hs prn for insomnia  4) Non-pharmacologic interventions:  - individual, group, milieu therapy as appropriate  5) Medical comorbidities:  - CAD: continue brillinta 60mg po bid, continue ASA 81mg po daily  - HTN: continue metoprolol ER 25mg po daily  - HLD: continue statin  - GERD continue pepcid 20mg po daily  - hypothyroidism: continue synthroid 112mcg po daily  6) Work up:  - will eventually need MOCA due to recall deficits  7) Social issues: son involved, advocating for addiction treatment  8) Psychoeducation: Risks and benefits discussed with patient and she is in agreement with treatment  
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 Ryan Sparrow, h/o prescription sedative abuse, multiple prior overdoses (patient denies suicide attempts but contradicts chart), initially bib EMS after son found her with altered mental status after having taken more than prescribed ambien in addition to drinking alcohol. Patient states alcohol consumption was accidental. Patient was admitted to [facility] briefly due to abnormal EKG but has since been medically cleared and transferred to our unit for ongoing treatment. Son strongly advocated for inpatient treatment due to concerns about dangerousness to self given her repeated patterns of unintentional overdose and patient minimizing need for treatment. Has demonstrated poorly controlled anxiety since arrival with particular distress about poor sleep. Continues to decline addiction treatment.     Plan:    1) Disposition: patient's son wanting patient to go to inpatient rehab but patient declining; patient wanting to return to own home with private home health  - continue inpatient care due to dangerousness to self  2) Legal status: 2PC  3) Psychotropic medications:  - increase zoloft to 125mg po daily for depression/anxiety  - continue abilify 5mg po daily due to possible new onset AH   - discontinued seroquel 25mg po qhs for insomnia due to lack of benefit  - discontinued remeron 7.5mg po qhs to avoid polypharmacy  - continue melatonin 3mg po qhs for insomnia  - increase gabapentin to 600mg po qhs for insomnia/anxiety particularly at night   - can give additional gabapentin 300mg prn for anxiety   - continue trazodone 75mg hs prn for insomnia  4) Non-pharmacologic interventions:  - individual, group, milieu therapy as appropriate  5) Medical comorbidities:  - CAD: continue brillinta 60mg po bid, continue ASA 81mg po daily  - HTN: continue metoprolol ER 25mg po daily  - HLD: continue statin  - GERD continue pepcid 20mg po daily  - hypothyroidism: continue synthroid 112mcg po daily  6) Work up:  - will eventually need MOCA due to recall deficits  7) Social issues: son involved, advocating for addiction treatment  8) Psychoeducation: Risks and benefits discussed with patient and she is in agreement with treatment  
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 Ryan Sparrow, h/o prescription sedative abuse, multiple prior overdoses (patient denies suicide attempts but contradicts chart), initially bib EMS after son found her with altered mental status after having taken more than prescribed ambien in addition to drinking alcohol. Patient states alcohol consumption was accidental. Patient was admitted to [facility] briefly due to abnormal EKG but has since been medically cleared and transferred to our unit for ongoing treatment. Son strongly advocated for inpatient treatment due to concerns about dangerousness to self given her repeated patterns of unintentional overdose and patient minimizing need for treatment. Demonstrated poorly controlled anxiety since arrival though with mild improvement. Continues to have poor sleep. Declines addiction treatment. r/o dependent personality d/o    10/14 Clinical Update: Pt reports stable mood. Reports still having soft stools, denies watery diarrhea. Will add pepto bismol PRN. Start biotene prn for dry mouth and flonase for congestion.   10/15: Improving but anxiety persists, will titrate sertraline to 150mg, place PPD for placement.  10/16: Anxious about possible assisted living placement, continue current meds.    Plan:    1) Disposition: patient's son wanting patient to go to inpatient rehab but patient declining; patient wanting to go to senior living  - continue inpatient care due to dangerousness to self  2) Legal status: 2PC  3) Psychotropic medications:  - continue zoloft 125mg po daily for depression/anxiety - increase to 150mg as of 10/16  - continue abilify 5mg po daily due to possible new onset AH   - discontinued seroquel 25mg po qhs for insomnia due to lack of benefit  - discontinued remeron 7.5mg po qhs to avoid polypharmacy  - increase melatonin to 6mg po qhs for insomnia  - continue gabapentin 800mg po qhs for insomnia/anxiety at night   - can give additional gabapentin 300mg prn for anxiety but not to be given within 3 hours of HS gabapentin  - continue trazodone 75mg hs prn for insomnia  4) Non-pharmacologic interventions:  - individual, group, milieu therapy as appropriate  5) Medical comorbidities:  - CAD: continue brillinta 60mg po bid, continue ASA 81mg po daily  - HTN: continue metoprolol ER 25mg po daily  - HLD: continue statin  - GERD continue pepcid 20mg po daily  - hypothyroidism: continue synthroid 112mcg po daily  6) Work up:  - will eventually need MOCA due to recall deficits  7) Social issues: son involved, advocating for addiction treatment,  has been somewhat resistant to bringing in her documents  8) Psychoeducation: Risks and benefits discussed with patient and she is in agreement with treatment  
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 Ryan Sparrow, h/o prescription sedative abuse, multiple prior overdoses (patient denies suicide attempts but contradicts chart), initially bib EMS after son found her with altered mental status after having taken more than prescribed ambien in addition to drinking alcohol. Patient states alcohol consumption was accidental. Patient was admitted to [facility] briefly due to abnormal EKG but has since been medically cleared and transferred to our unit for ongoing treatment. Son strongly advocated for inpatient treatment due to concerns about dangerousness to self given her repeated patterns of unintentional overdose and patient minimizing need for treatment.     Plan:    1) Disposition: patient's son wanting patient to go to inpatient rehab but patient declining; patient wanting to return to own home with private home health  - continue inpatient care due to dangerousness to self  2) Legal status: 2PC  3) Psychotropic medications:  - decrease lexapro to 5mg po daily with plan to taper off, patient was on 20mg/day prior to admission  - change seroquel to hs dosing - 25mg po qhs due to patient requesting it for insomnia  - decrease remeron to 7.5mg po qhs to avoid polypharmacy  - continue melatonin 3mg po qhs for insomnia  - continue gabapentin 400mg po bid for anxiety, can give additional 100mg prn for anxiety or insomnia  - left msg for outpatient psychiatrist Dr. Sparrow to review past med trials, consider abilify  4) Non-pharmacologic interventions:  - individual, group, milieu therapy as appropriate  5) Medical comorbidities:  - CAD: restart brillinta 60mg po bid, continue ASA 81mg po daily  - HTN: continue metoprolol ER 25mg po daily  - HLD: continue statin  - GERD continue pepcid 20mg po daily  - hypothyroidism: continue synthroid 112mcg po daily  6) Work up:  - will eventually need MOCA due to recall deficits  7) Social issues: connect with son to discuss disposition as patient refusing   8) Psychoeducation: Risks and benefits discussed with patient - declining change in antidepressant despite lack of benefit from lexapro  
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 Ryan Sparrow, h/o prescription sedative abuse, multiple prior overdoses (patient denies suicide attempts but contradicts chart), initially bib EMS after son found her with altered mental status after having taken more than prescribed ambien in addition to drinking alcohol. Patient states alcohol consumption was accidental. Patient was admitted to [facility] briefly due to abnormal EKG but has since been medically cleared and transferred to our unit for ongoing treatment. Son strongly advocated for inpatient treatment due to concerns about dangerousness to self given her repeated patterns of unintentional overdose and patient minimizing need for treatment. Has demonstrated poorly controlled anxiety since arrival with particular distress about poor sleep.     Plan:    1) Disposition: patient's son wanting patient to go to inpatient rehab but patient declining; patient wanting to return to own home with private home health  - continue inpatient care due to dangerousness to self  2) Legal status: 2P  3) Psychotropic medications:  - c/w decreased lexapro 5mg po daily with plan to taper off, patient was on 20mg/day prior to admission  - start zoloft 25mg po daily for depression/anxiety to replace lexapro with plan to add abilify  - continue seroquel 25mg po qhs for insomnia, anxiety  - c/w decreased dose of remeron 7.5mg po qhs with plan to taper off to avoid polypharmacy  - continue melatonin 3mg po qhs for insomnia  - discontinued gabapentin 400mg po bid for anxiety due to patient wish to d/c and changed to 600mg po qhs for insomnia  - can give additional gabapentin 400mg prn for anxiety   - reduce trazodone prn for insomnia to 25mg given dose increase of gabapentin   - left msg for outpatient psychiatrist Dr. Sparrow to review past med trials  4) Non-pharmacologic interventions:  - individual, group, milieu therapy as appropriate  5) Medical comorbidities:  - CAD: restart brillinta 60mg po bid, continue ASA 81mg po daily  - HTN: continue metoprolol ER 25mg po daily  - HLD: continue statin  - GERD continue pepcid 20mg po daily  - hypothyroidism: continue synthroid 112mcg po daily  6) Work up:  - will eventually need MOCA due to recall deficits  7) Social issues: connect with son to discuss disposition as patient refusing   8) Psychoeducation: Risks and benefits discussed with patient and she is in agreement with treatment  
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 Ryan Sparrow, h/o prescription sedative abuse, multiple prior overdoses (patient denies suicide attempts but contradicts chart), initially bib EMS after son found her with altered mental status after having taken more than prescribed ambien in addition to drinking alcohol. Patient states alcohol consumption was accidental. Patient was admitted to [facility] briefly due to abnormal EKG but has since been medically cleared and transferred to our unit for ongoing treatment. Son strongly advocated for inpatient treatment due to concerns about dangerousness to self given her repeated patterns of unintentional overdose and patient minimizing need for treatment. Demonstrated poorly controlled anxiety since arrival though with mild improvement. Continues to have poor sleep. Declines addiction treatment. r/o dependent personality d/o    10/14 Clinical Update: Pt reports stable mood. Reports still having soft stools, denies watery diarrhea. Will add pepto bismol PRN. Start biotene prn for dry mouth and flonase for congestion.   10/15: Improving but anxiety persists, will titrate sertraline to 150mg, place PPD for placement.    Plan:    1) Disposition: patient's son wanting patient to go to inpatient rehab but patient declining; patient wanting to go to assisted  - continue inpatient care due to dangerousness to self  2) Legal status: 2PC  3) Psychotropic medications:  - continue zoloft 125mg po daily for depression/anxiety - increase to 150mg as of 10/16  - continue abilify 5mg po daily due to possible new onset AH   - discontinued seroquel 25mg po qhs for insomnia due to lack of benefit  - discontinued remeron 7.5mg po qhs to avoid polypharmacy  - increase melatonin to 6mg po qhs for insomnia  - continue gabapentin 800mg po qhs for insomnia/anxiety at night   - can give additional gabapentin 300mg prn for anxiety but not to be given within 3 hours of HS gabapentin  - continue trazodone 75mg hs prn for insomnia  4) Non-pharmacologic interventions:  - individual, group, milieu therapy as appropriate  5) Medical comorbidities:  - CAD: continue brillinta 60mg po bid, continue ASA 81mg po daily  - HTN: continue metoprolol ER 25mg po daily  - HLD: continue statin  - GERD continue pepcid 20mg po daily  - hypothyroidism: continue synthroid 112mcg po daily  6) Work up:  - will eventually need MOCA due to recall deficits  7) Social issues: son involved, advocating for addiction treatment,  has been somewhat resistant to bringing in her documents  8) Psychoeducation: Risks and benefits discussed with patient and she is in agreement with treatment  
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 Ryan Sparrow, h/o prescription sedative abuse, multiple prior overdoses (patient denies suicide attempts but contradicts chart), initially bib EMS after son found her with altered mental status after having taken more than prescribed ambien in addition to drinking alcohol. Patient states alcohol consumption was accidental. Patient was admitted to [facility] briefly due to abnormal EKG but has since been medically cleared and transferred to our unit for ongoing treatment. Son strongly advocated for inpatient treatment due to concerns about dangerousness to self given her repeated patterns of unintentional overdose and patient minimizing need for treatment. Demonstrated poorly controlled anxiety since arrival though with mild improvement. Continues to have poor sleep. Declines addiction treatment. r/o dependent personality d/o    Plan:    1) Disposition: patient's son wanting patient to go to inpatient rehab but patient declining; patient wanting to go to nursing home  - continue inpatient care due to dangerousness to self  2) Legal status: 2PC  3) Psychotropic medications:  - increased zoloft to 125mg po daily for depression/anxiety  - continue abilify 5mg po daily due to possible new onset AH   - discontinued seroquel 25mg po qhs for insomnia due to lack of benefit  - discontinued remeron 7.5mg po qhs to avoid polypharmacy  - continue melatonin 3mg po qhs for insomnia  - increase gabapentin to 800mg po qhs for insomnia/anxiety particularly at night   - can give additional gabapentin 300mg prn for anxiety but not to be given within 3 hours of HS gabapentin  - continue trazodone 75mg hs prn for insomnia  4) Non-pharmacologic interventions:  - individual, group, milieu therapy as appropriate  5) Medical comorbidities:  - CAD: continue brillinta 60mg po bid, continue ASA 81mg po daily  - HTN: continue metoprolol ER 25mg po daily  - HLD: continue statin  - GERD continue pepcid 20mg po daily  - hypothyroidism: continue synthroid 112mcg po daily  6) Work up:  - will eventually need MOCA due to recall deficits  7) Social issues: son involved, advocating for addiction treatment,  has been somewhat resistant to bringing in her documents  8) Psychoeducation: Risks and benefits discussed with patient and she is in agreement with treatment  
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 Ryan Sparrow, h/o prescription sedative abuse, multiple prior overdoses (patient denies suicide attempts but contradicts chart), initially bib EMS after son found her with altered mental status after having taken more than prescribed ambien in addition to drinking alcohol. Patient states alcohol consumption was accidental. Patient was admitted to [facility] briefly due to abnormal EKG but has since been medically cleared and transferred to our unit for ongoing treatment. Son strongly advocated for inpatient treatment due to concerns about dangerousness to self given her repeated patterns of unintentional overdose and patient minimizing need for treatment. Has demonstrated poorly controlled anxiety since arrival with particular distress about poor sleep. Continues to decline addiction treatment.     Plan:    1) Disposition: patient's son wanting patient to go to inpatient rehab but patient declining; patient wanting to return to own home with private home health  - continue inpatient care due to dangerousness to self  2) Legal status: 2PC  3) Psychotropic medications:  - continue zoloft 100mg po daily for depression/anxiety  - continue abilify 2mg po daily due to possible new onset AH   - discontinued seroquel 25mg po qhs for insomnia due to lack of benefit  - discontinued remeron 7.5mg po qhs to avoid polypharmacy  - continue melatonin 3mg po qhs for insomnia  - continue gabapentin 400mg po qhs for insomnia/anxiety particularly at night   - can give additional gabapentin 400mg prn for anxiety   - continuee trazodone 75mg hs prn for insomnia  4) Non-pharmacologic interventions:  - individual, group, milieu therapy as appropriate  5) Medical comorbidities:  - CAD: continue brillinta 60mg po bid, continue ASA 81mg po daily  - HTN: continue metoprolol ER 25mg po daily  - HLD: continue statin  - GERD continue pepcid 20mg po daily  - hypothyroidism: continue synthroid 112mcg po daily  6) Work up:  - will eventually need MOCA due to recall deficits  7) Social issues: son involved, advocating for addiction treatment  8) Psychoeducation: Risks and benefits discussed with patient and she is in agreement with treatment  
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 Ryan Sparrow, h/o prescription sedative abuse, multiple prior overdoses (patient denies suicide attempts but contradicts chart), initially bib EMS after son found her with altered mental status after having taken more than prescribed ambien in addition to drinking alcohol. Patient states alcohol consumption was accidental. Patient was admitted to [facility] briefly due to abnormal EKG but has since been medically cleared and transferred to our unit for ongoing treatment. Son strongly advocated for inpatient treatment due to concerns about dangerousness to self given her repeated patterns of unintentional overdose and patient minimizing need for treatment. Demonstrated poorly controlled anxiety since arrival though with mild improvement. Continues to have poor sleep. Declines addiction treatment. r/o dependent personality d/o    10/14 Clinical Update: Pt reports stable mood. Reports still having soft stools, denies watery diarrhea. Will add pepto bismol PRN. Start biotene prn for dry mouth and flonase for congestion.   10/15: Improving but anxiety persists, will titrate sertraline to 150mg, place PPD for placement.  10/16: Anxious about possible assisted living placement, continue current meds.  10/18: Reports anxiety improved, less depressed, still hearing music intermittently; increase abilify to 7.5mg po daily and change trazodone to remeron  10/19: mood improvement ongoing, sleep somewhat improved as well, tolerating medications well    Plan:    1) Disposition: patient's son wanting patient to go to inpatient rehab but patient declining; patient wanting to go to [facility]  - continue inpatient care due to dangerousness to self  2) Legal status: 2PC  3) Psychotropic medications:  - continue zoloft 125mg po daily for depression/anxiety - increase to 150mg as of 10/16  - continue abilify 5mg po daily due to possible new onset AH   - discontinued seroquel 25mg po qhs for insomnia due to lack of benefit  - discontinued remeron 7.5mg po qhs to avoid polypharmacy  - increase melatonin to 6mg po qhs for insomnia  - continue gabapentin 800mg po qhs for insomnia/anxiety at night   - can give additional gabapentin 300mg prn for anxiety but not to be given within 3 hours of HS gabapentin  - continue trazodone 75mg hs prn for insomnia  4) Non-pharmacologic interventions:  - individual, group, milieu therapy as appropriate  5) Medical comorbidities:  - CAD: continue brillinta 60mg po bid, continue ASA 81mg po daily  - HTN: continue metoprolol ER 25mg po daily  - HLD: continue statin  - GERD continue pepcid 20mg po daily  - hypothyroidism: continue synthroid 112mcg po daily  6) Work up:  - will eventually need MOCA due to recall deficits  7) Social issues: son involved, advocating for addiction treatment,  has been somewhat resistant to bringing in her documents  8) Psychoeducation: Risks and benefits discussed with patient and she is in agreement with treatment  
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 Ryan Sparrow, h/o prescription sedative abuse, multiple prior overdoses (patient denies suicide attempts but contradicts chart), initially bib EMS after son found her with altered mental status after having taken more than prescribed ambien in addition to drinking alcohol. Patient states alcohol consumption was accidental. Patient was admitted to [facility] briefly due to abnormal EKG but has since been medically cleared and transferred to our unit for ongoing treatment. Son strongly advocated for inpatient treatment due to concerns about dangerousness to self given her repeated patterns of unintentional overdose and patient minimizing need for treatment. Has demonstrated poorly controlled anxiety since arrival with particular distress about poor sleep. Continues to decline addiction treatment.     Plan:    1) Disposition: patient's son wanting patient to go to inpatient rehab but patient declining; patient wanting to return to own home with private home health  - continue inpatient care due to dangerousness to self  2) Legal status: 2PC  3) Psychotropic medications:  - continue zoloft 75mg po daily for depression/anxiety with possible plan to add abilify  - discontinue seroquel 25mg po qhs for insomnia due to lack of benefit  - discontinued remeron 7.5mg po qhs to avoid polypharmacy  - continue melatonin 3mg po qhs for insomnia  - continue gabapentin 600mg po qhs for insomnia  - can give additional gabapentin 400mg prn for anxiety   - continue trazodone 50mg hs prn for insomnia   - left msg for outpatient psychiatrist Dr. Sparrow to review past med trials  4) Non-pharmacologic interventions:  - individual, group, milieu therapy as appropriate  5) Medical comorbidities:  - CAD: continue brillinta 60mg po bid, continue ASA 81mg po daily  - HTN: continue metoprolol ER 25mg po daily  - HLD: continue statin  - GERD continue pepcid 20mg po daily  - hypothyroidism: continue synthroid 112mcg po daily  6) Work up:  - will eventually need MOCA due to recall deficits  7) Social issues: son involved, advocating for addiction treatment  8) Psychoeducation: Risks and benefits discussed with patient and she is in agreement with treatment

## 2024-10-22 NOTE — BH INPATIENT PSYCHIATRY PROGRESS NOTE - NSTXDCOTHRGOAL_PSY_ALL_CORE
pt will improve mental status in order to effectively engage with dc planning.

## 2024-10-22 NOTE — BH INPATIENT PSYCHIATRY PROGRESS NOTE - MSE OPTIONS
Unstructured MSE
Structured MSE

## 2024-10-22 NOTE — BH INPATIENT PSYCHIATRY PROGRESS NOTE - NSBHFUPINTERVALCCFT_PSY_A_CORE
anxiety
anxiety
depression
depressed mood
depressed mood
anxiety
insomnia
anxious
depressed
" I should be leaving soon"
anxiety, "I've been ruminating about it (referring to intermediate)."
anxiety
"I'm not depressed"
improved mood
anxiety
depressed mood
anxiety
depressed mood
anxiety
anxiety
"I'm anxious."

## 2024-10-22 NOTE — BH INPATIENT PSYCHIATRY PROGRESS NOTE - NSBHATTESTTYPEVISIT_PSY_A_CORE
Attending Only

## 2024-10-22 NOTE — BH INPATIENT PSYCHIATRY PROGRESS NOTE - NSTXDEPRESGOAL_PSY_ALL_CORE
Will identify 2 coping skills that assist in improving mood

## 2024-10-22 NOTE — BH INPATIENT PSYCHIATRY PROGRESS NOTE - NSTXSUBMISGOAL_PSY_ALL_CORE
Will verbalize 3 adverse consequences of use

## 2024-10-22 NOTE — BH INPATIENT PSYCHIATRY PROGRESS NOTE - NSTXDCOTHRDATEEST_PSY_ALL_CORE
26-Sep-2024

## 2024-10-22 NOTE — [facility] INPATIENT PSYCHIATRY PROGRESS NOTE - CURRENT MEDICATION
MEDICATIONS  (STANDING):  aspirin enteric coated 81 milliGRAM(s) Oral daily  atorvastatin 80 milliGRAM(s) Oral at bedtime  escitalopram 5 milliGRAM(s) Oral daily  famotidine    Tablet 20 milliGRAM(s) Oral daily  gabapentin 600 milliGRAM(s) Oral at bedtime  levothyroxine 112 MICROGram(s) Oral daily  melatonin. 3 milliGRAM(s) Oral at bedtime  metoprolol succinate ER 25 milliGRAM(s) Oral daily  mirtazapine 7.5 milliGRAM(s) Oral at bedtime  QUEtiapine 25 milliGRAM(s) Oral at bedtime  sertraline 25 milliGRAM(s) Oral daily  ticagrelor 60 milliGRAM(s) Oral every 12 hours    MEDICATIONS  (PRN):  acetaminophen     Tablet .. 650 milliGRAM(s) Oral every 6 hours PRN Temp greater or equal to 38C (100.4F), Mild Pain (1 - 3)  aluminum hydroxide/magnesium hydroxide/simethicone Suspension 30 milliLiter(s) Oral every 6 hours PRN Dyspepsia  gabapentin 400 milliGRAM(s) Oral three times a day PRN anxiety  hydrOXYzine hydrochloride 25 milliGRAM(s) Oral every 8 hours PRN anxiety if gabapentin ineffective  loperamide 2 milliGRAM(s) Oral two times a day PRN diarrhea  
MEDICATIONS  (STANDING):  ARIPiprazole 5 milliGRAM(s) Oral daily  aspirin enteric coated 81 milliGRAM(s) Oral daily  atorvastatin 80 milliGRAM(s) Oral at bedtime  fluticasone propionate 50 MICROgram(s)/spray Nasal Spray 1 Spray(s) Both Nostrils two times a day  gabapentin 800 milliGRAM(s) Oral at bedtime  levothyroxine 112 MICROGram(s) Oral daily  melatonin. 6 milliGRAM(s) Oral at bedtime  metoprolol succinate ER 25 milliGRAM(s) Oral daily  mirtazapine 15 milliGRAM(s) Oral at bedtime  pantoprazole    Tablet 40 milliGRAM(s) Oral before breakfast  sertraline 150 milliGRAM(s) Oral daily  ticagrelor 60 milliGRAM(s) Oral every 12 hours    MEDICATIONS  (PRN):  acetaminophen     Tablet .. 650 milliGRAM(s) Oral every 6 hours PRN Temp greater or equal to 38C (100.4F), Mild Pain (1 - 3)  aluminum hydroxide/magnesium hydroxide/simethicone Suspension 30 milliLiter(s) Oral every 6 hours PRN Dyspepsia  artificial  tears Solution 1 Drop(s) Both EYES every 2 hours PRN eye itchiness  Biotene Dry Mouth Oral Rinse 5 milliLiter(s) Swish and Spit every 6 hours PRN dry mouth  bismuth subsalicylate Liquid 30 milliLiter(s) Oral every 8 hours PRN diarrhea  gabapentin 300 milliGRAM(s) Oral three times a day PRN anxiety  loperamide 2 milliGRAM(s) Oral every 6 hours PRN diarrhea  loperamide 2 milliGRAM(s) Oral every 4 hours PRN diarrhea  simethicone 80 milliGRAM(s) Chew two times a day PRN gas  
MEDICATIONS  (STANDING):  aspirin enteric coated 81 milliGRAM(s) Oral daily  atorvastatin 80 milliGRAM(s) Oral at bedtime  gabapentin 600 milliGRAM(s) Oral at bedtime  levothyroxine 112 MICROGram(s) Oral daily  melatonin. 3 milliGRAM(s) Oral at bedtime  metoprolol succinate ER 25 milliGRAM(s) Oral daily  pantoprazole    Tablet 40 milliGRAM(s) Oral before breakfast  QUEtiapine 25 milliGRAM(s) Oral at bedtime  sertraline 75 milliGRAM(s) Oral daily  ticagrelor 60 milliGRAM(s) Oral every 12 hours    MEDICATIONS  (PRN):  acetaminophen     Tablet .. 650 milliGRAM(s) Oral every 6 hours PRN Temp greater or equal to 38C (100.4F), Mild Pain (1 - 3)  aluminum hydroxide/magnesium hydroxide/simethicone Suspension 30 milliLiter(s) Oral every 6 hours PRN Dyspepsia  gabapentin 400 milliGRAM(s) Oral three times a day PRN anxiety  hydrOXYzine hydrochloride 25 milliGRAM(s) Oral every 8 hours PRN anxiety if gabapentin ineffective  loperamide 2 milliGRAM(s) Oral two times a day PRN diarrhea  traZODone 50 milliGRAM(s) Oral at bedtime PRN insomnia  
MEDICATIONS  (STANDING):  ARIPiprazole 2 milliGRAM(s) Oral daily  ARIPiprazole 2 milliGRAM(s) Oral once  aspirin enteric coated 81 milliGRAM(s) Oral daily  atorvastatin 80 milliGRAM(s) Oral at bedtime  gabapentin 400 milliGRAM(s) Oral at bedtime  levothyroxine 112 MICROGram(s) Oral daily  melatonin. 3 milliGRAM(s) Oral at bedtime  metoprolol succinate ER 25 milliGRAM(s) Oral daily  pantoprazole    Tablet 40 milliGRAM(s) Oral before breakfast  sertraline 100 milliGRAM(s) Oral daily  ticagrelor 60 milliGRAM(s) Oral every 12 hours    MEDICATIONS  (PRN):  acetaminophen     Tablet .. 650 milliGRAM(s) Oral every 6 hours PRN Temp greater or equal to 38C (100.4F), Mild Pain (1 - 3)  aluminum hydroxide/magnesium hydroxide/simethicone Suspension 30 milliLiter(s) Oral every 6 hours PRN Dyspepsia  gabapentin 400 milliGRAM(s) Oral three times a day PRN anxiety  loperamide 2 milliGRAM(s) Oral every 6 hours PRN diarrhea  traZODone 75 milliGRAM(s) Oral at bedtime PRN insomnia  
MEDICATIONS  (STANDING):  aspirin enteric coated 81 milliGRAM(s) Oral daily  atorvastatin 80 milliGRAM(s) Oral at bedtime  gabapentin 600 milliGRAM(s) Oral at bedtime  levothyroxine 112 MICROGram(s) Oral daily  melatonin. 3 milliGRAM(s) Oral at bedtime  metoprolol succinate ER 25 milliGRAM(s) Oral daily  pantoprazole    Tablet 40 milliGRAM(s) Oral before breakfast  QUEtiapine 25 milliGRAM(s) Oral at bedtime  sertraline 75 milliGRAM(s) Oral daily  ticagrelor 60 milliGRAM(s) Oral every 12 hours    MEDICATIONS  (PRN):  acetaminophen     Tablet .. 650 milliGRAM(s) Oral every 6 hours PRN Temp greater or equal to 38C (100.4F), Mild Pain (1 - 3)  aluminum hydroxide/magnesium hydroxide/simethicone Suspension 30 milliLiter(s) Oral every 6 hours PRN Dyspepsia  gabapentin 400 milliGRAM(s) Oral three times a day PRN anxiety  hydrOXYzine hydrochloride 25 milliGRAM(s) Oral every 8 hours PRN anxiety if gabapentin ineffective  loperamide 2 milliGRAM(s) Oral every 6 hours PRN diarrhea  traZODone 50 milliGRAM(s) Oral at bedtime PRN insomnia  
MEDICATIONS  (STANDING):  ARIPiprazole 5 milliGRAM(s) Oral daily  aspirin enteric coated 81 milliGRAM(s) Oral daily  atorvastatin 80 milliGRAM(s) Oral at bedtime  fluticasone propionate 50 MICROgram(s)/spray Nasal Spray 1 Spray(s) Both Nostrils two times a day  gabapentin 800 milliGRAM(s) Oral at bedtime  levothyroxine 112 MICROGram(s) Oral daily  melatonin. 6 milliGRAM(s) Oral at bedtime  metoprolol succinate ER 25 milliGRAM(s) Oral daily  pantoprazole    Tablet 40 milliGRAM(s) Oral before breakfast  sertraline 150 milliGRAM(s) Oral daily  ticagrelor 60 milliGRAM(s) Oral every 12 hours    MEDICATIONS  (PRN):  acetaminophen     Tablet .. 650 milliGRAM(s) Oral every 6 hours PRN Temp greater or equal to 38C (100.4F), Mild Pain (1 - 3)  aluminum hydroxide/magnesium hydroxide/simethicone Suspension 30 milliLiter(s) Oral every 6 hours PRN Dyspepsia  artificial  tears Solution 1 Drop(s) Both EYES every 2 hours PRN eye itchiness  Biotene Dry Mouth Oral Rinse 5 milliLiter(s) Swish and Spit every 6 hours PRN dry mouth  bismuth subsalicylate Liquid 30 milliLiter(s) Oral every 8 hours PRN diarrhea  gabapentin 300 milliGRAM(s) Oral three times a day PRN anxiety  loperamide 2 milliGRAM(s) Oral every 4 hours PRN diarrhea  loperamide 2 milliGRAM(s) Oral every 6 hours PRN diarrhea  traZODone 75 milliGRAM(s) Oral at bedtime PRN insomnia  
MEDICATIONS  (STANDING):  ARIPiprazole 5 milliGRAM(s) Oral daily  aspirin enteric coated 81 milliGRAM(s) Oral daily  atorvastatin 80 milliGRAM(s) Oral at bedtime  fluticasone propionate 50 MICROgram(s)/spray Nasal Spray 1 Spray(s) Both Nostrils two times a day  gabapentin 800 milliGRAM(s) Oral at bedtime  levothyroxine 112 MICROGram(s) Oral daily  melatonin. 6 milliGRAM(s) Oral at bedtime  metoprolol succinate ER 25 milliGRAM(s) Oral daily  pantoprazole    Tablet 40 milliGRAM(s) Oral before breakfast  sertraline 150 milliGRAM(s) Oral daily  ticagrelor 60 milliGRAM(s) Oral every 12 hours    MEDICATIONS  (PRN):  acetaminophen     Tablet .. 650 milliGRAM(s) Oral every 6 hours PRN Temp greater or equal to 38C (100.4F), Mild Pain (1 - 3)  aluminum hydroxide/magnesium hydroxide/simethicone Suspension 30 milliLiter(s) Oral every 6 hours PRN Dyspepsia  artificial  tears Solution 1 Drop(s) Both EYES every 2 hours PRN eye itchiness  Biotene Dry Mouth Oral Rinse 5 milliLiter(s) Swish and Spit every 6 hours PRN dry mouth  bismuth subsalicylate Liquid 30 milliLiter(s) Oral every 8 hours PRN diarrhea  gabapentin 300 milliGRAM(s) Oral three times a day PRN anxiety  loperamide 2 milliGRAM(s) Oral every 4 hours PRN diarrhea  loperamide 2 milliGRAM(s) Oral every 6 hours PRN diarrhea  simethicone 80 milliGRAM(s) Chew two times a day PRN gas  traZODone 75 milliGRAM(s) Oral at bedtime PRN insomnia  
MEDICATIONS  (STANDING):  aspirin enteric coated 81 milliGRAM(s) Oral daily  atorvastatin 80 milliGRAM(s) Oral at bedtime  escitalopram 5 milliGRAM(s) Oral daily  famotidine    Tablet 20 milliGRAM(s) Oral daily  gabapentin 600 milliGRAM(s) Oral at bedtime  levothyroxine 112 MICROGram(s) Oral daily  melatonin. 3 milliGRAM(s) Oral at bedtime  metoprolol succinate ER 25 milliGRAM(s) Oral daily  mirtazapine 7.5 milliGRAM(s) Oral at bedtime  QUEtiapine 25 milliGRAM(s) Oral at bedtime  sertraline 50 milliGRAM(s) Oral daily  ticagrelor 60 milliGRAM(s) Oral every 12 hours    MEDICATIONS  (PRN):  acetaminophen     Tablet .. 650 milliGRAM(s) Oral every 6 hours PRN Temp greater or equal to 38C (100.4F), Mild Pain (1 - 3)  aluminum hydroxide/magnesium hydroxide/simethicone Suspension 30 milliLiter(s) Oral every 6 hours PRN Dyspepsia  gabapentin 400 milliGRAM(s) Oral three times a day PRN anxiety  hydrOXYzine hydrochloride 25 milliGRAM(s) Oral every 8 hours PRN anxiety if gabapentin ineffective  loperamide 2 milliGRAM(s) Oral two times a day PRN diarrhea  
MEDICATIONS  (STANDING):  ARIPiprazole 5 milliGRAM(s) Oral daily  aspirin enteric coated 81 milliGRAM(s) Oral daily  atorvastatin 80 milliGRAM(s) Oral at bedtime  fluticasone propionate 50 MICROgram(s)/spray Nasal Spray 1 Spray(s) Both Nostrils two times a day  gabapentin 800 milliGRAM(s) Oral at bedtime  levothyroxine 112 MICROGram(s) Oral daily  melatonin. 6 milliGRAM(s) Oral at bedtime  metoprolol succinate ER 25 milliGRAM(s) Oral daily  mirtazapine 15 milliGRAM(s) Oral at bedtime  pantoprazole    Tablet 40 milliGRAM(s) Oral before breakfast  sertraline 150 milliGRAM(s) Oral daily  ticagrelor 60 milliGRAM(s) Oral every 12 hours    MEDICATIONS  (PRN):  acetaminophen     Tablet .. 650 milliGRAM(s) Oral every 6 hours PRN Temp greater or equal to 38C (100.4F), Mild Pain (1 - 3)  aluminum hydroxide/magnesium hydroxide/simethicone Suspension 30 milliLiter(s) Oral every 6 hours PRN Dyspepsia  artificial  tears Solution 1 Drop(s) Both EYES every 2 hours PRN eye itchiness  Biotene Dry Mouth Oral Rinse 5 milliLiter(s) Swish and Spit every 6 hours PRN dry mouth  bismuth subsalicylate Liquid 30 milliLiter(s) Oral every 8 hours PRN diarrhea  gabapentin 300 milliGRAM(s) Oral three times a day PRN anxiety  loperamide 2 milliGRAM(s) Oral every 6 hours PRN diarrhea  loperamide 2 milliGRAM(s) Oral every 4 hours PRN diarrhea  simethicone 80 milliGRAM(s) Chew two times a day PRN gas  
MEDICATIONS  (STANDING):  aspirin enteric coated 81 milliGRAM(s) Oral daily  atorvastatin 80 milliGRAM(s) Oral at bedtime  gabapentin 400 milliGRAM(s) Oral at bedtime  levothyroxine 112 MICROGram(s) Oral daily  melatonin. 3 milliGRAM(s) Oral at bedtime  metoprolol succinate ER 25 milliGRAM(s) Oral daily  pantoprazole    Tablet 40 milliGRAM(s) Oral before breakfast  sertraline 100 milliGRAM(s) Oral daily  ticagrelor 60 milliGRAM(s) Oral every 12 hours    MEDICATIONS  (PRN):  acetaminophen     Tablet .. 650 milliGRAM(s) Oral every 6 hours PRN Temp greater or equal to 38C (100.4F), Mild Pain (1 - 3)  aluminum hydroxide/magnesium hydroxide/simethicone Suspension 30 milliLiter(s) Oral every 6 hours PRN Dyspepsia  gabapentin 400 milliGRAM(s) Oral three times a day PRN anxiety  loperamide 2 milliGRAM(s) Oral every 6 hours PRN diarrhea  traZODone 75 milliGRAM(s) Oral at bedtime PRN insomnia  
MEDICATIONS  (STANDING):  aspirin enteric coated 81 milliGRAM(s) Oral daily  atorvastatin 80 milliGRAM(s) Oral at bedtime  gabapentin 400 milliGRAM(s) Oral at bedtime  levothyroxine 112 MICROGram(s) Oral daily  melatonin. 3 milliGRAM(s) Oral at bedtime  metoprolol succinate ER 25 milliGRAM(s) Oral daily  pantoprazole    Tablet 40 milliGRAM(s) Oral before breakfast  sertraline 75 milliGRAM(s) Oral daily  ticagrelor 60 milliGRAM(s) Oral every 12 hours    MEDICATIONS  (PRN):  acetaminophen     Tablet .. 650 milliGRAM(s) Oral every 6 hours PRN Temp greater or equal to 38C (100.4F), Mild Pain (1 - 3)  aluminum hydroxide/magnesium hydroxide/simethicone Suspension 30 milliLiter(s) Oral every 6 hours PRN Dyspepsia  gabapentin 400 milliGRAM(s) Oral three times a day PRN anxiety  hydrOXYzine hydrochloride 25 milliGRAM(s) Oral every 8 hours PRN anxiety if gabapentin ineffective  loperamide 2 milliGRAM(s) Oral every 6 hours PRN diarrhea  traZODone 50 milliGRAM(s) Oral at bedtime PRN insomnia  
MEDICATIONS  (STANDING):  ARIPiprazole 5 milliGRAM(s) Oral daily  aspirin enteric coated 81 milliGRAM(s) Oral daily  atorvastatin 80 milliGRAM(s) Oral at bedtime  gabapentin 400 milliGRAM(s) Oral at bedtime  levothyroxine 112 MICROGram(s) Oral daily  melatonin. 3 milliGRAM(s) Oral at bedtime  metoprolol succinate ER 25 milliGRAM(s) Oral daily  pantoprazole    Tablet 40 milliGRAM(s) Oral before breakfast  sertraline 100 milliGRAM(s) Oral daily  ticagrelor 60 milliGRAM(s) Oral every 12 hours    MEDICATIONS  (PRN):  acetaminophen     Tablet .. 650 milliGRAM(s) Oral every 6 hours PRN Temp greater or equal to 38C (100.4F), Mild Pain (1 - 3)  aluminum hydroxide/magnesium hydroxide/simethicone Suspension 30 milliLiter(s) Oral every 6 hours PRN Dyspepsia  artificial  tears Solution 1 Drop(s) Both EYES every 2 hours PRN eye itchiness  gabapentin 400 milliGRAM(s) Oral three times a day PRN anxiety  loperamide 2 milliGRAM(s) Oral every 6 hours PRN diarrhea  traZODone 75 milliGRAM(s) Oral at bedtime PRN insomnia  
MEDICATIONS  (STANDING):  aspirin enteric coated 81 milliGRAM(s) Oral daily  atorvastatin 80 milliGRAM(s) Oral at bedtime  famotidine    Tablet 20 milliGRAM(s) Oral daily  gabapentin 600 milliGRAM(s) Oral at bedtime  levothyroxine 112 MICROGram(s) Oral daily  melatonin. 3 milliGRAM(s) Oral at bedtime  metoprolol succinate ER 25 milliGRAM(s) Oral daily  mirtazapine 7.5 milliGRAM(s) Oral at bedtime  QUEtiapine 25 milliGRAM(s) Oral at bedtime  sertraline 50 milliGRAM(s) Oral daily  ticagrelor 60 milliGRAM(s) Oral every 12 hours    MEDICATIONS  (PRN):  acetaminophen     Tablet .. 650 milliGRAM(s) Oral every 6 hours PRN Temp greater or equal to 38C (100.4F), Mild Pain (1 - 3)  aluminum hydroxide/magnesium hydroxide/simethicone Suspension 30 milliLiter(s) Oral every 6 hours PRN Dyspepsia  gabapentin 400 milliGRAM(s) Oral three times a day PRN anxiety  hydrOXYzine hydrochloride 25 milliGRAM(s) Oral every 8 hours PRN anxiety if gabapentin ineffective  loperamide 2 milliGRAM(s) Oral two times a day PRN diarrhea  
MEDICATIONS  (STANDING):  aspirin enteric coated 81 milliGRAM(s) Oral daily  atorvastatin 80 milliGRAM(s) Oral at bedtime  escitalopram 5 milliGRAM(s) Oral daily  famotidine    Tablet 20 milliGRAM(s) Oral daily  gabapentin 400 milliGRAM(s) Oral two times a day  levothyroxine 112 MICROGram(s) Oral daily  melatonin. 3 milliGRAM(s) Oral at bedtime  metoprolol succinate ER 25 milliGRAM(s) Oral daily  mirtazapine 7.5 milliGRAM(s) Oral at bedtime  QUEtiapine 25 milliGRAM(s) Oral at bedtime  ticagrelor 60 milliGRAM(s) Oral every 12 hours    MEDICATIONS  (PRN):  acetaminophen     Tablet .. 650 milliGRAM(s) Oral every 6 hours PRN Temp greater or equal to 38C (100.4F), Mild Pain (1 - 3)  gabapentin 100 milliGRAM(s) Oral every 6 hours PRN anxiety/insomnia  
MEDICATIONS  (STANDING):  ARIPiprazole 5 milliGRAM(s) Oral daily  aspirin enteric coated 81 milliGRAM(s) Oral daily  atorvastatin 80 milliGRAM(s) Oral at bedtime  gabapentin 800 milliGRAM(s) Oral at bedtime  levothyroxine 112 MICROGram(s) Oral daily  melatonin. 3 milliGRAM(s) Oral at bedtime  metoprolol succinate ER 25 milliGRAM(s) Oral daily  pantoprazole    Tablet 40 milliGRAM(s) Oral before breakfast  sertraline 125 milliGRAM(s) Oral daily  ticagrelor 60 milliGRAM(s) Oral every 12 hours    MEDICATIONS  (PRN):  acetaminophen     Tablet .. 650 milliGRAM(s) Oral every 6 hours PRN Temp greater or equal to 38C (100.4F), Mild Pain (1 - 3)  aluminum hydroxide/magnesium hydroxide/simethicone Suspension 30 milliLiter(s) Oral every 6 hours PRN Dyspepsia  artificial  tears Solution 1 Drop(s) Both EYES every 2 hours PRN eye itchiness  gabapentin 300 milliGRAM(s) Oral three times a day PRN anxiety  loperamide 2 milliGRAM(s) Oral every 4 hours PRN diarrhea  loperamide 2 milliGRAM(s) Oral every 6 hours PRN diarrhea  traZODone 75 milliGRAM(s) Oral at bedtime PRN insomnia  
MEDICATIONS  (STANDING):  ARIPiprazole 5 milliGRAM(s) Oral daily  aspirin enteric coated 81 milliGRAM(s) Oral daily  atorvastatin 80 milliGRAM(s) Oral at bedtime  fluticasone propionate 50 MICROgram(s)/spray Nasal Spray 1 Spray(s) Both Nostrils two times a day  gabapentin 800 milliGRAM(s) Oral at bedtime  levothyroxine 112 MICROGram(s) Oral daily  melatonin. 6 milliGRAM(s) Oral at bedtime  metoprolol succinate ER 25 milliGRAM(s) Oral daily  pantoprazole    Tablet 40 milliGRAM(s) Oral before breakfast  sertraline 125 milliGRAM(s) Oral daily  ticagrelor 60 milliGRAM(s) Oral every 12 hours    MEDICATIONS  (PRN):  acetaminophen     Tablet .. 650 milliGRAM(s) Oral every 6 hours PRN Temp greater or equal to 38C (100.4F), Mild Pain (1 - 3)  aluminum hydroxide/magnesium hydroxide/simethicone Suspension 30 milliLiter(s) Oral every 6 hours PRN Dyspepsia  artificial  tears Solution 1 Drop(s) Both EYES every 2 hours PRN eye itchiness  Biotene Dry Mouth Oral Rinse 5 milliLiter(s) Swish and Spit every 6 hours PRN dry mouth  bismuth subsalicylate Liquid 30 milliLiter(s) Oral every 8 hours PRN diarrhea  gabapentin 300 milliGRAM(s) Oral three times a day PRN anxiety  loperamide 2 milliGRAM(s) Oral every 6 hours PRN diarrhea  loperamide 2 milliGRAM(s) Oral every 4 hours PRN diarrhea  traZODone 75 milliGRAM(s) Oral at bedtime PRN insomnia  
MEDICATIONS  (STANDING):  ARIPiprazole 5 milliGRAM(s) Oral daily  aspirin enteric coated 81 milliGRAM(s) Oral daily  atorvastatin 80 milliGRAM(s) Oral at bedtime  gabapentin 800 milliGRAM(s) Oral at bedtime  levothyroxine 112 MICROGram(s) Oral daily  melatonin. 6 milliGRAM(s) Oral at bedtime  metoprolol succinate ER 25 milliGRAM(s) Oral daily  pantoprazole    Tablet 40 milliGRAM(s) Oral before breakfast  sertraline 125 milliGRAM(s) Oral daily  ticagrelor 60 milliGRAM(s) Oral every 12 hours    MEDICATIONS  (PRN):  acetaminophen     Tablet .. 650 milliGRAM(s) Oral every 6 hours PRN Temp greater or equal to 38C (100.4F), Mild Pain (1 - 3)  aluminum hydroxide/magnesium hydroxide/simethicone Suspension 30 milliLiter(s) Oral every 6 hours PRN Dyspepsia  artificial  tears Solution 1 Drop(s) Both EYES every 2 hours PRN eye itchiness  gabapentin 300 milliGRAM(s) Oral three times a day PRN anxiety  loperamide 2 milliGRAM(s) Oral every 4 hours PRN diarrhea  loperamide 2 milliGRAM(s) Oral every 6 hours PRN diarrhea  traZODone 75 milliGRAM(s) Oral at bedtime PRN insomnia  
MEDICATIONS  (STANDING):  ARIPiprazole 5 milliGRAM(s) Oral daily  aspirin enteric coated 81 milliGRAM(s) Oral daily  atorvastatin 80 milliGRAM(s) Oral at bedtime  gabapentin 600 milliGRAM(s) Oral at bedtime  levothyroxine 112 MICROGram(s) Oral daily  melatonin. 3 milliGRAM(s) Oral at bedtime  metoprolol succinate ER 25 milliGRAM(s) Oral daily  pantoprazole    Tablet 40 milliGRAM(s) Oral before breakfast  sertraline 125 milliGRAM(s) Oral daily  ticagrelor 60 milliGRAM(s) Oral every 12 hours    MEDICATIONS  (PRN):  acetaminophen     Tablet .. 650 milliGRAM(s) Oral every 6 hours PRN Temp greater or equal to 38C (100.4F), Mild Pain (1 - 3)  aluminum hydroxide/magnesium hydroxide/simethicone Suspension 30 milliLiter(s) Oral every 6 hours PRN Dyspepsia  artificial  tears Solution 1 Drop(s) Both EYES every 2 hours PRN eye itchiness  gabapentin 300 milliGRAM(s) Oral three times a day PRN anxiety  loperamide 2 milliGRAM(s) Oral every 6 hours PRN diarrhea  loperamide 2 milliGRAM(s) Oral every 4 hours PRN diarrhea  traZODone 75 milliGRAM(s) Oral at bedtime PRN insomnia  
MEDICATIONS  (STANDING):  ARIPiprazole 7 milliGRAM(s) Oral daily  aspirin enteric coated 81 milliGRAM(s) Oral daily  atorvastatin 80 milliGRAM(s) Oral at bedtime  fluticasone propionate 50 MICROgram(s)/spray Nasal Spray 1 Spray(s) Both Nostrils two times a day  gabapentin 800 milliGRAM(s) Oral at bedtime  levothyroxine 112 MICROGram(s) Oral daily  melatonin. 6 milliGRAM(s) Oral at bedtime  metoprolol succinate ER 25 milliGRAM(s) Oral daily  mirtazapine 15 milliGRAM(s) Oral at bedtime  pantoprazole    Tablet 40 milliGRAM(s) Oral before breakfast  sertraline 150 milliGRAM(s) Oral daily  ticagrelor 60 milliGRAM(s) Oral every 12 hours    MEDICATIONS  (PRN):  acetaminophen     Tablet .. 650 milliGRAM(s) Oral every 6 hours PRN Temp greater or equal to 38C (100.4F), Mild Pain (1 - 3)  aluminum hydroxide/magnesium hydroxide/simethicone Suspension 30 milliLiter(s) Oral every 6 hours PRN Dyspepsia  artificial  tears Solution 1 Drop(s) Both EYES every 2 hours PRN eye itchiness  Biotene Dry Mouth Oral Rinse 5 milliLiter(s) Swish and Spit every 6 hours PRN dry mouth  bismuth subsalicylate Liquid 30 milliLiter(s) Oral every 8 hours PRN diarrhea  gabapentin 300 milliGRAM(s) Oral three times a day PRN anxiety  loperamide 2 milliGRAM(s) Oral every 6 hours PRN diarrhea  loperamide 2 milliGRAM(s) Oral every 4 hours PRN diarrhea  loperamide 2 milliGRAM(s) Oral every 6 hours PRN diarrhea  simethicone 80 milliGRAM(s) Chew two times a day PRN gas  
MEDICATIONS  (STANDING):  ARIPiprazole 2 milliGRAM(s) Oral daily  aspirin enteric coated 81 milliGRAM(s) Oral daily  atorvastatin 80 milliGRAM(s) Oral at bedtime  gabapentin 400 milliGRAM(s) Oral at bedtime  levothyroxine 112 MICROGram(s) Oral daily  melatonin. 3 milliGRAM(s) Oral at bedtime  metoprolol succinate ER 25 milliGRAM(s) Oral daily  pantoprazole    Tablet 40 milliGRAM(s) Oral before breakfast  sertraline 100 milliGRAM(s) Oral daily  ticagrelor 60 milliGRAM(s) Oral every 12 hours    MEDICATIONS  (PRN):  acetaminophen     Tablet .. 650 milliGRAM(s) Oral every 6 hours PRN Temp greater or equal to 38C (100.4F), Mild Pain (1 - 3)  aluminum hydroxide/magnesium hydroxide/simethicone Suspension 30 milliLiter(s) Oral every 6 hours PRN Dyspepsia  artificial  tears Solution 1 Drop(s) Both EYES every 2 hours PRN eye itchiness  gabapentin 400 milliGRAM(s) Oral three times a day PRN anxiety  loperamide 2 milliGRAM(s) Oral every 6 hours PRN diarrhea  traZODone 75 milliGRAM(s) Oral at bedtime PRN insomnia  
MEDICATIONS  (STANDING):  ARIPiprazole 5 milliGRAM(s) Oral daily  aspirin enteric coated 81 milliGRAM(s) Oral daily  atorvastatin 80 milliGRAM(s) Oral at bedtime  fluticasone propionate 50 MICROgram(s)/spray Nasal Spray 1 Spray(s) Both Nostrils two times a day  gabapentin 800 milliGRAM(s) Oral at bedtime  levothyroxine 112 MICROGram(s) Oral daily  melatonin. 6 milliGRAM(s) Oral at bedtime  metoprolol succinate ER 25 milliGRAM(s) Oral daily  pantoprazole    Tablet 40 milliGRAM(s) Oral before breakfast  sertraline 150 milliGRAM(s) Oral daily  ticagrelor 60 milliGRAM(s) Oral every 12 hours    MEDICATIONS  (PRN):  acetaminophen     Tablet .. 650 milliGRAM(s) Oral every 6 hours PRN Temp greater or equal to 38C (100.4F), Mild Pain (1 - 3)  aluminum hydroxide/magnesium hydroxide/simethicone Suspension 30 milliLiter(s) Oral every 6 hours PRN Dyspepsia  artificial  tears Solution 1 Drop(s) Both EYES every 2 hours PRN eye itchiness  Biotene Dry Mouth Oral Rinse 5 milliLiter(s) Swish and Spit every 6 hours PRN dry mouth  bismuth subsalicylate Liquid 30 milliLiter(s) Oral every 8 hours PRN diarrhea  gabapentin 300 milliGRAM(s) Oral three times a day PRN anxiety  loperamide 2 milliGRAM(s) Oral every 4 hours PRN diarrhea  loperamide 2 milliGRAM(s) Oral every 6 hours PRN diarrhea  traZODone 75 milliGRAM(s) Oral at bedtime PRN insomnia  
MEDICATIONS  (STANDING):  ARIPiprazole 2 milliGRAM(s) Oral daily  aspirin enteric coated 81 milliGRAM(s) Oral daily  atorvastatin 80 milliGRAM(s) Oral at bedtime  gabapentin 400 milliGRAM(s) Oral at bedtime  levothyroxine 112 MICROGram(s) Oral daily  melatonin. 3 milliGRAM(s) Oral at bedtime  metoprolol succinate ER 25 milliGRAM(s) Oral daily  pantoprazole    Tablet 40 milliGRAM(s) Oral before breakfast  sertraline 100 milliGRAM(s) Oral daily  ticagrelor 60 milliGRAM(s) Oral every 12 hours    MEDICATIONS  (PRN):  acetaminophen     Tablet .. 650 milliGRAM(s) Oral every 6 hours PRN Temp greater or equal to 38C (100.4F), Mild Pain (1 - 3)  aluminum hydroxide/magnesium hydroxide/simethicone Suspension 30 milliLiter(s) Oral every 6 hours PRN Dyspepsia  artificial  tears Solution 1 Drop(s) Both EYES every 2 hours PRN eye itchiness  gabapentin 400 milliGRAM(s) Oral three times a day PRN anxiety  loperamide 2 milliGRAM(s) Oral every 6 hours PRN diarrhea  traZODone 75 milliGRAM(s) Oral at bedtime PRN insomnia  
MEDICATIONS  (STANDING):  aspirin enteric coated 81 milliGRAM(s) Oral daily  atorvastatin 80 milliGRAM(s) Oral at bedtime  escitalopram 5 milliGRAM(s) Oral daily  famotidine    Tablet 20 milliGRAM(s) Oral daily  gabapentin 400 milliGRAM(s) Oral two times a day  levothyroxine 112 MICROGram(s) Oral daily  melatonin. 3 milliGRAM(s) Oral at bedtime  metoprolol succinate ER 25 milliGRAM(s) Oral daily  mirtazapine 7.5 milliGRAM(s) Oral at bedtime  QUEtiapine 25 milliGRAM(s) Oral at bedtime  ticagrelor 60 milliGRAM(s) Oral every 12 hours    MEDICATIONS  (PRN):  acetaminophen     Tablet .. 650 milliGRAM(s) Oral every 6 hours PRN Temp greater or equal to 38C (100.4F), Mild Pain (1 - 3)  gabapentin 100 milliGRAM(s) Oral every 6 hours PRN anxiety/insomnia

## 2024-10-23 VITALS — RESPIRATION RATE: 18 BRPM | TEMPERATURE: 97 F

## 2024-10-23 PROCEDURE — 90832 PSYTX W PT 30 MINUTES: CPT

## 2024-10-23 PROCEDURE — 99238 HOSP IP/OBS DSCHRG MGMT 30/<: CPT

## 2024-10-23 RX ADMIN — ARIPIPRAZOLE 7 MILLIGRAM(S): 2 TABLET ORAL at 08:25

## 2024-10-23 RX ADMIN — Medication 81 MILLIGRAM(S): at 08:25

## 2024-10-23 RX ADMIN — Medication 112 MICROGRAM(S): at 06:43

## 2024-10-23 RX ADMIN — Medication 2 MILLIGRAM(S): at 09:35

## 2024-10-23 RX ADMIN — PANTOPRAZOLE SODIUM 40 MILLIGRAM(S): 40 TABLET, DELAYED RELEASE ORAL at 08:25

## 2024-10-23 RX ADMIN — SERTRALINE HYDROCHLORIDE 150 MILLIGRAM(S): 50 TABLET, FILM COATED ORAL at 08:25

## 2024-10-23 RX ADMIN — Medication 25 MILLIGRAM(S): at 08:25

## 2024-10-23 RX ADMIN — TICAGRELOR 60 MILLIGRAM(S): 90 TABLET ORAL at 08:25
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
## 2024-10-23 NOTE — BH INPATIENT PSYCHIATRY DISCHARGE NOTE - NSDCMRMEDTOKEN_GEN_ALL_CORE_FT
acetaminophen 325 mg oral tablet: 2 tab(s) orally every 6 hours as needed for pain or fever  ARIPiprazole 5 mg oral tablet: 1.5 tab(s) orally once a day  aspirin 81 mg oral delayed release tablet: 1 tab(s) orally once a day  atorvastatin 80 mg oral tablet: 1 tab(s) orally once a day (at bedtime)  Brilinta (ticagrelor) 60 mg oral tablet: 1 tab(s) orally 2 times a day  fluticasone 50 mcg/inh nasal spray: 1 spray(s) in each nostril 2 times a day  gabapentin 800 mg oral tablet: 1 tab(s) orally once a day (at bedtime)  levothyroxine 112 mcg (0.112 mg) oral tablet: 1 tab(s) orally once a day  melatonin 3 mg oral tablet: 2 tab(s) orally once a day (at bedtime)  metoprolol succinate 25 mg oral tablet, extended release: 1 tab(s) orally once a day  mirtazapine 15 mg oral tablet: 1 tab(s) orally once a day (at bedtime)  pantoprazole 40 mg oral delayed release tablet: 1 tab(s) orally once a day  sertraline 50 mg oral tablet: 3 tab(s) orally once a day

## 2024-10-23 NOTE — BH PSYCHOLOGY - CLINICIAN PSYCHOTHERAPY NOTE - NSBHPSYCHOLINT_PSY_A_CORE
Cognitive/behavioral therapy/Dynamic issues addressed/Supported coping skills/Supportive therapy/other...
Cognitive/behavioral therapy/Encourage medication compliance/Supported coping skills/Supportive therapy/Treatment compliance encouraged
Cognitive/behavioral therapy/Dynamic issues addressed/Supported coping skills/Supportive therapy/other...
Cognitive/behavioral therapy/Encourage medication compliance/Problem-solving techniques discussed/Supportive therapy/Treatment compliance encouraged
Cognitive/behavioral therapy/Encourage medication compliance/Supportive therapy/Treatment compliance encouraged
Cognitive/behavioral therapy/Dynamic issues addressed/Supported coping skills/Supportive therapy/other...

## 2024-10-23 NOTE — BH INPATIENT PSYCHIATRY DISCHARGE NOTE - NSBHFUPINTERVALHXFT_PSY_A_CORE
Patient denies SI. Reports feeling anxious in anticipation of having to see her son. She's nervous he won't bring her things and that he might not pick her up. Reports fair sleep. Denies adverse effects of medications. Denies AH.

## 2024-10-23 NOTE — BH INPATIENT PSYCHIATRY DISCHARGE NOTE - DESCRIPTION
, retired , recently living with her son Retired  who was living with  and on and off until his passing August 2024.  had reportedly moved out many years ago and intermittently they would live together in [place] at his place. She kept her apartment in [place] but that it's unliveable there at this time. After death of , she moved in with her son. She has a h/o relying on her  and was not able to live alone and son also had serious concerns about her safety given her ambien addiction with numerous prior overdoses. Patient reports having fair financial means. There seems to be interpersonal conflict with her son who she finds controlling. Son did avoid bringing in her ID, debit card, other financial information during this admission.
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
## 2024-10-23 NOTE — BH INPATIENT PSYCHIATRY DISCHARGE NOTE - NSDCCPCAREPLAN_GEN_ALL_CORE_FT
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

## 2024-10-23 NOTE — BH INPATIENT PSYCHIATRY DISCHARGE NOTE - NSBHDCSIGEVENTSFT_PSY_A_CORE
Patient declined substance abuse treatment referral. She did minimize need for addictions treatment.     Of note, patient did have some cognitive deficits. Per Nidia roland, MOCA was 23/30 on 7/24/24 with deficits in orientation 4/6, delayed recall 2/5, visuospatial functioning 3/5 and attention 2/4. SLUMS assessment today on discharge was 23/30 - lost points for recall 2/5, calculation 1/3, story recall 6/8. Similar in results. Patient was anxious and possibly had reduced effort, likely an underestimate.

## 2024-10-23 NOTE — BH PSYCHOLOGY - CLINICIAN PSYCHOTHERAPY NOTE - NSBHPSYCHOLBILLFAM_PSY_A_CORE
[id number] - 38 to 52 minutes
[id number] - 16 to 37 minutes
[id number] - 16 to 37 minutes
21540 - 16 to 37 minutes
39025 - 38 to 52 minutes
73846 - 16 to 37 minutes
62318 - 16 to 37 minutes
[id number] - 38 to 52 minutes

## 2024-10-23 NOTE — BH PSYCHOLOGY - CLINICIAN PSYCHOTHERAPY NOTE - TOKEN PULL-DIAGNOSIS
Primary Diagnosis:  MDD (major depressive disorder) [F32.9]        Problem Dx:   Hypothyroidism [E03.9]      H/O malignant neoplasm of breast [Z85.3]      Major depression [F32.9]      Swelling of lower extremity [M79.89]      HTN (hypertension) [I10]      CAD (coronary artery disease) [I25.10]      Severe zolpidem use disorder [F13.20]      

## 2024-10-23 NOTE — BH PSYCHOLOGY - CLINICIAN PSYCHOTHERAPY NOTE - NSBHPSYCHOLRESPONSE_PSY_A_CORE
Coping skills acquired/Accepted support
Coping skills acquired/Insight displayed/Accepted support
Accepted support
Coping skills acquired/Insight displayed/Accepted support
Insight displayed/Accepted support
Insight displayed/Accepted support
Coping skills acquired/Insight displayed/Accepted support
Coping skills acquired/Insight displayed/Accepted support

## 2024-10-23 NOTE — BH PSYCHOLOGY - CLINICIAN PSYCHOTHERAPY NOTE - NSBHPSYCHOLPROBS_PSY_ALL_CORE
Depression/Other...
Depression/Substance Abuse/Other...
Anxiety/Depression
Anxiety/Depression
Depression/Substance Abuse/Other...
Anxiety/Depression

## 2024-10-23 NOTE — BH INPATIENT PSYCHIATRY DISCHARGE NOTE - HOSPITAL COURSE
CL team treated patient with lexapro 10mg po daily, seroquel 25mg po qhs, gabapentin 400mg po bid, remeron 15mg po qhs. Patient was a poor historian and it seems there was inadequate collateral information available to clarify home med list. Per records obtained by writer, patient had been last discharged from [facility] (7/21/24-8/9/24) and was discharged on lexapro 10mg po daily, gabapentin 400mg po bid, seroquel 50mg daily and 200mg po qhs, trazodone 50mg po qhs. Patient was provided a month's worth of prescription and was instructed to go to [facility] for follow up. Patient was unaware of the follow up appointment and did not get around to seeing her prior psychiatrist Dr. Sparrow since discharge either. She was not able to detail why she had kept supplies of ambien after it was deliberately discontinued at [facility], particularly given she had been hospitalized after overdosing on Ambien again. She was also unable to describe what she did with treatment after having run out of medications provided on discharge from [facility].     Based on the above information, seroquel was discontinued, assuming lack of benefit, either for insomnia or anxiety. Patient herself denied she was getting adequate sleep on regimen set by CL team. Due to recent QTc prolongation, lexapro was discontinued and she was transitioned to sertraline and titrated up to 150mg po daily. Abilify was added as adjunct treatment though patient later reported hearing music in her head - unclear if this was clear hallucinations. Abilify was titrated to 7.5mg po daily. Can be increased as an outpatient if needed. Patient was very distressed about insomnia though it was clear insomnia was a part of a larger depression/anxiety syndrome. She was encouraged to focus on treatment of mood after which insomnia may resolve on its own but she was very persistent in requesting sleep medications. Once it was determined that she would be going to an [facility] in which medications would be monitored closely, writer was able to increase reliance on sedating medications for immediate treatment of insomnia. Remeron and seroquel were discontinued and started on trazodone which was titrated to 75mg po qhs prn. Gabapentin was also adjusted by switching to HS dosing - received 800mg po qhs. Patient denied adequate improvement so trazodone was switched back to remeron 15mg po qhs. Patient did have some improvement and was less obsessive about insomnia. It's very likely that sertraline/abilify treatment had been effective for mood and sleep improved as well as a result as she had been on remeron while on  service without success.     By the last week of her admission here, patient was more calm, visible, participating in groups and demonstrated less helplessness and anticipatory anxiety. Catastrophic thoughts were much less frequent and affect became brighter. There was no SI during this admission. Patient tolerated all medications well.

## 2024-10-23 NOTE — BH INPATIENT PSYCHIATRY DISCHARGE NOTE - NSBHDCRISKMITIGATEFT_PSY_ALL_CORE
Patient has multiple prior overdose incidents that were not suicidal in nature rendering her to be at chronic risk for self harm.

## 2024-10-23 NOTE — BH PSYCHOLOGY - CLINICIAN PSYCHOTHERAPY NOTE - NSBHPSYCHOLINT_PSY_A_CORE FT
Acceptance and Commitment therapy 

## 2024-10-23 NOTE — BH INPATIENT PSYCHIATRY DISCHARGE NOTE - NSBHASSESSSUMMFT_PSY_ALL_CORE
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 Ryan Sparrow, h/o prescription sedative abuse, multiple prior overdoses (patient denies suicide attempts but contradicts chart), initially bib EMS after son found her with altered mental status after having taken more than prescribed ambien in addition to drinking alcohol. Patient states alcohol consumption was accidental. Patient was admitted to [facility] briefly due to abnormal EKG but has since been medically cleared and transferred to our unit for ongoing treatment. Son strongly advocated for inpatient treatment due to concerns about dangerousness to self given her repeated patterns of unintentional overdose and patient minimizing need for treatment. Demonstrated significant anxiety, catastrophic thinking, depressed mood and insomnia with distress about insomnia that was out of proportion to poor sleep. Patient has improved since treatment initiated. Will discharge as planned today.

## 2024-10-23 NOTE — BH PSYCHOLOGY - CLINICIAN PSYCHOTHERAPY NOTE - NSTXOTHERGOAL_PSY_ALL_CORE
patient will accept bereavement counseling 

## 2024-10-23 NOTE — BH PSYCHOLOGY - CLINICIAN PSYCHOTHERAPY NOTE - NSTXSUBMISGOAL_PSY_ALL_CORE
Will verbalize 3 adverse consequences of use

## 2024-10-23 NOTE — BH INPATIENT PSYCHIATRY DISCHARGE NOTE - NSBHDCMEDICALFT_PSY_A_CORE
Patient was continued on home meds. She had a brief period of LE swelling, d/l but this resolved on its own. Intermittently she also reported diarrhea but stool culture was -ve. No abnormal color.
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
## 2024-10-23 NOTE — BH PSYCHOLOGY - CLINICIAN PSYCHOTHERAPY NOTE - NSBHPSYCHOLGOALS_PSY_A_CORE
Decrease symptoms/Improve social/vocational/coping skills/Psychoeducation
Decrease symptoms/Improve social/vocational/coping skills/Psychoeducation
Decrease symptoms/Improve level of independent functioning/Treatment compliance
Decrease symptoms/Improve social/vocational/coping skills/Psychoeducation
Decrease symptoms/Improve social/vocational/coping skills/Psychoeducation
Decrease symptoms/Improve level of independent functioning/Treatment compliance
Decrease symptoms/Improve level of independent functioning/Treatment compliance
Decrease symptoms/Improve social/vocational/coping skills/Psychoeducation/Treatment compliance

## 2024-10-23 NOTE — BH INPATIENT PSYCHIATRY DISCHARGE NOTE - NSBHMETABOLIC_PSY_ALL_CORE_FT
BMI: BMI (kg/m2): 28.4 (09-22-24 @ 19:00)  HbA1c: A1C with Estimated Average Glucose Result: 5.6 % (09-21-24 @ 06:39)    Glucose: POCT Blood Glucose.: 80 mg/dL (08-12-24 @ 19:41)    BP: --Vital Signs Last 24 Hrs  T(C): 36.3 (10-23-24 @ 08:02), Max: 36.3 (10-23-24 @ 08:02)  T(F): 97.4 (10-23-24 @ 08:02), Max: 97.4 (10-23-24 @ 08:02)  HR: --  BP: --  BP(mean): --  RR: 18 (10-23-24 @ 08:02) (18 - 18)  SpO2: --    Orthostatic VS  10-23-24 @ 08:02  Lying BP: --/-- HR: --  Sitting BP: 111/73 HR: 75  Standing BP: 96/69 HR: 70  Site: upper left arm  Mode: electronic  Orthostatic VS  10-22-24 @ 06:13  Lying BP: 90/52 HR: 60  Sitting BP: --/-- HR: --  Standing BP: --/-- HR: --  Site: --  Mode: --    Lipid Panel: Date/Time: 09-21-24 @ 06:32  Cholesterol, Serum: 95  LDL Cholesterol Calculated: 34  HDL Cholesterol, Serum: 44  Total Cholesterol/HDL Ration Measurement: --  Triglycerides, Serum: 82

## 2024-10-23 NOTE — BH INPATIENT PSYCHIATRY DISCHARGE NOTE - MSE UNSTRUCTURED FT
Appearance: adequate grooming, hygiene, no abnormal involuntary movements noted  Behavior: attentive, no psychomotor agitation   Speech: wnl  Mood: anxious  Affect: congruent, stable, reactive  Thought process: linear, logical  Thought content: no delusions elicited, anticipatory anxiety noted; denies SI/HI  Perceptual disturbances: denies AH/VH  Cognition: well oriented, limited recall (see below)  Insight: partially intact  Judgement: adequate

## 2024-10-23 NOTE — BH PSYCHOLOGY - CLINICIAN PSYCHOTHERAPY NOTE - NSBHPTASSESSDT_PSY_A_CORE
07-Oct-2024 18:14
23-Oct-2024 08:50
18-Oct-2024 08:40
14-Oct-2024 02:20
11-Oct-2024 13:45
21-Oct-2024 11:05
16-Oct-2024 01:15
09-Oct-2024 14:15

## 2024-10-24 PROBLEM — I10 ESSENTIAL (PRIMARY) HYPERTENSION: Chronic | Status: ACTIVE | Noted: 2024-09-11

## 2025-04-28 NOTE — PHYSICAL THERAPY INITIAL EVALUATION ADULT - RANGE OF MOTION EXAMINATION, REHAB EVAL
No answer, unable to leave voicemail- call disconnects after short period.   
no ROM deficits were identified

## 2025-06-20 NOTE — BH CONSULTATION LIAISON PROGRESS NOTE - NSBHMSEMOOD_PSY_A_CORE
Depressed
Constitutional: Well developed, well nourished. NAD  Head: Normocephalic, atraumatic.  Eyes: PERRL, EOMI.  ENT: No nasal discharge. Mucous membranes dry.  Neck: Supple. Painless ROM.  Cardiovascular:  Regular rate and rhythm.    Breast: attending present: + redness, swelling fluctuance noted to right breast superior to nipple; no nipple discharge;   Pulmonary:   Lungs clear to auscultation bilaterally   Abdominal: Soft. Nondistended. No rebound, guarding, rigidity.  Extremities. Pelvis stable. No lower extremity edema, symmetric calves.  Skin: No rashes, cyanosis.  Neuro: AAOx3. No focal neurological deficits.  Psych: Normal mood. Normal affect.
Anxious
Depressed